# Patient Record
Sex: FEMALE | Race: ASIAN | NOT HISPANIC OR LATINO | Employment: UNEMPLOYED | URBAN - METROPOLITAN AREA
[De-identification: names, ages, dates, MRNs, and addresses within clinical notes are randomized per-mention and may not be internally consistent; named-entity substitution may affect disease eponyms.]

---

## 2017-10-23 ENCOUNTER — OFFICE VISIT (OUTPATIENT)
Dept: PEDIATRICS | Facility: CLINIC | Age: 57
End: 2017-10-23
Payer: COMMERCIAL

## 2017-10-23 VITALS
OXYGEN SATURATION: 96 % | SYSTOLIC BLOOD PRESSURE: 115 MMHG | HEIGHT: 63 IN | BODY MASS INDEX: 19.95 KG/M2 | WEIGHT: 112.6 LBS | HEART RATE: 65 BPM | DIASTOLIC BLOOD PRESSURE: 81 MMHG | TEMPERATURE: 97.4 F

## 2017-10-23 DIAGNOSIS — Z83.49 FAMILY HISTORY OF THYROID DISEASE: ICD-10-CM

## 2017-10-23 DIAGNOSIS — R94.6 ABNORMAL FINDING ON THYROID FUNCTION TEST: ICD-10-CM

## 2017-10-23 DIAGNOSIS — Z23 NEEDS FLU SHOT: ICD-10-CM

## 2017-10-23 DIAGNOSIS — M54.12 CERVICAL RADICULOPATHY: Primary | ICD-10-CM

## 2017-10-23 DIAGNOSIS — M62.838 NECK MUSCLE SPASM: ICD-10-CM

## 2017-10-23 DIAGNOSIS — M50.30 DDD (DEGENERATIVE DISC DISEASE), CERVICAL: ICD-10-CM

## 2017-10-23 DIAGNOSIS — M54.2 NECK PAIN ON LEFT SIDE: ICD-10-CM

## 2017-10-23 PROCEDURE — 90686 IIV4 VACC NO PRSV 0.5 ML IM: CPT | Performed by: FAMILY MEDICINE

## 2017-10-23 PROCEDURE — 90471 IMMUNIZATION ADMIN: CPT | Performed by: FAMILY MEDICINE

## 2017-10-23 PROCEDURE — 99203 OFFICE O/P NEW LOW 30 MIN: CPT | Mod: 25 | Performed by: FAMILY MEDICINE

## 2017-10-23 RX ORDER — TIZANIDINE 2 MG/1
2 TABLET ORAL
Qty: 30 TABLET | Refills: 0 | Status: SHIPPED | OUTPATIENT
Start: 2017-10-23 | End: 2018-05-22

## 2017-10-23 ASSESSMENT — ANXIETY QUESTIONNAIRES
1. FEELING NERVOUS, ANXIOUS, OR ON EDGE: MORE THAN HALF THE DAYS
7. FEELING AFRAID AS IF SOMETHING AWFUL MIGHT HAPPEN: SEVERAL DAYS
3. WORRYING TOO MUCH ABOUT DIFFERENT THINGS: MORE THAN HALF THE DAYS
5. BEING SO RESTLESS THAT IT IS HARD TO SIT STILL: SEVERAL DAYS
GAD7 TOTAL SCORE: 12
6. BECOMING EASILY ANNOYED OR IRRITABLE: MORE THAN HALF THE DAYS
2. NOT BEING ABLE TO STOP OR CONTROL WORRYING: MORE THAN HALF THE DAYS

## 2017-10-23 ASSESSMENT — PATIENT HEALTH QUESTIONNAIRE - PHQ9
SUM OF ALL RESPONSES TO PHQ QUESTIONS 1-9: 13
5. POOR APPETITE OR OVEREATING: MORE THAN HALF THE DAYS

## 2017-10-23 NOTE — PATIENT INSTRUCTIONS
[facility] [place]  Get the flu shot todfay  Schedule for fasting labs and physical in 4 weeks  Take TIZANIDINE at night for neck spasms  Recommended  local ice and heat, avoid triggering activities, tylenol or Ibuprofen AS needed for pain and FOLLOW UP FOR persistent or worsening concerns in 4weeks.    Understanding Cervical Radiculopathy    Cervical radiculopathy is irritation or inflammation of a nerve root in the neck. It causes neck pain and other symptoms that may spread into the chest or down the arm. To understand this condition, it helps to understand the parts of the spine:    Vertebrae. These are bones that stack to form the spine. The cervical spine contains the 7 vertebrae in the neck.    Disks. These are soft pads of tissue between the vertebrae. They act as shock absorbers for the spine.    The spinal canal. This is a tunnel formed within the stacked vertebrae. The spinal cord runs through this canal.    Nerves. These branch off the spinal cord. As they leave the spinal canal, nerves pass through openings between the vertebrae. The nerve root is the part of the nerve that is closest to the spinal cord.   With cervical radiculopathy, nerve roots in the neck become irritated. This leads to pain and symptoms that can travel to the nerves that go from the spinal cord down the arms and into the torso.  What causes cervical radiculopathy?  Aging, injury, poor posture, and other issues can lead to problems in the neck. These problems may then irritate nerve roots. These include:    Damage to a disk in the cervical spine. The damaged disk may then press on nearby nerve roots.    Degeneration from wear and tear, and aging. This can lead to narrowing (stenosis) of the openings between the vertebrae. The narrowed openings press on nerve roots as they leave the spinal canal.    An unstable spine. This is when a vertebra slips forward. It can then press on a nerve root.  There are other, less common  causes of pressure on nerves in the neck. These include infection, cysts, and tumors.  Symptoms of cervical radiculopathy  These include:    Neck pain    Pain, numbness, tingling, or weakness that travels down the arm    Loss of neck movement    Muscle spasms  Treatment for cervical radiculopathy  In most cases, your healthcare provider will first try treatments that help relieve symptoms. These may include:    Prescription or over-the-counter pain medicines. These help relieve pain and swelling.    Cold packs. These help reduce pain.    Resting. This involves avoiding positions and activities that increase pain.    Neck brace (cervical collar). This can help relieve inflammation and pain.    Physical therapy, including exercises and stretches. This can help decrease pain and increase movement and function.    Shots of medicinesaround the nerve roots. This is done to help relieve symptoms for a time.  In some cases, your healthcare provider may advise surgery to fix the underlying problem. This depends on the cause, the symptoms, and how long the pain has lasted.  Possible complications  Over time, an irritated and inflamed nerve may become damaged. This may lead to long-lasting (permanent) numbness or weakness. If symptoms change suddenly or get worse, be sure to let your healthcare provider know.     When to call your healthcare provider  Call your healthcare provider right away if you have any of these:    New pain or pain that gets worse    New or increasing weakness, numbness, or tingling in your arm or hand    Bowel or bladder changes   Date Last Reviewed: 3/10/2016    5171-3301 [employer]. [address] [place] [address]. All rights reserved. This information is not intended as a substitute for professional medical care. Always follow your healthcare professional's instructions.        Cervical Disk Problems  The spine has 3 natural curves. The cervical curve is located in the neck.  It forms the top part of the spine. For this reason, it is also called the cervical spine. This sheet tells you more about the parts of the cervical spine and damaged disks. This is a common problem that can affect the cervical spine, but most people don't need surgery for this.   A healthy cervical spine  The spine is made up of the following:      Vertebrae. These are bones stacked like building blocks that make up the spine. The neck contains the first 7 vertebrae of the spine.    Disks. These are small pads of tissue that lie between the vertebrae. They help cushion and protect the vertebrae when you move.    The spinal cord. This runs through a large central opening (spinal canal) formed by the vertebrae.    Nerves. These branch from the spinal cord and carry messages to the body.    Foramina. These are smaller openings in the vertebrae. Nerves travel to your arms and other parts of your body from the spinal cord through these openings.  Damaged disks in the cervical spine    One of the most common cervical spine problems is a damaged disk. A disk may be injured by a sudden movement, causing a disk to bulge or break open (herniate). Or a disk may wear out slowly over time (degenerate). A worn-out disk may become so flat that the vertebrae above and below it touch or slip back and forth. As disks wear out, abnormal bone growths (bone spurs) can form on the vertebrae. Bone spurs can also form in the foramina, causing them to narrow (stenosis). If your healthcare provider suspects that you have a damaged disk, tests may be done to confirm the problem, such as an MRI, CT, or EMG. Your healthcare provider will then work with you to plan treatment as needed.   Date Last Reviewed: 10/4/2015    [id number] [employer]. [address] [place] [address], [place] [address]. All rights reserved. This information is not intended as a substitute for professional medical care. Always follow your healthcare professional's  instructions.        Reach and Hold Exercise       Do this exercise on your hands and knees. Keep your knees under your hips and your hands under your shoulders. Keep your spine in a neutral position (not arched or sagging). Keep your ears in line with your shoulders. Hold for a few seconds before starting the exercise:  1. Tighten your abdominal muscles and raise one arm straight in front of you, palm down. Hold for 5 seconds, then lower. Repeat 5 times.  2. Do the exercise again, this time lifting your arm to the side. Repeat 5 times.  3. Do the exercise again, this time lifting your arm backward, palm up. Repeat 5 times.  Switch sides and do each exercise with the other arm.  Date Last Reviewed: 8/16/2015 2000-2017 [employer]. [address] [place] [address]. All rights reserved. This information is not intended as a substitute for professional medical care. Always follow your healthcare professional's instructions.        Shoulder Clock Exercise    To start, stand tall with your ears, shoulders, and hips in line. Your feet should be slightly apart, positioned just under your hips. Focus your eyes directly in front of you.  this position for a few seconds before starting your exercise. This helps increase your awareness of proper posture.    Imagine that your right shoulder is the center of a clock. With the outer point of your shoulder, roll it around to slowly trace the outer edge of the clock.    Move clockwise first, then counterclockwise.    Repeat 3 to 5 times. Switch shoulders.   Date Last Reviewed: 10/2/2015    [id number] [employer]. [address] [place] [address]. All rights reserved. This information is not intended as a substitute for professional medical care. Always follow your healthcare professional's instructions.        Shoulder and Upper Back Stretch  To start, stand tall with your ears, shoulders, and hips in line. Your feet should be  slightly apart, positioned just under your hips. Focus your eyes directly in front of you.  this position for a few seconds before starting your exercise. This helps increase your awareness of proper posture.          Reach overhead and slightly back with both arms. Keep your shoulders and neck aligned and your elbows behind your shoulders:    With your palms facing the ceiling, turn your fingers inward.    Take a deep breath. Breathe out, and lower your elbows toward your buttocks. Hold for 5 seconds, then return to starting position.    Repeat 3 times.  Date Last Reviewed: 8/16/2015 2000-2017 [employer]. [address] [place] [address]. All rights reserved. This information is not intended as a substitute for professional medical care. Always follow your healthcare professional's instructions.        Neck Exercises: Active Neck Rotation    To start, lie on your back, knees bent and feet flat on the floor. Keep your ears, shoulders, and hips aligned, but don t press your lower back to the floor. Rest your hands on your pelvis. Breathe deeply and relax.    Use your neck muscles to turn your head to one side until you feel a stretch in the muscles.    Hold for 5 seconds. Then turn to the other side.    Repeat 5 times on each side.  Note: Keep your shoulders on the floor. Don t lift or tuck your chin as you turn your head.  Date Last Reviewed: 8/16/2015 2000-2017 [employer]. [address] [place] [address]. All rights reserved. This information is not intended as a substitute for professional medical care. Always follow your healthcare professional's instructions.        Neck Exercises: Arm Lift            To start, lie on your back, knees bent and feet flat on the floor. Keep your ears, shoulders, and hips aligned, but don t press your lower back to the floor. Rest your hands on your pelvis. Breathe deeply and relax. Tighten the abdominal muscles to keep  the back from arching.    Raise one arm overhead, then lower it. As you lower that arm, raise the other arm.    Continue to move both arms in slow, smooth arcs. Keep your arms straight and your head and neck relaxed.    Repeat 10 times with each arm.  For your safety, check with your healthcare provider before starting an exercise program.   Date Last Reviewed: 8/16/2015 2000-2017 [employer] [address] [place] [address], [place] [address]. All rights reserved. This information is not intended as a substitute for professional medical care. Always follow your healthcare professional's instructions.

## 2017-10-23 NOTE — MR AVS SNAPSHOT
After Visit Summary   10/23/2017    Jaimie Melvin    MRN: [id number]           Patient Information     Date Of Birth          1960        Visit Information        Provider Department      10/23/2017 2:10 PM Toni Harkins MD [facility]        Today's Diagnoses     Abnormal finding on thyroid function test    -  1    Family history of thyroid disease        Neck pain on left side        Cervical radiculopathy        DDD (degenerative disc disease), cervical        Neck muscle spasm          Care Instructions    KADEEM [facility] [place] records form [place]  Get the flu shot todfay  Schedule for fasting labs and physical in 4 weeks  Take TIZANIDINE at night for neck spasms  Recommended  local ice and heat, avoid triggering activities, tylenol or Ibuprofen AS needed for pain and FOLLOW UP FOR persistent or worsening concerns in 4weeks.    Understanding Cervical Radiculopathy    Cervical radiculopathy is irritation or inflammation of a nerve root in the neck. It causes neck pain and other symptoms that may spread into the chest or down the arm. To understand this condition, it helps to understand the parts of the spine:    Vertebrae. These are bones that stack to form the spine. The cervical spine contains the 7 vertebrae in the neck.    Disks. These are soft pads of tissue between the vertebrae. They act as shock absorbers for the spine.    The spinal canal. This is a tunnel formed within the stacked vertebrae. The spinal cord runs through this canal.    Nerves. These branch off the spinal cord. As they leave the spinal canal, nerves pass through openings between the vertebrae. The nerve root is the part of the nerve that is closest to the spinal cord.   With cervical radiculopathy, nerve roots in the neck become irritated. This leads to pain and symptoms that can travel to the nerves that go from the spinal cord down the arms and into the torso.  What causes cervical radiculopathy?  Aging,  injury, poor posture, and other issues can lead to problems in the neck. These problems may then irritate nerve roots. These include:    Damage to a disk in the cervical spine. The damaged disk may then press on nearby nerve roots.    Degeneration from wear and tear, and aging. This can lead to narrowing (stenosis) of the openings between the vertebrae. The narrowed openings press on nerve roots as they leave the spinal canal.    An unstable spine. This is when a vertebra slips forward. It can then press on a nerve root.  There are other, less common causes of pressure on nerves in the neck. These include infection, cysts, and tumors.  Symptoms of cervical radiculopathy  These include:    Neck pain    Pain, numbness, tingling, or weakness that travels down the arm    Loss of neck movement    Muscle spasms  Treatment for cervical radiculopathy  In most cases, your healthcare provider will first try treatments that help relieve symptoms. These may include:    Prescription or over-the-counter pain medicines. These help relieve pain and swelling.    Cold packs. These help reduce pain.    Resting. This involves avoiding positions and activities that increase pain.    Neck brace (cervical collar). This can help relieve inflammation and pain.    Physical therapy, including exercises and stretches. This can help decrease pain and increase movement and function.    Shots of medicinesaround the nerve roots. This is done to help relieve symptoms for a time.  In some cases, your healthcare provider may advise surgery to fix the underlying problem. This depends on the cause, the symptoms, and how long the pain has lasted.  Possible complications  Over time, an irritated and inflamed nerve may become damaged. This may lead to long-lasting (permanent) numbness or weakness. If symptoms change suddenly or get worse, be sure to let your healthcare provider know.     When to call your healthcare provider  Call your healthcare provider  right away if you have any of these:    New pain or pain that gets worse    New or increasing weakness, numbness, or tingling in your arm or hand    Bowel or bladder changes   Date Last Reviewed: 3/10/2016    5807-2527 [employer]. [address] [place] [address]. All rights reserved. This information is not intended as a substitute for professional medical care. Always follow your healthcare professional's instructions.        Cervical Disk Problems  The spine has 3 natural curves. The cervical curve is located in the neck. It forms the top part of the spine. For this reason, it is also called the cervical spine. This sheet tells you more about the parts of the cervical spine and damaged disks. This is a common problem that can affect the cervical spine, but most people don't need surgery for this.   A healthy cervical spine  The spine is made up of the following:      Vertebrae. These are bones stacked like building blocks that make up the spine. The neck contains the first 7 vertebrae of the spine.    Disks. These are small pads of tissue that lie between the vertebrae. They help cushion and protect the vertebrae when you move.    The spinal cord. This runs through a large central opening (spinal canal) formed by the vertebrae.    Nerves. These branch from the spinal cord and carry messages to the body.    Foramina. These are smaller openings in the vertebrae. Nerves travel to your arms and other parts of your body from the spinal cord through these openings.  Damaged disks in the cervical spine    One of the most common cervical spine problems is a damaged disk. A disk may be injured by a sudden movement, causing a disk to bulge or break open (herniate). Or a disk may wear out slowly over time (degenerate). A worn-out disk may become so flat that the vertebrae above and below it touch or slip back and forth. As disks wear out, abnormal bone growths (bone spurs) can form on the vertebrae.  Bone spurs can also form in the foramina, causing them to narrow (stenosis). If your healthcare provider suspects that you have a damaged disk, tests may be done to confirm the problem, such as an MRI, CT, or EMG. Your healthcare provider will then work with you to plan treatment as needed.   Date Last Reviewed: 10/4/2015    3343-7216 [employer]. [address] [place] [address]. All rights reserved. This information is not intended as a substitute for professional medical care. Always follow your healthcare professional's instructions.        Reach and Hold Exercise       Do this exercise on your hands and knees. Keep your knees under your hips and your hands under your shoulders. Keep your spine in a neutral position (not arched or sagging). Keep your ears in line with your shoulders. Hold for a few seconds before starting the exercise:  1. Tighten your abdominal muscles and raise one arm straight in front of you, palm down. Hold for 5 seconds, then lower. Repeat 5 times.  2. Do the exercise again, this time lifting your arm to the side. Repeat 5 times.  3. Do the exercise again, this time lifting your arm backward, palm up. Repeat 5 times.  Switch sides and do each exercise with the other arm.  Date Last Reviewed: 8/16/2015 2000-2017 [employer]. [address] [place] [address]. All rights reserved. This information is not intended as a substitute for professional medical care. Always follow your healthcare professional's instructions.        Shoulder Clock Exercise    To start, stand tall with your ears, shoulders, and hips in line. Your feet should be slightly apart, positioned just under your hips. Focus your eyes directly in front of you.  this position for a few seconds before starting your exercise. This helps increase your awareness of proper posture.    Imagine that your right shoulder is the center of a clock. With the outer point of your  shoulder, roll it around to slowly trace the outer edge of the clock.    Move clockwise first, then counterclockwise.    Repeat 3 to 5 times. Switch shoulders.   Date Last Reviewed: 10/2/2015    6351-4227 [employer]. [address] [place] [address]. All rights reserved. This information is not intended as a substitute for professional medical care. Always follow your healthcare professional's instructions.        Shoulder and Upper Back Stretch  To start, stand tall with your ears, shoulders, and hips in line. Your feet should be slightly apart, positioned just under your hips. Focus your eyes directly in front of you.  this position for a few seconds before starting your exercise. This helps increase your awareness of proper posture.          Reach overhead and slightly back with both arms. Keep your shoulders and neck aligned and your elbows behind your shoulders:    With your palms facing the ceiling, turn your fingers inward.    Take a deep breath. Breathe out, and lower your elbows toward your buttocks. Hold for 5 seconds, then return to starting position.    Repeat 3 times.  Date Last Reviewed: 8/16/2015 2000-2017 [employer]. [address] [place] [address]. All rights reserved. This information is not intended as a substitute for professional medical care. Always follow your healthcare professional's instructions.        Neck Exercises: Active Neck Rotation    To start, lie on your back, knees bent and feet flat on the floor. Keep your ears, shoulders, and hips aligned, but don t press your lower back to the floor. Rest your hands on your pelvis. Breathe deeply and relax.    Use your neck muscles to turn your head to one side until you feel a stretch in the muscles.    Hold for 5 seconds. Then turn to the other side.    Repeat 5 times on each side.  Note: Keep your shoulders on the floor. Don t lift or tuck your chin as you turn your head.  Date  Last Reviewed: 8/16/2015 2000-2017 [employer]. [address] [place] [address]. All rights reserved. This information is not intended as a substitute for professional medical care. Always follow your healthcare professional's instructions.        Neck Exercises: Arm Lift            To start, lie on your back, knees bent and feet flat on the floor. Keep your ears, shoulders, and hips aligned, but don t press your lower back to the floor. Rest your hands on your pelvis. Breathe deeply and relax. Tighten the abdominal muscles to keep the back from arching.    Raise one arm overhead, then lower it. As you lower that arm, raise the other arm.    Continue to move both arms in slow, smooth arcs. Keep your arms straight and your head and neck relaxed.    Repeat 10 times with each arm.  For your safety, check with your healthcare provider before starting an exercise program.   Date Last Reviewed: 8/16/2015 2000-2017 [employer]. [address] [place] [address]. All rights reserved. This information is not intended as a substitute for professional medical care. Always follow your healthcare professional's instructions.                Follow-ups after your visit        Who to contact     If you have questions or need follow up information about today's clinic visit or your schedule please contact [facility] directly at [phone].  Normal or non-critical lab and imaging results will be communicated to you by MyChart, letter or phone within 4 business days after the clinic has received the results. If you do not hear from us within 7 days, please contact the clinic through MyChart or phone. If you have a critical or abnormal lab result, we will notify you by phone as soon as possible.  Submit refill requests through [employer] or call your pharmacy and they will forward the refill request to us. Please allow 3 business days for your refill to be completed.  "         Additional Information About Your Visit        [employer] Information     Knowable is an electronic gateway that provides easy, online access to your medical records. With Knowable, you can request a clinic appointment, read your test results, renew a prescription or communicate with your care team.     To sign up for Knowable visit the website at [email or web address]   You will be asked to enter the access code listed below, as well as some personal information. Please follow the directions to create your username and password.     Your access code is: [email or web address]  Expires: 2018  2:28 PM     Your access code will  in 90 days. If you need help or a new code, please contact your [facility] or call [phone] for assistance.        Care EveryWhere ID     This is your Care EveryWhere ID. This could be used by other organizations to access your [facility] medical records  [id number]        Your Vitals Were     Pulse Temperature Height Pulse Oximetry BMI (Body Mass Index)       65 97.4  F (36.3  C) (Temporal) 5' 2.5\" (1.588 m) 96% 20.27 kg/m2        Blood Pressure from Last 3 Encounters:   10/23/17 115/81    Weight from Last 3 Encounters:   10/23/17 112 lb 9.6 oz (51.1 kg)              Today, you had the following     No orders found for display         Today's Medication Changes          These changes are accurate as of: 10/23/17  2:28 PM.  If you have any questions, ask your nurse or doctor.               Start taking these medicines.        Dose/Directions    tiZANidine 2 MG tablet   Commonly known as:  ZANAFLEX   Used for:  Neck pain on left side, Cervical radiculopathy, DDD (degenerative disc disease), cervical, Neck muscle spasm   Started by:  Toni Harkins MD        Dose:  2 mg   Take 1 tablet (2 mg) by mouth nightly as needed for muscle spasms   Quantity:  30 tablet   Refills:  0            Where to get your medicines      These medications were sent to " [facility] - [place], [place] - [address], [address]  [address], [facility] [address]     Phone:  [phone]     tiZANidine 2 MG tablet                Primary Care Provider Office Phone # Fax #    [facility] [phone]       [address]  [facility] [address]        Equal Access to Services     CALLIE ALONZO : Hadii aad ku hadasho Soomaali, waaxda luqadaha, qaybta kaalmada adeegyada, waxay idiin hayaan adeeg kharash la'aan . [facility] [phone].    ATENCIÓN: Si habla español, tiene a ken disposición servicios gratuitos de asistencia lingüística. [facility] [phone].    We comply with applicable federal civil rights laws and [place] laws. We do not discriminate on the basis of race, color, national origin, age, disability, sex, sexual orientation, or gender identity.            Thank you!     Thank you for choosing [facility]  for your care. Our goal is always to provide you with excellent care. Hearing back from our patients is one way we can continue to improve our services. Please take a few minutes to complete the written survey that you may receive in the mail after your visit with us. Thank you!             Your Updated Medication List - Protect others around you: Learn how to safely use, store and throw away your medicines at www.disposemymeds.org.          This list is accurate as of: 10/23/17  2:28 PM.  Always use your most recent med list.                   Brand Name Dispense Instructions for use Diagnosis    tiZANidine 2 MG tablet    ZANAFLEX    30 tablet    Take 1 tablet (2 mg) by mouth nightly as needed for muscle spasms    Neck pain on left side, Cervical radiculopathy, DDD (degenerative disc disease), cervical, Neck muscle spasm

## 2017-10-23 NOTE — PROGRESS NOTES
SUBJECTIVE:   Jaimie Melvin is a 57 year old female who presents to clinic today for the following health issues:    This is a new patient to this provider, here today  To establish care  Patient recently moved from [place], [place], living with her sister in [place]  Past medical history is Significant for cervical degenerative disc disease, family history of thyroid disease. 4 sisters with hyperthyroidism,Personal history of having abnormal thyroid function test results  Denies cold or heat intolerance, weight loss or gain, change in bowel movements, hair loss or thinning, chest pain, palpitations, SOB, edema legs or eyes, dry skin, memory problems.    She is also here with concerns of having Pain on both sides of the neck muscles left worse than the right associated with some tingling and numbness in both the upper extremities and with no associated new onset of bladder or bowel incontinence.  Patient denies headaches, recent history of fall or trauma      Concern - Establish Care/Thyroid Concerns/Pain on the left/right side of her neck.  Patient states her 4 sisters have hyperthyroidism and she would like to be checked.  Onset: 2 days ago    Description:   Pain on the left side of her neck.      Intensity: 3-4/10    Progression of Symptoms:  same    Accompanying Signs & Symptoms:  Patient states she has bilateral shoulder pain    Previous history of similar problem:   Yes    Precipitating factors:   Worsened by: Lifting things.    Alleviating factors:  Improved by: Applying heat/tylenol    Therapies Tried and outcome: Seen by Neurologist in [place] diagnosed with arthritis.          Problem list and histories reviewed & adjusted, as indicated.  Additional history: as documented    Patient Active Problem List   Diagnosis     DDD (degenerative disc disease), cervical     Cervical radiculopathy     Neck pain on left side     History reviewed. No pertinent surgical history.    Social History   Substance Use Topics  "    Smoking status: Never Smoker     Smokeless tobacco: Never Used     Alcohol use No     Family History   Problem Relation Age of Onset     Colon Cancer Mother      Lung Cancer Father      Lung Cancer Brother      Thyroid Disease Sister          Current Outpatient Prescriptions   Medication Sig Dispense Refill     tiZANidine (ZANAFLEX) 2 MG tablet Take 1 tablet (2 mg) by mouth nightly as needed for muscle spasms 30 tablet 0     Not on File  No lab results found.   BP Readings from Last 3 Encounters:   10/23/17 115/81    Wt Readings from Last 3 Encounters:   10/23/17 112 lb 9.6 oz (51.1 kg)                  Labs reviewed in EPIC          Reviewed and updated as needed this visit by clinical staffTobacco  Allergies  Meds  Med Hx  Surg Hx  Fam Hx  Soc Hx      Reviewed and updated as needed this visit by Provider         ROS:  C: NEGATIVE for fever, chills, change in weight  INTEGUMENTARY/SKIN: NEGATIVE for worrisome rashes, moles or lesions  R: NEGATIVE for significant cough or SOB  CV: NEGATIVE for chest pain, palpitations or peripheral edema  GI: NEGATIVE for nausea, abdominal pain, heartburn, or change in bowel habits  MUSCULOSKELETAL: as above  NEURO: as above  ENDOCRINE: as above  HEME/ALLERGY/IMMUNE: NEGATIVE for bleeding problems  PSYCHIATRIC: NEGATIVE for changes in mood or affect    OBJECTIVE:     /81  Pulse 65  Temp 97.4  F (36.3  C) (Temporal)  Ht 5' 2.5\" (1.588 m)  Wt 112 lb 9.6 oz (51.1 kg)  SpO2 96%  BMI 20.27 kg/m2  Body mass index is 20.27 kg/(m^2).  GENERAL: healthy, alert and no distress  NECK: no adenopathy, no asymmetry, masses, or scars and thyroid normal to palpation  RESP: lungs clear to auscultation - no rales, rhonchi or wheezes  CV: regular rate and rhythm, normal S1 S2, no S3 or S4, no murmur, click or rub, no peripheral edema and peripheral pulses strong  MS: Minimal to moderate tenderness over the Trapezius muscles on both sides, no cervical spine tenderness, slightly " reduced range of motion during flexion and extension for muscle spasm  SKIN: no suspicious lesions or rashes  NEURO: Normal strength and tone, mentation intact and speech normal  BACK: no CVA tenderness, no paralumbar tenderness  Comprehensive back pain exam:  No tenderness, Range of motion not limited by pain, Lower extremity strength functional and equal on both sides, Lower extremity reflexes within normal limits bilaterally, Lower extremity sensation normal and equal on both sides and Straight leg raise negative bilaterally  PSYCH: mentation appears normal, affect normal/bright    Diagnostic Test Results:  none     ASSESSMENT/PLAN:             1. Cervical radiculopathy  Per patient's report, she had a cervical MRI done 2 months ago at the previous clinic  Will send KADEEM for old records and reports  Prescription given for tizanidine to use at night as needed for muscle spasm, will try over-the-counter Aleve or ibuprofen as needed for  Pain  Patient education handouts on neck rehabilitation exercises  Will recheck at the visit for physical in 4 weeks or sooner if needed  Consider spine versus sports consult based on the MRI results  Dosing and potential medication side effects discussed.  Patient verbalised understanding and is agreeable to the plan.    - tiZANidine (ZANAFLEX) 2 MG tablet; Take 1 tablet (2 mg) by mouth nightly as needed for muscle spasms  Dispense: 30 tablet; Refill: 0    2. Neck pain on left side  as above    - tiZANidine (ZANAFLEX) 2 MG tablet; Take 1 tablet (2 mg) by mouth nightly as needed for muscle spasms  Dispense: 30 tablet; Refill: 0    3. DDD (degenerative disc disease), cervical  as above    - tiZANidine (ZANAFLEX) 2 MG tablet; Take 1 tablet (2 mg) by mouth nightly as needed for muscle spasms  Dispense: 30 tablet; Refill: 0    4. Abnormal finding on thyroid function test   patient has been getting thyroid labs in 6 months for monitoring due to significant family history and personal  history of abnormal thyroid functions  Will have labs done at the time of physical in 4 weeks      5. Family history of thyroid disease  as above      6. Neck muscle spasm  as above    - tiZANidine (ZANAFLEX) 2 MG tablet; Take 1 tablet (2 mg) by mouth nightly as needed for muscle spasms  Dispense: 30 tablet; Refill: 0    Chart documentation done in part with Dragon Voice recognition Software. Although reviewed after completion, some word and grammatical error may remain.    per patient's report, she had a colonoscopy a few years ago but less than 10 years ago-normal  Had a mammogram 2 years ago-normal  Had a normal Pap last year-from [place]  We will wait for old records and reports for review    See Patient Instructions    Toni Harkins MD  [facility]

## 2017-10-23 NOTE — NURSING NOTE
"Chief Complaint   Patient presents with     Establish Care     Family history of thyroid issues       Initial /81  Pulse 65  Temp 97.4  F (36.3  C) (Temporal)  Ht 5' 2.5\" (1.588 m)  Wt 112 lb 9.6 oz (51.1 kg)  SpO2 96%  BMI 20.27 kg/m2 Estimated body mass index is 20.27 kg/(m^2) as calculated from the following:    Height as of this encounter: 5' 2.5\" (1.588 m).    Weight as of this encounter: 112 lb 9.6 oz (51.1 kg).  Medication Reconciliation: complete     Patricia Sam CMA  "

## 2017-10-23 NOTE — NURSING NOTE
Injectable Influenza Immunization Documentation    1.  Is the person to be vaccinated sick today?  No    2. Does the person to be vaccinated have an allergy to eggs or to a component of the vaccine?  No    3. Has the person to be vaccinated today ever had a serious reaction to influenza vaccine in the past?  No    4. Has the person to be vaccinated ever had Guillain-Cl[place]ond[place] syndrome?  No     Form completed by Salome DECKER

## 2017-10-24 ASSESSMENT — ANXIETY QUESTIONNAIRES: GAD7 TOTAL SCORE: 12

## 2017-11-04 DIAGNOSIS — Z13.6 CARDIOVASCULAR SCREENING; LDL GOAL LESS THAN 160: ICD-10-CM

## 2017-11-04 DIAGNOSIS — Z00.00 ROUTINE GENERAL MEDICAL EXAMINATION AT A HEALTH CARE FACILITY: Primary | ICD-10-CM

## 2017-11-04 DIAGNOSIS — Z11.59 NEED FOR HEPATITIS C SCREENING TEST: ICD-10-CM

## 2017-11-04 DIAGNOSIS — Z13.29 SCREENING FOR THYROID DISORDER: ICD-10-CM

## 2017-11-04 DIAGNOSIS — Z13.1 SCREENING FOR DIABETES MELLITUS (DM): ICD-10-CM

## 2017-11-04 DIAGNOSIS — Z13.0 SCREENING FOR DEFICIENCY ANEMIA: ICD-10-CM

## 2017-11-06 DIAGNOSIS — Z13.0 SCREENING FOR DEFICIENCY ANEMIA: ICD-10-CM

## 2017-11-06 DIAGNOSIS — Z00.00 ROUTINE GENERAL MEDICAL EXAMINATION AT A HEALTH CARE FACILITY: Primary | ICD-10-CM

## 2017-11-08 DIAGNOSIS — Z13.1 SCREENING FOR DIABETES MELLITUS (DM): ICD-10-CM

## 2017-11-08 DIAGNOSIS — Z13.6 CARDIOVASCULAR SCREENING; LDL GOAL LESS THAN 160: ICD-10-CM

## 2017-11-08 DIAGNOSIS — Z00.00 ROUTINE GENERAL MEDICAL EXAMINATION AT A HEALTH CARE FACILITY: ICD-10-CM

## 2017-11-08 DIAGNOSIS — Z13.29 SCREENING FOR THYROID DISORDER: ICD-10-CM

## 2017-11-08 DIAGNOSIS — Z11.59 NEED FOR HEPATITIS C SCREENING TEST: ICD-10-CM

## 2017-11-08 DIAGNOSIS — Z13.0 SCREENING FOR DEFICIENCY ANEMIA: ICD-10-CM

## 2017-11-08 LAB
ALBUMIN SERPL-MCNC: 3.8 G/DL (ref 3.4–5)
ALP SERPL-CCNC: 43 U/L (ref 40–150)
ALT SERPL W P-5'-P-CCNC: 14 U/L (ref 0–50)
ANION GAP SERPL CALCULATED.3IONS-SCNC: 5 MMOL/L (ref 3–14)
AST SERPL W P-5'-P-CCNC: 11 U/L (ref 0–45)
BASOPHILS # BLD AUTO: 0 10E9/L (ref 0–0.2)
BASOPHILS NFR BLD AUTO: 0.4 %
BILIRUB SERPL-MCNC: 0.5 MG/DL (ref 0.2–1.3)
BUN SERPL-MCNC: 10 MG/DL (ref 7–30)
CALCIUM SERPL-MCNC: 8.2 MG/DL (ref 8.5–10.1)
CHLORIDE SERPL-SCNC: 108 MMOL/L (ref 94–109)
CHOLEST SERPL-MCNC: 230 MG/DL
CO2 SERPL-SCNC: 29 MMOL/L (ref 20–32)
CREAT SERPL-MCNC: 0.63 MG/DL (ref 0.52–1.04)
DIFFERENTIAL METHOD BLD: NORMAL
EOSINOPHIL # BLD AUTO: 0.1 10E9/L (ref 0–0.7)
EOSINOPHIL NFR BLD AUTO: 1.5 %
ERYTHROCYTE [DISTWIDTH] IN BLOOD BY AUTOMATED COUNT: 12.4 % (ref 10–15)
GFR SERPL CREATININE-BSD FRML MDRD: >90 ML/MIN/1.7M2
GLUCOSE SERPL-MCNC: 84 MG/DL (ref 70–99)
HCT VFR BLD AUTO: 44.1 % (ref 35–47)
HCV AB SERPL QL IA: NONREACTIVE
HDLC SERPL-MCNC: 77 MG/DL
HGB BLD-MCNC: 15 G/DL (ref 11.7–15.7)
LDLC SERPL CALC-MCNC: 132 MG/DL
LYMPHOCYTES # BLD AUTO: 1.4 10E9/L (ref 0.8–5.3)
LYMPHOCYTES NFR BLD AUTO: 29.3 %
MCH RBC QN AUTO: 31.8 PG (ref 26.5–33)
MCHC RBC AUTO-ENTMCNC: 34 G/DL (ref 31.5–36.5)
MCV RBC AUTO: 93 FL (ref 78–100)
MONOCYTES # BLD AUTO: 0.3 10E9/L (ref 0–1.3)
MONOCYTES NFR BLD AUTO: 5.6 %
NEUTROPHILS # BLD AUTO: 2.9 10E9/L (ref 1.6–8.3)
NEUTROPHILS NFR BLD AUTO: 63.2 %
NONHDLC SERPL-MCNC: 153 MG/DL
PLATELET # BLD AUTO: 225 10E9/L (ref 150–450)
POTASSIUM SERPL-SCNC: 3.6 MMOL/L (ref 3.4–5.3)
PROT SERPL-MCNC: 7.4 G/DL (ref 6.8–8.8)
RBC # BLD AUTO: 4.72 10E12/L (ref 3.8–5.2)
SODIUM SERPL-SCNC: 142 MMOL/L (ref 133–144)
TRIGL SERPL-MCNC: 104 MG/DL
TSH SERPL DL<=0.005 MIU/L-ACNC: 0.68 MU/L (ref 0.4–4)
WBC # BLD AUTO: 4.6 10E9/L (ref 4–11)

## 2017-11-08 PROCEDURE — 36415 COLL VENOUS BLD VENIPUNCTURE: CPT | Performed by: FAMILY MEDICINE

## 2017-11-08 PROCEDURE — 80050 GENERAL HEALTH PANEL: CPT | Performed by: FAMILY MEDICINE

## 2017-11-08 PROCEDURE — 80061 LIPID PANEL: CPT | Performed by: FAMILY MEDICINE

## 2017-11-08 PROCEDURE — 86803 HEPATITIS C AB TEST: CPT | Performed by: FAMILY MEDICINE

## 2017-11-14 ENCOUNTER — OFFICE VISIT (OUTPATIENT)
Dept: PEDIATRICS | Facility: CLINIC | Age: 57
End: 2017-11-14
Payer: COMMERCIAL

## 2017-11-14 VITALS
HEIGHT: 63 IN | OXYGEN SATURATION: 96 % | WEIGHT: 114.6 LBS | BODY MASS INDEX: 20.3 KG/M2 | SYSTOLIC BLOOD PRESSURE: 114 MMHG | DIASTOLIC BLOOD PRESSURE: 78 MMHG | HEART RATE: 64 BPM | TEMPERATURE: 97.5 F

## 2017-11-14 DIAGNOSIS — Z12.11 SCREENING FOR COLON CANCER: ICD-10-CM

## 2017-11-14 DIAGNOSIS — F43.23 ADJUSTMENT DISORDER WITH MIXED ANXIETY AND DEPRESSED MOOD: ICD-10-CM

## 2017-11-14 DIAGNOSIS — Z00.01 ENCOUNTER FOR GENERAL ADULT MEDICAL EXAMINATION WITH ABNORMAL FINDINGS: Primary | ICD-10-CM

## 2017-11-14 DIAGNOSIS — Z23 NEED FOR TDAP VACCINATION: ICD-10-CM

## 2017-11-14 DIAGNOSIS — Z12.39 BREAST CANCER SCREENING: ICD-10-CM

## 2017-11-14 DIAGNOSIS — Z12.4 SCREENING FOR CERVICAL CANCER: ICD-10-CM

## 2017-11-14 DIAGNOSIS — M50.30 DDD (DEGENERATIVE DISC DISEASE), CERVICAL: ICD-10-CM

## 2017-11-14 DIAGNOSIS — M54.12 CERVICAL RADICULOPATHY: ICD-10-CM

## 2017-11-14 PROBLEM — Z83.49 FAMILY HISTORY OF THYROID DISEASE: Status: ACTIVE | Noted: 2017-11-14

## 2017-11-14 PROCEDURE — 90471 IMMUNIZATION ADMIN: CPT | Performed by: FAMILY MEDICINE

## 2017-11-14 PROCEDURE — 99396 PREV VISIT EST AGE 40-64: CPT | Mod: 25 | Performed by: FAMILY MEDICINE

## 2017-11-14 PROCEDURE — G0476 HPV COMBO ASSAY CA SCREEN: HCPCS | Performed by: FAMILY MEDICINE

## 2017-11-14 PROCEDURE — G0123 SCREEN CERV/VAG THIN LAYER: HCPCS | Performed by: FAMILY MEDICINE

## 2017-11-14 PROCEDURE — 90715 TDAP VACCINE 7 YRS/> IM: CPT | Performed by: FAMILY MEDICINE

## 2017-11-14 RX ORDER — MULTIVIT WITH MINERALS/LUTEIN
1 TABLET ORAL DAILY
COMMUNITY
Start: 2017-11-14 | End: 2023-01-01

## 2017-11-14 ASSESSMENT — PATIENT HEALTH QUESTIONNAIRE - PHQ9
5. POOR APPETITE OR OVEREATING: SEVERAL DAYS
SUM OF ALL RESPONSES TO PHQ QUESTIONS 1-9: 10

## 2017-11-14 ASSESSMENT — ANXIETY QUESTIONNAIRES
7. FEELING AFRAID AS IF SOMETHING AWFUL MIGHT HAPPEN: SEVERAL DAYS
GAD7 TOTAL SCORE: 7
5. BEING SO RESTLESS THAT IT IS HARD TO SIT STILL: SEVERAL DAYS
2. NOT BEING ABLE TO STOP OR CONTROL WORRYING: SEVERAL DAYS
1. FEELING NERVOUS, ANXIOUS, OR ON EDGE: SEVERAL DAYS
3. WORRYING TOO MUCH ABOUT DIFFERENT THINGS: SEVERAL DAYS
6. BECOMING EASILY ANNOYED OR IRRITABLE: SEVERAL DAYS

## 2017-11-14 ASSESSMENT — PAIN SCALES - GENERAL: PAINLEVEL: MODERATE PAIN (5)

## 2017-11-14 NOTE — PROGRESS NOTES
SUBJECTIVE:   CC: Jaimie Melvin is an 57 year old woman who presents for preventive health visit.     Healthy Habits:    Do you get at least three servings of calcium containing foods daily (dairy, green leafy vegetables, etc.)? yes    Amount of exercise or daily activities, outside of work: 3-4 day(s) per week    Problems taking medications regularly No    Medication side effects: No    Have you had an eye exam in the past two years? no    Do you see a dentist twice per year? yes    Do you have sleep apnea, excessive snoring or daytime drowsiness?no              Today's PHQ-2 Score:   PHQ-2 ( 1999 [employer]) 11/14/2017 10/23/2017   Q1: Little interest or pleasure in doing things 3 0   Q2: Feeling down, depressed or hopeless 3 3   PHQ-2 Score 6 3         Abuse: Current or Past(Physical, Sexual or Emotional)- No  Do you feel safe in your environment - Yes  Social History   Substance Use Topics     Smoking status: Never Smoker     Smokeless tobacco: Never Used     Alcohol use No     The patient does not drink >3 drinks per day nor >7 drinks per week.    Reviewed orders with patient.  Reviewed health maintenance and updated orders accordingly - Yes  Labs reviewed in EPIC  BP Readings from Last 3 Encounters:   11/14/17 114/78   10/23/17 115/81    Wt Readings from Last 3 Encounters:   11/14/17 114 lb 9.6 oz (52 kg)   10/23/17 112 lb 9.6 oz (51.1 kg)                  Patient Active Problem List   Diagnosis     DDD (degenerative disc disease), cervical     Cervical radiculopathy     Neck pain on left side     CARDIOVASCULAR SCREENING; LDL GOAL LESS THAN 160     Adjustment disorder with mixed anxiety and depressed mood     History reviewed. No pertinent surgical history.    Social History   Substance Use Topics     Smoking status: Never Smoker     Smokeless tobacco: Never Used     Alcohol use No     Family History   Problem Relation Age of Onset     Colon Cancer Mother      Lung Cancer Father      Lung Cancer Brother       Thyroid Disease Sister          Current Outpatient Prescriptions   Medication Sig Dispense Refill     BIOTIN PO Take 1 tablet by mouth daily       VITAMIN D, CHOLECALCIFEROL, PO Take 5,000 Units by mouth daily       Multiple Vitamins-Minerals (CENTRUM SILVER) per tablet Take 1 tablet by mouth daily       Misc Natural Products (OSTEO BI-FLEX JOINT SHIELD PO) Take 1 tablet by mouth daily       tiZANidine (ZANAFLEX) 2 MG tablet Take 1 tablet (2 mg) by mouth nightly as needed for muscle spasms 30 tablet 0     No Known Allergies  Recent Labs   Lab Test  11/08/17   0736   LDL  132*   HDL  77   TRIG  104   ALT  14   CR  0.63   GFRESTIMATED  >90   GFRESTBLACK  >90   POTASSIUM  3.6   TSH  0.68              Patient over age 50, mutual decision to screen reflected in health maintenance.      Pertinent mammograms are reviewed under the imaging tab.  History of abnormal Pap smear: NO - age 30- 65 PAP every 3 years recommended    Reviewed and updated as needed this visit by clinical staffTobacco  Allergies  Meds  Med Hx  Surg Hx  Fam Hx  Soc Hx        Reviewed and updated as needed this visit by Provider          Past Medical History:   Diagnosis Date     Arthritis      Depressive disorder       History reviewed. No pertinent surgical history.  Obstetric History     No data available            ROS:  C: NEGATIVE for fever, chills, change in weight  I: NEGATIVE for worrisome rashes, moles or lesions  E: NEGATIVE for vision changes or irritation  ENT: NEGATIVE for ear, mouth and throat problems  R: NEGATIVE for significant cough or SOB  B: NEGATIVE for masses, tenderness or discharge  CV: NEGATIVE for chest pain, palpitations or peripheral edema  GI: NEGATIVE for nausea, abdominal pain, heartburn, or change in bowel habits  : NEGATIVE for unusual urinary or vaginal symptoms. No vaginal bleeding.  MUSCULOSKELETAL:Slightly improved but persistent left-sided neck pain  With cervical radiculopathy, was seen a few weeks ago for  "the same, improved on muscle relaxer  N: NEGATIVE for weakness, dizziness or paresthesias  E: NEGATIVE for temperature intolerance, skin/hair changes  H: NEGATIVE for bleeding problems  PSYCHIATRIC: Ongoing concerns for anxiety and depression secondary to prolonged grief from loss of her  in January/2017, moved from [facility] to live close to her sister     OBJECTIVE:   /78 (BP Location: Right arm, Patient Position: Sitting, Cuff Size: Adult Regular)  Pulse 64  Temp 97.5  F (36.4  C) (Oral)  Ht 5' 2.5\" (1.588 m)  Wt 114 lb 9.6 oz (52 kg)  SpO2 96%  BMI 20.63 kg/m2  EXAM:  GENERAL APPEARANCE: healthy, alert and no distress  EYES: Eyes grossly normal to inspection, PERRL and conjunctivae and sclerae normal  HENT: ear canals and TM's normal, nose and mouth without ulcers or lesions, oropharynx clear and oral mucous membranes moist  NECK: no adenopathy, no asymmetry, masses, or scars and thyroid normal to palpation  RESP: lungs clear to auscultation - no rales, rhonchi or wheezes  BREAST: normal without masses, tenderness or nipple discharge and no palpable axillary masses or adenopathy  CV: regular rate and rhythm, normal S1 S2, no S3 or S4, no murmur, click or rub, no peripheral edema and peripheral pulses strong  ABDOMEN: soft, nontender, no hepatosplenomegaly, no masses and bowel sounds normal   (female): normal female external genitalia, normal urethral meatus, vaginal mucosal atrophy noted, normal cervix, adnexae, and uterus without masses or abnormal discharge  MS: no musculoskeletal defects are noted and gait is age appropriate without ataxia  SKIN: no suspicious lesions or rashes  NEURO: Normal strength and tone, sensory exam grossly normal, mentation intact and speech normal  PSYCH: mentation appears normal and tearful    ASSESSMENT/PLAN:   1. Encounter for general adult medical examination with abnormal findings  Discussed on regular exercises, daily calcium intake, healthy eating, " self breast exams monthly and routine dental checks  - MA Screening Digital Bilateral; Future  - Pap imaged thin layer screen with HPV - recommended age 30 - 65 years (select HPV order below)  - HPV High Risk Types DNA Cervical  - TDAP VACCINE (ADACEL)    2. Adjustment disorder with mixed anxiety and depressed mood  Patient was tearful today, and stress from recent and loss of  in January  She is trying to talk to her  at [employer], family and friends had good social support system  Offered to talk to [facility] today, patient is not interested at this time  Reviewed her PHQ-9, she did mention about not wanting to live but denies suicidal ideations or previous attempts  Patient is not interested in seeking any medical treatment or counseling at this time  She will monitor, follow-up p.r.n.  - DEPRESSION ACTION PLAN (DAP)    3. Cervical radiculopathy  Persistent pain, be started on physical therapy and consult sports medicine for further evaluation including possible cervical MRA  - SPORTS MEDICINE REFERRAL  - [facility]    4. DDD (degenerative disc disease), cervical  as above    - SPORTS MEDICINE REFERRAL  - [facility]    5. Breast cancer screening    - MA Screening Digital Bilateral; Future    6. Screening for cervical cancer    - Pap imaged thin layer screen with HPV - recommended age 30 - 65 years (select HPV order below)  - HPV High Risk Types DNA Cervical    7. Need for Tdap vaccination    - TDAP VACCINE (ADACEL)    8. Screening for colon cancer  The patient's report, she had a normal colonoscopy 2 years ago in [place], [place]      COUNSELING:   Reviewed preventive health counseling, as reflected in patient instructions  Special attention given to:        Regular exercise       Healthy diet/nutrition       Vision screening       Immunizations    Vaccinated for: TDAP           Osteoporosis Prevention/Bone Health       Colon cancer screening       Consider  "Hep C screening for patients born between 1945 and 1965       (Nelly)menopause management       The 10-year ASCVD risk score (Shanna GOODWIN Jr, et al., 2013) is: 1.9%    Values used to calculate the score:      Age: 57 years      Sex: Female      Is Non- : No      Diabetic: No      Tobacco smoker: No      Systolic Blood Pressure: 114 mmHg      Is BP treated: No      HDL Cholesterol: 77 mg/dL      Total Cholesterol: 230 mg/dL       Advance Care Planning           reports that she has never smoked. She has never used smokeless tobacco.    Estimated body mass index is 20.63 kg/(m^2) as calculated from the following:    Height as of this encounter: 5' 2.5\" (1.588 m).    Weight as of this encounter: 114 lb 9.6 oz (52 kg).         Counseling Resources:  ATP IV Guidelines  Pooled Cohorts Equation Calculator  Breast Cancer Risk Calculator  FRAX Risk Assessment  ICSI Preventive Guidelines  Dietary Guidelines for Americans, 2010  USDA's MyPlate  ASA Prophylaxis  Lung CA Screening    Toni Harkins MD  [facility]  Chart documentation done in part with Dragon Voice recognition Software. Although reviewed after completion, some word and grammatical error may remain.    "

## 2017-11-14 NOTE — LETTER
November 25, 2017    Jaimie Melvin  [address] [facility] [address]    Dear Jaimie,  We are happy to inform you that your PAP smear result from 11/14/17 is normal.  We are now able to do a follow up test on PAP smears. The DNA test is for HPV (Human Papilloma Virus). Cervical cancer is closely linked with certain types of HPV. Your result showed no evidence of high risk HPV.  Therefore we recommend you return in 3 years for your next pap smear.  You will still need to return to the clinic every year for an annual exam and other preventive tests.  Please contact the clinic at [phone] with any questions.  Sincerely,    Toni Harkins MD/arash

## 2017-11-14 NOTE — LETTER
My Depression Action Plan  Name: Jaimie Melvin   Date of Birth 1960  Date: 11/14/2017    My doctor: [facility]   My clinic: [address] [address] [phone]          GREEN    ZONE   Good Control    What it looks like:     Things are going generally well. You have normal up s and down s. You may even feel depressed from time to time, but bad moods usually last less than a day.   What you need to do:  1. Continue to care for yourself (see self care plan)  2. Check your depression survival kit and update it as needed  3. Follow your physician s recommendations including any medication.  4. Do not stop taking medication unless you consult with your physician first.           YELLOW         ZONE Getting Worse    What it looks like:     Depression is starting to interfere with your life.     It may be hard to get out of bed; you may be starting to isolate yourself from others.    Symptoms of depression are starting to last most all day and this has happened for several days.     You may have suicidal thoughts but they are not constant.   What you need to do:     1. Call your care team, your response to treatment will improve if you keep your care team informed of your progress. Yellow periods are signs an adjustment may need to be made.     2. Continue your self-care, even if you have to fake it!    3. Talk to someone in your support network    4. Open up your depression survival kit           RED    ZONE Medical Alert - Get Help    What it looks like:     Depression is seriously interfering with your life.     You may experience these or other symptoms: You can t get out of bed most days, can t work or engage in other necessary activities, you have trouble taking care of basic hygiene, or basic responsibilities, thoughts of suicide or death that will not go away, self-injurious behavior.     What you need to do:  1. Call your care  team and request a same-day appointment. If they are not available (weekends or after hours) call your local crisis line, emergency room or 911.      Electronically signed by: Toni Harkins, November 14, 2017    Depression Self Care Plan / Survival Kit    Self-Care for Depression  Here s the deal. Your body and mind are really not as separate as most people think.  What you do and think affects how you feel and how you feel influences what you do and think. This means if you do things that people who feel good do, it will help you feel better.  Sometimes this is all it takes.  There is also a place for medication and therapy depending on how severe your depression is, so be sure to consult with your medical provider and/ or Behavioral Health Consultant if your symptoms are worsening or not improving.     In order to better manage my stress, I will:    Exercise  Get some form of exercise, every day. This will help reduce pain and release endorphins, the  feel good  chemicals in your brain. This is almost as good as taking antidepressants!  This is not the same as joining a gym and then never going! (they count on that by the way ) It can be as simple as just going for a walk or doing some gardening, anything that will get you moving.      Hygiene   Maintain good hygiene (Get out of bed in the morning, Make your bed, Brush your teeth, Take a shower, and Get dressed like you were going to work, even if you are unemployed).  If your clothes don't fit try to get ones that do.    Diet  I will strive to eat foods that are good for me, drink plenty of water, and avoid excessive sugar, caffeine, alcohol, and other mood-altering substances.  Some foods that are helpful in depression are: complex carbohydrates, B vitamins, flaxseed, fish or fish oil, fresh fruits and vegetables.    Psychotherapy  I agree to participate in Individual Therapy (if recommended).    Medication  If prescribed medications, I agree to take them.   Missing doses can result in serious side effects.  I understand that drinking alcohol, or other illicit drug use, may cause potential side effects.  I will not stop my medication abruptly without first discussing it with my provider.    Staying Connected With Others  I will stay in touch with my friends, family members, and my primary care provider/team.    Use your imagination  Be creative.  We all have a creative side; it doesn t matter if it s oil painting, sand castles, or mud pies! This will also kick up the endorphins.    Witness Beauty  (AKA stop and smell the roses) Take a look outside, even in mid-winter. Notice colors, textures. Watch the squirrels and birds.     Service to others  Be of service to others.  There is always someone else in need.  By helping others we can  get out of ourselves  and remember the really important things.  This also provides opportunities for practicing all the other parts of the program.    Humor  Laugh and be silly!  Adjust your TV habits for less news and crime-drama and more comedy.    Control your stress  Try breathing deep, massage therapy, biofeedback, and meditation. Find time to relax each day.     My support system    Clinic Contact:  Phone number:    Contact 1:  Phone number:    Contact 2:  Phone number:    [employer]/:  Phone number:    Therapist:  Phone number:    Local crisis center:    Phone number:    Other community support:  Phone number:

## 2017-11-14 NOTE — MR AVS SNAPSHOT
After Visit Summary   11/14/2017    Jaimie Melvin    MRN: [id number]           Patient Information     Date Of Birth          1960        Visit Information        Provider Department      11/14/2017 1:30 PM Toni Harkins MD [facility]        Today's Diagnoses     Encounter for general adult medical examination with abnormal findings    -  1    Adjustment disorder with mixed anxiety and depressed mood        Cervical radiculopathy        DDD (degenerative disc disease), cervical        Breast cancer screening        Screening for cervical cancer        Need for Tdap vaccination        Screening for colon cancer          Care Instructions    Get the TDAP shot today  Schedule for mammogram  Schedule for sports consult  Start on physical therapy for the neck      Preventive Health Recommendations  Female Ages 50 - 64    Yearly exam: See your health care provider every year in order to  o Review health changes.   o Discuss preventive care.    o Review your medicines if your doctor has prescribed any.      Get a Pap test every three years (unless you have an abnormal result and your provider advises testing more often).    If you get Pap tests with HPV test, you only need to test every 5 years, unless you have an abnormal result.     You do not need a Pap test if your uterus was removed (hysterectomy) and you have not had cancer.    You should be tested each year for STDs (sexually transmitted diseases) if you're at risk.     Have a mammogram every 1 to 2 years.    Have a colonoscopy at age 50, or have a yearly FIT test (stool test). These exams screen for colon cancer.      Have a cholesterol test every 5 years, or more often if advised.    Have a diabetes test (fasting glucose) every three years. If you are at risk for diabetes, you should have this test more often.     If you are at risk for osteoporosis (brittle bone disease), think about having a bone density scan  (DEXA).    Shots: Get a flu shot each year. Get a tetanus shot every 10 years.    Nutrition:     Eat at least 5 servings of fruits and vegetables each day.    Eat whole-grain bread, whole-wheat pasta and brown rice instead of white grains and rice.    Talk to your provider about Calcium and Vitamin D.     Lifestyle    Exercise at least 150 minutes a week (30 minutes a day, 5 days a week). This will help you control your weight and prevent disease.    Limit alcohol to one drink per day.    No smoking.     Wear sunscreen to prevent skin cancer.     See your dentist every six months for an exam and cleaning.    See your eye doctor every 1 to 2 years.            Follow-ups after your visit        Additional Services     [facility] PT, HAND, AND CHIROPRACTIC REFERRAL       **This order will print in the [facility] Scheduling Office**    Physical Therapy, Hand Therapy and Chiropractic Care are available through:    *[facility]  *[facility]  *[facility]    Call one number to schedule at any of the above locations: [phone].    Your provider has referred you to: Physical Therapy at [facility] or [facility]    Indication/Reason for Referral: as below  Onset of Illness: months  Therapy Orders: Evaluate and Treat  Special Programs: None  Special Request: None    Rene López      Additional Comments for the Therapist or Chiropractor: none    Please be aware that coverage of these services is subject to the terms and limitations of your health insurance plan.  Call member services at your health plan with any benefit or coverage questions.      Please bring the following to your appointment:    *Your personal calendar for scheduling future appointments  *Comfortable clothing            SPORTS MEDICINE REFERRAL       Your provider has referred you to:  [facility]: [facility] [phone]   [email or web address]    Please be aware that  coverage of these services is subject to the terms and limitations of your health insurance plan.  Call member services at your health plan with any benefit or coverage questions.      Please bring the following to your appointment:    >>   Any x-rays, CTs or MRIs which have been performed.  Contact the facility where they were done to arrange for  prior to your scheduled appointment.    >>   List of current medications   >>   This referral request   >>   Any documents/labs given to you for this referral                  Future tests that were ordered for you today     Open Future Orders        Priority Expected Expires Ordered    MA Screening Digital Bilateral Routine  11/14/2018 11/14/2017            Who to contact     If you have questions or need follow up information about today's clinic visit or your schedule please contact [facility] directly at [phone].  Normal or non-critical lab and imaging results will be communicated to you by MyChart, letter or phone within 4 business days after the clinic has received the results. If you do not hear from us within 7 days, please contact the clinic through MyChart or phone. If you have a critical or abnormal lab result, we will notify you by phone as soon as possible.  Submit refill requests through [employer] or call your pharmacy and they will forward the refill request to us. Please allow 3 business days for your refill to be completed.          Additional Information About Your Visit        [employer]et Information     [employer]et is an electronic gateway that provides easy, online access to your medical records. With [employer]anet, you can request a clinic appointment, read your test results, renew a prescription or communicate with your care team.     To sign up for [employer] visit the website at [email or web address]   You will be asked to enter the access code listed below, as well as some personal information. Please follow the directions to  "create your username and password.     Your access code is: [email or web address]  Expires: 2018  1:28 PM     Your access code will  in 90 days. If you need help or a new code, please contact your [facility] or call [phone] for assistance.        Care EveryWhere ID     This is your Care EveryWhere ID. This could be used by other organizations to access your [place] medical records  [id number]        Your Vitals Were     Pulse Temperature Height Pulse Oximetry BMI (Body Mass Index)       64 97.5  F (36.4  C) (Oral) 5' 2.5\" (1.588 m) 96% 20.63 kg/m2        Blood Pressure from Last 3 Encounters:   17 114/78   10/23/17 115/81    Weight from Last 3 Encounters:   17 114 lb 9.6 oz (52 kg)   10/23/17 112 lb 9.6 oz (51.1 kg)              We Performed the Following     DEPRESSION ACTION PLAN (DAP)     HPV High Risk Types DNA Cervical     [facility] PT, HAND, AND CHIROPRACTIC REFERRAL     Pap imaged thin layer screen with HPV - recommended age 30 - 65 years (select HPV order below)     SPORTS MEDICINE REFERRAL     TDAP VACCINE (ADACEL)        Primary Care Provider Office Phone # Fax #    [facility] [phone] [address]  [facility] [address]        Equal Access to Services     CALLIE ALONZO : Hadii ginette ku hadasho Soomaali, waaxda luqadaha, qaybta kaalmada adekenyada, sulema colunga haypat sanches . So [facility] [phone].    ATENCIÓN: Si habla español, tiene a ken disposición servicios gratuitos de asistencia lingüística. Llame al [phone].    We comply with applicable federal civil rights laws and [place] laws. We do not discriminate on the basis of race, color, national origin, age, disability, sex, sexual orientation, or gender identity.            Thank you!     Thank you for choosing [facility]  for your care. Our goal is always to provide you with excellent care. Hearing back from our patients is " one way we can continue to improve our services. Please take a few minutes to complete the written survey that you may receive in the mail after your visit with us. Thank you!             Your Updated Medication List - Protect others around you: Learn how to safely use, store and throw away your medicines at www.disposemymeds.org.          This list is accurate as of: 11/14/17  2:06 PM.  Always use your most recent med list.                   Brand Name Dispense Instructions for use Diagnosis    BIOTIN PO      Take 1 tablet by mouth daily        CENTRUM SILVER per tablet      Take 1 tablet by mouth daily        OSTEO BI-FLEX JOINT SHIELD PO      Take 1 tablet by mouth daily        tiZANidine 2 MG tablet    ZANAFLEX    30 tablet    Take 1 tablet (2 mg) by mouth nightly as needed for muscle spasms    Neck pain on left side, Cervical radiculopathy, DDD (degenerative disc disease), cervical, Neck muscle spasm       VITAMIN D (CHOLECALCIFEROL) PO      Take 5,000 Units by mouth daily

## 2017-11-14 NOTE — NURSING NOTE
"Chief Complaint   Patient presents with     Physical       Initial /78 (BP Location: Right arm, Patient Position: Sitting, Cuff Size: Adult Regular)  Pulse 64  Temp 97.5  F (36.4  C) (Oral)  Ht 5' 2.5\" (1.588 m)  Wt 114 lb 9.6 oz (52 kg)  SpO2 96%  BMI 20.63 kg/m2 Estimated body mass index is 20.63 kg/(m^2) as calculated from the following:    Height as of this encounter: 5' 2.5\" (1.588 m).    Weight as of this encounter: 114 lb 9.6 oz (52 kg).  BP completed using cuff size: regular  Catia Tabor, MIREILLE    "

## 2017-11-14 NOTE — PATIENT INSTRUCTIONS
Get the TDAP shot today  Schedule for mammogram  Schedule for sports consult  Start on physical therapy for the neck      Preventive Health Recommendations  Female Ages 50 - 64    Yearly exam: See your health care provider every year in order to  o Review health changes.   o Discuss preventive care.    o Review your medicines if your doctor has prescribed any.      Get a Pap test every three years (unless you have an abnormal result and your provider advises testing more often).    If you get Pap tests with HPV test, you only need to test every 5 years, unless you have an abnormal result.     You do not need a Pap test if your uterus was removed (hysterectomy) and you have not had cancer.    You should be tested each year for STDs (sexually transmitted diseases) if you're at risk.     Have a mammogram every 1 to 2 years.    Have a colonoscopy at age 50, or have a yearly FIT test (stool test). These exams screen for colon cancer.      Have a cholesterol test every 5 years, or more often if advised.    Have a diabetes test (fasting glucose) every three years. If you are at risk for diabetes, you should have this test more often.     If you are at risk for osteoporosis (brittle bone disease), think about having a bone density scan (DEXA).    Shots: Get a flu shot each year. Get a tetanus shot every 10 years.    Nutrition:     Eat at least 5 servings of fruits and vegetables each day.    Eat whole-grain bread, whole-wheat pasta and brown rice instead of white grains and rice.    Talk to your provider about Calcium and Vitamin D.     Lifestyle    Exercise at least 150 minutes a week (30 minutes a day, 5 days a week). This will help you control your weight and prevent disease.    Limit alcohol to one drink per day.    No smoking.     Wear sunscreen to prevent skin cancer.     See your dentist every six months for an exam and cleaning.    See your eye doctor every 1 to 2 years.

## 2017-11-14 NOTE — Clinical Note
Please abstract the following data from this visit with this patient into the appropriate field in Epic:  Colonoscopy done on this date: 2015 (approximately), by this group: [facility], results were NORMAL.

## 2017-11-15 ASSESSMENT — ANXIETY QUESTIONNAIRES: GAD7 TOTAL SCORE: 7

## 2017-11-16 LAB
COPATH REPORT: NORMAL
PAP: NORMAL

## 2017-11-20 LAB
FINAL DIAGNOSIS: NORMAL
HPV HR 12 DNA CVX QL NAA+PROBE: NEGATIVE
HPV16 DNA SPEC QL NAA+PROBE: NEGATIVE
HPV18 DNA SPEC QL NAA+PROBE: NEGATIVE
SPECIMEN DESCRIPTION: NORMAL

## 2017-12-08 ENCOUNTER — THERAPY VISIT (OUTPATIENT)
Dept: PHYSICAL THERAPY | Facility: CLINIC | Age: 57
End: 2017-12-08
Payer: COMMERCIAL

## 2017-12-08 DIAGNOSIS — M54.12 CERVICAL RADICULOPATHY: Primary | ICD-10-CM

## 2017-12-08 PROCEDURE — 97110 THERAPEUTIC EXERCISES: CPT | Mod: GP | Performed by: PHYSICAL THERAPIST

## 2017-12-08 PROCEDURE — 97161 PT EVAL LOW COMPLEX 20 MIN: CPT | Mod: GP | Performed by: PHYSICAL THERAPIST

## 2017-12-08 PROCEDURE — 97530 THERAPEUTIC ACTIVITIES: CPT | Mod: GP | Performed by: PHYSICAL THERAPIST

## 2017-12-08 NOTE — PROGRESS NOTES
[facility] Initial Evaluation -- Cervical    Evaluation Date: December 8, 2017  Jaimie Melvin is a 57 year old female with a cervical condition.   Referral: Dr. Harkins   Employment status: not currently working  Leisure mechanical stresses:   Functional disability score (NDI):  30%  VAS score (0-10): 5/10  Patient goals/expectations:  Alleviate symptoms so that she can sleep all night.  Improve strength of the R UE so that she may able to lift and carry laundry without pain.     HISTORY:    Present symptoms:  Patient is experiencing R sided neck ache with symptoms into the R UT, proximal arm with tingling into index, middle and ring fingers of the R hand.  Intermittent symptoms on the L side in same location.  R UE weakness.  Pain quality (sharp/shooting/stabbing/aching/burning/cramping):   Aching, numbness/tingling.  Paresthesia (yes/no):  yes    Present since (onset date): June 2016 on the R side and symptoms began on the left side in about August 2017.  .     Symptoms (improving/unchanging/worsening):  Unchanging/worsening .    Symptoms commenced as a result of: unknown   Condition occurred in the following environment:  home    Symptoms at onset (neck/arm/forearm/headache): neck on the Rt with proximal arm/shoulder and tingling/numbness ring, middle and ring fingers.    Constant symptoms (neck/arm/forearm/headache):  neck on the Rt with proximal arm/shoulder and tingling/numbness ring, middle and ring fingers.      Intermittent symptoms (neck/arm/forearm/headache): L sided neck, proximal arm/shoulder and finger tips    Symptoms are made worse with the following: Always Sitting > 30', Always Turning and Sometimes On the move, lifting more than 5#, reading, standing > 1 hour.  Symptoms are made better with the following: heat and going to the gym 3 times a week.   Time of day does not impact symptoms - sometimes worse in am, sometimes as day progresses, sometimes in the evening.    Disturbed  sleep (yes/no): yes  Number of pillows: 1-2  Sleeping postures (prone/sup/side R/L): side or supine    Previous episodes (0/1-5/6-10/11+): none Year of first episode: 2016    Previous history: none  Previous treatments: PT - not helpful (Pt recently moved to [place] from [place].  Had PT when in [place]).    Specific Questions: (as reported by the patient)  Dizziness/Tinnitus/Nausea/Swallowing (pos/neg): none  Gait/Upper Limbs (normal/abnormal): normal  Medications (nil/NSAIDS/anlag/steroids/anticoag/other):  OTC analgesic and Muscle relaxants  Medical allergies:  none  General health (excellent/good/fair/poor):  good  Pertinent medical history:  Menopausal  Imaging (None/Xray/MRI/Other):  MRI - herniated disc in the neck.  (-) UE EMG  Recent or major surgery (yes/no): none  Night pain (yes/no): no  Accidents (yes/no): no  Unexplained weight loss (yes/no): no  Barriers at home: none  Other red flags: none    EXAMINATION    Posture:   Sitting (good/fair/poor): fair  Standing (good/fair/poor): fair     Protruded head (yes/no): no    Wry Neck (right/left/nil):  no  Relevant (yes/no):  NA     Correction of posture(better/worse/no effect): better  Other observations:  Decreased cervical lordosis    Neurological:    Motor Deficit:  R UE weakness - Triceps 3/5, Biceps 4-/5, ER 3+/5, wrist extension 4+/5  Reflexes:  Symmetrical UE reflexes  Sensory Deficit:  Symmetrical to sensation to light touch     Dural signs:  NT    Movement Loss:   Raghavendra Mod Min Nil Pain   Protrusion    x Nil loss with inc R sided neck sx   Flexion    x 70 deg with inc R sided neck sx   Retraction  x   Mod loss with inc R neck pain   Extension x    46 deg with inc PDM w dev to L   Lateral flexion R x    28 deg with inc pain R neck   Lateral flexion L x    10 deg with inc pain on the R   Rotation R  x   52 deg with inc pain on the R   Rotation L  x   63 deg with inc pain on the R     Test Movements:   During: produces, abolishes, increases, decreases, no effect,  centralizing, peripheralizing  After: better, worse, no better, no worse, no effect, centralized, peripheralized    Pretest symptoms sittin/10 R neck proximal arm tingling medial 3 fingers with weakness R UE   Symptoms During Symptoms After ROM increased ROM decreased No Effect   PRO          Rep PRO          RET Increases    No Worse      x   Rep RET Increases    Better    X  Increased strength R UE     RET EXT          Rep RET EXT          Pretest symptoms lying:  R cx proximal shoulder/R arm, tingling medial 3 fingers, UE weakness   Symptoms During Symptoms After ROM increased ROM decreased No Effect   RET Increases    No Worse      x   Rep RET Increases    Better    X  Increased strength R UE     RET EXT          Rep RET EXT              Static Tests:   Protrusion:  NT  Flexion:  NT  Retraction:  NT  Extension (sitting/prone/supine):  NT    Other Tests: none    Provisional Classification:  Derangement - Asymmetrical, unilateral, symptoms below elbow    Principle of Management:  Education:  Discussed centralization/peripheralization.  Discussed improved strength and ROM as a result of the exercise which allows to know she is moving in the correct direction.  Discussed proper posture and use of lumbar roll.  Avoid protrusion and prolonged flexion.  Can continue with exercise 3 times a week as without increased symptoms afterwards.      Equipment provided:  Lumbar roll  Mechanical therapy (Y/N):  yes   Extension principle:  Repeated neck retraction with self overpressure 10 reps per 2 hours.  If not improving with seated retraction can try exercises supine.     Other:  NA    ASSESSMENT/PLAN:    Patient is a 57 year old female with cervical complaints.    Patient has the following significant findings with corresponding treatment plan.                Diagnosis 1:  Cervical radiculopathy    Pain -  manual therapy, self management, education, directional preference exercise and home program  Decreased  ROM/flexibility - manual therapy, therapeutic exercise and home program  Decreased joint mobility - manual therapy, therapeutic exercise and home program  Decreased strength - therapeutic exercise, therapeutic activities and home program  Decreased function - therapeutic activities and home program  Impaired posture - neuro re-education, therapeutic activities and home program    Therapy Evaluation Codes:   1) History comprised of:   Personal factors that impact the plan of care:      None.    Comorbidity factors that impact the plan of care are:      None.     Medications impacting care: None.  2) Examination of Body Systems comprised of:   Body structures and functions that impact the plan of care:      Cervical spine.   Activity limitations that impact the plan of care are:      Driving, Lifting, Sleeping and carrying, sitting, standing, reading.  3) Clinical presentation characteristics are:   Stable/Uncomplicated.  4) Decision-Making    Low complexity using standardized patient assessment instrument and/or measureable assessment of functional outcome.  Cumulative Therapy Evaluation is: Low complexity.    Previous and current functional limitations:  (See Goal Flow Sheet for this information)    Short term and Long term goals: (See Goal Flow Sheet for this information)     Communication ability:  Patient appears to be able to clearly communicate and understand verbal and written communication and follow directions correctly.  Treatment Explanation - The following has been discussed with the patient:   RX ordered/plan of care  Anticipated outcomes  Possible risks and side effects  This patient would benefit from PT intervention to resume normal activities.   Rehab potential is good.    Frequency:  1 X week, once daily  Duration:  for 8 weeks  Discharge Plan:  Achieve all LTG.  Independent in home treatment program.  Reach maximal therapeutic benefit.    Please refer to the daily flowsheet for treatment today,  total treatment time and time spent performing 1:1 timed codes.

## 2017-12-15 ENCOUNTER — THERAPY VISIT (OUTPATIENT)
Dept: PHYSICAL THERAPY | Facility: CLINIC | Age: 57
End: 2017-12-15
Payer: COMMERCIAL

## 2017-12-15 DIAGNOSIS — M54.12 CERVICAL RADICULOPATHY: ICD-10-CM

## 2017-12-15 PROCEDURE — 97110 THERAPEUTIC EXERCISES: CPT | Mod: GP | Performed by: PHYSICAL THERAPIST

## 2017-12-29 ENCOUNTER — THERAPY VISIT (OUTPATIENT)
Dept: PHYSICAL THERAPY | Facility: CLINIC | Age: 57
End: 2017-12-29
Payer: COMMERCIAL

## 2017-12-29 DIAGNOSIS — M54.12 CERVICAL RADICULOPATHY: ICD-10-CM

## 2017-12-29 PROCEDURE — 97110 THERAPEUTIC EXERCISES: CPT | Mod: GP | Performed by: PHYSICAL THERAPIST

## 2017-12-29 PROCEDURE — 97140 MANUAL THERAPY 1/> REGIONS: CPT | Mod: GP | Performed by: PHYSICAL THERAPIST

## 2018-01-24 ENCOUNTER — THERAPY VISIT (OUTPATIENT)
Dept: PHYSICAL THERAPY | Facility: CLINIC | Age: 58
End: 2018-01-24
Payer: COMMERCIAL

## 2018-01-24 DIAGNOSIS — M54.12 CERVICAL RADICULOPATHY: ICD-10-CM

## 2018-01-24 PROCEDURE — 97110 THERAPEUTIC EXERCISES: CPT | Mod: GP | Performed by: PHYSICAL THERAPIST

## 2018-01-24 PROCEDURE — 97140 MANUAL THERAPY 1/> REGIONS: CPT | Mod: GP | Performed by: PHYSICAL THERAPIST

## 2018-02-09 ENCOUNTER — THERAPY VISIT (OUTPATIENT)
Dept: PHYSICAL THERAPY | Facility: CLINIC | Age: 58
End: 2018-02-09
Payer: COMMERCIAL

## 2018-02-09 DIAGNOSIS — M54.12 CERVICAL RADICULOPATHY: ICD-10-CM

## 2018-02-09 PROCEDURE — 97110 THERAPEUTIC EXERCISES: CPT | Mod: GP | Performed by: PHYSICAL THERAPIST

## 2018-02-09 PROCEDURE — 97140 MANUAL THERAPY 1/> REGIONS: CPT | Mod: GP | Performed by: PHYSICAL THERAPIST

## 2018-02-09 NOTE — PROGRESS NOTES
Subjective:  HPI                    Objective:  System    Physical Exam    General     ROS    Assessment/Plan:    PROGRESS  REPORT    Progress reporting period is from 12/8/2017 to 2/9/2018.       SUBJECTIVE  Patient relates that she is 70% improved with regards to her neck and B UE symptoms.  Sleeping better.  Notes that her arm/forearm and hand symptoms are now intermittent.  Symptoms remain constant for the neck and into B UT, shoulders, R > L.  Notes that with the weather changes her symptoms will worsen.  Has been doing exercises 3 to 4 times a day.  Has been working on her posture and does feel sitting posture has improved, but still needs to work on her posture.  When her symptoms are worse, she will feel that her R UE is weak.  Weakness seems to be present all the time, but weak with use when symptoms are worse.  Does feel a pulling down the R UE if tries to lift a gallon of milk.  Will have to assist lifting with use of the L hand.      Current Pain level: 5/10.     Initial Pain level: 9/10 (at worst).   Changes in function:  Yes (See Goal flowsheet attached for changes in current functional level)  Adverse reaction to treatment or activity: None    OBJECTIVE  Changes noted in objective findings:  Yes, improved posture, improved R UE strength, improved neck side bending L, improved sitting posture.   Objective:   CROM Rot R 36, Rot L 33, R SB 28, L SB 22 with inc pain on the R, Flexion 26 deg, Ext 40 deg.   At eval R Rot 52, Rot L 63 deg, R SB 28 deg, L SB 10 deg, extension 46 deg    Weakness R UE - Biceps 4+/5, Triceps 3/5,  wrist extension 3+/5 (full ER today with shoulder pain).  At eval - Biceps 4-/5, Triceps 3/5, ER 3+/5, wrist extension 4+/5    Increased rotation after cervical retraction ext and increased Bicep strength to 5/5, improved wrist ext, no change with Triceps.  Full strength for wrist extension and Triceps after extension mobilization and deep tissue work along right side of the spine.     Slight decline in NDI score (36% today while 30% at evaluation).      ASSESSMENT/PLAN  Updated problem list and treatment plan: Diagnosis 1:  Cervical radiculopathy    Pain -  hot/cold therapy, US, manual therapy, self management, education, directional preference exercise and home program  Decreased ROM/flexibility - manual therapy, therapeutic exercise and home program  Decreased joint mobility - manual therapy, therapeutic exercise and home program  Decreased strength - therapeutic exercise, therapeutic activities and home program  Impaired muscle performance - neuro re-education and home program  Decreased function - therapeutic activities and home program  Impaired posture - neuro re-education, therapeutic activities and home program  STG/LTGs have been met or progress has been made towards goals:  Yes (See Goal flow sheet completed today.)  Assessment of Progress: The patient's condition is improving.  The patient has met all of their long term goals.  Patient is meeting short term goals and is progressing towards long term goals.  Self Management Plans:  Patient has been instructed in a home treatment program.  Patient  has been instructed in self management of symptoms.  I have re-evaluated this patient and find that the nature, scope, duration and intensity of the therapy is appropriate for the medical condition of the patient.  Jaimie continues to require the following intervention to meet STG and LTG's:  PT    Recommendations:  This patient would benefit from continued therapy.     Frequency:  1 X week, once daily  Duration:  for 6 weeks  (approved for 3 additional sessions per insurance)  May need more PT due to weakness due to nerve root impingement.  Patient will be out of town beginning next week.  Will schedule future PT when returns.        Please refer to the daily flowsheet for treatment today, total treatment time and time spent performing 1:1 timed codes.

## 2018-03-08 ENCOUNTER — THERAPY VISIT (OUTPATIENT)
Dept: PHYSICAL THERAPY | Facility: CLINIC | Age: 58
End: 2018-03-08
Payer: COMMERCIAL

## 2018-03-08 DIAGNOSIS — M54.12 CERVICAL RADICULOPATHY: ICD-10-CM

## 2018-03-08 PROCEDURE — 97110 THERAPEUTIC EXERCISES: CPT | Mod: GP | Performed by: PHYSICAL THERAPIST

## 2018-03-08 PROCEDURE — 97140 MANUAL THERAPY 1/> REGIONS: CPT | Mod: GP | Performed by: PHYSICAL THERAPIST

## 2018-04-05 ENCOUNTER — THERAPY VISIT (OUTPATIENT)
Dept: PHYSICAL THERAPY | Facility: CLINIC | Age: 58
End: 2018-04-05
Payer: COMMERCIAL

## 2018-04-05 DIAGNOSIS — M54.12 CERVICAL RADICULOPATHY: ICD-10-CM

## 2018-04-05 PROCEDURE — 97110 THERAPEUTIC EXERCISES: CPT | Mod: GP | Performed by: PHYSICAL THERAPIST

## 2018-04-05 NOTE — PROGRESS NOTES
Subjective:  HPI                    Objective:  System    Physical Exam    General     ROS    Assessment/Plan:    PROGRESS  REPORT    Progress reporting period is from 2/9/18 to 4/5/18.       SUBJECTIVE  Subjective: Patient relates that her R UE symptoms are relatively the same/constant, the L UE symptoms are intermittent.  L UE symptoms tend to commence as the day progresses.  Sleep continues to be disturbed. She is waking every 2 hours due to her symptoms and it takes roughly 30 minutes to fall back asleep. Pt relates that she has not been consistent with her HEP. Has been on vacation, but would like to return to PT more consistently to see if we are able to make more progress. Pt stated that her mother had the same pattern of symptoms that she is having and is questioning if it will change in the future.   Current pain level is 4-5/10  .     Previous pain level was  9/10 Initial Pain level: 9/10 (at worst).   Changes in function:  None  Adverse reaction to treatment or activity: None    OBJECTIVE  Changes noted in objective findings:  Yes, improved posture (fair to good), decreased ROM,  Objective: R rot 39, L Rot 48, Ext 17    On 2/9/18 CROM Rot R 36, Rot L 33, R SB 28, L SB 22 with inc pain on the R, Flexion 26 deg, Ext 40 deg  MMT 5/5 biceps B, Triceps 4/5 B, wrist ex 5/5, Thumb ext 3+/5 on L, 5/5 R.  Strength improved following extension biasing exercises   On 2/9/18 Weakness R UE - Biceps 4+/5, Triceps 3/5,  wrist extension 3+/5 (full ER today with shoulder pain).  Sitting Posture: decreased cervical lordosis     ASSESSMENT/PLAN  Updated problem list and treatment plan: Diagnosis 1:  Cervical Radiculopathy    Pain -  manual therapy, self management, education, directional preference exercise and home program  Decreased ROM/flexibility - manual therapy, therapeutic exercise, therapeutic activity and home program  Decreased joint mobility - manual therapy, therapeutic exercise and home program  Decreased  strength - therapeutic exercise, therapeutic activities and home program  Decreased function - therapeutic activities and home program  Impaired posture - neuro re-education, therapeutic activities and home program  STG/LTGs have been met or progress has been made towards goals:  None  Assessment of Progress: The patient's condition has potential to improve.  Patient has not been with consistent with HEP and is questioning wether this is due to her slow progress over the past few months   Self Management Plans:  Patient has been instructed in a home treatment program.  I have re-evaluated this patient and find that the nature, scope, duration and intensity of the therapy is appropriate for the medical condition of the patient.  Jaimie continues to require the following intervention to meet STG and LTG's:  PT    Recommendations:  This patient would benefit from continued therapy.     Frequency:  1 X week, once daily  Duration:  for 6-8 weeks        Please refer to the daily flowsheet for treatment today, total treatment time and time spent performing 1:1 timed codes.

## 2018-04-11 ENCOUNTER — THERAPY VISIT (OUTPATIENT)
Dept: PHYSICAL THERAPY | Facility: CLINIC | Age: 58
End: 2018-04-11
Payer: COMMERCIAL

## 2018-04-11 DIAGNOSIS — M54.12 CERVICAL RADICULOPATHY: ICD-10-CM

## 2018-04-11 PROCEDURE — 97110 THERAPEUTIC EXERCISES: CPT | Mod: GP | Performed by: PHYSICAL THERAPIST

## 2018-04-25 ENCOUNTER — RADIANT APPOINTMENT (OUTPATIENT)
Dept: MAMMOGRAPHY | Facility: CLINIC | Age: 58
End: 2018-04-25
Attending: FAMILY MEDICINE
Payer: COMMERCIAL

## 2018-04-25 DIAGNOSIS — Z00.01 ENCOUNTER FOR GENERAL ADULT MEDICAL EXAMINATION WITH ABNORMAL FINDINGS: ICD-10-CM

## 2018-04-25 DIAGNOSIS — Z12.39 BREAST CANCER SCREENING: ICD-10-CM

## 2018-04-25 PROCEDURE — 77067 SCR MAMMO BI INCL CAD: CPT

## 2018-05-22 ENCOUNTER — OFFICE VISIT (OUTPATIENT)
Dept: PEDIATRICS | Facility: CLINIC | Age: 58
End: 2018-05-22
Payer: COMMERCIAL

## 2018-05-22 VITALS
HEART RATE: 63 BPM | HEIGHT: 63 IN | OXYGEN SATURATION: 97 % | WEIGHT: 109.4 LBS | SYSTOLIC BLOOD PRESSURE: 112 MMHG | BODY MASS INDEX: 19.38 KG/M2 | TEMPERATURE: 98.3 F | DIASTOLIC BLOOD PRESSURE: 78 MMHG

## 2018-05-22 DIAGNOSIS — L30.4 INTERTRIGO: Primary | ICD-10-CM

## 2018-05-22 DIAGNOSIS — M25.511 CHRONIC PAIN OF BOTH SHOULDERS: ICD-10-CM

## 2018-05-22 DIAGNOSIS — F43.23 ADJUSTMENT DISORDER WITH MIXED ANXIETY AND DEPRESSED MOOD: ICD-10-CM

## 2018-05-22 DIAGNOSIS — M54.2 NECK PAIN ON LEFT SIDE: ICD-10-CM

## 2018-05-22 DIAGNOSIS — G89.29 CHRONIC PAIN OF BOTH SHOULDERS: ICD-10-CM

## 2018-05-22 DIAGNOSIS — M54.12 CERVICAL RADICULOPATHY: ICD-10-CM

## 2018-05-22 DIAGNOSIS — M50.30 DDD (DEGENERATIVE DISC DISEASE), CERVICAL: ICD-10-CM

## 2018-05-22 DIAGNOSIS — M25.512 CHRONIC PAIN OF BOTH SHOULDERS: ICD-10-CM

## 2018-05-22 PROCEDURE — 99214 OFFICE O/P EST MOD 30 MIN: CPT | Performed by: FAMILY MEDICINE

## 2018-05-22 RX ORDER — CLOTRIMAZOLE AND BETAMETHASONE DIPROPIONATE 10; .64 MG/G; MG/G
CREAM TOPICAL DAILY PRN
Qty: 15 G | Refills: 1 | Status: SHIPPED | OUTPATIENT
Start: 2018-05-22 | End: 2018-10-26

## 2018-05-22 RX ORDER — CLOTRIMAZOLE AND BETAMETHASONE DIPROPIONATE 10; .64 MG/G; MG/G
CREAM TOPICAL DAILY PRN
COMMUNITY
End: 2018-05-22

## 2018-05-22 ASSESSMENT — PAIN SCALES - GENERAL: PAINLEVEL: MODERATE PAIN (5)

## 2018-05-22 NOTE — PATIENT INSTRUCTIONS
Recommended local ice and heat, avoid triggering activities for the shoulders  Start on PT for neck and shoulders  Schedule for phyiscal,  fasting labs in 11/2018

## 2018-05-22 NOTE — MR AVS SNAPSHOT
After Visit Summary   5/22/2018    Jaimie Melvin    MRN: [id number]           Patient Information     Date Of Birth          1960        Visit Information        Provider Department      5/22/2018 10:50 AM Toni Harkins MD [facility]        Today's Diagnoses     Intertrigo    -  1    Adjustment disorder with mixed anxiety and depressed mood        Cervical radiculopathy        DDD (degenerative disc disease), cervical        Neck pain on left side        Chronic pain of both shoulders          Care Instructions    Recommended local ice and heat, avoid triggering activities for the shoulders  Start on PT for neck and shoulders  Schedule for phyiscal,  fasting labs in 11/2018          Follow-ups after your visit        Additional Services     [facility] PT, HAND, AND CHIROPRACTIC REFERRAL       **This order will print in the [facility] Scheduling Office**    Physical Therapy, Hand Therapy and Chiropractic Care are available through:    *[facility]  *[facility]  *[facility]    Call one number to schedule at any of the above locations: [phone].    Your provider has referred you to: Physical Therapy at [facility] or [facility]    Indication/Reason for Referral: as below  Onset of Illness: months  Therapy Orders: Evaluate and Treat  Special Programs: None  Special Request: None    Rene López      Additional Comments for the Therapist or Chiropractor: none    Please be aware that coverage of these services is subject to the terms and limitations of your health insurance plan.  Call member services at your health plan with any benefit or coverage questions.      Please bring the following to your appointment:    *Your personal calendar for scheduling future appointments  *Comfortable clothing            [facility] PT, HAND, AND CHIROPRACTIC REFERRAL       **This order will print in the [facility] Scheduling Office**    Physical Therapy, Hand Therapy and  Chiropractic Care are available through:    *[facility]  *[facility]  *[facility]    Call one number to schedule at any of the above locations: [phone].    Your provider has referred you to: Physical Therapy at [facility] or [facility]    Indication/Reason for Referral: as below  Onset of Illness: months  Therapy Orders: Evaluate and Treat  Special Programs: None  Special Request: None    Rene López      Additional Comments for the Therapist or Chiropractor: none    Please be aware that coverage of these services is subject to the terms and limitations of your health insurance plan.  Call member services at your health plan with any benefit or coverage questions.      Please bring the following to your appointment:    *Your personal calendar for scheduling future appointments  *Comfortable clothing                  Follow-up notes from your care team     Return in about 6 months (around 11/14/2018) for Fasting lab work, Physical.      Who to contact     If you have questions or need follow up information about today's clinic visit or your schedule please contact [facility] directly at [phone].  Normal or non-critical lab and imaging results will be communicated to you by MyChart, letter or phone within 4 business days after the clinic has received the results. If you do not hear from us within 7 days, please contact the clinic through [employer] or phone. If you have a critical or abnormal lab result, we will notify you by phone as soon as possible.  Submit refill requests through [employer] or call your pharmacy and they will forward the refill request to us. Please allow 3 business days for your refill to be completed.          Additional Information About Your Visit        [employer] Information     [employer] is an electronic gateway that provides easy, online access to your medical records. With [employer], you can request a clinic appointment, read  "your test results, renew a prescription or communicate with your care team.     To sign up for [employer] visit the website at [email or web address]   You will be asked to enter the access code listed below, as well as some personal information. Please follow the directions to create your username and password.     Your access code is: [email or web address]  Expires: 2018 10:50 AM     Your access code will  in 90 days. If you need help or a new code, please contact your [facility] or call [phone] for assistance.        Care EveryWhere ID     This is your Care EveryWhere ID. This could be used by other organizations to access your [facility] medical records  [id number]        Your Vitals Were     Pulse Temperature Height Pulse Oximetry BMI (Body Mass Index)       63 98.3  F (36.8  C) (Oral) 5' 2.5\" (1.588 m) 97% 19.69 kg/m2        Blood Pressure from Last 3 Encounters:   18 112/78   17 114/78   10/23/17 115/81    Weight from Last 3 Encounters:   18 109 lb 6.4 oz (49.6 kg)   17 114 lb 9.6 oz (52 kg)   10/23/17 112 lb 9.6 oz (51.1 kg)              We Performed the Following     [facility] PT[facility] HAND, AND CHIR[facility]RACTIC REFERRAL     [facility] PT[facility] HAND, AND CHIROPRACTIC REFERRAL          Where to get your medicines      These medications were sent to [facility] - [place], [place] - [address], [address]  [address], [address], [facility] [address]     Phone:  [phone]     clotrimazole-betamethasone cream          Primary Care Provider Office Phone # Fax #    Toni Harkins [phone]       [address]  [facility] [address]        Equal Access to Services     CALLIE ALONZO AH: Anamaria Hawthorne, tona cevallos, qasulema cartagenaarash la'pat ah. So [facility] [phone].    ATENCIÓN: Si habla español, tiene a ken disposición servicios gratuitos de asistencia lingüística. " Miah melgar [phone].    We comply with applicable federal civil rights laws and [place] laws. We do not discriminate on the basis of race, color, national origin, age, disability, sex, sexual orientation, or gender identity.            Thank you!     Thank you for choosing [facility]  for your care. Our goal is always to provide you with excellent care. Hearing back from our patients is one way we can continue to improve our services. Please take a few minutes to complete the written survey that you may receive in the mail after your visit with us. Thank you!             Your Updated Medication List - Protect others around you: Learn how to safely use, store and throw away your medicines at www.disposemymeds.org.          This list is accurate as of 5/22/18 10:50 AM.  Always use your most recent med list.                   Brand Name Dispense Instructions for use Diagnosis    CENTRUM SILVER per tablet      Take 1 tablet by mouth daily        clotrimazole-betamethasone cream    LOTRISONE    15 g    Apply topically daily as needed    Intertrigo       OSTEO BI-FLEX JOINT SHIELD PO      Take 1 tablet by mouth daily        VITAMIN D (CHOLECALCIFEROL) PO      Take 5,000 Units by mouth daily

## 2018-05-22 NOTE — PROGRESS NOTES
SUBJECTIVE:   Jaimie Melvin is a 57 year old female who presents to clinic today for the following health issues:    INTERTRIGO-patient complaining of intermittent rash in both armpits for many years now, for which she was using topical creams prescribed by previous provider. patient is requesting refills on this medication today though she does not have any current concerning symptoms.    MOOD DISORDER-has ongoing concerns with anxiety and depression which patient is trying to work through herself and exercise and talking to friends and families she denies concerns for suicidal ideations, panic attacks.  Patient thinks she is feeling much better than when it all started.  NECK PAIN/RADICULOPATHY-patient was started on physical therapy at her last visit for cervical radiculopathy and neck pain she feels improved symptoms.  Her insurance is not covering more than 8 visits until we referred her back. Denies new or worsening symptoms at this time.  Patient is also concerned with right worse than the left pain in both shoulders,          Problem list and histories reviewed & adjusted, as indicated.  Additional history: as documented    Patient Active Problem List   Diagnosis     DDD (degenerative disc disease), cervical     Cervical radiculopathy     Neck pain on left side     CARDIOVASCULAR SCREENING; LDL GOAL LESS THAN 160     Adjustment disorder with mixed anxiety and depressed mood     Family history of thyroid disease     Intertrigo     No past surgical history on file.    Social History   Substance Use Topics     Smoking status: Never Smoker     Smokeless tobacco: Never Used     Alcohol use No     Family History   Problem Relation Age of Onset     Colon Cancer Mother      Lung Cancer Father      Lung Cancer Brother      Thyroid Disease Sister          Current Outpatient Prescriptions   Medication Sig Dispense Refill     clotrimazole-betamethasone (LOTRISONE) cream Apply topically daily as needed 15 g 1     Misc  "Natural Products (OSTEO BI-FLEX JOINT SHIELD PO) Take 1 tablet by mouth daily       Multiple Vitamins-Minerals (CENTRUM SILVER) per tablet Take 1 tablet by mouth daily       VITAMIN D, CHOLECALCIFEROL, PO Take 5,000 Units by mouth daily       No Known Allergies  Recent Labs   Lab Test  11/08/17   0736   LDL  132*   HDL  77   TRIG  104   ALT  14   CR  0.63   GFRESTIMATED  >90   GFRESTBLACK  >90   POTASSIUM  3.6   TSH  0.68      BP Readings from Last 3 Encounters:   05/22/18 112/78   11/14/17 114/78   10/23/17 115/81    Wt Readings from Last 3 Encounters:   05/22/18 109 lb 6.4 oz (49.6 kg)   11/14/17 114 lb 9.6 oz (52 kg)   10/23/17 112 lb 9.6 oz (51.1 kg)                  Labs reviewed in EPIC    Reviewed and updated as needed this visit by clinical staff       Reviewed and updated as needed this visit by Provider         ROS:  CONSTITUTIONAL: NEGATIVE for fever, chills, change in weight  INTEGUMENTARY/SKIN: as above  RESP: NEGATIVE for significant cough or SOB  CV: NEGATIVE for chest pain, palpitations or peripheral edema  MUSCULOSKELETAL: as above  NEURO: NEGATIVE for weakness, dizziness or paresthesias  PSYCHIATRIC: as above    OBJECTIVE:     /78 (BP Location: Right arm, Patient Position: Sitting, Cuff Size: Adult Regular)  Pulse 63  Temp 98.3  F (36.8  C) (Oral)  Ht 5' 2.5\" (1.588 m)  Wt 109 lb 6.4 oz (49.6 kg)  SpO2 97%  BMI 19.69 kg/m2  Body mass index is 19.69 kg/(m^2).  GENERAL: healthy, alert and no distress  RESP: lungs clear to auscultation - no rales, rhonchi or wheezes  CV: regular rate and rhythm, normal S1 S2, no S3 or S4, no murmur, click or rub, no peripheral edema and peripheral pulses strong  MS: no gross musculoskeletal defects noted, no edema  MS: No cervical spine tenderness, normal range of motion of the neck.  Bilateral shoulders-minimal tenderness over the biceps on both sides, slightly respiratory range of motion during extension and abduction.  Pain during  abduction, abduction " internal and external rotation.,  Negative impingement test  SKIN: no suspicious lesions or rashes  PSYCH: mentation appears normal, affect normal/bright    Diagnostic Test Results:  none     ASSESSMENT/PLAN:             1. Intertrigo  Prescription given for Lotrisone cream  Reviewed skin hygiene  Dosing and potential medication side effects discussed.  rtc  for persistent or worsening concerns    - clotrimazole-betamethasone (LOTRISONE) cream; Apply topically daily as needed  Dispense: 15 g; Refill: 1    2. Adjustment disorder with mixed anxiety and depressed mood  Continue to monitor, continue relaxation techniques  Consider starting on counseling versus medications for worsening concerns    3. Cervical radiculopathy  Feeling improved on physical therapy, will continue PT  - [facility], [facility] CHIR[facility]RACTIC REFERRAL    4. DDD (degenerative disc disease), cervical    - [facility] PT[facility] HAND, AND CHIROPRACTIC REFERRAL    5. Neck pain on left side    - [facility] PT[facility] HAND, AND CHIROPRACTIC REFERRAL    6. Chronic pain of both shoulders  ddx-rotator cuff tendinitis versus strain  Start on physical therapy,.Recommended  local ice and heat, avoid triggering activities, tylenol or Ibuprofen prn for pain and rtc ofr persistent or worsening concerns in 4weeks.    - [facility] PT, HAND, AND CHIROPRACTIC REFERRAL    Chart documentation done in part with Dragon Voice recognition Software. Although reviewed after completion, some word and grammatical error may remain.    See Patient Instructions    Toni Harkins MD  [facility]

## 2018-08-14 ENCOUNTER — OFFICE VISIT (OUTPATIENT)
Dept: PEDIATRICS | Facility: CLINIC | Age: 58
End: 2018-08-14
Payer: COMMERCIAL

## 2018-08-14 VITALS
DIASTOLIC BLOOD PRESSURE: 82 MMHG | HEART RATE: 78 BPM | TEMPERATURE: 98 F | BODY MASS INDEX: 19.24 KG/M2 | HEIGHT: 63 IN | SYSTOLIC BLOOD PRESSURE: 126 MMHG | WEIGHT: 108.6 LBS | OXYGEN SATURATION: 95 %

## 2018-08-14 DIAGNOSIS — M25.511 CHRONIC RIGHT SHOULDER PAIN: Primary | ICD-10-CM

## 2018-08-14 DIAGNOSIS — G89.29 CHRONIC RIGHT SHOULDER PAIN: Primary | ICD-10-CM

## 2018-08-14 DIAGNOSIS — M67.911 TENDINOPATHY OF ROTATOR CUFF, RIGHT: ICD-10-CM

## 2018-08-14 PROCEDURE — 99214 OFFICE O/P EST MOD 30 MIN: CPT | Performed by: FAMILY MEDICINE

## 2018-08-14 RX ORDER — NAPROXEN 500 MG/1
500 TABLET ORAL 2 TIMES DAILY WITH MEALS
Qty: 60 TABLET | Refills: 0 | Status: SHIPPED | OUTPATIENT
Start: 2018-08-14 | End: 2019-01-16

## 2018-08-14 NOTE — MR AVS SNAPSHOT
After Visit Summary   8/14/2018    Jaimie Melvin    MRN: [id number]           Patient Information     Date Of Birth          1960        Visit Information        Provider Department      8/14/2018 1:50 PM Toni Harkins MD [facility]        Today's Diagnoses     Chronic right shoulder pain    -  1    Tendinopathy of rotator cuff, right          Care Instructions    Recommended  local ice and heat, avoid triggering activities,   take medications as prescribed  Get the xrays today  Start on PT for right shoulder   Understanding Rotator Cuff Tendonitis    Tendons are tough tissues that connect muscles to bone. A group of 4 muscles and their tendons form a  cuff  around the head of the upper arm bone. This is called the rotator cuff. It connects the upper arm to the shoulder blade. It gives the shoulder joint stability and strength.  If tendons are injured or strained, they may become irritated and swollen (inflamed). This is called tendonitis. Rotator cuff tendonitis may cause shoulder pain and loss of function.  What causes rotator cuff tendonitis?  Tendonitis results when the rotator cuff tendons are injured or overworked. The most common cause of injury is repetitive overhead activities. These can be work-related activities such as reaching, pushing, or lifting. Or they can be sports-related activities such as throwing, swimming, or lifting weights.  Symptoms of rotator cuff tendonitis  Pain on the side of the upper arm is the most common symptom. Pain may get worse with overhead movements or when you raise the arm above shoulder level. It may also hurt to lie on the shoulder at night.  Treatment for rotator cuff tendonitis  Treatment may include the following:    Active rest. This lets the rotator cuff heal. Active rest means using your arm and shoulder, but avoiding activities that cause pain, such as reaching overhead or sleeping on the shoulder.    Cold packs. Putting  ice packs on the shoulder helps reduce swelling and relieve pain.    Pain medicines. Prescription or over-the-counter pain medicines can help relieve pain and swelling.    Arm and shoulder exercises. These help keep the shoulder joint mobile as it heals. They also help improve the strength of muscles around the joint.  Possible complications  It might be tempting to stop using your shoulder completely to avoid pain. But doing so may lead to a condition called  frozen shoulder.  To help prevent this, following instructions you are given for active rest and for doing exercises to help your shoulder heal.  When to call your healthcare provider  Call your healthcare provider right away if you have any of these:    Fever of 100.4 F (38 C) or higher, or as directed    Symptoms that don t get better, or get worse    New symptoms   Date Last Reviewed: 3/10/2016    9653-6068 [employer]. [address] [place], [place] [address]. All rights reserved. This information is not intended as a substitute for professional medical care. Always follow your healthcare professional's instructions.        Understanding a Rotator Cuff Tendon Tear    The rotator cuff is a group of 4 muscles and their tendons in the shoulder. The muscles are located in the front, back, and top of the shoulder joint. They each have a strong band of tissue (tendon) that attaches to the top of the upper arm bone. This helps keep the arm bone firmly in place in the socket of the shoulder joint. The muscles and tendons of the rotator cuff also help the shoulder joint with certain movements. These include reaching the arm over the head and rotating the arm.  Any one of the rotator cuff tendons can fray or tear from causes such as injury and overuse. A tear may be partial, with some of the tendon still intact. Or it may be a complete tear, with the tendon fully torn. Both types can cause pain and weakness, and limit arm and shoulder movement. A  rotator cuff tear often needs treatment to heal properly.  Causes of a rotator cuff tendon tear  Causes can include:    Wear and tear of the tendons from aging or normal use over time    Overuse of the tendons from sports or work activities, especially those that involve repeated overhead movements    Injury to the tendons from a fall or other accident  Symptoms of a rotator cuff tendon tear  Some people with a rotator cuff tendon tear have few or no symptoms. Others may have symptoms that range from mild to severe. Possible symptoms include:    Pain in your shoulder, which may be worse with overhead movements or at night from lying on the affected side    Weakness in your arm and shoulder    Trouble lifting your arm up or rotating your arm    Clicking or crackling sounds when moving or using your arm and shoulder  Treating a rotator cuff tendon tear  Treatment for a rotator cuff tendon tear depends on several factors. These include the severity of the tear and your symptoms. Options may include:    Resting your arm and shoulder. This involves limiting certain movements, such as reaching above your head or lifting your arm up. These can slow healing and worsen symptoms. You may also need to avoid certain sports and types of work for a time.    Cold packs or heat packs. These help reduce pain and swelling.    Prescription or over-the-counter pain medicines. These help reduce pain and swelling.    Injections of medicine into your shoulder. These help relieve pain and swelling for a time.    Physical therapy and exercises.  These help improve strength, flexibility, and range of motion in your arm and shoulder.     Surgery. You may need surgery if your tendon is completely torn or if other treatments don t relieve your symptoms. Different options are available. In many cases, the damaged tendon is repaired. It is then reattached to your arm bone.  Possible complications    If a partial tear isn t given time to heal, it  may get larger or tear completely. You may then need more treatment.    Even with treatment, a partial or complete tear may sometimes have trouble healing. The problem may become long-term (chronic). This can cause ongoing pain, weakness, and limited movement of your arm and shoulder.     When to call your healthcare provider  Call your healthcare provider right away if you have any of these:    Fever of 100.4 F (38 C) or higher, or as directed    Symptoms that don t get better with treatment, or get worse    After surgery, symptoms at the incision sites such as redness, warmth, swelling, bleeding, or drainage    New symptoms   Date Last Reviewed: 3/10/2016    9543-6457 [employer]. [address] [place] [address]. All rights reserved. This information is not intended as a substitute for professional medical care. Always follow your healthcare professional's instructions.                Follow-ups after your visit        Additional Services     [facility] PT, HAND, AND CHIROPRACTIC REFERRAL       **This order will print in the [facility] Scheduling Office**    Physical Therapy, Hand Therapy and Chiropractic Care are available through:    *[facility]  *[facility]  *[facility]    Call one number to schedule at any of the above locations: [phone].    Your provider has referred you to: Physical Therapy at [facility] or [facility] [place]    Indication/Reason for Referral: as below  Onset of Illness: 1 year  Therapy Orders: Evaluate and Treat  Special Programs: None  Special Request: None    Rene López      Additional Comments for the Therapist or Chiropractor: none    Please be aware that coverage of these services is subject to the terms and limitations of your health insurance plan.  Call member services at your health plan with any benefit or coverage questions.      Please bring the following to your appointment:    *Your personal calendar for scheduling future  appointments  *Comfortable clothing                  Future tests that were ordered for you today     Open Future Orders        Priority Expected Expires Ordered    XR Shoulder Right G/E 3 Views Routine 8/14/2018 8/14/2019 8/14/2018            Who to contact     If you have questions or need follow up information about today's clinic visit or your schedule please contact [facility] directly at [phone].  Normal or non-critical lab and imaging results will be communicated to you by [employer], letter or phone within 4 business days after the clinic has received the results. If you do not hear from us within 7 days, please contact the clinic through [employer]ines[employer] or phone. If you have a critical or abnormal lab result, we will notify you by phone as soon as possible.  Submit refill requests through [employer] or call your pharmacy and they will forward the refill request to us. Please allow 3 business days for your refill to be completed.          Additional Information About Your Visit        Caninesharpinion-pins Information     [employer] gives you secure access to your electronic health record. If you see a primary care provider, you can also send messages to your care team and make appointments. If you have questions, please call your primary care clinic.  If you do not have a primary care provider, please call [phone] and they will assist you.      [employer] is an electronic gateway that provides easy, online access to your medical records. With [employer], you can request a clinic appointment, read your test results, renew a prescription or communicate with your care team.     To access your existing account, please contact your [facility] or call [phone] for assistance.        Care EveryWhere ID     This is your Care EveryWhere ID. This could be used by other organizations to access your [place] medical records  [id number]        Your Vitals Were     Pulse Temperature Height  "Pulse Oximetry BMI (Body Mass Index)       78 98  F (36.7  C) (Temporal) 5' 2.5\" (1.588 m) 95% 19.55 kg/m2        Blood Pressure from Last 3 Encounters:   08/14/18 126/82   05/22/18 112/78   11/14/17 114/78    Weight from Last 3 Encounters:   08/14/18 108 lb 9.6 oz (49.3 kg)   05/22/18 109 lb 6.4 oz (49.6 kg)   11/14/17 114 lb 9.6 oz (52 kg)              We Performed the Following     [facility] PT, HAND, AND CHIROPRACTIC REFERRAL          Today's Medication Changes          These changes are accurate as of 8/14/18  2:12 PM.  If you have any questions, ask your nurse or doctor.               Start taking these medicines.        Dose/Directions    diclofenac 1 % Gel topical gel   Commonly known as:  VOLTAREN   Used for:  Chronic right shoulder pain, Tendinopathy of rotator cuff, right   Started by:  Toni Harkins MD        Apply  2 grams to right shoulder four times daily as needed using enclosed dosing card.   Quantity:  100 g   Refills:  1       naproxen 500 MG tablet   Commonly known as:  NAPROSYN   Used for:  Chronic right shoulder pain, Tendinopathy of rotator cuff, right   Started by:  Toni Harkins MD        Dose:  500 mg   Take 1 tablet (500 mg) by mouth 2 times daily (with meals)   Quantity:  60 tablet   Refills:  0            Where to get your medicines      These medications were sent to [facility] - [place], [place] - [address], [address]  [address], [address], [facility] [address]     Phone:  [phone]     diclofenac 1 % Gel topical gel    naproxen 500 MG tablet                Primary Care Provider Office Phone # Fax #    Toni Harkins [phone]       [address]  [facility] [address]        Equal Access to Services     [facility] CHERI AH: Anamaria Hawthorne, wanel luqadaha, qaybdon tourealnina piña, sulema alexandra. [facility] [phone].    ATENCIÓN: Si habla suzanna, tiene a ken disposición " servicios gratuitos de asistencia lingüística. Miah melgar [phone].    We comply with applicable federal civil rights laws and [place] laws. We do not discriminate on the basis of race, color, national origin, age, disability, sex, sexual orientation, or gender identity.            Thank you!     Thank you for choosing [facility]  for your care. Our goal is always to provide you with excellent care. Hearing back from our patients is one way we can continue to improve our services. Please take a few minutes to complete the written survey that you may receive in the mail after your visit with us. Thank you!             Your Updated Medication List - Protect others around you: Learn how to safely use, store and throw away your medicines at www.disposemymeds.org.          This list is accurate as of 8/14/18  2:12 PM.  Always use your most recent med list.                   Brand Name Dispense Instructions for use Diagnosis    CENTRUM SILVER per tablet      Take 1 tablet by mouth daily        clotrimazole-betamethasone cream    LOTRISONE    15 g    Apply topically daily as needed    Intertrigo       diclofenac 1 % Gel topical gel    VOLTAREN    100 g    Apply  2 grams to right shoulder four times daily as needed using enclosed dosing card.    Chronic right shoulder pain, Tendinopathy of rotator cuff, right       naproxen 500 MG tablet    NAPROSYN    60 tablet    Take 1 tablet (500 mg) by mouth 2 times daily (with meals)    Chronic right shoulder pain, Tendinopathy of rotator cuff, right       OSTEO BI-FLEX JOINT SHIELD PO      Take 1 tablet by mouth daily        VITAMIN D (CHOLECALCIFEROL) PO      Take 5,000 Units by mouth daily

## 2018-08-14 NOTE — PROGRESS NOTES
SUBJECTIVE:   Jaimie Melvin is a 57 year old female who presents to clinic today for the following health issues:      Shoulder Pain  Patient with PMH significant for cervical radiculopathy is here with concerns of having progressively worsening pain in the right shoulder for the past 1 year with no associated concerns for recent history of trauma or fall.  Patient tried physical therapy for her neck pain and is feeling better.    Denies current concerns for neck pain, tingling, numbness or weakness of the upper extremities.  Patient is right-handed.  Denies left-sided symptoms.      Onset: about a year, getting worse    Description:   Location: left shoulder and right shoulder  Character: Sharp    Intensity: mild    Progression of Symptoms: worse    Accompanying Signs & Symptoms:  Other symptoms: radiation of pain to neck and tingling only on right shoulder    History:   Previous similar pain: no       Precipitating factors:   Trauma or overuse: no     Alleviating factors:  Improved by: rest/inactivity and heat    Therapies Tried and outcome: heat             Problem list and histories reviewed & adjusted, as indicated.  Additional history: as documented    Patient Active Problem List   Diagnosis     DDD (degenerative disc disease), cervical     Cervical radiculopathy     Neck pain on left side     CARDIOVASCULAR SCREENING; LDL GOAL LESS THAN 160     Adjustment disorder with mixed anxiety and depressed mood     Family history of thyroid disease     Intertrigo     Tendinopathy of rotator cuff, right     No past surgical history on file.    Social History   Substance Use Topics     Smoking status: Never Smoker     Smokeless tobacco: Never Used     Alcohol use No     Family History   Problem Relation Age of Onset     Colon Cancer Mother      Lung Cancer Father      Lung Cancer Brother      Thyroid Disease Sister          Current Outpatient Prescriptions   Medication Sig Dispense Refill      "clotrimazole-betamethasone (LOTRISONE) cream Apply topically daily as needed 15 g 1     diclofenac (VOLTAREN) 1 % GEL topical gel Apply  2 grams to right shoulder four times daily as needed using enclosed dosing card. 100 g 1     Misc Natural Products (OSTEO BI-FLEX JOINT SHIELD PO) Take 1 tablet by mouth daily       Multiple Vitamins-Minerals (CENTRUM SILVER) per tablet Take 1 tablet by mouth daily       naproxen (NAPROSYN) 500 MG tablet Take 1 tablet (500 mg) by mouth 2 times daily (with meals) 60 tablet 0     VITAMIN D, CHOLECALCIFEROL, PO Take 5,000 Units by mouth daily       No Known Allergies  Recent Labs   Lab Test  11/08/17   0736   LDL  132*   HDL  77   TRIG  104   ALT  14   CR  0.63   GFRESTIMATED  >90   GFRESTBLACK  >90   POTASSIUM  3.6   TSH  0.68      BP Readings from Last 3 Encounters:   08/14/18 126/82   05/22/18 112/78   11/14/17 114/78    Wt Readings from Last 3 Encounters:   08/14/18 49.3 kg (108 lb 9.6 oz)   05/22/18 49.6 kg (109 lb 6.4 oz)   11/14/17 52 kg (114 lb 9.6 oz)                  Labs reviewed in EPIC    Reviewed and updated as needed this visit by clinical staff  Tobacco  Allergies  Meds       Reviewed and updated as needed this visit by Provider         ROS:  CONSTITUTIONAL: NEGATIVE for fever, chills, change in weight  INTEGUMENTARY/SKIN: NEGATIVE for worrisome rashes, moles or lesions  MUSCULOSKELETAL: as above  NEURO: NEGATIVE for weakness, dizziness or paresthesias    OBJECTIVE:     /82 (BP Location: Right arm, Patient Position: Chair, Cuff Size: Adult Small)  Pulse 78  Temp 98  F (36.7  C) (Temporal)  Ht 1.588 m (5' 2.5\")  Wt 49.3 kg (108 lb 9.6 oz)  SpO2 95%  BMI 19.55 kg/m2  Body mass index is 19.55 kg/(m^2).  GENERAL: healthy, alert and no distress  MS: Right shoulder-normal on inspection, moderate tenderness over the right acromioclavicular joint and right biceps, painful and reduced range of motion during abduction, internal and external rotation, negative " impingement test.  SKIN: no suspicious lesions or rashes  NEURO: Normal strength and tone, mentation intact and speech normal  PSYCH: mentation appears normal, affect normal/bright    Diagnostic Test Results:  none     ASSESSMENT/PLAN:             1. Chronic right shoulder pain  ddx-right shoulder osteoarthritis versus rotator cuff tendinitis/tear  Recommended right shoulder x-rays due to chronicity-  Recommended local ice and heat, avoid triggering activities,   Prescription given for diclofenac gel to use topically as needed for pain, take naproxen twice daily as needed for moderate to severe pain  Recommended to start on physical therapy  If no better in 4-6 weeks of PT, consider sports medicine consult for further evaluation including possible cortisone injection  Dosing and potential medication side effects discussed.  Patient verbalised understanding and is agreeable to the plan.  Will f/u on results and call with recommendations.      - XR Shoulder Right G/E 3 Views; Future  - [facility] PT, HAND, AND CHIROPRACTIC REFERRAL  - diclofenac (VOLTAREN) 1 % GEL topical gel; Apply  2 grams to right shoulder four times daily as needed using enclosed dosing card.  Dispense: 100 g; Refill: 1  - naproxen (NAPROSYN) 500 MG tablet; Take 1 tablet (500 mg) by mouth 2 times daily (with meals)  Dispense: 60 tablet; Refill: 0    2. Tendinopathy of rotator cuff, right  as above    - XR Shoulder Right G/E 3 Views; Future  - [facility] PT, HAND, AND CHIROPRACTIC REFERRAL  - diclofenac (VOLTAREN) 1 % GEL topical gel; Apply  2 grams to right shoulder four times daily as needed using enclosed dosing card.  Dispense: 100 g; Refill: 1  - naproxen (NAPROSYN) 500 MG tablet; Take 1 tablet (500 mg) by mouth 2 times daily (with meals)  Dispense: 60 tablet; Refill: 0    Chart documentation done in part with Dragon Voice recognition Software. Although reviewed after completion, some word and grammatical error may remain.    See Patient  Instructions    Toni Harkins MD  [facility]

## 2018-08-14 NOTE — PATIENT INSTRUCTIONS
Recommended  local ice and heat, avoid triggering activities,   take medications as prescribed  Get the xrays today  Start on PT for right shoulder   Understanding Rotator Cuff Tendonitis    Tendons are tough tissues that connect muscles to bone. A group of 4 muscles and their tendons form a  cuff  around the head of the upper arm bone. This is called the rotator cuff. It connects the upper arm to the shoulder blade. It gives the shoulder joint stability and strength.  If tendons are injured or strained, they may become irritated and swollen (inflamed). This is called tendonitis. Rotator cuff tendonitis may cause shoulder pain and loss of function.  What causes rotator cuff tendonitis?  Tendonitis results when the rotator cuff tendons are injured or overworked. The most common cause of injury is repetitive overhead activities. These can be work-related activities such as reaching, pushing, or lifting. Or they can be sports-related activities such as throwing, swimming, or lifting weights.  Symptoms of rotator cuff tendonitis  Pain on the side of the upper arm is the most common symptom. Pain may get worse with overhead movements or when you raise the arm above shoulder level. It may also hurt to lie on the shoulder at night.  Treatment for rotator cuff tendonitis  Treatment may include the following:    Active rest. This lets the rotator cuff heal. Active rest means using your arm and shoulder, but avoiding activities that cause pain, such as reaching overhead or sleeping on the shoulder.    Cold packs. Putting ice packs on the shoulder helps reduce swelling and relieve pain.    Pain medicines. Prescription or over-the-counter pain medicines can help relieve pain and swelling.    Arm and shoulder exercises. These help keep the shoulder joint mobile as it heals. They also help improve the strength of muscles around the joint.  Possible complications  It might be tempting to stop using your shoulder completely to  avoid pain. But doing so may lead to a condition called  frozen shoulder.  To help prevent this, following instructions you are given for active rest and for doing exercises to help your shoulder heal.  When to call your healthcare provider  Call your healthcare provider right away if you have any of these:    Fever of 100.4 F (38 C) or higher, or as directed    Symptoms that don t get better, or get worse    New symptoms   Date Last Reviewed: 3/10/2016    0067-2611 [employer]. [address] [place] [id number]. All rights reserved. This information is not intended as a substitute for professional medical care. Always follow your healthcare professional's instructions.        Understanding a Rotator Cuff Tendon Tear    The rotator cuff is a group of 4 muscles and their tendons in the shoulder. The muscles are located in the front, back, and top of the shoulder joint. They each have a strong band of tissue (tendon) that attaches to the top of the upper arm bone. This helps keep the arm bone firmly in place in the socket of the shoulder joint. The muscles and tendons of the rotator cuff also help the shoulder joint with certain movements. These include reaching the arm over the head and rotating the arm.  Any one of the rotator cuff tendons can fray or tear from causes such as injury and overuse. A tear may be partial, with some of the tendon still intact. Or it may be a complete tear, with the tendon fully torn. Both types can cause pain and weakness, and limit arm and shoulder movement. A rotator cuff tear often needs treatment to heal properly.  Causes of a rotator cuff tendon tear  Causes can include:    Wear and tear of the tendons from aging or normal use over time    Overuse of the tendons from sports or work activities, especially those that involve repeated overhead movements    Injury to the tendons from a fall or other accident  Symptoms of a rotator cuff tendon tear  Some people with  a rotator cuff tendon tear have few or no symptoms. Others may have symptoms that range from mild to severe. Possible symptoms include:    Pain in your shoulder, which may be worse with overhead movements or at night from lying on the affected side    Weakness in your arm and shoulder    Trouble lifting your arm up or rotating your arm    Clicking or crackling sounds when moving or using your arm and shoulder  Treating a rotator cuff tendon tear  Treatment for a rotator cuff tendon tear depends on several factors. These include the severity of the tear and your symptoms. Options may include:    Resting your arm and shoulder. This involves limiting certain movements, such as reaching above your head or lifting your arm up. These can slow healing and worsen symptoms. You may also need to avoid certain sports and types of work for a time.    Cold packs or heat packs. These help reduce pain and swelling.    Prescription or over-the-counter pain medicines. These help reduce pain and swelling.    Injections of medicine into your shoulder. These help relieve pain and swelling for a time.    Physical therapy and exercises.  These help improve strength, flexibility, and range of motion in your arm and shoulder.     Surgery. You may need surgery if your tendon is completely torn or if other treatments don t relieve your symptoms. Different options are available. In many cases, the damaged tendon is repaired. It is then reattached to your arm bone.  Possible complications    If a partial tear isn t given time to heal, it may get larger or tear completely. You may then need more treatment.    Even with treatment, a partial or complete tear may sometimes have trouble healing. The problem may become long-term (chronic). This can cause ongoing pain, weakness, and limited movement of your arm and shoulder.     When to call your healthcare provider  Call your healthcare provider right away if you have any of these:    Fever of  100.4 F (38 C) or higher, or as directed    Symptoms that don t get better with treatment, or get worse    After surgery, symptoms at the incision sites such as redness, warmth, swelling, bleeding, or drainage    New symptoms   Date Last Reviewed: 3/10/2016    8732-8766 [employer]. [address] [place] [address]. All rights reserved. This information is not intended as a substitute for professional medical care. Always follow your healthcare professional's instructions.

## 2018-08-15 ENCOUNTER — RADIANT APPOINTMENT (OUTPATIENT)
Dept: GENERAL RADIOLOGY | Facility: CLINIC | Age: 58
End: 2018-08-15
Attending: FAMILY MEDICINE
Payer: COMMERCIAL

## 2018-08-15 DIAGNOSIS — M67.911 TENDINOPATHY OF ROTATOR CUFF, RIGHT: ICD-10-CM

## 2018-08-15 DIAGNOSIS — M25.511 CHRONIC RIGHT SHOULDER PAIN: ICD-10-CM

## 2018-08-15 DIAGNOSIS — G89.29 CHRONIC RIGHT SHOULDER PAIN: ICD-10-CM

## 2018-08-15 PROCEDURE — 73030 X-RAY EXAM OF SHOULDER: CPT | Mod: RT | Performed by: RADIOLOGY

## 2018-08-15 NOTE — PROGRESS NOTES
Dear Jaimie,  Your right shoulder x-ray showed no concern for arthritis.  As the reviewed, it did show concerns for inflammation and tendinitis of the rotator cuff.  Please follow-up with the plan that we reviewed yesterday during the visit including physical therapy and medications.   Let me know if you have any questions. Take care.  'Toni Harkins MD

## 2018-09-24 ENCOUNTER — TELEPHONE (OUTPATIENT)
Dept: PEDIATRICS | Facility: CLINIC | Age: 58
End: 2018-09-24

## 2018-09-24 NOTE — TELEPHONE ENCOUNTER
2nd attempt    Left message for patient to return clinic call regarding scheduling. Patient needs a Physical  appointment for annual wellness with Dr Harkins on or after 11/14/18 and  Fasting labs. Number to clinic and Mychart option given, please assist in scheduling once patient returns clinic call from the recall/wait list tab.    Call Center OKAY TO SCHEDULE.    Recall letter sent 8/23/18    Thanks,   Tere Kaminski  Primary Care   [facility] [place]

## 2018-10-23 ENCOUNTER — THERAPY VISIT (OUTPATIENT)
Dept: PHYSICAL THERAPY | Facility: CLINIC | Age: 58
End: 2018-10-23
Payer: COMMERCIAL

## 2018-10-23 DIAGNOSIS — M25.511 CHRONIC PAIN OF BOTH SHOULDERS: Primary | ICD-10-CM

## 2018-10-23 DIAGNOSIS — M25.512 CHRONIC PAIN OF BOTH SHOULDERS: Primary | ICD-10-CM

## 2018-10-23 DIAGNOSIS — G89.29 CHRONIC PAIN OF BOTH SHOULDERS: Primary | ICD-10-CM

## 2018-10-23 PROCEDURE — G8984 CARRY CURRENT STATUS: HCPCS | Mod: GP | Performed by: PHYSICAL THERAPIST

## 2018-10-23 PROCEDURE — 97161 PT EVAL LOW COMPLEX 20 MIN: CPT | Mod: GP | Performed by: PHYSICAL THERAPIST

## 2018-10-23 PROCEDURE — G8985 CARRY GOAL STATUS: HCPCS | Mod: GP | Performed by: PHYSICAL THERAPIST

## 2018-10-23 PROCEDURE — 97110 THERAPEUTIC EXERCISES: CPT | Mod: GP | Performed by: PHYSICAL THERAPIST

## 2018-10-23 NOTE — PROGRESS NOTES
[facility] Initial Evaluation  Subjective:  Patient is a 58 year old female presenting with rehab right shoulder hpi.   Karina Melvin is a 58 year old female with a bilateral shoulders condition.  Condition occurred with:  Unknown cause.  Condition occurred: for unknown reasons.  This is a chronic condition  Been having pain for over 2 years now. 8/14/18 date of MD order.    Patient reports pain:  Upper arm and upper trap.  Radiates to:  Cervical and shoulder (sometimes will have a sensation of numbness in her fingers).  Pain is described as sharp and is intermittent and reported as 7/10.  Associated symptoms:  Loss of strength, loss of motion/stiffness and tingling. Pain is worse in the P.M..  Exacerbated by: lying on her side, raising arm up over shoulder level, lifting heavy objects.  Relieved by: hot shower, and light massage.  Since onset symptoms are gradually worsening.  Special tests:  X-ray (no signficant osteoarthriis, slight calcification of supraspinats tendon).  Previous treatment includes physical therapy.  There was mild improvement following previous treatment.  General health as reported by patient is good.  Pertinent medical history includes:  None.  Medical allergies: no.  Other surgeries include:  None reported.  Medication history: tyelnol.  Current occupation is Not currently working.    Employment tasks: staaying with sister and does house chores for her sister, Shopping, grocery shopping.     Barriers: lives with sister.    Red flags:  None as reported by the patient.                        Objective:  System                   Shoulder Evaluation:  ROM:  AROM:    Flexion:  Left:  142 ERP    Right:  140 ERP  Extension: Left: 48Right: 35  Abduction:  Left: 112   Right:  95 ERP      External Rotation:  Left:  54    Right:  55            Extension/Internal Rotation:  Left:  T6    Right:  T9    PROM:    Flexion:  Right: 155    Extension:  Right:  40  Abduction:  Right:   154                          Strength:    Flexion: Left:5/5 Strong/painful    Pain:    Right: 4/5  Weak/painful     Pain:   Extension:  Left: 5/5  Strong/pain free    Pain:    Right: 5/5    Strong/pain free  Pain:  Abduction:  Left: 4/5  Weak/painful  Pain:    Right: 4/5   Weak/painful    Pain:  Adduction:  Left: 5/5   Strong/pain free   Pain:    Right: 5/5   Strong/pain free    Pain:  Internal Rotation:  Left:5/5   Strong/pain free    Pain:    Right: 4/5   Weak/painful    Pain:  External Rotation:   Left:5/5     Pain:   Right:/5  Weak/pain free    Pain:    Horizontal Abduction:  Left:4/5     Pain:            Stability Testing:  normal                                             Eulogio Cervical Evaluation    Posture:  Sitting: good  Standing: good    Wry Neck: no  Correction of Posture: better    Movement Loss:    Flexion (Flex): nil  Retraction (RET): nil  Extension (EXT): mod and pain  Lateral Flexion Right (LF R): nil  Lateral Flexion Left (LF L): nil  Rotation Right (ROT R): nil  Rotation Left (ROT L): nil                                                 ROS    Assessment/Plan:    Patient is a 58 year old female with both sides shoulder complaints.    Patient has the following significant findings with corresponding treatment plan.                Diagnosis 1:  Chronic bilateral shoulder pain  Pain -  hot/cold therapy, US, manual therapy, self management, education and home program  Decreased ROM/flexibility - manual therapy, therapeutic exercise, therapeutic activity and home program  Decreased joint mobility - manual therapy, therapeutic exercise, therapeutic activity and home program  Decreased strength - therapeutic exercise, therapeutic activities and home program  Decreased function - therapeutic activities and home program    Therapy Evaluation Codes:   1) History comprised of:   Personal factors that impact the plan of care:      Time since onset of symptoms.    Comorbidity factors that impact the plan  of care are:      None.     Medications impacting care: None.  2) Examination of Body Systems comprised of:   Body structures and functions that impact the plan of care:      Cervical spine and Shoulder.   Activity limitations that impact the plan of care are:      Cooking, Dressing and Lifting.  3) Clinical presentation characteristics are:   Stable/Uncomplicated.  4) Decision-Making    Low complexity using standardized patient assessment instrument and/or measureable assessment of functional outcome.  Cumulative Therapy Evaluation is: Low complexity.    Previous and current functional limitations:  (See Goal Flow Sheet for this information)    Short term and Long term goals: (See Goal Flow Sheet for this information)     Communication ability:  Patient appears to be able to clearly communicate and understand verbal and written communication and follow directions correctly.  Treatment Explanation - The following has been discussed with the patient:   RX ordered/plan of care  Anticipated outcomes  Possible risks and side effects  This patient would benefit from PT intervention to resume normal activities.   Rehab potential is good.    Frequency:  1 X week, once daily  Duration:  for 8 weeks  Discharge Plan:  Achieve all LTG.  Independent in home treatment program.  Reach maximal therapeutic benefit.    Please refer to the daily flowsheet for treatment today, total treatment time and time spent performing 1:1 timed codes.

## 2018-10-29 ENCOUNTER — THERAPY VISIT (OUTPATIENT)
Dept: PHYSICAL THERAPY | Facility: CLINIC | Age: 58
End: 2018-10-29
Payer: COMMERCIAL

## 2018-10-29 DIAGNOSIS — M25.512 CHRONIC PAIN OF BOTH SHOULDERS: ICD-10-CM

## 2018-10-29 DIAGNOSIS — G89.29 CHRONIC PAIN OF BOTH SHOULDERS: ICD-10-CM

## 2018-10-29 DIAGNOSIS — M25.511 CHRONIC PAIN OF BOTH SHOULDERS: ICD-10-CM

## 2018-10-29 PROCEDURE — 97110 THERAPEUTIC EXERCISES: CPT | Mod: GP | Performed by: PHYSICAL THERAPIST

## 2018-10-29 PROCEDURE — 97140 MANUAL THERAPY 1/> REGIONS: CPT | Mod: GP | Performed by: PHYSICAL THERAPIST

## 2018-11-06 ENCOUNTER — THERAPY VISIT (OUTPATIENT)
Dept: PHYSICAL THERAPY | Facility: CLINIC | Age: 58
End: 2018-11-06
Payer: COMMERCIAL

## 2018-11-06 DIAGNOSIS — M25.512 CHRONIC PAIN OF BOTH SHOULDERS: ICD-10-CM

## 2018-11-06 DIAGNOSIS — G89.29 CHRONIC PAIN OF BOTH SHOULDERS: ICD-10-CM

## 2018-11-06 DIAGNOSIS — M25.511 CHRONIC PAIN OF BOTH SHOULDERS: ICD-10-CM

## 2018-11-06 PROCEDURE — 97110 THERAPEUTIC EXERCISES: CPT | Mod: GP | Performed by: PHYSICAL THERAPIST

## 2018-11-08 ENCOUNTER — THERAPY VISIT (OUTPATIENT)
Dept: PHYSICAL THERAPY | Facility: CLINIC | Age: 58
End: 2018-11-08
Payer: COMMERCIAL

## 2018-11-08 DIAGNOSIS — M25.512 CHRONIC PAIN OF BOTH SHOULDERS: ICD-10-CM

## 2018-11-08 DIAGNOSIS — G89.29 CHRONIC PAIN OF BOTH SHOULDERS: ICD-10-CM

## 2018-11-08 DIAGNOSIS — M25.511 CHRONIC PAIN OF BOTH SHOULDERS: ICD-10-CM

## 2018-11-08 PROCEDURE — 97140 MANUAL THERAPY 1/> REGIONS: CPT | Mod: GP | Performed by: PHYSICAL THERAPIST

## 2018-11-08 PROCEDURE — 97110 THERAPEUTIC EXERCISES: CPT | Mod: GP | Performed by: PHYSICAL THERAPIST

## 2018-11-15 ENCOUNTER — THERAPY VISIT (OUTPATIENT)
Dept: PHYSICAL THERAPY | Facility: CLINIC | Age: 58
End: 2018-11-15
Payer: COMMERCIAL

## 2018-11-15 DIAGNOSIS — M25.512 CHRONIC PAIN OF BOTH SHOULDERS: ICD-10-CM

## 2018-11-15 DIAGNOSIS — G89.29 CHRONIC PAIN OF BOTH SHOULDERS: ICD-10-CM

## 2018-11-15 DIAGNOSIS — M25.511 CHRONIC PAIN OF BOTH SHOULDERS: ICD-10-CM

## 2018-11-15 PROCEDURE — 97110 THERAPEUTIC EXERCISES: CPT | Mod: GP | Performed by: PHYSICAL THERAPIST

## 2019-01-16 ENCOUNTER — OFFICE VISIT (OUTPATIENT)
Dept: PEDIATRICS | Facility: CLINIC | Age: 59
End: 2019-01-16
Payer: COMMERCIAL

## 2019-01-16 VITALS
WEIGHT: 110.3 LBS | HEIGHT: 63 IN | DIASTOLIC BLOOD PRESSURE: 77 MMHG | SYSTOLIC BLOOD PRESSURE: 116 MMHG | BODY MASS INDEX: 19.54 KG/M2 | OXYGEN SATURATION: 97 % | HEART RATE: 68 BPM | TEMPERATURE: 97.6 F

## 2019-01-16 DIAGNOSIS — G89.29 CHRONIC RIGHT SHOULDER PAIN: ICD-10-CM

## 2019-01-16 DIAGNOSIS — M25.511 CHRONIC RIGHT SHOULDER PAIN: ICD-10-CM

## 2019-01-16 DIAGNOSIS — L30.4 INTERTRIGO: ICD-10-CM

## 2019-01-16 DIAGNOSIS — K64.8 INTERNAL HEMORRHOID, BLEEDING: Primary | ICD-10-CM

## 2019-01-16 DIAGNOSIS — Z12.39 BREAST CANCER SCREENING: ICD-10-CM

## 2019-01-16 DIAGNOSIS — M67.911 TENDINOPATHY OF ROTATOR CUFF, RIGHT: ICD-10-CM

## 2019-01-16 PROCEDURE — 99214 OFFICE O/P EST MOD 30 MIN: CPT | Performed by: FAMILY MEDICINE

## 2019-01-16 RX ORDER — HYDROCORTISONE ACETATE 25 MG/1
25 SUPPOSITORY RECTAL 2 TIMES DAILY
Qty: 20 SUPPOSITORY | Refills: 0 | Status: SHIPPED | OUTPATIENT
Start: 2019-01-16 | End: 2019-02-21

## 2019-01-16 RX ORDER — CLOTRIMAZOLE AND BETAMETHASONE DIPROPIONATE 10; .64 MG/G; MG/G
CREAM TOPICAL
Qty: 15 G | Refills: 0 | Status: SHIPPED | OUTPATIENT
Start: 2019-01-16 | End: 2019-06-17

## 2019-01-16 ASSESSMENT — PATIENT HEALTH QUESTIONNAIRE - PHQ9
SUM OF ALL RESPONSES TO PHQ QUESTIONS 1-9: 10
5. POOR APPETITE OR OVEREATING: SEVERAL DAYS

## 2019-01-16 ASSESSMENT — MIFFLIN-ST. JEOR: SCORE: 1036.51

## 2019-01-16 ASSESSMENT — ANXIETY QUESTIONNAIRES
7. FEELING AFRAID AS IF SOMETHING AWFUL MIGHT HAPPEN: SEVERAL DAYS
5. BEING SO RESTLESS THAT IT IS HARD TO SIT STILL: SEVERAL DAYS
2. NOT BEING ABLE TO STOP OR CONTROL WORRYING: SEVERAL DAYS
6. BECOMING EASILY ANNOYED OR IRRITABLE: SEVERAL DAYS
1. FEELING NERVOUS, ANXIOUS, OR ON EDGE: SEVERAL DAYS
3. WORRYING TOO MUCH ABOUT DIFFERENT THINGS: SEVERAL DAYS
GAD7 TOTAL SCORE: 7

## 2019-01-16 ASSESSMENT — PAIN SCALES - GENERAL: PAINLEVEL: NO PAIN (0)

## 2019-01-16 NOTE — PATIENT INSTRUCTIONS
Schedule for mammogram, physical, fasting labs in 5-6 months  Schedule for sports consult    Start using ANUSOL for up to 7-10 days

## 2019-01-16 NOTE — PROGRESS NOTES
SUBJECTIVE:   Karina Melvin is a 58 year old female who presents to clinic today for the following health issues:    Hemorrhoids  Patient with past medical history significant for family history of colon cancer in mother is here with concerns of having mild pain and rectal bleeding after having a bowel movement intermittently for the past few months noticed mostly happening after she eats very spicy ethnic foods.  Patient had a colonoscopy in [place] 3 years ago which was reportedly normal  He denies concerns for swelling around the anus, diarrhea, constipation, abdominal pain or bloating, nausea, vomiting, decrease or lack of appetite, weight loss  Onset: within last year, intermittent    Description:   Pain: YES- after BM and pressure  Itching: no     Accompanying Signs & Symptoms:  Blood streaked toilet paper: YES  Blood in stool: no   Changes in stool pattern: no     History:   Any previous GI studies done:Colonoscopy  Family History of colon cancer: YES- mother    Precipitating factors:   None    Alleviating factors:  None    Therapies Tried and outcome: Tylenol    Chronic right shoulder pain-patient was seen for this last year, was recommended to consult sports medicine for possible cortisone injection which she declined.  Is currently using topical diclofenac gel and on physical therapy with only minimal relief of symptoms  Patient continues to have significant restriction in range of motion and feels severe pain when she tries to lay on her right side  Denies recent history of fall or trauma  INTERTRIGO-patient has history of chronic intertrigo from Candida for many years in the bilateral axillary area, has been using antifungal/steroid cream sparingly as needed, is requesting refills today        Problem list and histories reviewed & adjusted, as indicated.  Additional history: as documented    Patient Active Problem List   Diagnosis     DDD (degenerative disc disease), cervical     Cervical  radiculopathy     Neck pain on left side     CARDIOVASCULAR SCREENING; LDL GOAL LESS THAN 160     Adjustment disorder with mixed anxiety and depressed mood     Family history of thyroid disease     Intertrigo     Tendinopathy of rotator cuff, right     Chronic pain of both shoulders     Internal hemorrhoid, bleeding     Chronic right shoulder pain     History reviewed. No pertinent surgical history.    Social History     Tobacco Use     Smoking status: Never Smoker     Smokeless tobacco: Never Used   Substance Use Topics     Alcohol use: No     Family History   Problem Relation Age of Onset     Colon Cancer Mother      Lung Cancer Father      Lung Cancer Brother      Thyroid Disease Sister          Current Outpatient Medications   Medication Sig Dispense Refill     clotrimazole-betamethasone (LOTRISONE) 1-0.05 % external cream APPLY TO AFFECTED AREA(S) TOPICALLY DAILY AS NEEDED 15 g 0     diclofenac (VOLTAREN) 1 % topical gel Apply  2 grams to right shoulder four times daily as needed using enclosed dosing card. 100 g 1     hydrocortisone (ANUSOL-HC) 25 MG suppository Place 1 suppository (25 mg) rectally 2 times daily for 10 days 20 suppository 0     Misc Natural Products (OSTEO BI-FLEX JOINT SHIELD PO) Take 1 tablet by mouth daily       Multiple Vitamins-Minerals (CENTRUM SILVER) per tablet Take 1 tablet by mouth daily       VITAMIN D, CHOLECALCIFEROL, PO Take 5,000 Units by mouth daily       No Known Allergies  Recent Labs   Lab Test 11/08/17  0736   *   HDL 77   TRIG 104   ALT 14   CR 0.63   GFRESTIMATED >90   GFRESTBLACK >90   POTASSIUM 3.6   TSH 0.68      BP Readings from Last 3 Encounters:   01/16/19 116/77   08/14/18 126/82   05/22/18 112/78    Wt Readings from Last 3 Encounters:   01/16/19 50 kg (110 lb 4.8 oz)   08/14/18 49.3 kg (108 lb 9.6 oz)   05/22/18 49.6 kg (109 lb 6.4 oz)                  Labs reviewed in EPIC    Reviewed and updated as needed this visit by clinical staff  Meds       Reviewed  "and updated as needed this visit by Provider         ROS:  CONSTITUTIONAL: NEGATIVE for fever, chills, change in weight  INTEGUMENTARY/SKIN: as above  RESP: NEGATIVE for significant cough or SOB  CV: NEGATIVE for chest pain, palpitations or peripheral edema  GI: as above  MUSCULOSKELETAL: as above  NEURO: NEGATIVE for weakness, dizziness or paresthesias  Heme-no concerns for anemia  PSYCHIATRIC: NEGATIVE for changes in mood or affect    OBJECTIVE:     /77 (BP Location: Right arm, Patient Position: Sitting, Cuff Size: Adult Regular)   Pulse 68   Temp 97.6  F (36.4  C) (Oral)   Ht 1.588 m (5' 2.5\")   Wt 50 kg (110 lb 4.8 oz)   SpO2 97%   BMI 19.85 kg/m    Body mass index is 19.85 kg/m .  GENERAL: healthy, alert and no distress  RESP: lungs clear to auscultation - no rales, rhonchi or wheezes  CV: regular rate and rhythm, normal S1 S2, no S3 or S4, no murmur, click or rub, no peripheral edema and peripheral pulses strong  ABDOMEN: soft, nontender, no hepatosplenomegaly, no masses and bowel sounds normal  RECTAL (female): Small posterior anal fissure with few internal nonbleeding hemorrhoids noted on rectal exam  No external hemorrhoid or other sentinel tag seen  MS: Right shoulder-moderate tenderness over the right acromioclavicular joint, significantly restricted in range of motion during abduction, adduction, external and internal rotation, negative impingement test  SKIN: no suspicious lesions or rashes  PSYCH: mentation appears normal, affect normal/bright    Diagnostic Test Results:  none     ASSESSMENT/PLAN:             1. Internal hemorrhoid, bleeding  Recommended to use anusol locally, avoid constipation and straining at stool,start on otc stool softeners, push fluids and fiber rich diet,start on miralax, sitz baths 1-2 times daily and RTC in 2 weeks if no better by then or sooner prn.  We will consider colonoscopy for further evaluation given her family history of colon cancer  Recommended to avoid " food triggers  Patient verbalised understanding and is agreeable to the plan    - hydrocortisone (ANUSOL-HC) 25 MG suppository; Place 1 suppository (25 mg) rectally 2 times daily for 10 days  Dispense: 20 suppository; Refill: 0    2. Chronic right shoulder pain  With previous x-ray showing supraspinatus tendinopathy  Recommended patient to consult sports medicine for cortisone injection given her poor response to physical therapy and topical medications  Patient verbalised understanding and is agreeable to the plan.    - diclofenac (VOLTAREN) 1 % topical gel; Apply  2 grams to right shoulder four times daily as needed using enclosed dosing card.  Dispense: 100 g; Refill: 1  - SPORTS MEDICINE REFERRAL    3. Tendinopathy of rotator cuff, right  as above    - diclofenac (VOLTAREN) 1 % topical gel; Apply  2 grams to right shoulder four times daily as needed using enclosed dosing card.  Dispense: 100 g; Refill: 1  - SPORTS MEDICINE REFERRAL    4. Breast cancer screening    - [place] Screening Digital Bilateral; Future    5. Intertrigo  Recommend patient to use cream only sparingly and if she does not feel improvement and using cream for 1 week, patient needs to be seen  - clotrimazole-betamethasone (LOTRISONE) 1-0.05 % external cream; APPLY TO AFFECTED AREA(S) TOPICALLY DAILY AS NEEDED  Dispense: 15 g; Refill: 0    Chart documentation done in part with Dragon Voice recognition Software. Although reviewed after completion, some word and grammatical error may remain.    See Patient Instructions    Toni Harkins MD  [facility]

## 2019-01-17 ASSESSMENT — ANXIETY QUESTIONNAIRES: GAD7 TOTAL SCORE: 7

## 2019-01-23 ENCOUNTER — OFFICE VISIT (OUTPATIENT)
Dept: PEDIATRICS | Facility: CLINIC | Age: 59
End: 2019-01-23
Payer: COMMERCIAL

## 2019-01-23 VITALS
WEIGHT: 109.1 LBS | HEART RATE: 61 BPM | DIASTOLIC BLOOD PRESSURE: 77 MMHG | TEMPERATURE: 97.9 F | OXYGEN SATURATION: 97 % | SYSTOLIC BLOOD PRESSURE: 122 MMHG | BODY MASS INDEX: 19.64 KG/M2

## 2019-01-23 DIAGNOSIS — R05.9 COUGH: ICD-10-CM

## 2019-01-23 DIAGNOSIS — J02.9 SORE THROAT: Primary | ICD-10-CM

## 2019-01-23 LAB
DEPRECATED S PYO AG THROAT QL EIA: NORMAL
SPECIMEN SOURCE: NORMAL

## 2019-01-23 PROCEDURE — 99213 OFFICE O/P EST LOW 20 MIN: CPT | Performed by: FAMILY MEDICINE

## 2019-01-23 PROCEDURE — 87081 CULTURE SCREEN ONLY: CPT | Performed by: FAMILY MEDICINE

## 2019-01-23 PROCEDURE — 87880 STREP A ASSAY W/OPTIC: CPT | Performed by: FAMILY MEDICINE

## 2019-01-23 RX ORDER — BENZONATATE 100 MG/1
100 CAPSULE ORAL 3 TIMES DAILY PRN
Qty: 21 CAPSULE | Refills: 0 | Status: SHIPPED | OUTPATIENT
Start: 2019-01-23 | End: 2019-02-21

## 2019-01-23 NOTE — PROGRESS NOTES
SUBJECTIVE:   Karina Melvin is a 58 year old female who presents to clinic today for the following health issues:      Patient is here with concerns of having acute onset of sore throat and severe dry cough for the past 2 days associated with clear runny nose, chest congestion has slightly decreased appetite and  with no associated concerns for fever, chills, abnormal skin rashes, vomiting, diarrhea, abdominal pain.  Does not smoke. Has no h/o asthnma or allergies.    Acute Illness   Acute illness concerns: Cough and Sore Throat  Onset: 2 days ago    Fever: no    Chills/Sweats: YES    Headache (location?): YES    Sinus Pressure:YES    Conjunctivitis:  no    Ear Pain: no    Rhinorrhea: YES    Congestion: YES    Sore Throat: YES     Cough: YES-non-productive    Wheeze: no    Decreased Appetite: YES    Nausea: no    Vomiting: no    Diarrhea:  no    Dysuria/Freq.: no    Fatigue/Achiness: YES    Sick/Strep Exposure: no     Therapies Tried and outcome: increase fluid intake, cough drops and tylenol          Problem list and histories reviewed & adjusted, as indicated.  Additional history: as documented    Patient Active Problem List   Diagnosis     DDD (degenerative disc disease), cervical     Cervical radiculopathy     Neck pain on left side     CARDIOVASCULAR SCREENING; LDL GOAL LESS THAN 160     Adjustment disorder with mixed anxiety and depressed mood     Family history of thyroid disease     Intertrigo     Tendinopathy of rotator cuff, right     Chronic pain of both shoulders     Internal hemorrhoid, bleeding     Chronic right shoulder pain     No past surgical history on file.    Social History     Tobacco Use     Smoking status: Never Smoker     Smokeless tobacco: Never Used   Substance Use Topics     Alcohol use: No     Family History   Problem Relation Age of Onset     Colon Cancer Mother      Lung Cancer Father      Lung Cancer Brother      Thyroid Disease Sister          Current Outpatient Medications    Medication Sig Dispense Refill     benzonatate (TESSALON) 100 MG capsule Take 1 capsule (100 mg) by mouth 3 times daily as needed for cough 21 capsule 0     clotrimazole-betamethasone (LOTRISONE) 1-0.05 % external cream APPLY TO AFFECTED AREA(S) TOPICALLY DAILY AS NEEDED 15 g 0     diclofenac (VOLTAREN) 1 % topical gel Apply  2 grams to right shoulder four times daily as needed using enclosed dosing card. 100 g 1     hydrocortisone (ANUSOL-HC) 25 MG suppository Place 1 suppository (25 mg) rectally 2 times daily for 10 days 20 suppository 0     Misc Natural Products (OSTEO BI-FLEX JOINT SHIELD PO) Take 1 tablet by mouth daily       Multiple Vitamins-Minerals (CENTRUM SILVER) per tablet Take 1 tablet by mouth daily       VITAMIN D, CHOLECALCIFEROL, PO Take 5,000 Units by mouth daily       No Known Allergies  Recent Labs   Lab Test 11/08/17  0736   *   HDL 77   TRIG 104   ALT 14   CR 0.63   GFRESTIMATED >90   GFRESTBLACK >90   POTASSIUM 3.6   TSH 0.68      BP Readings from Last 3 Encounters:   01/23/19 122/77   01/16/19 116/77   08/14/18 126/82    Wt Readings from Last 3 Encounters:   01/23/19 49.5 kg (109 lb 1.6 oz)   01/16/19 50 kg (110 lb 4.8 oz)   08/14/18 49.3 kg (108 lb 9.6 oz)                  Labs reviewed in EPIC    Reviewed and updated as needed this visit by clinical staff       Reviewed and updated as needed this visit by Provider         ROS:  CONSTITUTIONAL: NEGATIVE for fever, chills, change in weight  INTEGUMENTARY/SKIN: NEGATIVE for worrisome rashes, moles or lesions  EYES: NEGATIVE for vision changes or irritation  ENT/MOUTH: as above  RESP:as above  CV: NEGATIVE for chest pain, palpitations or peripheral edema  GI: NEGATIVE for nausea, abdominal pain, heartburn, or change in bowel habits  PSYCHIATRIC: NEGATIVE for changes in mood or affect    OBJECTIVE:     /77 (BP Location: Right arm, Patient Position: Sitting, Cuff Size: Adult Regular)   Pulse 61   Temp 97.9  F (36.6  C) (Oral)    Wt 49.5 kg (109 lb 1.6 oz)   SpO2 97%   BMI 19.64 kg/m    Body mass index is 19.64 kg/m .  GENERAL: healthy, alert and no distress  EYES: Eyes grossly normal to inspection  HENT: ear canals and TM's normal, nose and mouth without ulcers or lesions  NECK: no adenopathy, no asymmetry, masses, or scars and thyroid normal to palpation  RESP: lungs clear to auscultation - no rales, rhonchi or wheezes  CV: regular rate and rhythm, normal S1 S2, no S3 or S4, no murmur, click or rub, no peripheral edema and peripheral pulses strong  MS: no gross musculoskeletal defects noted, no edema  SKIN: no suspicious lesions or rashes  PSYCH: mentation appears normal, affect normal/bright    Diagnostic Test Results:  Results for orders placed or performed in visit on 01/23/19 (from the past 24 hour(s))   Strep, Rapid Screen   Result Value Ref Range    Specimen Description Throat     Rapid Strep A Screen       NEGATIVE: No Group A streptococcal antigen detected by immunoassay, await culture report.       ASSESSMENT/PLAN:             1. Sore throat  Results for orders placed or performed in visit on 01/23/19   Strep, Rapid Screen   Result Value Ref Range    Specimen Description Throat     Rapid Strep A Screen       NEGATIVE: No Group A streptococcal antigen detected by immunoassay, await culture report.         Negative test results reviewed with the patient and reassured.  Recommended warm saline gargles, tylenol or motrin prn for pain, throat lozenges, fluids and rtc if no better by 1week or sooner prn.      - Strep, Rapid Screen  - Beta strep group A culture    2. Cough  Likely viral URI, per patient's request, prescription given for Tessalon capsules to use as needed for cough  Follow-up if no better in 1 week or sooner if needed  Dosing and potential medication side effects discussed.  Patient verbalised understanding and is agreeable to the plan.    - benzonatate (TESSALON) 100 MG capsule; Take 1 capsule (100 mg) by mouth 3 times  daily as needed for cough  Dispense: 21 capsule; Refill: 0    Chart documentation done in part with Dragon Voice recognition Software. Although reviewed after completion, some word and grammatical error may remain.    See Patient Instructions    Toni Harkins MD  [facility]

## 2019-01-24 LAB
BACTERIA SPEC CULT: NORMAL
SPECIMEN SOURCE: NORMAL

## 2019-02-21 ENCOUNTER — OFFICE VISIT (OUTPATIENT)
Dept: ORTHOPEDICS | Facility: CLINIC | Age: 59
End: 2019-02-21
Attending: FAMILY MEDICINE
Payer: COMMERCIAL

## 2019-02-21 VITALS
HEART RATE: 73 BPM | WEIGHT: 109 LBS | DIASTOLIC BLOOD PRESSURE: 80 MMHG | HEIGHT: 63 IN | BODY MASS INDEX: 19.31 KG/M2 | SYSTOLIC BLOOD PRESSURE: 112 MMHG

## 2019-02-21 DIAGNOSIS — M75.31 CALCIFIC TENDINITIS OF RIGHT SHOULDER: Primary | ICD-10-CM

## 2019-02-21 PROCEDURE — 99214 OFFICE O/P EST MOD 30 MIN: CPT | Performed by: FAMILY MEDICINE

## 2019-02-21 ASSESSMENT — MIFFLIN-ST. JEOR: SCORE: 1030.61

## 2019-02-21 ASSESSMENT — PAIN SCALES - GENERAL: PAINLEVEL: SEVERE PAIN (7)

## 2019-02-21 NOTE — LETTER
2/21/2019         RE: Karina Melvin  [address] [facility] [address]        Dear Colleague,    Thank you for referring your patient, Karina Melvin, to the [facility]. Please see a copy of my visit note below.    CHIEF COMPLAINT:  Consult (Chronic right shoulder pain )       HISTORY OF PRESENT ILLNESS  Ms. Melvin is a pleasant 58 year old year old female who presents to clinic today with right shoulder pain.  Karina is seen at the request of Dr. Harkins.    Estrella has had right shoulder pain for about 2 years.  No acute event that she can recall that incited her pain.  She points to the lateral aspect of her right shoulder.  Pain is worse with overhead motion, worse the more she uses her shoulder.  She denies any numbness or tingling, no weakness.      Notably, she does have a history of a C3 disc herniation that was diagnosed while living in [place], [place] years ago.      Additional history: as documented    MEDICAL HISTORY  Patient Active Problem List   Diagnosis     DDD (degenerative disc disease), cervical     Cervical radiculopathy     Neck pain on left side     CARDIOVASCULAR SCREENING; LDL GOAL LESS THAN 160     Adjustment disorder with mixed anxiety and depressed mood     Family history of thyroid disease     Intertrigo     Tendinopathy of rotator cuff, right     Chronic pain of both shoulders     Internal hemorrhoid, bleeding     Chronic right shoulder pain       Current Outpatient Medications   Medication Sig Dispense Refill     clotrimazole-betamethasone (LOTRISONE) 1-0.05 % external cream APPLY TO AFFECTED AREA(S) TOPICALLY DAILY AS NEEDED 15 g 0     diclofenac (VOLTAREN) 1 % topical gel Apply  2 grams to right shoulder four times daily as needed using enclosed dosing card. 100 g 1     Misc Natural Products (OSTEO BI-FLEX JOINT SHIELD PO) Take 1 tablet by mouth daily       Multiple Vitamins-Minerals (CENTRUM SILVER) per tablet Take 1 tablet by mouth daily        "VITAMIN D, CHOLECALCIFEROL, PO Take 5,000 Units by mouth daily         No Known Allergies    Family History   Problem Relation Age of Onset     Colon Cancer Mother      Lung Cancer Father      Lung Cancer Brother      Thyroid Disease Sister        Additional medical/Social/Surgical histories reviewed in [facility] and updated as appropriate.     REVIEW OF SYSTEMS (2/21/2019)  CONSTITUTIONAL: Denies fever and weight loss  EYES: Denies acute vision changes  ENT: Denies hearing changes or difficulty swallowing  CARDIAC: Denies chest pain or edema  RESPIRATORY: Denies dyspnea, cough or wheeze  GASTROINTESTINAL: Denies abdominal pain, nausea, vomiting  MUSCULOSKELETAL: See HPI  SKIN: Denies any recent rash or lesion  NEUROLOGICAL: Denies numbness or focal weakness  PSYCHIATRIC: No history of psychiatric symptoms or problems  ENDOCRINE: Denies current diagnosis of diabetes  HEMATOLOGY: Denies episodes of easy bleeding      PHYSICAL EXAM  Vitals:    02/21/19 1042   BP: 112/80   BP Location: Left arm   Patient Position: Sitting   Pulse: 73   Weight: 49.4 kg (109 lb)   Height: 1.588 m (5' 2.5\")     General  - normal appearance, in no obvious distress  CV  - normal radial pulse  Pulm  - normal respiratory pattern, non-labored  Musculoskeletal - right shoulder  - inspection: normal bone and joint alignment  - palpation: mildly tender RC insertion, normal clavicle, non-tender AC  - ROM: painful and limited flexion and ER at end range, limited IR  - strength: 5/5  strength, 5/5 in all shoulder planes  - special tests:  (-) Speed's  (+) Neer  (+) Hawkin's  (+) Ana's  Neuro  - no sensory or motor deficit, grossly normal coordination, normal muscle tone  Skin  - no ecchymosis, erythema, warmth, or induration, no obvious rash  Psych  - interactive, appropriate, normal mood and affect           ASSESSMENT & PLAN  Ms. Melvin is a 58 year old year old female who presents to clinic today with right shoulder pain.    I reviewed her " "shoulder xray in the room with her:  IMPRESSION:  1. No evidence of fracture or dislocation involving the right  shoulder.  2. Focal area of calcification in the region of the supraspinatus  tendon, correlate clinically for calcific tendinitis.    Estrella's pain is likely secondary to calcific tendinitis of the rotator cuff.  We discussed this for some time.  Given her now chronic pain and lack of function I am ordering an MRI of her shoulder.    We should follow-up after her MRI, at that visit we may attempt a barbotage procedure or subacromial bursa injection, if amenable.    Thank you for allowing me to participate in Estrella's care.    Jose Miguel De Leon DO, CAQSM  Primary Care Sports Medicine                 [facility]  2/21/2019    Karina KATHY Melvin's chief complaint for this visit includes:  Chief Complaint   Patient presents with     Consult     Chronic right shoulder pain, no known injury, pain for years. Has done PT     PCP: Toni Harkins    Referring Provider:  Toni Harkins MD  [address] [place] [address]    Ht 1.588 m (5' 2.5\")   Wt 49.4 kg (109 lb)   BMI 19.62 kg/m     Severe Pain (7)       Reason for visit:     What part of your body is injured / painful?  right shoulder    What caused the injury /pain? Unsure    How long ago did your injury occur or pain begin? several years ago    What are your most bothersome symptoms? Pain    How would you characterize your symptom?  aching    What makes your symptoms better? Rest    What makes your symptoms worse? Movement    Have you been previously seen for this problem? Yes, has tried PT    Medical History:    Any recent changes to your medical history? No    Any new medication prescribed since last visit? No    Have you had surgery on this body part before? No        Review of Systems:    Do you have fever, chills, weight loss? No    Do you have any vision problems? No    Do you have any chest pain or edema? No    Do you have any " shortness of breath or wheezing?  No    Do you have stomach problems? No    Do you have any numbness or focal weakness? No    Do you have diabetes? No    Do you have problems with bleeding or clotting? No    Do you have an rashes or other skin lesions? No         Again, thank you for allowing me to participate in the care of your patient.        Sincerely,        Jose Miguel De Leon, DO

## 2019-02-21 NOTE — PROGRESS NOTES
CHIEF COMPLAINT:  Consult (Chronic right shoulder pain )       HISTORY OF PRESENT ILLNESS  Ms. Melvin is a pleasant 58 year old year old female who presents to clinic today with right shoulder pain.  Karina is seen at the request of Dr. Harkins.    Estrella has had right shoulder pain for about 2 years.  No acute event that she can recall that incited her pain.  She points to the lateral aspect of her right shoulder.  Pain is worse with overhead motion, worse the more she uses her shoulder.  She denies any numbness or tingling, no weakness.      Notably, she does have a history of a C3 disc herniation that was diagnosed while living in [place], [place] years ago.      Additional history: as documented    MEDICAL HISTORY  Patient Active Problem List   Diagnosis     DDD (degenerative disc disease), cervical     Cervical radiculopathy     Neck pain on left side     CARDIOVASCULAR SCREENING; LDL GOAL LESS THAN 160     Adjustment disorder with mixed anxiety and depressed mood     Family history of thyroid disease     Intertrigo     Tendinopathy of rotator cuff, right     Chronic pain of both shoulders     Internal hemorrhoid, bleeding     Chronic right shoulder pain       Current Outpatient Medications   Medication Sig Dispense Refill     clotrimazole-betamethasone (LOTRISONE) 1-0.05 % external cream APPLY TO AFFECTED AREA(S) TOPICALLY DAILY AS NEEDED 15 g 0     diclofenac (VOLTAREN) 1 % topical gel Apply  2 grams to right shoulder four times daily as needed using enclosed dosing card. 100 g 1     Misc Natural Products (OSTEO BI-FLEX JOINT SHIELD PO) Take 1 tablet by mouth daily       Multiple Vitamins-Minerals (CENTRUM SILVER) per tablet Take 1 tablet by mouth daily       VITAMIN D, CHOLECALCIFEROL, PO Take 5,000 Units by mouth daily         No Known Allergies    Family History   Problem Relation Age of Onset     Colon Cancer Mother      Lung Cancer Father      Lung Cancer Brother      Thyroid Disease Sister        Additional  "medical/Social/Surgical histories reviewed in [facility] and updated as appropriate.     REVIEW OF SYSTEMS (2/21/2019)  CONSTITUTIONAL: Denies fever and weight loss  EYES: Denies acute vision changes  ENT: Denies hearing changes or difficulty swallowing  CARDIAC: Denies chest pain or edema  RESPIRATORY: Denies dyspnea, cough or wheeze  GASTROINTESTINAL: Denies abdominal pain, nausea, vomiting  MUSCULOSKELETAL: See HPI  SKIN: Denies any recent rash or lesion  NEUROLOGICAL: Denies numbness or focal weakness  PSYCHIATRIC: No history of psychiatric symptoms or problems  ENDOCRINE: Denies current diagnosis of diabetes  HEMATOLOGY: Denies episodes of easy bleeding      PHYSICAL EXAM  Vitals:    02/21/19 1042   BP: 112/80   BP Location: Left arm   Patient Position: Sitting   Pulse: 73   Weight: 49.4 kg (109 lb)   Height: 1.588 m (5' 2.5\")     General  - normal appearance, in no obvious distress  CV  - normal radial pulse  Pulm  - normal respiratory pattern, non-labored  Musculoskeletal - right shoulder  - inspection: normal bone and joint alignment  - palpation: mildly tender RC insertion, normal clavicle, non-tender AC  - ROM: painful and limited flexion and ER at end range, limited IR  - strength: 5/5  strength, 5/5 in all shoulder planes  - special tests:  (-) Speed's  (+) Neer  (+) Hawkin's  (+) Ana's  Neuro  - no sensory or motor deficit, grossly normal coordination, normal muscle tone  Skin  - no ecchymosis, erythema, warmth, or induration, no obvious rash  Psych  - interactive, appropriate, normal mood and affect           ASSESSMENT & PLAN  Ms. Melvin is a 58 year old year old female who presents to clinic today with right shoulder pain.    I reviewed her shoulder xray in the room with her:  IMPRESSION:  1. No evidence of fracture or dislocation involving the right  shoulder.  2. Focal area of calcification in the region of the supraspinatus  tendon, correlate clinically for calcific tendinitis.    Estrella's pain is " likely secondary to calcific tendinitis of the rotator cuff.  We discussed this for some time.  Given her now chronic pain and lack of function I am ordering an MRI of her shoulder.    We should follow-up after her MRI, at that visit we may attempt a barbotage procedure or subacromial bursa injection, if amenable.    Thank you for allowing me to participate in Estrella's care.    Jose Miguel De Leon DO, CAM  Primary Care Sports Medicine

## 2019-02-21 NOTE — PROGRESS NOTES
"      [facility]  2/21/2019    Karinathang Melvin's chief complaint for this visit includes:  Chief Complaint   Patient presents with     Consult     Chronic right shoulder pain, no known injury, pain for years. Has done PT     PCP: Toni Harkins    Referring Provider:  Toni Harkins MD  [address] [place] [address]    Ht 1.588 m (5' 2.5\")   Wt 49.4 kg (109 lb)   BMI 19.62 kg/m    Severe Pain (7)       Reason for visit:     What part of your body is injured / painful?  right shoulder    What caused the injury /pain? Unsure    How long ago did your injury occur or pain begin? several years ago    What are your most bothersome symptoms? Pain    How would you characterize your symptom?  aching    What makes your symptoms better? Rest    What makes your symptoms worse? Movement    Have you been previously seen for this problem? Yes, has tried PT    Medical History:    Any recent changes to your medical history? No    Any new medication prescribed since last visit? No    Have you had surgery on this body part before? No        Review of Systems:    Do you have fever, chills, weight loss? No    Do you have any vision problems? No    Do you have any chest pain or edema? No    Do you have any shortness of breath or wheezing?  No    Do you have stomach problems? No    Do you have any numbness or focal weakness? No    Do you have diabetes? No    Do you have problems with bleeding or clotting? No    Do you have an rashes or other skin lesions? No         "

## 2019-02-28 ENCOUNTER — ANCILLARY PROCEDURE (OUTPATIENT)
Dept: MRI IMAGING | Facility: CLINIC | Age: 59
End: 2019-02-28
Attending: FAMILY MEDICINE
Payer: COMMERCIAL

## 2019-02-28 DIAGNOSIS — M75.31 CALCIFIC TENDINITIS OF RIGHT SHOULDER: ICD-10-CM

## 2019-02-28 PROCEDURE — 73221 MRI JOINT UPR EXTREM W/O DYE: CPT | Mod: RT | Performed by: RADIOLOGY

## 2019-03-07 ENCOUNTER — OFFICE VISIT (OUTPATIENT)
Dept: ORTHOPEDICS | Facility: CLINIC | Age: 59
End: 2019-03-07
Payer: COMMERCIAL

## 2019-03-07 VITALS
DIASTOLIC BLOOD PRESSURE: 72 MMHG | SYSTOLIC BLOOD PRESSURE: 103 MMHG | HEIGHT: 63 IN | BODY MASS INDEX: 19.31 KG/M2 | WEIGHT: 109 LBS | HEART RATE: 67 BPM

## 2019-03-07 DIAGNOSIS — M75.31 CALCIFIC TENDINITIS OF RIGHT SHOULDER: Primary | ICD-10-CM

## 2019-03-07 PROCEDURE — 99213 OFFICE O/P EST LOW 20 MIN: CPT | Performed by: FAMILY MEDICINE

## 2019-03-07 ASSESSMENT — PAIN SCALES - GENERAL: PAINLEVEL: EXTREME PAIN (8)

## 2019-03-07 ASSESSMENT — MIFFLIN-ST. JEOR: SCORE: 1030.61

## 2019-03-07 NOTE — PROGRESS NOTES
"HISTORY OF PRESENT ILLNESS  Ms. Melvin is a pleasant 58 year old year old female following up with right shoulder pain.  Estrella is here to review her MRI.  Additional history: as documented      REVIEW OF SYSTEMS (3/7/2019)  10 point ROS of systems including Constitutional, Eyes, Respiratory, Cardiovascular, Gastroenterology, Genitourinary, Integumentary, Musculoskeletal, Psychiatric were all negative except for pertinent positives noted in my HPI.     PHYSICAL EXAM  Vitals:    03/07/19 1031   BP: 103/72   BP Location: Left arm   Patient Position: Sitting   Pulse: 67   Weight: 49.4 kg (109 lb)   Height: 1.588 m (5' 2.5\")         ASSESSMENT & PLAN  Ms. Melvin is a 58 year old year old female following up with right shoulder pain.    I reviewed her MR in the room with her:  Impression:   1. Calcific tendinitis with active inflammation of the  supraspinatus/infraspinatus junction fibers. Underlying partial  thickness tear cannot be entirely excluded in a background of active  inflammation.  2. Subscapularis tendinosis.    Estrella and I discussed the pathophysiology and treatment strategies for calcific tendinitis.  One possible treatment strategy is to perform injection based therapy either in the form of a barbotage or percutaneous tenotomy.    Estrella is going to return to my clinic next week for a barbotage procedure.    It was a pleasure seeing Estrella.    15 minutes was spent in the room, more than half of which was spent on counseling and coordination of care.        Jose Miguel De Leon, DO, CAQSM          "

## 2019-03-07 NOTE — LETTER
"    3/7/2019         RE: Karina Melvin  [address] [facility] [address]        Dear Colleague,    Thank you for referring your patient, Karina Melvin, to the [facility]. Please see a copy of my visit note below.    HISTORY OF PRESENT ILLNESS  Ms. Melvin is a pleasant 58 year old year old female following up with right shoulder pain.  Estrella is here to review her MRI.  Additional history: as documented      REVIEW OF SYSTEMS (3/7/2019)  10 point ROS of systems including Constitutional, Eyes, Respiratory, Cardiovascular, Gastroenterology, Genitourinary, Integumentary, Musculoskeletal, Psychiatric were all negative except for pertinent positives noted in my HPI.     PHYSICAL EXAM  Vitals:    03/07/19 1031   BP: 103/72   BP Location: Left arm   Patient Position: Sitting   Pulse: 67   Weight: 49.4 kg (109 lb)   Height: 1.588 m (5' 2.5\")         ASSESSMENT & PLAN  Ms. Melvin is a 58 year old year old female following up with right shoulder pain.    I reviewed her MR in the room with her:  Impression:   1. Calcific tendinitis with active inflammation of the  supraspinatus/infraspinatus junction fibers. Underlying partial  thickness tear cannot be entirely excluded in a background of active  inflammation.  2. Subscapularis tendinosis.    Estrella and I discussed the pathophysiology and treatment strategies for calcific tendinitis.  One possible treatment strategy is to perform injection based therapy either in the form of a barbotage or percutaneous tenotomy.    Estrella is going to return to my clinic next week for a barbotage procedure.    It was a pleasure seeing Estrella.    15 minutes was spent in the room, more than half of which was spent on counseling and coordination of care.        Jose Miguel De Leon DO, CAQSM                  [facility] FOLLOW-UP VISIT 3/7/2019    Karina Melvin's chief complaint for this visit includes:    PCP: Toni Harkins    Referring Provider:  No " "referring provider defined for this encounter.    Ht 1.588 m (5' 2.5\")   Wt 49.4 kg (109 lb)   BMI 19.62 kg/m     Extreme Pain (8)       Interval History:     Follow up reason: follow up shoulder  MRI    Medical History:    Any recent changes to your medical history? No    Any new medication prescribed since last visit? No    Review of Systems:    Do you have fever, chills, weight loss? No    Do you have any vision problems? No    Do you have any chest pain or edema? No    Do you have any shortness of breath or wheezing?  No    Do you have stomach problems? No    Do you have any numbness or focal weakness? No    Do you have diabetes? No    Do you have problems with bleeding or clotting? No    Do you have an rashes or other skin lesions? No      Again, thank you for allowing me to participate in the care of your patient.        Sincerely,        Jose Miguel De Leon, DO    "

## 2019-03-07 NOTE — PATIENT INSTRUCTIONS
Thanks for coming today.  Ortho/Sports Medicine Clinic  [address] [place] [address]    To schedule future appointments in Ortho Clinic, you may call [phone].    To schedule ordered imaging by your Provider: Call [facility] at [phone]    REEL Qualified available online at:   [email or web address]    Please call if any further questions or concerns [phone] and ask for the Orthopedic Department. Clinic hours 8 am to 5 pm.    Return to clinic if symptoms worsen.

## 2019-03-07 NOTE — PROGRESS NOTES
"      [facility] FOLLOW-UP VISIT 3/7/2019    Karina Melvin's chief complaint for this visit includes:    PCP: Toni Harkins    Referring Provider:  No referring provider defined for this encounter.    Ht 1.588 m (5' 2.5\")   Wt 49.4 kg (109 lb)   BMI 19.62 kg/m    Extreme Pain (8)       Interval History:     Follow up reason: follow up shoulder  MRI    Medical History:    Any recent changes to your medical history? No    Any new medication prescribed since last visit? No    Review of Systems:    Do you have fever, chills, weight loss? No    Do you have any vision problems? No    Do you have any chest pain or edema? No    Do you have any shortness of breath or wheezing?  No    Do you have stomach problems? No    Do you have any numbness or focal weakness? No    Do you have diabetes? No    Do you have problems with bleeding or clotting? No    Do you have an rashes or other skin lesions? No    "

## 2019-03-12 ENCOUNTER — OFFICE VISIT (OUTPATIENT)
Dept: ORTHOPEDICS | Facility: CLINIC | Age: 59
End: 2019-03-12
Payer: COMMERCIAL

## 2019-03-12 ENCOUNTER — TELEPHONE (OUTPATIENT)
Dept: ORTHOPEDICS | Facility: CLINIC | Age: 59
End: 2019-03-12

## 2019-03-12 VITALS
BODY MASS INDEX: 19.31 KG/M2 | SYSTOLIC BLOOD PRESSURE: 103 MMHG | HEART RATE: 70 BPM | HEIGHT: 63 IN | DIASTOLIC BLOOD PRESSURE: 80 MMHG | WEIGHT: 109 LBS

## 2019-03-12 DIAGNOSIS — M75.31 CALCIFIC TENDINITIS OF RIGHT SHOULDER: Primary | ICD-10-CM

## 2019-03-12 PROCEDURE — 76942 ECHO GUIDE FOR BIOPSY: CPT | Performed by: FAMILY MEDICINE

## 2019-03-12 PROCEDURE — 99207 ZZC DROP WITH A PROCEDURE: CPT | Performed by: FAMILY MEDICINE

## 2019-03-12 PROCEDURE — 20550 NJX 1 TENDON SHEATH/LIGAMENT: CPT | Mod: RT | Performed by: FAMILY MEDICINE

## 2019-03-12 RX ORDER — TRIAMCINOLONE ACETONIDE 40 MG/ML
40 INJECTION, SUSPENSION INTRA-ARTICULAR; INTRAMUSCULAR ONCE
Status: DISCONTINUED | OUTPATIENT
Start: 2019-03-12 | End: 2020-08-10

## 2019-03-12 ASSESSMENT — MIFFLIN-ST. JEOR: SCORE: 1030.61

## 2019-03-12 ASSESSMENT — PAIN SCALES - GENERAL: PAINLEVEL: EXTREME PAIN (8)

## 2019-03-12 NOTE — LETTER
Return to Work  2019     Seen today: no    Patient:  Karina Melvin  :   1960  MRN:     [id number]  Physician: JOSE MIGUEL DE LEON    Karina Melvni may not return to work until after 2019.      Please contact our office with any questions or concerns.     Electronically signed by Jose Miguel De Leon DO

## 2019-03-12 NOTE — TELEPHONE ENCOUNTER
Health Call Center    Phone Message  Patient needs a note for work for her appointment today - She would like to come and pick it up.   May a detailed message be left on voicemail: yes    Reason for Call: Other: Patient needs a note for work for her appointment today - She would like to come and pick it up.      Action Taken: Message routed to:  Adult Clinics: Orthopedics p [phone]

## 2019-03-12 NOTE — PROGRESS NOTES
"Estrella has calcific tendinitis of the right supraspinatus.  She is here for a barbotage.    Vitals:    03/12/19 1009   BP: 103/80   BP Location: Left arm   Patient Position: Sitting   Pulse: 70   Weight: 49.4 kg (109 lb)   Height: 1.588 m (5' 2.5\")     Barbotage, supraspinatus tendon - Ultrasound Guided  The patient was informed of the risks and the benefits of the procedure and a written consent was signed.  The patient s right shoulder was prepped with chlorhexidine in sterile fashion.   40 mg of triamcinolone suspension was drawn up into a 5 mL syringe with 4 mL of 1% lidocaine.  In addition a second syringe with 10 cc of 1% lidocaine without epinephrine was drawn up.  Procedure was performed using sterile technique.  Under ultrasound guidance a 1.5-inch 22-gauge needle was used to enter the distal supraspinatus tendon at the area of calcification.  Anterolateral approach was used with arm held in Crass position.  Needle placement was visualized and documented with ultrasound.  Ultrasound visualization was necessary to ensure placement in to the calcified area and not the rotator cuff tendon, which could potentially cause further tendon damage.  Injection performed long axis to the probe.  Multiple passes were made through calcification at distal supraspinatus, the 10 cc of lidocaine was injected throughout.  The syringe was then swapped for the syringe containing the triamcinolone.  Injection was performed at the site of the calcification.  There were no complications. The patient tolerated the procedure well. There was negligible bleeding.   The patient was instructed to ice the shoulder upon leaving clinic and refrain from overuse over the next 3 days.   The patient was instructed to call or go to the emergency room with any unusual pain, swelling, redness, or if otherwise concerned.  Kenalog: NDC [id number]      Art DO Moises CAQSM      "

## 2019-03-12 NOTE — PATIENT INSTRUCTIONS
Thanks for coming today.  [facility] Clinic  [address] [place] [address]    To schedule future appointments in Ortho Clinic, you may call [phone].    To schedule ordered imaging by your Provider: Call [facility] at [phone]    [employer] available online at:   [email or web address]    Please call if any further questions or concerns [phone] and ask for the Orthopedic Department. Clinic hours 8 am to 5 pm.    Return to clinic if symptoms worsen.

## 2019-03-12 NOTE — TELEPHONE ENCOUNTER
The patient was contacted today to discuss her work letter. She would like a few weeks off to recover. Dr. De Leon will be consulted. The patient can  the completed letter tomorrow 3/13/19. Th patient is agreeable.

## 2019-03-12 NOTE — LETTER
"    3/12/2019         RE: Karina Melvin  [address] [facility] [address]        Dear Colleague,    Thank you for referring your patient, Karina Melvin, to the [facility]. Please see a copy of my visit note below.    Estrella has calcific tendinitis of the right supraspinatus.  She is here for a barbotage.    Vitals:    03/12/19 1009   BP: 103/80   BP Location: Left arm   Patient Position: Sitting   Pulse: 70   Weight: 49.4 kg (109 lb)   Height: 1.588 m (5' 2.5\")     Barbotage, supraspinatus tendon - Ultrasound Guided  The patient was informed of the risks and the benefits of the procedure and a written consent was signed.  The patient s right shoulder was prepped with chlorhexidine in sterile fashion.   40 mg of triamcinolone suspension was drawn up into a 5 mL syringe with 4 mL of 1% lidocaine.  In addition a second syringe with 10 cc of 1% lidocaine without epinephrine was drawn up.  Procedure was performed using sterile technique.  Under ultrasound guidance a 1.5-inch 22-gauge needle was used to enter the distal supraspinatus tendon at the area of calcification.  Anterolateral approach was used with arm held in Crass position.  Needle placement was visualized and documented with ultrasound.  Ultrasound visualization was necessary to ensure placement in to the calcified area and not the rotator cuff tendon, which could potentially cause further tendon damage.  Injection performed long axis to the probe.  Multiple passes were made through calcification at distal supraspinatus, the 10 cc of lidocaine was injected throughout.  The syringe was then swapped for the syringe containing the triamcinolone.  Injection was performed at the site of the calcification.  There were no complications. The patient tolerated the procedure well. There was negligible bleeding.   The patient was instructed to ice the shoulder upon leaving clinic and refrain from overuse over the next 3 days.   The " patient was instructed to call or go to the emergency room with any unusual pain, swelling, redness, or if otherwise concerned.  Kenalog: NDC [id number]      Art De Leon DO [facility]        Again, thank you for allowing me to participate in the care of your patient.        Sincerely,        Jose Miguel De Leon DO

## 2019-03-12 NOTE — LETTER
Return to Work  2019     Seen today: yes    Patient:  Karina Melvin  :   1960  MRN:     [id number]  Physician: JOSE MIGUEL DE LEON    Karina Melvin may not return to work until after 2019.    Condition: MRI and injection    Please contact our office with any questions or concerns.     Electronically signed by Jose Miguel De Leon DO

## 2019-03-12 NOTE — NURSING NOTE
"Karina Melvin's chief complaint for this visit includes:  Chief Complaint   Patient presents with     RECHECK     right shoulder procedure      PCP: Toni Harkins    Referring Provider:  No referring provider defined for this encounter.    /80 (BP Location: Left arm, Patient Position: Sitting)   Pulse 70   Ht 1.588 m (5' 2.5\")   Wt 49.4 kg (109 lb)   BMI 19.62 kg/m    Extreme Pain (8)     Do you need any medication refills at today's visit? No     "

## 2019-03-12 NOTE — LETTER
2019     Seen today: yes    Patient:  Karina Melvin  :   1960  MRN:     [id number]  Physician: JOSE MIGUEL DE LEON    Karina Melvin may not return to work until after 2019.    Condition: MRI and injection (barbotage procedure)    Kiya contact our office with any questions or concerns.     Electronically signed by Jose Miguel De Leon DO

## 2019-03-13 NOTE — TELEPHONE ENCOUNTER
Please see message below, patient would like a call when the work letter is placed at the , possibly this morning for her work.     [phone] -

## 2019-03-13 NOTE — TELEPHONE ENCOUNTER
Dr. De Leon is agreeable for the patient to be off work until April 1st, a work letter will be written today. The patient is agreeable and will pick the letter up from the .

## 2019-03-22 ENCOUNTER — THERAPY VISIT (OUTPATIENT)
Dept: PHYSICAL THERAPY | Facility: CLINIC | Age: 59
End: 2019-03-22
Payer: COMMERCIAL

## 2019-03-22 DIAGNOSIS — M25.511 CHRONIC PAIN OF BOTH SHOULDERS: ICD-10-CM

## 2019-03-22 DIAGNOSIS — M75.31 CALCIFIC TENDINITIS OF RIGHT SHOULDER: ICD-10-CM

## 2019-03-22 DIAGNOSIS — G89.29 CHRONIC PAIN OF BOTH SHOULDERS: ICD-10-CM

## 2019-03-22 DIAGNOSIS — M25.512 CHRONIC PAIN OF BOTH SHOULDERS: ICD-10-CM

## 2019-03-22 PROCEDURE — 97110 THERAPEUTIC EXERCISES: CPT | Mod: GP | Performed by: PHYSICAL THERAPIST

## 2019-03-22 PROCEDURE — 97164 PT RE-EVAL EST PLAN CARE: CPT | Mod: GP | Performed by: PHYSICAL THERAPIST

## 2019-03-22 PROCEDURE — 97035 APP MDLTY 1+ULTRASOUND EA 15: CPT | Mod: GP | Performed by: PHYSICAL THERAPIST

## 2019-03-23 NOTE — PROGRESS NOTES
Subjective:  HPI                    Objective:  System    Physical Exam    General     ROS    Assessment/Plan:    PROGRESS  REPORT    Progress reporting period is from 11/15/2018 to 3/22/2019.       SUBJECTIVE  Subjective changes noted by patient: R shoulder feels a little better since the injection into the tendon on March 12, 2019.    Patient notes that she was not in PT after November as she was out of town though the holidays.  She had not made progress with regards to her shoulders, R remaining worse than L.  Thus, sought further medical intervention and had an injection.  She does feel that her ROM has improved since the injection and has been able to lay on the right shoulder with a little less pain.  Not really feeling the symptoms down the R UE any longer.    Pain for the shoulders will increase with reaching, lifting, carrying, etc.  Returns to PT to improved pain, increase her ROM and strength so that she can better use her UE.   Notes that the L shoulder does continue to bother her, still not as bad as her R shoulder.     Current pain level is 7/10  .     Previous pain level was  5/10   Initial Pain level: 7/10.   Changes in function:  None  Adverse reaction to treatment or activity: None    OBJECTIVE  Changes noted in objective findings:  No significant change in AROM for the R shoulder since injection on 3/12/2019.  No significant change in SPADI score.   Objective:  AROM R shoulder Flex 117 deg, Ext 33, Abd 99, ER 55, IR L1.  All AROM limited by pain   AROM R shoulder at evaluation on 10/23/2018 - Flexion 140 deg, Ext 35 deg, abd 95 deg, ER 55 deg, IR T 9  PROM R shoulder 140 empty endfeel, ER 75 empty endfeel   10/23/2019 PROM flexion 155, abd 154  SPADI 70 deg, on 10/23/2018 77.69 (the lower the score the better function).    ASSESSMENT/PLAN  Updated problem list and treatment plan: Diagnosis 1:  R shoulder calcific tendinitis (and L shoulder pain)    Pain -  US, manual therapy, self management,  education and home program  Decreased ROM/flexibility - manual therapy, therapeutic exercise and home program  Decreased strength - therapeutic exercise, therapeutic activities and home program  Impaired muscle performance - neuro re-education and home program  Decreased function - therapeutic activities and home program  STG/LTGs have been met or progress has been made towards goals:  None  Assessment of Progress: The patient's condition is unchanged.  Self Management Plans:  Patient has been instructed in a home treatment program.  Patient  has been instructed in self management of symptoms.  I have re-evaluated this patient and find that the nature, scope, duration and intensity of the therapy is appropriate for the medical condition of the patient.  Karina continues to require the following intervention to meet STG and LTG's:  PT    Recommendations:  This patient would benefit from continued therapy.     Frequency:  1 X week, once daily  Duration:  for 12 weeks        Please refer to the daily flowsheet for treatment today, total treatment time and time spent performing 1:1 timed codes.

## 2019-03-28 ENCOUNTER — THERAPY VISIT (OUTPATIENT)
Dept: PHYSICAL THERAPY | Facility: CLINIC | Age: 59
End: 2019-03-28
Payer: COMMERCIAL

## 2019-03-28 DIAGNOSIS — G89.29 CHRONIC PAIN OF BOTH SHOULDERS: ICD-10-CM

## 2019-03-28 DIAGNOSIS — M25.511 CHRONIC PAIN OF BOTH SHOULDERS: ICD-10-CM

## 2019-03-28 DIAGNOSIS — M25.512 CHRONIC PAIN OF BOTH SHOULDERS: ICD-10-CM

## 2019-03-28 DIAGNOSIS — M75.31 CALCIFIC TENDINITIS OF RIGHT SHOULDER: ICD-10-CM

## 2019-03-28 PROCEDURE — 97035 APP MDLTY 1+ULTRASOUND EA 15: CPT | Mod: GP | Performed by: PHYSICAL THERAPIST

## 2019-03-28 PROCEDURE — 97110 THERAPEUTIC EXERCISES: CPT | Mod: GP | Performed by: PHYSICAL THERAPIST

## 2019-04-10 ENCOUNTER — THERAPY VISIT (OUTPATIENT)
Dept: PHYSICAL THERAPY | Facility: CLINIC | Age: 59
End: 2019-04-10
Payer: COMMERCIAL

## 2019-04-10 DIAGNOSIS — G89.29 CHRONIC PAIN OF BOTH SHOULDERS: ICD-10-CM

## 2019-04-10 DIAGNOSIS — M25.512 CHRONIC PAIN OF BOTH SHOULDERS: ICD-10-CM

## 2019-04-10 DIAGNOSIS — M75.31 CALCIFIC TENDINITIS OF RIGHT SHOULDER: ICD-10-CM

## 2019-04-10 DIAGNOSIS — M25.511 CHRONIC PAIN OF BOTH SHOULDERS: ICD-10-CM

## 2019-04-10 PROCEDURE — 97035 APP MDLTY 1+ULTRASOUND EA 15: CPT | Mod: GP | Performed by: PHYSICAL THERAPIST

## 2019-04-10 PROCEDURE — 97110 THERAPEUTIC EXERCISES: CPT | Mod: GP | Performed by: PHYSICAL THERAPIST

## 2019-04-16 ENCOUNTER — THERAPY VISIT (OUTPATIENT)
Dept: PHYSICAL THERAPY | Facility: CLINIC | Age: 59
End: 2019-04-16
Payer: COMMERCIAL

## 2019-04-16 DIAGNOSIS — G89.29 CHRONIC PAIN OF BOTH SHOULDERS: ICD-10-CM

## 2019-04-16 DIAGNOSIS — M75.31 CALCIFIC TENDINITIS OF RIGHT SHOULDER: ICD-10-CM

## 2019-04-16 DIAGNOSIS — M25.512 CHRONIC PAIN OF BOTH SHOULDERS: ICD-10-CM

## 2019-04-16 DIAGNOSIS — M25.511 CHRONIC PAIN OF BOTH SHOULDERS: ICD-10-CM

## 2019-04-16 PROCEDURE — 97110 THERAPEUTIC EXERCISES: CPT | Mod: GP | Performed by: PHYSICAL THERAPIST

## 2019-04-16 PROCEDURE — 97035 APP MDLTY 1+ULTRASOUND EA 15: CPT | Mod: GP | Performed by: PHYSICAL THERAPIST

## 2019-04-25 ENCOUNTER — THERAPY VISIT (OUTPATIENT)
Dept: PHYSICAL THERAPY | Facility: CLINIC | Age: 59
End: 2019-04-25
Payer: COMMERCIAL

## 2019-04-25 DIAGNOSIS — M75.31 CALCIFIC TENDINITIS OF RIGHT SHOULDER: ICD-10-CM

## 2019-04-25 DIAGNOSIS — M25.511 CHRONIC PAIN OF BOTH SHOULDERS: ICD-10-CM

## 2019-04-25 DIAGNOSIS — M25.512 CHRONIC PAIN OF BOTH SHOULDERS: ICD-10-CM

## 2019-04-25 DIAGNOSIS — G89.29 CHRONIC PAIN OF BOTH SHOULDERS: ICD-10-CM

## 2019-04-25 PROCEDURE — 97110 THERAPEUTIC EXERCISES: CPT | Mod: GP | Performed by: PHYSICAL THERAPIST

## 2019-04-25 PROCEDURE — 97035 APP MDLTY 1+ULTRASOUND EA 15: CPT | Mod: GP | Performed by: PHYSICAL THERAPIST

## 2019-05-01 ENCOUNTER — THERAPY VISIT (OUTPATIENT)
Dept: PHYSICAL THERAPY | Facility: CLINIC | Age: 59
End: 2019-05-01
Payer: COMMERCIAL

## 2019-05-01 DIAGNOSIS — M75.31 CALCIFIC TENDINITIS OF RIGHT SHOULDER: ICD-10-CM

## 2019-05-01 DIAGNOSIS — M25.511 CHRONIC PAIN OF BOTH SHOULDERS: ICD-10-CM

## 2019-05-01 DIAGNOSIS — M25.512 CHRONIC PAIN OF BOTH SHOULDERS: ICD-10-CM

## 2019-05-01 DIAGNOSIS — G89.29 CHRONIC PAIN OF BOTH SHOULDERS: ICD-10-CM

## 2019-05-01 PROCEDURE — 97035 APP MDLTY 1+ULTRASOUND EA 15: CPT | Mod: GP | Performed by: PHYSICAL THERAPIST

## 2019-05-01 PROCEDURE — 97110 THERAPEUTIC EXERCISES: CPT | Mod: GP | Performed by: PHYSICAL THERAPIST

## 2019-06-08 DIAGNOSIS — Z00.00 ROUTINE GENERAL MEDICAL EXAMINATION AT A HEALTH CARE FACILITY: Primary | ICD-10-CM

## 2019-06-08 DIAGNOSIS — Z13.6 CARDIOVASCULAR SCREENING; LDL GOAL LESS THAN 160: ICD-10-CM

## 2019-06-08 DIAGNOSIS — Z13.1 SCREENING FOR DIABETES MELLITUS (DM): ICD-10-CM

## 2019-06-08 DIAGNOSIS — Z13.0 SCREENING FOR DEFICIENCY ANEMIA: ICD-10-CM

## 2019-06-17 ENCOUNTER — OFFICE VISIT (OUTPATIENT)
Dept: PEDIATRICS | Facility: CLINIC | Age: 59
End: 2019-06-17
Payer: COMMERCIAL

## 2019-06-17 VITALS
BODY MASS INDEX: 19.92 KG/M2 | SYSTOLIC BLOOD PRESSURE: 108 MMHG | DIASTOLIC BLOOD PRESSURE: 75 MMHG | TEMPERATURE: 97.4 F | WEIGHT: 110.7 LBS | HEART RATE: 63 BPM | OXYGEN SATURATION: 98 %

## 2019-06-17 DIAGNOSIS — Z12.11 COLON CANCER SCREENING: ICD-10-CM

## 2019-06-17 DIAGNOSIS — Z12.39 BREAST CANCER SCREENING: ICD-10-CM

## 2019-06-17 DIAGNOSIS — G89.29 CHRONIC RIGHT SHOULDER PAIN: ICD-10-CM

## 2019-06-17 DIAGNOSIS — Z00.00 ROUTINE GENERAL MEDICAL EXAMINATION AT A HEALTH CARE FACILITY: Primary | ICD-10-CM

## 2019-06-17 DIAGNOSIS — Z13.6 CARDIOVASCULAR SCREENING; LDL GOAL LESS THAN 160: ICD-10-CM

## 2019-06-17 DIAGNOSIS — Z13.1 SCREENING FOR DIABETES MELLITUS (DM): ICD-10-CM

## 2019-06-17 DIAGNOSIS — Z11.4 SCREENING FOR HUMAN IMMUNODEFICIENCY VIRUS: ICD-10-CM

## 2019-06-17 DIAGNOSIS — G47.9 SLEEPING DIFFICULTY: ICD-10-CM

## 2019-06-17 DIAGNOSIS — Z13.0 SCREENING FOR DEFICIENCY ANEMIA: ICD-10-CM

## 2019-06-17 DIAGNOSIS — L30.4 INTERTRIGO: ICD-10-CM

## 2019-06-17 DIAGNOSIS — Z12.4 CERVICAL CANCER SCREENING: ICD-10-CM

## 2019-06-17 DIAGNOSIS — M67.911 TENDINOPATHY OF ROTATOR CUFF, RIGHT: ICD-10-CM

## 2019-06-17 DIAGNOSIS — M25.511 CHRONIC RIGHT SHOULDER PAIN: ICD-10-CM

## 2019-06-17 DIAGNOSIS — Z00.00 ROUTINE GENERAL MEDICAL EXAMINATION AT A HEALTH CARE FACILITY: ICD-10-CM

## 2019-06-17 LAB
ALBUMIN SERPL-MCNC: 3.9 G/DL (ref 3.4–5)
ALP SERPL-CCNC: 40 U/L (ref 40–150)
ALT SERPL W P-5'-P-CCNC: 14 U/L (ref 0–50)
ANION GAP SERPL CALCULATED.3IONS-SCNC: 4 MMOL/L (ref 3–14)
AST SERPL W P-5'-P-CCNC: 13 U/L (ref 0–45)
BASOPHILS # BLD AUTO: 0 10E9/L (ref 0–0.2)
BASOPHILS NFR BLD AUTO: 0.6 %
BILIRUB SERPL-MCNC: 0.5 MG/DL (ref 0.2–1.3)
BUN SERPL-MCNC: 12 MG/DL (ref 7–30)
CALCIUM SERPL-MCNC: 9.1 MG/DL (ref 8.5–10.1)
CHLORIDE SERPL-SCNC: 107 MMOL/L (ref 94–109)
CHOLEST SERPL-MCNC: 225 MG/DL
CO2 SERPL-SCNC: 32 MMOL/L (ref 20–32)
CREAT SERPL-MCNC: 0.59 MG/DL (ref 0.52–1.04)
DIFFERENTIAL METHOD BLD: NORMAL
EOSINOPHIL # BLD AUTO: 0.1 10E9/L (ref 0–0.7)
EOSINOPHIL NFR BLD AUTO: 2.3 %
ERYTHROCYTE [DISTWIDTH] IN BLOOD BY AUTOMATED COUNT: 11.4 % (ref 10–15)
GFR SERPL CREATININE-BSD FRML MDRD: >90 ML/MIN/{1.73_M2}
GLUCOSE SERPL-MCNC: 86 MG/DL (ref 70–99)
HCT VFR BLD AUTO: 45.3 % (ref 35–47)
HDLC SERPL-MCNC: 89 MG/DL
HGB BLD-MCNC: 14.9 G/DL (ref 11.7–15.7)
HIV 1+2 AB+HIV1 P24 AG SERPL QL IA: NONREACTIVE
IMM GRANULOCYTES # BLD: 0 10E9/L (ref 0–0.4)
IMM GRANULOCYTES NFR BLD: 0.2 %
LDLC SERPL CALC-MCNC: 115 MG/DL
LYMPHOCYTES # BLD AUTO: 1.6 10E9/L (ref 0.8–5.3)
LYMPHOCYTES NFR BLD AUTO: 32.4 %
MCH RBC QN AUTO: 31.4 PG (ref 26.5–33)
MCHC RBC AUTO-ENTMCNC: 32.9 G/DL (ref 31.5–36.5)
MCV RBC AUTO: 96 FL (ref 78–100)
MONOCYTES # BLD AUTO: 0.4 10E9/L (ref 0–1.3)
MONOCYTES NFR BLD AUTO: 7.7 %
NEUTROPHILS # BLD AUTO: 2.7 10E9/L (ref 1.6–8.3)
NEUTROPHILS NFR BLD AUTO: 56.8 %
NONHDLC SERPL-MCNC: 136 MG/DL
PLATELET # BLD AUTO: 227 10E9/L (ref 150–450)
POTASSIUM SERPL-SCNC: 4.3 MMOL/L (ref 3.4–5.3)
PROT SERPL-MCNC: 7.8 G/DL (ref 6.8–8.8)
RBC # BLD AUTO: 4.74 10E12/L (ref 3.8–5.2)
SODIUM SERPL-SCNC: 143 MMOL/L (ref 133–144)
TRIGL SERPL-MCNC: 104 MG/DL
WBC # BLD AUTO: 4.8 10E9/L (ref 4–11)

## 2019-06-17 PROCEDURE — 85025 COMPLETE CBC W/AUTO DIFF WBC: CPT | Performed by: FAMILY MEDICINE

## 2019-06-17 PROCEDURE — 80053 COMPREHEN METABOLIC PANEL: CPT | Performed by: FAMILY MEDICINE

## 2019-06-17 PROCEDURE — 80061 LIPID PANEL: CPT | Performed by: FAMILY MEDICINE

## 2019-06-17 PROCEDURE — 87389 HIV-1 AG W/HIV-1&-2 AB AG IA: CPT | Performed by: FAMILY MEDICINE

## 2019-06-17 PROCEDURE — 36415 COLL VENOUS BLD VENIPUNCTURE: CPT | Performed by: FAMILY MEDICINE

## 2019-06-17 PROCEDURE — 99396 PREV VISIT EST AGE 40-64: CPT | Performed by: FAMILY MEDICINE

## 2019-06-17 RX ORDER — CLOTRIMAZOLE AND BETAMETHASONE DIPROPIONATE 10; .64 MG/G; MG/G
CREAM TOPICAL
Qty: 15 G | Refills: 0 | Status: SHIPPED | OUTPATIENT
Start: 2019-06-17 | End: 2020-04-21

## 2019-06-17 ASSESSMENT — ANXIETY QUESTIONNAIRES
1. FEELING NERVOUS, ANXIOUS, OR ON EDGE: SEVERAL DAYS
5. BEING SO RESTLESS THAT IT IS HARD TO SIT STILL: SEVERAL DAYS
3. WORRYING TOO MUCH ABOUT DIFFERENT THINGS: SEVERAL DAYS
7. FEELING AFRAID AS IF SOMETHING AWFUL MIGHT HAPPEN: SEVERAL DAYS
GAD7 TOTAL SCORE: 7
6. BECOMING EASILY ANNOYED OR IRRITABLE: SEVERAL DAYS
2. NOT BEING ABLE TO STOP OR CONTROL WORRYING: SEVERAL DAYS

## 2019-06-17 ASSESSMENT — PATIENT HEALTH QUESTIONNAIRE - PHQ9
5. POOR APPETITE OR OVEREATING: SEVERAL DAYS
SUM OF ALL RESPONSES TO PHQ QUESTIONS 1-9: 10

## 2019-06-17 ASSESSMENT — PAIN SCALES - GENERAL: PAINLEVEL: NO PAIN (0)

## 2019-06-17 NOTE — PROGRESS NOTES
SUBJECTIVE:   CC: Karina Melvin is an 58 year old woman who presents for preventive health visit.     Healthy Habits:    Do you get at least three servings of calcium containing foods daily (dairy, green leafy vegetables, etc.)? yes    Amount of exercise or daily activities, outside of work: 0 day(s) per week    Problems taking medications regularly No    Medication side effects: No    Have you had an eye exam in the past two years? no    Do you see a dentist twice per year? yes    Do you have sleep apnea, excessive snoring or daytime drowsiness?no      Sleep Concerns - Patient reports waking about 2-3 times per night. She has been using tylenol for it.  Denies concerns for depression, anxiety, previous history of insomnia, urinary urgency or frequency        Today's PHQ-2 Score:   PHQ-2 ( 1999 [employer]) 6/17/2019 1/16/2019   Q1: Little interest or pleasure in doing things 1 1   Q2: Feeling down, depressed or hopeless 1 1   PHQ-2 Score 2 2     Abuse: Current or Past(Physical, Sexual or Emotional)- No  Do you feel safe in your environment? Yes    Social History     Tobacco Use     Smoking status: Never Smoker     Smokeless tobacco: Never Used   Substance Use Topics     Alcohol use: No     If you drink alcohol do you typically have >3 drinks per day or >7 drinks per week? No                     Reviewed orders with patient.  Reviewed health maintenance and updated orders accordingly - Yes  Lab work is in process  Labs reviewed in EPIC  BP Readings from Last 3 Encounters:   06/17/19 108/75   03/12/19 103/80   03/07/19 103/72    Wt Readings from Last 3 Encounters:   06/17/19 50.2 kg (110 lb 11.2 oz)   03/12/19 49.4 kg (109 lb)   03/07/19 49.4 kg (109 lb)                  Patient Active Problem List   Diagnosis     DDD (degenerative disc disease), cervical     Cervical radiculopathy     Neck pain on left side     CARDIOVASCULAR SCREENING; LDL GOAL LESS THAN 160     Adjustment disorder with mixed anxiety and depressed  mood     Family history of thyroid disease     Intertrigo     Tendinopathy of rotator cuff, right     Chronic pain of both shoulders     Internal hemorrhoid, bleeding     Chronic right shoulder pain     Calcific tendinitis of right shoulder     Sleeping difficulty     No past surgical history on file.    Social History     Tobacco Use     Smoking status: Never Smoker     Smokeless tobacco: Never Used   Substance Use Topics     Alcohol use: No     Family History   Problem Relation Age of Onset     Colon Cancer Mother      Lung Cancer Father      Lung Cancer Brother      Thyroid Disease Sister          Current Outpatient Medications   Medication Sig Dispense Refill     clotrimazole-betamethasone (LOTRISONE) 1-0.05 % external cream APPLY TO AFFECTED AREA(S) TOPICALLY DAILY AS NEEDED 15 g 0     diclofenac (VOLTAREN) 1 % topical gel Apply  2 grams to right shoulder four times daily as needed using enclosed dosing card. 100 g 1     Misc Natural Products (OSTEO BI-FLEX JOINT SHIELD PO) Take 1 tablet by mouth daily       Multiple Vitamins-Minerals (CENTRUM SILVER) per tablet Take 1 tablet by mouth daily       VITAMIN D, CHOLECALCIFEROL, PO Take 5,000 Units by mouth daily       No Known Allergies  Recent Labs   Lab Test 06/17/19  0814 11/08/17  0736   * 132*   HDL 89 77   TRIG 104 104   ALT 14 14   CR 0.59 0.63   GFRESTIMATED >90 >90   GFRESTBLACK >90 >90   POTASSIUM 4.3 3.6   TSH  --  0.68        Mammogram Screening: Patient over age 50, mutual decision to screen reflected in health maintenance.    Pertinent mammograms are reviewed under the imaging tab.  History of abnormal Pap smear: NO - age 30- 65 PAP every 3 years recommended  PAP / HPV Latest Ref Rng & Units 11/14/2017   PAP - NIL   HPV 16 DNA NEG:Negative Negative   HPV 18 DNA NEG:Negative Negative   OTHER HR HPV NEG:Negative Negative     Reviewed and updated as needed this visit by clinical staff  Tobacco  Allergies         Reviewed and updated as needed  this visit by Provider          Past Medical History:   Diagnosis Date     Arthritis      Depressive disorder       No past surgical history on file.  OB History   No data available       ROS:  CONSTITUTIONAL: NEGATIVE for fever, chills, change in weight  INTEGUMENTARY/SKIN: NEGATIVE for worrisome rashes, moles or lesions  EYES: NEGATIVE for vision changes or irritation  ENT: NEGATIVE for ear, mouth and throat problems  RESP: NEGATIVE for significant cough or SOB  BREAST: NEGATIVE for masses, tenderness or discharge  CV: NEGATIVE for chest pain, palpitations or peripheral edema  GI: NEGATIVE for nausea, abdominal pain, heartburn, or change in bowel habits  : NEGATIVE for unusual urinary or vaginal symptoms. No vaginal bleeding.  MUSCULOSKELETAL: NEGATIVE for significant arthralgias or myalgia  MUSCULOSKELETAL: Ongoing chronic right shoulder pain  NEURO: NEGATIVE for weakness, dizziness or paresthesias  ENDOCRINE: NEGATIVE for temperature intolerance, skin/hair changes  HEME/ALLERGY/IMMUNE: NEGATIVE for bleeding problems  PSYCHIATRIC: NEGATIVE for changes in mood or affect     OBJECTIVE:   /75 (BP Location: Right arm, Patient Position: Sitting, Cuff Size: Adult Regular)   Pulse 63   Temp 97.4  F (36.3  C) (Oral)   Wt 50.2 kg (110 lb 11.2 oz)   SpO2 98%   BMI 19.92 kg/m    EXAM:  GENERAL APPEARANCE: healthy, alert and no distress  EYES: Eyes grossly normal to inspection, PERRL and conjunctivae and sclerae normal  HENT: ear canals and TM's normal, nose and mouth without ulcers or lesions, oropharynx clear and oral mucous membranes moist  NECK: no adenopathy, no asymmetry, masses, or scars and thyroid normal to palpation  RESP: lungs clear to auscultation - no rales, rhonchi or wheezes  BREAST: normal without masses, tenderness or nipple discharge and no palpable axillary masses or adenopathy  CV: regular rate and rhythm, normal S1 S2, no S3 or S4, no murmur, click or rub, no peripheral edema and peripheral  pulses strong  ABDOMEN: soft, nontender, no hepatosplenomegaly, no masses and bowel sounds normal   (female): normal female external genitalia, normal urethral meatus, vaginal mucosal atrophy noted and normal cervix, adnexae, and uterus without masses.  MS: no musculoskeletal defects are noted and gait is age appropriate without ataxia  SKIN: no suspicious lesions or rashes  NEURO: Normal strength and tone, sensory exam grossly normal, mentation intact and speech normal  PSYCH: mentation appears normal and affect normal/bright    Diagnostic Test Results:  Labs reviewed in Epic  Results for orders placed or performed in visit on 06/17/19 (from the past 24 hour(s))   Lipid panel reflex to direct LDL Fasting   Result Value Ref Range    Cholesterol 225 (H) <200 mg/dL    Triglycerides 104 <150 mg/dL    HDL Cholesterol 89 >49 mg/dL    LDL Cholesterol Calculated 115 (H) <100 mg/dL    Non HDL Cholesterol 136 (H) <130 mg/dL   **Comprehensive metabolic panel FUTURE anytime   Result Value Ref Range    Sodium 143 133 - 144 mmol/L    Potassium 4.3 3.4 - 5.3 mmol/L    Chloride 107 94 - 109 mmol/L    Carbon Dioxide 32 20 - 32 mmol/L    Anion Gap 4 3 - 14 mmol/L    Glucose 86 70 - 99 mg/dL    Urea Nitrogen 12 7 - 30 mg/dL    Creatinine 0.59 0.52 - 1.04 mg/dL    GFR Estimate >90 >60 mL/min/[1.73_m2]    GFR Estimate If Black >90 >60 mL/min/[1.73_m2]    Calcium 9.1 8.5 - 10.1 mg/dL    Bilirubin Total 0.5 0.2 - 1.3 mg/dL    Albumin 3.9 3.4 - 5.0 g/dL    Protein Total 7.8 6.8 - 8.8 g/dL    Alkaline Phosphatase 40 40 - 150 U/L    ALT 14 0 - 50 U/L    AST 13 0 - 45 U/L   CBC with platelets differential   Result Value Ref Range    WBC 4.8 4.0 - 11.0 10e9/L    RBC Count 4.74 3.8 - 5.2 10e12/L    Hemoglobin 14.9 11.7 - 15.7 g/dL    Hematocrit 45.3 35.0 - 47.0 %    MCV 96 78 - 100 fl    MCH 31.4 26.5 - 33.0 pg    MCHC 32.9 31.5 - 36.5 g/dL    RDW 11.4 10.0 - 15.0 %    Platelet Count 227 150 - 450 10e9/L    Diff Method Automated Method     %  Neutrophils 56.8 %    % Lymphocytes 32.4 %    % Monocytes 7.7 %    % Eosinophils 2.3 %    % Basophils 0.6 %    % Immature Granulocytes 0.2 %    Absolute Neutrophil 2.7 1.6 - 8.3 10e9/L    Absolute Lymphocytes 1.6 0.8 - 5.3 10e9/L    Absolute Monocytes 0.4 0.0 - 1.3 10e9/L    Absolute Eosinophils 0.1 0.0 - 0.7 10e9/L    Absolute Basophils 0.0 0.0 - 0.2 10e9/L    Abs Immature Granulocytes 0.0 0 - 0.4 10e9/L       ASSESSMENT/PLAN:   1. Routine general medical examination at a health care facility  Discussed on regular exercises, daily calcium intake, healthy eating, self breast exams monthly and routine dental checks    - HIV Antigen Antibody Combo  - GASTROENTEROLOGY ADULT REF PROCEDURE ONLY [facility] [phone]; No Provider Preference    2. Intertrigo    - clotrimazole-betamethasone (LOTRISONE) 1-0.05 % external cream; APPLY TO AFFECTED AREA(S) TOPICALLY DAILY AS NEEDED  Dispense: 15 g; Refill: 0    3. Chronic right shoulder pain  Recommended patient to consult Dr. De Leon in sports medicine for follow-up  She had one injection with significant relief of pain for 3 to 4 months  - diclofenac (VOLTAREN) 1 % topical gel; Apply  2 grams to right shoulder four times daily as needed using enclosed dosing card.  Dispense: 100 g; Refill: 1    4. Tendinopathy of rotator cuff, right  as above    - diclofenac (VOLTAREN) 1 % topical gel; Apply  2 grams to right shoulder four times daily as needed using enclosed dosing card.  Dispense: 100 g; Refill: 1    5. Breast cancer screening  Patient wants to have the screening done every 2 years we will repeat next year    6. CARDIOVASCULAR SCREENING; LDL GOAL LESS THAN 160  LDL Cholesterol Calculated   Date Value Ref Range Status   06/17/2019 115 (H) <100 mg/dL Final     Comment:     Above desirable:  100-129 mg/dl  Borderline High:  130-159 mg/dL  High:             160-189 mg/dL  Very high:       >189 mg/dl           7. Cervical cancer screening  Reviewed normal Pap from 2017,  "will recheck next year    8. Colon cancer screening    - GASTROENTEROLOGY ADULT REF PROCEDURE ONLY [facility] [phone]; No Provider Preference    9. Screening for human immunodeficiency virus    - HIV Antigen Antibody Combo    10. Sleeping difficulty  Reviewed sleep hygiene  Will try over-the-counter melatonin 3 to 5 mg as needed for sleep  F/U  for persistent or worsening concerns  Patient verbalised understanding and is agreeable to the plan.        COUNSELING:   Reviewed preventive health counseling, as reflected in patient instructions  Special attention given to:        Regular exercise       Healthy diet/nutrition       Vision screening       HIV screeninx in teen years, 1x in adult years, and at intervals if high risk       (Nelly)menopause management       The 10-year ASCVD risk score (Shanna GOODWIN Jr., et al., 2013) is: 1.7%    Values used to calculate the score:      Age: 58 years      Sex: Female      Is Non- : No      Diabetic: No      Tobacco smoker: No      Systolic Blood Pressure: 108 mmHg      Is BP treated: No      HDL Cholesterol: 89 mg/dL      Total Cholesterol: 225 mg/dL       Advance Care Planning    Estimated body mass index is 19.92 kg/m  as calculated from the following:    Height as of 3/12/19: 1.588 m (5' 2.5\").    Weight as of this encounter: 50.2 kg (110 lb 11.2 oz).         reports that she has never smoked. She has never used smokeless tobacco.      Counseling Resources:  ATP IV Guidelines  Pooled Cohorts Equation Calculator  Breast Cancer Risk Calculator  FRAX Risk Assessment  ICSI Preventive Guidelines  Dietary Guidelines for Americans, 2010  USDA's MyPlate  ASA Prophylaxis  Lung CA Screening    Toni Harkins MD  [facility]  Chart documentation done in part with Dragon Voice recognition Software. Although reviewed after completion, some word and grammatical error may remain.    "

## 2019-06-17 NOTE — PATIENT INSTRUCTIONS
Schedule for colonoscopy  Make sure to check with the insurance  Try MELATONIN 3mg as needed at night for sleep    Return in 1 year or sooner if needed    Preventive Health Recommendations  Female Ages 50 - 64    Yearly exam: See your health care provider every year in order to  o Review health changes.   o Discuss preventive care.    o Review your medicines if your doctor has prescribed any.      Get a Pap test every three years (unless you have an abnormal result and your provider advises testing more often).    If you get Pap tests with HPV test, you only need to test every 5 years, unless you have an abnormal result.     You do not need a Pap test if your uterus was removed (hysterectomy) and you have not had cancer.    You should be tested each year for STDs (sexually transmitted diseases) if you're at risk.     Have a mammogram every 1 to 2 years.    Have a colonoscopy at age 50, or have a yearly FIT test (stool test). These exams screen for colon cancer.      Have a cholesterol test every 5 years, or more often if advised.    Have a diabetes test (fasting glucose) every three years. If you are at risk for diabetes, you should have this test more often.     If you are at risk for osteoporosis (brittle bone disease), think about having a bone density scan (DEXA).    Shots: Get a flu shot each year. Get a tetanus shot every 10 years.    Nutrition:     Eat at least 5 servings of fruits and vegetables each day.    Eat whole-grain bread, whole-wheat pasta and brown rice instead of white grains and rice.    Get adequate Calcium and Vitamin D.     Lifestyle    Exercise at least 150 minutes a week (30 minutes a day, 5 days a week). This will help you control your weight and prevent disease.    Limit alcohol to one drink per day.    No smoking.     Wear sunscreen to prevent skin cancer.     See your dentist every six months for an exam and cleaning.    See your eye doctor every 1 to 2 years.

## 2019-06-18 ASSESSMENT — ANXIETY QUESTIONNAIRES: GAD7 TOTAL SCORE: 7

## 2019-06-18 NOTE — RESULT ENCOUNTER NOTE
Dear Estrella,  Your test results for HIV screening is negative, this is good and reassuring.   Let me know if you have any questions. Take care.  Toni Harkins MD

## 2019-08-15 ENCOUNTER — TELEPHONE (OUTPATIENT)
Dept: PEDIATRICS | Facility: CLINIC | Age: 59
End: 2019-08-15

## 2019-08-15 NOTE — TELEPHONE ENCOUNTER
**Please Do Not Close This Encounter**               ** Until This Has Been Addressed**          PRIOR AUTHORIZATION REQUIED PER INSURANCE       PATIENT:Karina Melvin YOB: 1960          MEDICATION:Diclofenac  Sod 1%             SIG:Apply 2 grams to right shoulder four times daily as needed using enclosed dosing card       NDC:[address]-58      INSURANCE:[facility]       INSURANCE PHONE #:[phone]      ID #:[id number]      INSURANCE REJECT:Not on Formulary      PHARMACY:[facility]      PHARMACY NPI:[id number]      PHARMACY PHONE #:[phone]                                                          Please let us know when Prior Auth approved/denied, prescriber refusal to complete prior auth or if you would like to change medication/discontinue                                                            Thank you

## 2019-08-15 NOTE — TELEPHONE ENCOUNTER
Central Prior Authorization Team   Phone: [phone]      PA Initiation via fax    Medication: Diclofenac Sod 1%-  Insurance Company: [employer] - Phone [phone] Fax [phone]  Pharmacy Filling the Rx: [facility] - [place], [place] - [address] [id number]  Filling Pharmacy Phone: [phone]  Filling Pharmacy Fax:    Start Date: 8/15/2019

## 2019-08-21 ENCOUNTER — HOSPITAL ENCOUNTER (OUTPATIENT)
Facility: AMBULATORY SURGERY CENTER | Age: 59
Discharge: HOME | End: 2019-08-21
Attending: INTERNAL MEDICINE | Admitting: INTERNAL MEDICINE
Payer: COMMERCIAL

## 2019-08-21 VITALS
OXYGEN SATURATION: 95 % | SYSTOLIC BLOOD PRESSURE: 105 MMHG | HEART RATE: 66 BPM | TEMPERATURE: 97 F | RESPIRATION RATE: 16 BRPM | DIASTOLIC BLOOD PRESSURE: 72 MMHG

## 2019-08-21 LAB — COLONOSCOPY: NORMAL

## 2019-08-21 PROCEDURE — 88305 TISSUE EXAM BY PATHOLOGIST: CPT | Performed by: INTERNAL MEDICINE

## 2019-08-21 PROCEDURE — 45380 COLONOSCOPY AND BIOPSY: CPT | Mod: XS

## 2019-08-21 PROCEDURE — 45385 COLONOSCOPY W/LESION REMOVAL: CPT

## 2019-08-21 PROCEDURE — G8907 PT DOC NO EVENTS ON DISCHARG: HCPCS

## 2019-08-21 PROCEDURE — G8918 PT W/O PREOP ORDER IV AB PRO: HCPCS

## 2019-08-21 RX ORDER — FENTANYL CITRATE 50 UG/ML
INJECTION, SOLUTION INTRAMUSCULAR; INTRAVENOUS PRN
Status: DISCONTINUED | OUTPATIENT
Start: 2019-08-21 | End: 2019-08-21 | Stop reason: HOSPADM

## 2019-08-21 RX ORDER — ONDANSETRON 2 MG/ML
4 INJECTION INTRAMUSCULAR; INTRAVENOUS
Status: DISCONTINUED | OUTPATIENT
Start: 2019-08-21 | End: 2019-08-22 | Stop reason: HOSPADM

## 2019-08-21 RX ORDER — LIDOCAINE 40 MG/G
CREAM TOPICAL
Status: DISCONTINUED | OUTPATIENT
Start: 2019-08-21 | End: 2019-08-22 | Stop reason: HOSPADM

## 2019-08-21 NOTE — TELEPHONE ENCOUNTER
Called patient on home number to inform. Patient states understanding, but doesn't wish to try oral form of medication at this time. She will update clinic if she chooses otherwise.  Routing to Dr. Harkins as FYI when she returns to clinic on 8/26/19.  Catia WILSON CMA

## 2019-08-21 NOTE — TELEPHONE ENCOUNTER
Called [employer] to check status. PA was manually faxed along with documentation on 8/15/19 however insurance stated they have not received it because the fax number is incorrect. Re-faxed to number given [phone]. Turnaround time will be 24 hours for urgent requests.

## 2019-08-21 NOTE — TELEPHONE ENCOUNTER
Checking on status of PA.      Thank You,  Katie Flores  Pharmacy Technician  [facility]  [phone]

## 2019-08-21 NOTE — TELEPHONE ENCOUNTER
PRIOR AUTHORIZATION DENIED    Medication: Diclofenac Sod 1%-DENIED    Denial Date: 8/21/2019    Denial Rational:         Appeal Information:

## 2019-08-21 NOTE — TELEPHONE ENCOUNTER
Inform patient that her insurance company will not cover the diclofenac gel but will cover the pill form. Would she want to try the pill form?

## 2019-08-25 LAB — COPATH REPORT: NORMAL

## 2019-09-25 ENCOUNTER — OFFICE VISIT (OUTPATIENT)
Dept: PEDIATRICS | Facility: CLINIC | Age: 59
End: 2019-09-25
Payer: COMMERCIAL

## 2019-09-25 VITALS
SYSTOLIC BLOOD PRESSURE: 113 MMHG | HEART RATE: 62 BPM | TEMPERATURE: 97.6 F | DIASTOLIC BLOOD PRESSURE: 76 MMHG | BODY MASS INDEX: 20.97 KG/M2 | OXYGEN SATURATION: 95 % | WEIGHT: 116.5 LBS

## 2019-09-25 DIAGNOSIS — M25.511 CHRONIC RIGHT SHOULDER PAIN: Primary | ICD-10-CM

## 2019-09-25 DIAGNOSIS — G89.29 CHRONIC RIGHT SHOULDER PAIN: Primary | ICD-10-CM

## 2019-09-25 DIAGNOSIS — Z23 NEED FOR PROPHYLACTIC VACCINATION AND INOCULATION AGAINST INFLUENZA: ICD-10-CM

## 2019-09-25 DIAGNOSIS — F43.23 ADJUSTMENT DISORDER WITH MIXED ANXIETY AND DEPRESSED MOOD: ICD-10-CM

## 2019-09-25 DIAGNOSIS — M75.81 TENDINITIS OF RIGHT ROTATOR CUFF: ICD-10-CM

## 2019-09-25 DIAGNOSIS — Z12.39 BREAST CANCER SCREENING: ICD-10-CM

## 2019-09-25 PROCEDURE — 90682 RIV4 VACC RECOMBINANT DNA IM: CPT | Performed by: FAMILY MEDICINE

## 2019-09-25 PROCEDURE — 99214 OFFICE O/P EST MOD 30 MIN: CPT | Mod: 25 | Performed by: FAMILY MEDICINE

## 2019-09-25 PROCEDURE — 90471 IMMUNIZATION ADMIN: CPT | Performed by: FAMILY MEDICINE

## 2019-09-25 RX ORDER — TRAMADOL HYDROCHLORIDE 50 MG/1
25-50 TABLET ORAL
Qty: 30 TABLET | Refills: 0 | Status: SHIPPED | OUTPATIENT
Start: 2019-09-25 | End: 2020-11-16

## 2019-09-25 RX ORDER — FLUOXETINE 10 MG/1
10 CAPSULE ORAL DAILY
Qty: 30 CAPSULE | Refills: 1 | Status: SHIPPED | OUTPATIENT
Start: 2019-09-25 | End: 2020-08-10

## 2019-09-25 ASSESSMENT — PATIENT HEALTH QUESTIONNAIRE - PHQ9
5. POOR APPETITE OR OVEREATING: SEVERAL DAYS
SUM OF ALL RESPONSES TO PHQ QUESTIONS 1-9: 15

## 2019-09-25 ASSESSMENT — ANXIETY QUESTIONNAIRES
5. BEING SO RESTLESS THAT IT IS HARD TO SIT STILL: MORE THAN HALF THE DAYS
1. FEELING NERVOUS, ANXIOUS, OR ON EDGE: SEVERAL DAYS
3. WORRYING TOO MUCH ABOUT DIFFERENT THINGS: SEVERAL DAYS
2. NOT BEING ABLE TO STOP OR CONTROL WORRYING: MORE THAN HALF THE DAYS
6. BECOMING EASILY ANNOYED OR IRRITABLE: MORE THAN HALF THE DAYS
7. FEELING AFRAID AS IF SOMETHING AWFUL MIGHT HAPPEN: SEVERAL DAYS
GAD7 TOTAL SCORE: 10

## 2019-09-25 ASSESSMENT — PAIN SCALES - GENERAL: PAINLEVEL: SEVERE PAIN (7)

## 2019-09-25 NOTE — PATIENT INSTRUCTIONS
Get the flu shot today    Schedule for recheck in 5-6 weeks  Schedule for to see Edyta Frost  Schedule for f/u with Dr. De Leon  Schedule for mammogram    Start on PROZAC 10 mg daily   Take TRAMADOL as needed at night for shoulder pain

## 2019-09-25 NOTE — PROGRESS NOTES
Subjective     Karina Melvin is a 58 year old female who presents to clinic today for the following health issues:    HPI     Results - review colonoscopy results 8/21    Flu Vaccine - offered, patient will do today.    Shoulder Pain - patient reports pain in right shoulder again x 2 months. Patient had injection done with Dr. De Leon in March.  Patient has concerns with ongoing pain in the right shoulder for the past 1-1.5Years, she had a  MRI 7 months ago showing calcific tendinitis of the right right rotator cuff.  Patient received cortisone injection from Dr. De Leon in sports medicine along with physical therapy that helped near resolution of symptoms at that time along with topical diclofenac gel but patient's insurance did not approve further refills on the gel and patient is here to discuss about her current concerns with 8-9 out of 10 pain    Depression and Anxiety Follow-Up  Patient has been dealing with symptoms of depression and anxiety from mild to moderate severity for the past 2 years since she lost her , she is currently living with her sister  Denies use of alcohol, recreational drugs, tobacco, sleep problems, concerns for panic attacks  She does feel very hopeless and does not feel, she would be happy to continue to live though she denies she will attempt to hurt herself  Patient denies previous history of depression, anxiety, previous treatment    How are you doing with your depression since your last visit? No change    How are you doing with your anxiety since your last visit?  No change    Are you having other symptoms that might be associated with depression or anxiety? No    Have you had a significant life event? No     Do you have any concerns with your use of alcohol or other drugs? No    Social History     Tobacco Use     Smoking status: Never Smoker     Smokeless tobacco: Never Used   Substance Use Topics     Alcohol use: No     Drug use: No     PHQ 11/14/2017 1/16/2019 6/17/2019    PHQ-9 Total Score 10 10 10   Q9: Thoughts of better off dead/self-harm past 2 weeks Several days Several days Not at all     NAEEM-7 SCORE 11/14/2017 1/16/2019 6/17/2019   Total Score 7 7 7     No flowsheet data found.  No flowsheet data found.  In the past two weeks have you had thoughts of suicide or self-harm?  No.    Do you have concerns about your personal safety or the safety of others?   No    Suicide Assessment Five-step Evaluation and Treatment (SAFE-T)      How many servings of fruits and vegetables do you eat daily?  0-1    On average, how many sweetened beverages do you drink each day (soda, juice, sweet tea, etc)?   1    How many days per week do you miss taking your medication? 0            Patient Active Problem List   Diagnosis     DDD (degenerative disc disease), cervical     Cervical radiculopathy     Neck pain on left side     CARDIOVASCULAR SCREENING; LDL GOAL LESS THAN 160     Adjustment disorder with mixed anxiety and depressed mood     Family history of thyroid disease     Intertrigo     Tendinopathy of rotator cuff, right     Chronic pain of both shoulders     Internal hemorrhoid, bleeding     Chronic right shoulder pain     Calcific tendinitis of right shoulder     Sleeping difficulty     Past Surgical History:   Procedure Laterality Date     COLONOSCOPY N/A 8/21/2019    Procedure: Colonoscopy, With Polypectomy And Biopsy;  Surgeon: Duane, William Charles, MD;  Location: [facility] OR     COLONOSCOPY WITH CO2 INSUFFLATION N/A 8/21/2019    Procedure: COLONOSCOPY, WITH CO2 INSUFFLATION;  Surgeon: Duane, William Charles, MD;  Location: [facility] OR       Social History     Tobacco Use     Smoking status: Never Smoker     Smokeless tobacco: Never Used   Substance Use Topics     Alcohol use: No     Family History   Problem Relation Age of Onset     Colon Cancer Mother      Lung Cancer Father      Lung Cancer Brother      Thyroid Disease Sister          Current Outpatient Medications   Medication Sig Dispense  Refill     clotrimazole-betamethasone (LOTRISONE) 1-0.05 % external cream APPLY TO AFFECTED AREA(S) TOPICALLY DAILY AS NEEDED 15 g 0     FLUoxetine (PROZAC) 10 MG capsule Take 1 capsule (10 mg) by mouth daily 30 capsule 1     Misc Natural Products (OSTEO BI-FLEX JOINT SHIELD PO) Take 1 tablet by mouth daily       Multiple Vitamins-Minerals (CENTRUM SILVER) per tablet Take 1 tablet by mouth daily       traMADol (ULTRAM) 50 MG tablet Take 0.5-1 tablets (25-50 mg) by mouth nightly as needed for moderate to severe pain 30 tablet 0     VITAMIN D, CHOLECALCIFEROL, PO Take 5,000 Units by mouth daily       No Known Allergies  Recent Labs   Lab Test 06/17/19  0814 11/08/17  0736   * 132*   HDL 89 77   TRIG 104 104   ALT 14 14   CR 0.59 0.63   GFRESTIMATED >90 >90   GFRESTBLACK >90 >90   POTASSIUM 4.3 3.6   TSH  --  0.68      BP Readings from Last 3 Encounters:   09/25/19 113/76   08/21/19 105/72   06/17/19 108/75    Wt Readings from Last 3 Encounters:   09/25/19 52.8 kg (116 lb 8 oz)   06/17/19 50.2 kg (110 lb 11.2 oz)   03/12/19 49.4 kg (109 lb)                      Reviewed and updated as needed this visit by Provider         Review of Systems   ROS COMP: CONSTITUTIONAL: NEGATIVE for fever, chills, change in weight  RESP: NEGATIVE for significant cough or SOB  CV: NEGATIVE for chest pain, palpitations or peripheral edema  GI: NEGATIVE for nausea, abdominal pain, heartburn, or change in bowel habits  MUSCULOSKELETAL: as above  NEURO: NEGATIVE for weakness, dizziness or paresthesias  ENDOCRINE: NEGATIVE for temperature intolerance, skin/hair changes  HEME/ALLERGY/IMMUNE: NEGATIVE for bleeding problems  PSYCHIATRIC: as above      Objective    /76 (BP Location: Right arm, Patient Position: Sitting, Cuff Size: Adult Regular)   Pulse 62   Temp 97.6  F (36.4  C) (Oral)   Wt 52.8 kg (116 lb 8 oz)   SpO2 95%   BMI 20.97 kg/m    Body mass index is 20.97 kg/m .  Physical Exam   GENERAL: healthy, alert and no  distress  RESP: lungs clear to auscultation - no rales, rhonchi or wheezes  CV: regular rate and rhythm, normal S1 S2, no S3 or S4, no murmur, click or rub, no peripheral edema and peripheral pulses strong  MS: Moderate tenderness over the right biceps, right anterior shoulder, full range of motion but with significant amount of pain during internal rotation and abduction, negative impingement test  SKIN: no suspicious lesions or rashes  PSYCH: mentation appears normal, tearful and anxious    Diagnostic Test Results:  Labs reviewed in Epic        Assessment & Plan     1. Chronic right shoulder pain  From calcific tendinitis  With the shoulder MRI from 6 months ago  Recommended patient to follow-up with Dr. De Leon in sports medicine for recheck and consideration for repeat cortisone injection if appropriate  Diclofenac gel topical was helpful but insurance does not cover it anymore  Continue with over-the-counter Tylenol PRN for mild pain, use tramadol as needed for moderate to severe pain  Dosing and potential medication side effects discussed.  Patient did not tolerate over-the-counter and prescription NSAIDs in the past, had upper GI side effects including nausea, vomiting and burning pain  - traMADol (ULTRAM) 50 MG tablet; Take 0.5-1 tablets (25-50 mg) by mouth nightly as needed for moderate to severe pain  Dispense: 30 tablet; Refill: 0        3. Tendinitis of right rotator cuff  as above  - traMADol (ULTRAM) 50 MG tablet; Take 0.5-1 tablets (25-50 mg) by mouth nightly as needed for moderate to severe pain  Dispense: 30 tablet; Refill: 0    4. Breast cancer screening    - MA Screening Digital Bilateral; Future    5. Adjustment disorder with mixed anxiety and depressed mood  Empathized with patient on her current concerns  Recommended to start on counseling  Reviewed relaxation techniques  Patient continues to see her Judaism , and has excellent family and friends support as she reported  Reviewed her PHQ 9  and expressed my concerns over her symptoms  Patient does not express suicidal ideations, she feels very hopeless  Recommended to start on Prozac 10 mg daily, follow for recheck in 5 to 6 weeks or sooner if needed  Dosing and potential medication side effects discussed.  Patient verbalised understanding and is agreeable to the plan.    - FLUoxetine (PROZAC) 10 MG capsule; Take 1 capsule (10 mg) by mouth daily  Dispense: 30 capsule; Refill: 1    6. Need for prophylactic vaccination and inoculation against influenza    - INFLUENZA QUAD, RECOMBINANT, P-FREE (RIV4) (FLUBLOCK) [41786]  - Vaccine Administration, Initial [94746]       Chart documentation done in part with Dragon Voice recognition Software. Although reviewed after completion, some word and grammatical error may remain.    See Patient Instructions    Return in about 5 weeks (around 10/30/2019) for Medication Recheck.    Toni Harkins MD  [facility]

## 2019-09-26 ASSESSMENT — ANXIETY QUESTIONNAIRES: GAD7 TOTAL SCORE: 10

## 2019-10-07 ENCOUNTER — ANCILLARY PROCEDURE (OUTPATIENT)
Dept: MAMMOGRAPHY | Facility: CLINIC | Age: 59
End: 2019-10-07
Attending: FAMILY MEDICINE
Payer: COMMERCIAL

## 2019-10-07 ENCOUNTER — OFFICE VISIT (OUTPATIENT)
Dept: ORTHOPEDICS | Facility: CLINIC | Age: 59
End: 2019-10-07
Payer: COMMERCIAL

## 2019-10-07 VITALS
SYSTOLIC BLOOD PRESSURE: 110 MMHG | DIASTOLIC BLOOD PRESSURE: 76 MMHG | HEART RATE: 66 BPM | BODY MASS INDEX: 20.88 KG/M2 | WEIGHT: 116 LBS

## 2019-10-07 DIAGNOSIS — Z12.39 BREAST CANCER SCREENING: ICD-10-CM

## 2019-10-07 DIAGNOSIS — M75.31 CALCIFIC TENDINITIS OF RIGHT SHOULDER: Primary | ICD-10-CM

## 2019-10-07 PROCEDURE — 77067 SCR MAMMO BI INCL CAD: CPT

## 2019-10-07 PROCEDURE — 99213 OFFICE O/P EST LOW 20 MIN: CPT | Performed by: FAMILY MEDICINE

## 2019-10-07 ASSESSMENT — PAIN SCALES - GENERAL: PAINLEVEL: SEVERE PAIN (7)

## 2019-10-07 NOTE — LETTER
10/7/2019         RE: Karina Melvin  [address] [facility] [address]        Dear Colleague,    Thank you for referring your patient, Karina Melvin, to the [facility]. Please see a copy of my visit note below.    HISTORY OF PRESENT ILLNESS  Ms. Melvin is a pleasant 59 year old year old female following up with calcific tendinitis of her right shoulder.  Estrella last saw me in March, at that visit I performed a ultrasound-guided barbotage of her supraspinatus tendon.  She felt completely pain-free for about 4 months.  Unfortunately over the past couple of months her shoulder started to bother her again.  She points to the lateral aspect of her shoulder, pain is worse with extension and abduction.  She denies numbness or tingling.     PHYSICAL EXAM  Vitals:    10/07/19 0939   BP: 110/76   Pulse: 66   Weight: 52.6 kg (116 lb)     General  - normal appearance, in no obvious distress  CV  - normal radial pulse  Pulm  - normal respiratory pattern, non-labored  Musculoskeletal - right shoulder  - inspection: normal bone and joint alignment, no obvious deformity, no scapular winging, no AC step-off  - palpation: mildly tender RC insertion, normal clavicle, non-tender AC  - ROM: painful and limited flexion and ER at end range, limited IR and abduction  - strength: 5/5  strength, 5/5 in all shoulder planes  - special tests:  (+) Neer  (+) Hawkin's  (+) Ana's  Neuro  - no sensory or motor deficit, grossly normal coordination, normal muscle tone  Skin  - no ecchymosis, erythema, warmth, or induration, no obvious rash  Psych  - interactive, appropriate, normal mood and affect          ASSESSMENT & PLAN  Ms. Melvin is a 59 year old year old female following up with calcific tendinitis of the right shoulder.    I again reviewed her prior x-ray and MR in the room with her which does reveal calcific tendinitis of the supraspinatus tendon with a possible partial-thickness tear.    We reviewed  treatment options moving forward including repeating the procedure, repeating her imaging, or referring her to our surgical colleagues.  She would like to repeat her barbotage procedure, she get this done at her earliest convenience.    She is going to follow-up this coming Friday.    It was a pleasure seeing Estrella.        Jose Miguel De Leon DO, CAQSM      This note was constructed using Dragon dictation software, please excuse any minor errors in spelling, grammar, or syntax.            [facility] FOLLOW-UP VISIT 10/7/2019    Karina Melvin's chief complaint for this visit includes:  Chief Complaint   Patient presents with     RECHECK     f/u right shoulder, did well have the right shoulder procedure, pain starting to return.      PCP: Toni Harkins    Referring Provider:  No referring provider defined for this encounter.    /76   Pulse 66   Wt 52.6 kg (116 lb)   BMI 20.88 kg/m     Severe Pain (7)       Interval History:     Follow up reason: right shoulder       Medical History:    Any recent changes to your medical history? No    Any new medication prescribed since last visit? No    Review of Systems:    Do you have fever, chills, weight loss? No    Do you have any vision problems? No    Do you have any chest pain or edema? No    Do you have any shortness of breath or wheezing?  No    Do you have stomach problems? No    Do you have any numbness or focal weakness? No    Do you have diabetes? No    Do you have problems with bleeding or clotting? No    Do you have an rashes or other skin lesions? No      Again, thank you for allowing me to participate in the care of your patient.        Sincerely,        Jose Miguel De Leon DO

## 2019-10-07 NOTE — PROGRESS NOTES
[facility] FOLLOW-UP VISIT 10/7/2019    Karina Melvin's chief complaint for this visit includes:  Chief Complaint   Patient presents with     RECHECK     f/u right shoulder, did well have the right shoulder procedure, pain starting to return.      PCP: Toni Harkins    Referring Provider:  No referring provider defined for this encounter.    /76   Pulse 66   Wt 52.6 kg (116 lb)   BMI 20.88 kg/m    Severe Pain (7)       Interval History:     Follow up reason: right shoulder       Medical History:    Any recent changes to your medical history? No    Any new medication prescribed since last visit? No    Review of Systems:    Do you have fever, chills, weight loss? No    Do you have any vision problems? No    Do you have any chest pain or edema? No    Do you have any shortness of breath or wheezing?  No    Do you have stomach problems? No    Do you have any numbness or focal weakness? No    Do you have diabetes? No    Do you have problems with bleeding or clotting? No    Do you have an rashes or other skin lesions? No

## 2019-10-07 NOTE — PROGRESS NOTES
HISTORY OF PRESENT ILLNESS  Ms. Melvin is a pleasant 59 year old year old female following up with calcific tendinitis of her right shoulder.  Estrella last saw me in March, at that visit I performed a ultrasound-guided barbotage of her supraspinatus tendon.  She felt completely pain-free for about 4 months.  Unfortunately over the past couple of months her shoulder started to bother her again.  She points to the lateral aspect of her shoulder, pain is worse with extension and abduction.  She denies numbness or tingling.     PHYSICAL EXAM  Vitals:    10/07/19 0939   BP: 110/76   Pulse: 66   Weight: 52.6 kg (116 lb)     General  - normal appearance, in no obvious distress  CV  - normal radial pulse  Pulm  - normal respiratory pattern, non-labored  Musculoskeletal - right shoulder  - inspection: normal bone and joint alignment, no obvious deformity, no scapular winging, no AC step-off  - palpation: mildly tender RC insertion, normal clavicle, non-tender AC  - ROM: painful and limited flexion and ER at end range, limited IR and abduction  - strength: 5/5  strength, 5/5 in all shoulder planes  - special tests:  (+) Neer  (+) Hawkin's  (+) Ana's  Neuro  - no sensory or motor deficit, grossly normal coordination, normal muscle tone  Skin  - no ecchymosis, erythema, warmth, or induration, no obvious rash  Psych  - interactive, appropriate, normal mood and affect          ASSESSMENT & PLAN  Ms. Melvin is a 59 year old year old female following up with calcific tendinitis of the right shoulder.    I again reviewed her prior x-ray and MR in the room with her which does reveal calcific tendinitis of the supraspinatus tendon with a possible partial-thickness tear.    We reviewed treatment options moving forward including repeating the procedure, repeating her imaging, or referring her to our surgical colleagues.  She would like to repeat her barbotage procedure, she get this done at her earliest convenience.    She is going to  follow-up this coming Friday.    It was a pleasure seeing Estrella.        Jose Miguel De Leon DO, LEELA      This note was constructed using Dragon dictation software, please excuse any minor errors in spelling, grammar, or syntax.

## 2019-10-08 NOTE — RESULT ENCOUNTER NOTE
Dear Estrella,  Your mammogram is negative and reassuring.   Let me know if you have any questions. Take care.  Toni Harkins MD

## 2019-10-11 ENCOUNTER — OFFICE VISIT (OUTPATIENT)
Dept: ORTHOPEDICS | Facility: CLINIC | Age: 59
End: 2019-10-11
Payer: COMMERCIAL

## 2019-10-11 VITALS — DIASTOLIC BLOOD PRESSURE: 75 MMHG | BODY MASS INDEX: 20.88 KG/M2 | SYSTOLIC BLOOD PRESSURE: 111 MMHG | WEIGHT: 116 LBS

## 2019-10-11 DIAGNOSIS — M75.31 CALCIFIC TENDINITIS OF RIGHT SHOULDER: Primary | ICD-10-CM

## 2019-10-11 PROCEDURE — 20550 NJX 1 TENDON SHEATH/LIGAMENT: CPT | Mod: RT | Performed by: FAMILY MEDICINE

## 2019-10-11 PROCEDURE — 76942 ECHO GUIDE FOR BIOPSY: CPT | Performed by: FAMILY MEDICINE

## 2019-10-11 PROCEDURE — 99207 ZZC DROP WITH A PROCEDURE: CPT | Performed by: FAMILY MEDICINE

## 2019-10-11 RX ADMIN — TRIAMCINOLONE ACETONIDE 40 MG: 40 INJECTION, SUSPENSION INTRA-ARTICULAR; INTRAMUSCULAR at 09:19

## 2019-10-11 NOTE — LETTER
10/11/2019         RE: Karina Melvin  [address] [facility] [address]        Dear Colleague,    Thank you for referring your patient, Karina Melvin, to the [facility]. Please see a copy of my visit note below.      Ms. Melvin is a pleasant 59 year old year old female following up with calcific tendinitis of her right shoulder.  Estrella is here for a barbotage procedure.  Vitals:    10/11/19 0917   BP: 111/75   Weight: 52.6 kg (116 lb)     Barbotage, supraspinatus tendon - Ultrasound Guided    The patient was informed of the risks and the benefits of the procedure and a written consent was signed.    The patient s right shoulder was prepped with chlorhexidine in sterile fashion.     40 mg of triamcinolone suspension was drawn up into a 5 mL syringe with 4 mL of 1% lidocaine.  In addition a second syringe with 10 cc of 1% lidocaine without epinephrine was drawn up.    Procedure was performed using sterile technique.  Under ultrasound guidance a 1.5-inch 18-gauge needle was used to enter the distal supraspinatus tendon at the area of calcification.  Anterolateral approach was used with patient supine, right shoulder internally rotated.  Needle placement was visualized and documented with ultrasound.  Ultrasound visualization was necessary to ensure placement in to the calcified area and not the rotator cuff tendon, which could potentially cause further tendon damage.   Procedure performed with multiple axes of visualization.  Multiple passes were made through calcification at distal supraspinatus, the 10 cc of lidocaine was injected throughout.  The syringe was then swapped for the syringe containing the triamcinolone.  Injection was performed at the site of the calcification.    There were no complications. The patient tolerated the procedure well. There was negligible bleeding.     The patient was instructed to ice the shoulder upon leaving clinic and refrain from overuse over the  next 3 days.     The patient was instructed to call or go to the emergency room with any unusual pain, swelling, redness, or if otherwise concerned.    Kenalog: NDC [id number]        DO KAITLYNN PadillaM        Jose Miguel De Leon DO, CAQSM      This note was constructed using Dragon dictation software, please excuse any minor errors in spelling, grammar, or syntax.        [facility] FOLLOW-UP VISIT 10/11/2019    Karina Melvin's chief complaint for this visit includes:  Chief Complaint   Patient presents with     Procedure     right shoulder barbotage procedure      PCP: Toni Harkins    Referring Provider:  No referring provider defined for this encounter.    Wt 52.6 kg (116 lb)   BMI 20.88 kg/m     Data Unavailable       Interval History:     Follow up reason: right shoulder barbotage procedure     Medical History:    Any recent changes to your medical history? No    Any new medication prescribed since last visit? No    Review of Systems:    Do you have fever, chills, weight loss? No    Do you have any vision problems? No    Do you have any chest pain or edema? No    Do you have any shortness of breath or wheezing?  No    Do you have stomach problems? No    Do you have any numbness or focal weakness? No    Do you have diabetes? No    Do you have problems with bleeding or clotting? No    Do you have an rashes or other skin lesions? No  Large Joint Injection/Arthocentesis  Date/Time: 10/11/2019 9:19 AM  Performed by: Jose Miguel De Leon DO  Authorized by: Jose Miguel De Leon DO     Indications:  Pain  Needle Size:  25 G  Guidance: ultrasound    Location:  Shoulder   Location comment:  Right shoulder barbotage procedure       Medications:  40 mg triamcinolone 40 MG/ML  Outcome:  Tolerated well, no immediate complications  Procedure discussed: discussed risks, benefits, and alternatives    Consent Given by:  Patient  Timeout: timeout called immediately prior to procedure    Prep: patient was prepped  and draped in usual sterile fashion            Again, thank you for allowing me to participate in the care of your patient.        Sincerely,        Jose Miguel De Leon, DO

## 2019-10-11 NOTE — PROGRESS NOTES
Ms. Melvin is a pleasant 59 year old year old female following up with calcific tendinitis of her right shoulder.  Estrella is here for a barbotage procedure.  Vitals:    10/11/19 0917   BP: 111/75   Weight: 52.6 kg (116 lb)     Barbotage, supraspinatus tendon - Ultrasound Guided    The patient was informed of the risks and the benefits of the procedure and a written consent was signed.    The patient s right shoulder was prepped with chlorhexidine in sterile fashion.     40 mg of triamcinolone suspension was drawn up into a 5 mL syringe with 4 mL of 1% lidocaine.  In addition a second syringe with 10 cc of 1% lidocaine without epinephrine was drawn up.    Procedure was performed using sterile technique.  Under ultrasound guidance a 1.5-inch 18-gauge needle was used to enter the distal supraspinatus tendon at the area of calcification.  Anterolateral approach was used with patient supine, right shoulder internally rotated.  Needle placement was visualized and documented with ultrasound.  Ultrasound visualization was necessary to ensure placement in to the calcified area and not the rotator cuff tendon, which could potentially cause further tendon damage.  Procedure performed with multiple axes of visualization.  Multiple passes were made through calcification at distal supraspinatus, the 10 cc of lidocaine was injected throughout.  The syringe was then swapped for the syringe containing the triamcinolone.  Injection was performed at the site of the calcification.    There were no complications. The patient tolerated the procedure well. There was negligible bleeding.     The patient was instructed to ice the shoulder upon leaving clinic and refrain from overuse over the next 3 days.     The patient was instructed to call or go to the emergency room with any unusual pain, swelling, redness, or if otherwise concerned.    Kenalog: NDC 3088-4055-86        Art De Leon DO CAQSM        Jose Miguel De Leon DO, CAQSM      This note was  constructed using Dragon dictation software, please excuse any minor errors in spelling, grammar, or syntax.        [facility] FOLLOW-UP VISIT 10/11/2019    Karina Melvin's chief complaint for this visit includes:  Chief Complaint   Patient presents with     Procedure     right shoulder barbotage procedure      PCP: Toni Harkins    Referring Provider:  No referring provider defined for this encounter.    Wt 52.6 kg (116 lb)   BMI 20.88 kg/m    Data Unavailable       Interval History:     Follow up reason: right shoulder barbotage procedure     Medical History:    Any recent changes to your medical history? No    Any new medication prescribed since last visit? No    Review of Systems:    Do you have fever, chills, weight loss? No    Do you have any vision problems? No    Do you have any chest pain or edema? No    Do you have any shortness of breath or wheezing?  No    Do you have stomach problems? No    Do you have any numbness or focal weakness? No    Do you have diabetes? No    Do you have problems with bleeding or clotting? No    Do you have an rashes or other skin lesions? No  Large Joint Injection/Arthocentesis  Date/Time: 10/11/2019 9:19 AM  Performed by: Jose Miguel De Leon DO  Authorized by: Jose Miguel De Leon DO     Indications:  Pain  Needle Size:  25 G  Guidance: ultrasound    Location:  Shoulder   Location comment:  Right shoulder barbotage procedure       Medications:  40 mg triamcinolone 40 MG/ML  Outcome:  Tolerated well, no immediate complications  Procedure discussed: discussed risks, benefits, and alternatives    Consent Given by:  Patient  Timeout: timeout called immediately prior to procedure    Prep: patient was prepped and draped in usual sterile fashion

## 2019-10-13 RX ORDER — TRIAMCINOLONE ACETONIDE 40 MG/ML
40 INJECTION, SUSPENSION INTRA-ARTICULAR; INTRAMUSCULAR
Status: DISCONTINUED | OUTPATIENT
Start: 2019-10-11 | End: 2020-08-10

## 2019-10-14 DIAGNOSIS — M75.31 CALCIFIC TENDINITIS OF RIGHT SHOULDER: Primary | ICD-10-CM

## 2019-10-14 DIAGNOSIS — M25.511 ACUTE PAIN OF RIGHT SHOULDER: ICD-10-CM

## 2019-10-14 RX ORDER — HYDROCODONE BITARTRATE AND ACETAMINOPHEN 5; 325 MG/1; MG/1
1 TABLET ORAL EVERY 6 HOURS PRN
Qty: 20 TABLET | Refills: 0 | Status: SHIPPED | OUTPATIENT
Start: 2019-10-14 | End: 2019-10-21

## 2019-10-14 RX ORDER — HYDROCODONE BITARTRATE AND ACETAMINOPHEN 5; 325 MG/1; MG/1
TABLET ORAL
Qty: 30 TABLET | Refills: 0 | Status: SHIPPED | OUTPATIENT
Start: 2019-10-14 | End: 2019-10-14

## 2019-10-14 NOTE — TELEPHONE ENCOUNTER
The patient called today to discuss her increased pain after her right shoulder procedure last Friday.  The patient states she is using ice and tylenol with out relief.  She is unable to take Tramadol or ibuprofen. Dr. De Leon is agreeable to ordering Narco. This will be ordered to the [facility].

## 2019-11-27 PROBLEM — M54.12 CERVICAL RADICULOPATHY: Status: RESOLVED | Noted: 2017-10-23 | Resolved: 2019-11-27

## 2019-11-27 PROBLEM — M75.31 CALCIFIC TENDINITIS OF RIGHT SHOULDER: Status: RESOLVED | Noted: 2019-03-22 | Resolved: 2019-11-27

## 2019-11-27 NOTE — PROGRESS NOTES
Discharge Note    Progress reporting period is from initial evaluation date (please see noted date below) to May 1, 2019.  Linked Episodes   Type: Episode: Status: Noted: Resolved: Last update: Updated by:   PHYSICAL THERAPY bilateral shoulder pain 10/23/18 Active 10/23/2018  5/1/2019 11:39 AM Ladi Fowler, PT      Comments:       Karina failed to follow up and current status is unknown.  Please see information below for last relevant information on current status.  Patient seen for 6 visits.    SUBJECTIVE  Subjective changes noted by patient:  Pt relates she is feels better this week than last and feels that the exercises are helping. relates that IR wand stretch can cause some soreness at times. States reaching is still somewhat painful  .  Current pain level is 2/10(4/10 with reaching).     Previous pain level was  7/10.   Changes in function:  Yes (See Goal flowsheet attached for changes in current functional level)  Adverse reaction to treatment or activity: None    OBJECTIVE  Changes noted in objective findings:   AROM R shoulder flexion 155, Abd  150, ext 50, ER 80, IR: T11     ASSESSMENT/PLAN  Diagnosis: bilateral shoulder pain   Updated problem list and treatment plan:   Pain - HEP  Decreased ROM/flexibility - HEP  Decreased function - HEP  Decreased strength - HEP  STG/LTGs have been met or progress has been made towards goals:  Yes, please see goal flowsheet for most current information  Assessment of Progress: current status is unknown.    Last current status: Pt is progressing as expected   Self Management Plans:  HEP  I have re-evaluated this patient and find that the nature, scope, duration and intensity of the therapy is appropriate for the medical condition of the patient.  Karina continues to require the following intervention to meet STG and LTG's:  HEP.    Recommendations:  Discharge with current home program.  Patient to follow up with MD as needed.    Please refer to the daily  flowsheet for treatment today, total treatment time and time spent performing 1:1 timed codes.

## 2019-11-27 NOTE — PROGRESS NOTES
Discharge Note    Progress reporting period is from initial evaluation date (please see noted date below) to Apr 11, 2018.  Linked Episodes   Type: Episode: Status: Noted: Resolved: Last update: Updated by:   PHYSICAL THERAPY neck and B shoulders/UE 12/8/2017 Active 12/8/2017 4/11/2018  8:20 AM Ladi Fowler, PT      Comments:       Karina failed to follow up and current status is unknown.  Please see information below for last relevant information on current status.  Patient seen for 8 visits.    SUBJECTIVE  Subjective changes noted by patient:  Patient relates that she is better.  Less pain and improving ROM for neck extension.  Not feeling the symptoms down the R UE.  Middle finger is much less  numb/tingly   Symptoms down the arm is intermittent.  She will feel the symptoms about every   .  Current pain level is 4/10.     Previous pain level was  9/10(at worst).   Changes in function:  Yes (See Goal flowsheet attached for changes in current functional level)  Adverse reaction to treatment or activity: None    OBJECTIVE  Changes noted in objective findings:   CROM Rot R 44, L 42, ext 27.    MMT symmetrical MMT for thumb ext 4+/5 B.     ASSESSMENT/PLAN  Diagnosis: cervical radiculopathy   Updated problem list and treatment plan:   Pain - HEP  Decreased ROM/flexibility - HEP  Decreased function - HEP  Decreased strength - HEP  STG/LTGs have been met or progress has been made towards goals:  Yes, please see goal flowsheet for most current information  Assessment of Progress: current status is unknown.    Last current status: Pt is progressing slower than anticipated   Self Management Plans:  HEP  I have re-evaluated this patient and find that the nature, scope, duration and intensity of the therapy is appropriate for the medical condition of the patient.  Karina continues to require the following intervention to meet STG and LTG's:  HEP.    Recommendations:  Discharge with current home program.  Patient  to follow up with MD as needed.    Please refer to the daily flowsheet for treatment today, total treatment time and time spent performing 1:1 timed codes.

## 2020-04-21 DIAGNOSIS — L30.4 INTERTRIGO: ICD-10-CM

## 2020-04-21 RX ORDER — CLOTRIMAZOLE AND BETAMETHASONE DIPROPIONATE 10; .64 MG/G; MG/G
CREAM TOPICAL
Qty: 15 G | Refills: 0 | Status: SHIPPED | OUTPATIENT
Start: 2020-04-21 | End: 2020-08-10

## 2020-08-10 ENCOUNTER — VIRTUAL VISIT (OUTPATIENT)
Dept: PEDIATRICS | Facility: CLINIC | Age: 60
End: 2020-08-10
Payer: COMMERCIAL

## 2020-08-10 DIAGNOSIS — R09.81 NASAL CONGESTION: ICD-10-CM

## 2020-08-10 DIAGNOSIS — M75.81 TENDINITIS OF RIGHT ROTATOR CUFF: ICD-10-CM

## 2020-08-10 DIAGNOSIS — R21 RASH AND NONSPECIFIC SKIN ERUPTION: ICD-10-CM

## 2020-08-10 DIAGNOSIS — G89.29 CHRONIC RIGHT SHOULDER PAIN: Primary | ICD-10-CM

## 2020-08-10 DIAGNOSIS — M25.511 CHRONIC RIGHT SHOULDER PAIN: Primary | ICD-10-CM

## 2020-08-10 DIAGNOSIS — J30.2 SEASONAL ALLERGIC RHINITIS, UNSPECIFIED TRIGGER: ICD-10-CM

## 2020-08-10 PROCEDURE — 99214 OFFICE O/P EST MOD 30 MIN: CPT | Mod: 95 | Performed by: FAMILY MEDICINE

## 2020-08-10 RX ORDER — TRAMADOL HYDROCHLORIDE 50 MG/1
25-50 TABLET ORAL
Qty: 30 TABLET | Refills: 0 | Status: CANCELLED | OUTPATIENT
Start: 2020-08-10

## 2020-08-10 RX ORDER — FLUTICASONE PROPIONATE 50 MCG
1 SPRAY, SUSPENSION (ML) NASAL DAILY
Qty: 16 G | Refills: 1 | Status: SHIPPED | OUTPATIENT
Start: 2020-08-10 | End: 2020-08-10

## 2020-08-10 RX ORDER — FLUTICASONE PROPIONATE 50 MCG
2 SPRAY, SUSPENSION (ML) NASAL DAILY
Qty: 16 G | Refills: 1 | Status: SHIPPED | OUTPATIENT
Start: 2020-08-10 | End: 2020-10-12

## 2020-08-10 RX ORDER — ACETAMINOPHEN 325 MG/1
325-650 TABLET ORAL EVERY 6 HOURS PRN
COMMUNITY
End: 2022-04-11

## 2020-08-10 NOTE — PATIENT INSTRUCTIONS
Start using Flonase nasal sprays-2 sprays in each nostril once daily  Start on physical therapy for the right shoulder pain  Use over-the-counter Claritin 10 mg daily in the morning  Use over-the-counter hydrocortisone cream twice daily as needed for the rash around the nose

## 2020-08-10 NOTE — PROGRESS NOTES
"Karina Melvin is a 59 year old female who is being evaluated via a billable telephone visit.      The patient has been notified of following:     \"This telephone visit will be conducted via a call between you and your physician/provider. We have found that certain health care needs can be provided without the need for a physical exam.  This service lets us provide the care you need with a short phone conversation.  If a prescription is necessary we can send it directly to your pharmacy.  If lab work is needed we can place an order for that and you can then stop by our lab to have the test done at a later time.    Telephone visits are billed at different rates depending on your insurance coverage. During this emergency period, for some insurers they may be billed the same as an in-person visit.  Please reach out to your insurance provider with any questions.    If during the course of the call the physician/provider feels a telephone visit is not appropriate, you will not be charged for this service.\"    Patient has given verbal consent for Telephone visit?  Yes    What phone number would you like to be contacted at? [phone]    How would you like to obtain your AVS? Bubbahart    Subjective     Karina Melvin is a 59 year old female who presents via phone visit today for the following health issues:    HPI  Patient is here for a telephone visit instead of in person visit due to the current COVID-19 pandemic.    Medication Followup for chronic shoulder pain    Taking Medication as prescribed: yes    Side Effects:  None    Medication Helping Symptoms:  yes   . Patient is here for telephone visit with concerns of having ongoing intermittent pain of right shoulder.  Patient was seen by Dr. De Leon in sports medicine last September, had MRI showing calcific tendinitis of the right shoulder.  She continues to have pain and stiffness with limited improvement but felt when she use the diclofenac topical gel she had a " great pain relief and is requesting refills on those today      ALLERGIES    Complaining of having increasing nasal stuffiness, nasal congestion, sneezing, itching around the nose and with no associated concerns for scratchy throat, wheezing, cough  Patient does not smoke, denies history of asthma  Noted as a small round erythematous rash on either side of the nose from rubbing her nostrils       Onset: 2 months    Description:   Nasal congestion: no   Sneezing: YES  Red, itchy eyes: no    Progression of Symptoms:  same    Accompanying Signs & Symptoms:  Cough: no   Wheezing: no   Rash: YES- under her nose from running nose  Sinus/facial pain: no    History:   Is it seasonal: in the summer   History of Asthma: no   Has allergy testing been done: no     Precipitating factors:   Worse in the morning    Alleviating factors:  None       Therapies Tried and outcome: Claritin, Tylenol, Dayquil.           Patient Active Problem List   Diagnosis     DDD (degenerative disc disease), cervical     Neck pain on left side     CARDIOVASCULAR SCREENING; LDL GOAL LESS THAN 160     Adjustment disorder with mixed anxiety and depressed mood     Family history of thyroid disease     Intertrigo     Tendinopathy of rotator cuff, right     Chronic pain of both shoulders     Internal hemorrhoid, bleeding     Chronic right shoulder pain     Sleeping difficulty     Past Surgical History:   Procedure Laterality Date     COLONOSCOPY N/A 8/21/2019    Procedure: Colonoscopy, With Polypectomy And Biopsy;  Surgeon: Duane, William Charles, MD;  Location: [facility] OR     COLONOSCOPY WITH CO2 INSUFFLATION N/A 8/21/2019    Procedure: COLONOSCOPY, WITH CO2 INSUFFLATION;  Surgeon: Duane, William Charles, MD;  Location: [facility] OR       Social History     Tobacco Use     Smoking status: Never Smoker     Smokeless tobacco: Never Used   Substance Use Topics     Alcohol use: No     Family History   Problem Relation Age of Onset     Colon Cancer Mother      Lung Cancer  Father      Lung Cancer Brother      Thyroid Disease Sister          Current Outpatient Medications   Medication Sig Dispense Refill     acetaminophen (TYLENOL) 325 MG tablet Take 325-650 mg by mouth every 6 hours as needed for mild pain       diclofenac (VOLTAREN) 1 % topical gel Apply 2 g topically 4 times daily 100 g 0     fluticasone (FLONASE) 50 MCG/ACT nasal spray Spray 1 spray into both nostrils daily 16 g 1     Misc Natural Products (OSTEO BI-FLEX JOINT SHIELD PO) Take 1 tablet by mouth daily       Multiple Vitamins-Minerals (CENTRUM SILVER) per tablet Take 1 tablet by mouth daily       traMADol (ULTRAM) 50 MG tablet Take 0.5-1 tablets (25-50 mg) by mouth nightly as needed for moderate to severe pain 30 tablet 0     VITAMIN D, CHOLECALCIFEROL, PO Take 5,000 Units by mouth daily       clotrimazole-betamethasone (LOTRISONE) 1-0.05 % external cream APPLY TO AFFECTED AREA(S) TOPICALLY DAILY AS NEEDED (Patient not taking: Reported on 8/10/2020) 15 g 0     FLUoxetine (PROZAC) 10 MG capsule Take 1 capsule (10 mg) by mouth daily (Patient not taking: Reported on 8/10/2020) 30 capsule 1     No Known Allergies  Recent Labs   Lab Test 06/17/19  0814 11/08/17  0736   * 132*   HDL 89 77   TRIG 104 104   ALT 14 14   CR 0.59 0.63   GFRESTIMATED >90 >90   GFRESTBLACK >90 >90   POTASSIUM 4.3 3.6   TSH  --  0.68      BP Readings from Last 3 Encounters:   10/11/19 111/75   10/07/19 110/76   09/25/19 113/76    Wt Readings from Last 3 Encounters:   10/11/19 52.6 kg (116 lb)   10/07/19 52.6 kg (116 lb)   09/25/19 52.8 kg (116 lb 8 oz)                    Reviewed and updated as needed this visit by Provider         Review of Systems   CONSTITUTIONAL: NEGATIVE for fever, chills, change in weight  INTEGUMENTARY/SKIN: as above  EYES: NEGATIVE for vision changes or irritation  ENT/MOUTH: as above  RESP: NEGATIVE for significant cough or SOB  CV: NEGATIVE for chest pain, palpitations or peripheral edema  MUSCULOSKELETAL: as  above  PSYCHIATRIC: NEGATIVE for changes in mood or affect       Objective   Reported vitals:  There were no vitals taken for this visit.   healthy, alert and no distress  PSYCH: Alert and oriented times 3; coherent speech, normal   rate and volume, able to articulate logical thoughts, able   to abstract reason, no tangential thoughts, no hallucinations   or delusions  Her affect is normal  RESP: No cough, no audible wheezing, able to talk in full sentences  Remainder of exam unable to be completed due to telephone visits    Diagnostic Test Results:  Labs reviewed in Epic        Assessment/Plan:    1. Chronic right shoulder pain  Recommended to start back on physical therapy  Patient has upcoming appointment with provider next month  We will reevaluate at that time  - [facility]; Future    2. Tendinitis of right rotator cuff  Prescription refill given for diclofenac topical gel, start on PT as mentioned above, weightbearing activities, apply local ice and heat as needed  - diclofenac (VOLTAREN) 1 % topical gel; Apply 2 g topically 4 times daily  Dispense: 100 g; Refill: 0  - [facility]; Future    3. Seasonal allergic rhinitis, unspecified trigger  Recommended to start taking over-the-counter Claritin 10 mg once daily in the morning  Use Flonase nasal sprays daily PRN  Reviewed saline nasal rinses  Follow-up if no better in 2 to 3 weeks or sooner if needed  - fluticasone (FLONASE) 50 MCG/ACT nasal spray; Spray 2 spray into both nostrils daily  Dispense: 16 g; Refill: 1    4. Nasal congestion  as above    - fluticasone (FLONASE) 50 MCG/ACT nasal spray; Spray 2 spray into both nostrils daily  Dispense: 16 g; Refill: 1    5. Rash and nonspecific skin eruption  Try over-the-counter hydrocortisone cream twice a day for up to 2 weeks, follow-up if no better by then or sooner if needed      No follow-ups on file.      Phone call duration:  6:10 minutes    Toni  EVARISTO Harkins MD

## 2020-08-21 ENCOUNTER — OFFICE VISIT (OUTPATIENT)
Dept: PEDIATRICS | Facility: CLINIC | Age: 60
End: 2020-08-21
Payer: COMMERCIAL

## 2020-08-21 VITALS
OXYGEN SATURATION: 97 % | TEMPERATURE: 97.8 F | BODY MASS INDEX: 1.8 KG/M2 | DIASTOLIC BLOOD PRESSURE: 80 MMHG | HEART RATE: 77 BPM | WEIGHT: 10 LBS | SYSTOLIC BLOOD PRESSURE: 136 MMHG

## 2020-08-21 DIAGNOSIS — M77.50 TENDONITIS OF ANKLE OR FOOT: ICD-10-CM

## 2020-08-21 DIAGNOSIS — M75.81 TENDINITIS OF RIGHT ROTATOR CUFF: ICD-10-CM

## 2020-08-21 DIAGNOSIS — R21 RASH AND NONSPECIFIC SKIN ERUPTION: ICD-10-CM

## 2020-08-21 DIAGNOSIS — M79.672 LEFT FOOT PAIN: Primary | ICD-10-CM

## 2020-08-21 PROCEDURE — 99213 OFFICE O/P EST LOW 20 MIN: CPT | Performed by: FAMILY MEDICINE

## 2020-08-21 NOTE — PROGRESS NOTES
Subjective     Karina Melvin is a 59 year old female who presents to clinic today for the following health issues:    HPI     Patient is here with concerns of having sharp pain over the medial aspect of left ankle radiating intermittently to the left leg below the knee associated with occasional soft tissue swelling over the concerned area of the left foot and ankle for the past 2 months.  Patient denies history of fall or trauma  Denies previous similar symptoms, right-sided symptoms  Denies low back pain, tingling, numbness or weakness of the extremities  She is also here with concerns of having intermittent itchy rash on the back of the neck and right upper arm for the past 5 to 6 weeks  Denies history of shingles in the past, current concerns for burning sensation, pain, fever, chills  Musculoskeletal problem/pain  Onset/Duration: 2 months  Description  Location: foot - left  Joint Swelling: YES  Redness: no  Pain: YES  Warmth: YES  Intensity:  moderate  Progression of Symptoms:  intermittent  Accompanying signs and symptoms:   Fevers: no  Numbness/tingling/weakness: no  History  Trauma to the area: no  Recent illness:  no  Previous similar problem: no  Previous evaluation:  no  Precipitating or alleviating [id number]:  Aggravating factors include: standing, walking and climbing stairs  Therapies tried and outcome: rest/inactivity and elevation  Rash  Onset/Duration: 1month  Description  Location: Back of neck  Character: red  Itching: severe  Intensity:  moderate  Progression of Symptoms:  constant  Accompanying signs and symptoms:   Fever: no  Body aches or joint pain: no  Sore throat symptoms: no  Recent cold symptoms: no  History:           Previous episodes of similar rash: None  New exposures:  None  Recent travel: no  Exposure to similar rash: no  Precipitating or alleviating factors: none  Therapies tried and outcome: none        Review of Systems   CONSTITUTIONAL: NEGATIVE for fever, chills, change in  weight  INTEGUMENTARY/SKIN: as above  RESP: NEGATIVE for significant cough or SOB  CV: NEGATIVE for chest pain, palpitations or peripheral edema  MUSCULOSKELETAL: as above  NEURO: NEGATIVE for weakness, dizziness or paresthesias  PSYCHIATRIC: HX anxiety and HX depression      Objective    /80   Pulse 77   Temp 97.8  F (36.6  C) (Temporal)   Wt (!) 4.536 kg (10 lb)   SpO2 97%   BMI 1.80 kg/m    Body mass index is 1.8 kg/m .  Physical Exam   GENERAL: healthy, alert and no distress  MS: Normal gait  Minimal tenderness over the soft tissues on the medial aspect of the left ankle, full range of motion  No warmth, redness noted  SKIN: Faint erythematous papular eruption on the posterior neck and some on the right elbow  NEURO: Normal strength and tone, mentation intact and speech normal  BACK: no CVA tenderness, no paralumbar tenderness  Comprehensive back pain exam:  No tenderness, Range of motion not limited by pain, Lower extremity strength functional and equal on both sides, Lower extremity reflexes within normal limits bilaterally, Lower extremity sensation normal and equal on both sides and Straight leg raise negative bilaterally  PSYCH: mentation appears normal, affect normal/bright    No results found for this or any previous visit (from the past 24 hour(s)).        Assessment & Plan     Left foot pain  Likely foot tendinitis causing her pain  Recommended to try over-the-counter foot orthotics, apply local ice and heat PRN, use over-the-counter diclofenac topical gel PRN, start on physical therapy, gave education handouts on the same.  Patient will call if she does not feel any better in 5 to 6 weeks of PT or sooner if needed  Consider imaging/podiatry consult  for persistent or worsening concerns  Patient verbalised understanding and is agreeable to the plan.    - [facility]; Future    Tendonitis of ankle or foot  as above    - [facility];  Future    Rash and nonspecific skin eruption  ddx-herpes zoster versus heat rash  Given the duration of symptoms, antiviral treatment may not be helpful  Recommended patient to try over-the-counter hydrocortisone cream topically twice daily as needed for up to 2 weeks  Avoid sun exposure  If rash is not any better in 2 weeks, patient understands to follow-up for further recommendations         Chart documentation done in part with Dragon Voice recognition Software. Although reviewed after completion, some word and grammatical error may remain.    See Patient Instructions    No follow-ups on file.    Toni Harkins MD  [facility]

## 2020-08-21 NOTE — PATIENT INSTRUCTIONS
Start on physical therapy  Use over the counter DICLOFENAC GEL topically over the affected areas as needed for pain

## 2020-08-24 ASSESSMENT — PATIENT HEALTH QUESTIONNAIRE - PHQ9: SUM OF ALL RESPONSES TO PHQ QUESTIONS 1-9: 11

## 2020-10-12 ENCOUNTER — MYC REFILL (OUTPATIENT)
Dept: PEDIATRICS | Facility: CLINIC | Age: 60
End: 2020-10-12

## 2020-10-12 DIAGNOSIS — R09.81 NASAL CONGESTION: ICD-10-CM

## 2020-10-12 DIAGNOSIS — J30.2 SEASONAL ALLERGIC RHINITIS, UNSPECIFIED TRIGGER: ICD-10-CM

## 2020-10-21 RX ORDER — FLUTICASONE PROPIONATE 50 MCG
2 SPRAY, SUSPENSION (ML) NASAL DAILY
Qty: 16 G | Refills: 5 | Status: SHIPPED | OUTPATIENT
Start: 2020-10-21 | End: 2021-10-26

## 2020-10-21 NOTE — TELEPHONE ENCOUNTER
Fluticasone nasal spray refilled per RN protocol.  Patient informed over Mychart.    Last Written Prescription Date:  8/10/2020  Last Fill Quantity: 16 g,  # refills: 1   Last office visit: 8/21/2020 with prescribing provider:  Dr. Harkins   Future Office Visit:      Deanna Ferreira RN, [facility]

## 2020-11-16 ENCOUNTER — OFFICE VISIT (OUTPATIENT)
Dept: PEDIATRICS | Facility: CLINIC | Age: 60
End: 2020-11-16
Payer: COMMERCIAL

## 2020-11-16 VITALS
HEART RATE: 69 BPM | SYSTOLIC BLOOD PRESSURE: 122 MMHG | TEMPERATURE: 98 F | HEIGHT: 61 IN | WEIGHT: 121.2 LBS | BODY MASS INDEX: 22.88 KG/M2 | DIASTOLIC BLOOD PRESSURE: 73 MMHG | OXYGEN SATURATION: 98 %

## 2020-11-16 DIAGNOSIS — S16.1XXA STRAIN OF STERNOCLEIDOMASTOID MUSCLE, INITIAL ENCOUNTER: Primary | ICD-10-CM

## 2020-11-16 PROCEDURE — 99213 OFFICE O/P EST LOW 20 MIN: CPT | Performed by: INTERNAL MEDICINE

## 2020-11-16 RX ORDER — CELECOXIB 200 MG/1
200 CAPSULE ORAL DAILY
Qty: 30 CAPSULE | Refills: 0 | Status: SHIPPED | OUTPATIENT
Start: 2020-11-16 | End: 2021-01-06

## 2020-11-16 ASSESSMENT — MIFFLIN-ST. JEOR: SCORE: 1052.14

## 2020-11-16 NOTE — PROGRESS NOTES
Subjective     Karina Melvin is a 60 year old female who presents to clinic today for the following health issues:    HPI       NECK AND JAW PAIN  For the last 2 weeks patient has had pain on the left side/front of neck, feels swollen along with pain on the left side of her jaw, strong family history of thyroid problems.    Patient states that she has had no injury.  All of a sudden 1 day she felt pain on the left side of her neck and it is on the upper and close to the jaw.  She is worried about thyroid problem because he has a family history of sisters with thyroid disease.  She is wondering if she should have an ultrasound scan of some sort.  Denies voice change.  Pain with swallowing.  No sore throat.    Review of Systems   Constitutional, HEENT, cardiovascular, pulmonary, GI, , musculoskeletal, neuro, skin, endocrine and psych systems are negative, except as otherwise noted.      Objective    There were no vitals taken for this visit.  There is no height or weight on file to calculate BMI.  Physical Exam   GENERAL: healthy, alert and no distress  EYES: Eyes grossly normal to inspection, PERRL and conjunctivae and sclerae normal  HENT: ear canals and TM's normal, nose and mouth without ulcers or lesions  NECK: no adenopathy, no asymmetry, masses, or scars and thyroid normal to palpation  MS: no gross musculoskeletal defects noted, no edema.  Examination of the neck reveals no cervical spine tenderness.  There is tenderness of the sternomastoid muscle on the left side.  The muscle is not swollen.  There is no palpable lump.  No evidence of lymphadenopathy but a muscle is activated with range of motion of the neck she can feel the discomfort.            Assessment & Plan     1.  Strain of the sternocleidomastoid muscle initial encounter.  Inform patient the exam is negative except for tenderness of the muscle which in reality is not swollen.  Advised to use anti-inflammatory medication.  She cannot tolerate  Advil or Aleve so I prescribed Celebrex 20 mg daily.  If she remains symptomatic in few weeks then she could return.      Tip Linda MD  [facility]

## 2020-11-24 ENCOUNTER — MYC REFILL (OUTPATIENT)
Dept: PEDIATRICS | Facility: CLINIC | Age: 60
End: 2020-11-24

## 2020-11-24 DIAGNOSIS — R09.81 NASAL CONGESTION: ICD-10-CM

## 2020-11-24 DIAGNOSIS — J30.2 SEASONAL ALLERGIC RHINITIS, UNSPECIFIED TRIGGER: ICD-10-CM

## 2020-11-24 RX ORDER — FLUTICASONE PROPIONATE 50 MCG
2 SPRAY, SUSPENSION (ML) NASAL DAILY
Qty: 16 G | Refills: 5 | OUTPATIENT
Start: 2020-11-24

## 2020-11-24 NOTE — TELEPHONE ENCOUNTER
fluticasone (FLONASE) 50 MCG/ACT nasal spray 16 g 5 10/21/2020  No   Sig - Route: Spray 2 sprays in nostril daily Corrected script - Nasal   Sent to pharmacy as: Fluticasone Propionate 50 MCG/ACT Nasal Suspension (FLONASE)   Class: E-Prescribe       Too soon to refill    Ashley Bryant RN  [facility]

## 2020-12-09 ENCOUNTER — TELEPHONE (OUTPATIENT)
Dept: PEDIATRICS | Facility: CLINIC | Age: 60
End: 2020-12-09

## 2020-12-09 NOTE — TELEPHONE ENCOUNTER
[facility] Call Center    Phone Message    May a detailed message be left on voicemail: yes     Reason for Call: Other: pt is calling regarding COVID like symptoms and was tested in nov, pt would like directive on what to do. please advise     Action Taken: Message routed to:  Primary Care p [phone]                                                                     Complex Repair And Double M Plasty Text: The defect edges were debeveled with a #15 scalpel blade.  The primary defect was closed partially with a complex linear closure.  Given the location of the remaining defect, shape of the defect and the proximity to free margins a double M plasty was deemed most appropriate for complete closure of the defect.  Using a sterile surgical marker, an appropriate advancement flap was drawn incorporating the defect and placing the expected incisions within the relaxed skin tension lines where possible.    The area thus outlined was incised deep to adipose tissue with a #15 scalpel blade.  The skin margins were undermined to an appropriate distance in all directions utilizing iris scissors.

## 2020-12-09 NOTE — TELEPHONE ENCOUNTER
Contacted patient, she was diagnosed with COVID-19 on 11/30/2020.  This past Saturday, 12/5/2020, she presented to the ER due to increased shortness of breath (see ED visit note from [facility]).  She was diagnosed with pneumonia.  Patient is requesting an antibiotic.    Patient reports that her shortness of breath has improved, but the cough is terrible.  Her cough is nonproductive, very dry and persistent.  She is taking tylenol for chills/sweats, but has not been checking her temperature.  She has been drinking warm tea and plenty of water.  She was discharged from the ED with a pulse oximeter, states her oxygenation has been adequate.    Patient states she feels that an antibiotic would be helpful.  She is not using a cough syrup.    Forwarding to provider for review/recommendation.    Arlet Hoyt RN

## 2020-12-09 NOTE — TELEPHONE ENCOUNTER
Patient contacted, Dr. Harkins had a cancellation this afternoon.  Video visit scheduled with PCP.      Arlet Hoyt RN

## 2020-12-27 ENCOUNTER — HEALTH MAINTENANCE LETTER (OUTPATIENT)
Age: 60
End: 2020-12-27

## 2021-01-06 ENCOUNTER — VIRTUAL VISIT (OUTPATIENT)
Dept: PEDIATRICS | Facility: CLINIC | Age: 61
End: 2021-01-06
Payer: COMMERCIAL

## 2021-01-06 DIAGNOSIS — J18.9 PNEUMONIA OF RIGHT MIDDLE LOBE DUE TO INFECTIOUS ORGANISM: ICD-10-CM

## 2021-01-06 DIAGNOSIS — R05.9 COUGH: ICD-10-CM

## 2021-01-06 DIAGNOSIS — U07.1 INFECTION DUE TO 2019 NOVEL CORONAVIRUS: ICD-10-CM

## 2021-01-06 PROCEDURE — 99214 OFFICE O/P EST MOD 30 MIN: CPT | Mod: 95 | Performed by: INTERNAL MEDICINE

## 2021-01-06 RX ORDER — BENZONATATE 200 MG/1
200 CAPSULE ORAL 3 TIMES DAILY PRN
Qty: 30 CAPSULE | Refills: 1 | Status: SHIPPED | OUTPATIENT
Start: 2021-01-06 | End: 2021-01-16

## 2021-01-06 RX ORDER — CODEINE PHOSPHATE AND GUAIFENESIN 10; 100 MG/5ML; MG/5ML
1 SOLUTION ORAL EVERY 4 HOURS PRN
Qty: 120 ML | Refills: 0 | Status: CANCELLED | OUTPATIENT
Start: 2021-01-06

## 2021-01-06 RX ORDER — CODEINE PHOSPHATE AND GUAIFENESIN 10; 100 MG/5ML; MG/5ML
1 SOLUTION ORAL
Qty: 120 ML | Refills: 0 | Status: SHIPPED | OUTPATIENT
Start: 2021-01-06 | End: 2022-04-11

## 2021-01-06 RX ORDER — FLUTICASONE PROPIONATE AND SALMETEROL XINAFOATE 230; 21 UG/1; UG/1
2 AEROSOL, METERED RESPIRATORY (INHALATION) 2 TIMES DAILY
Qty: 12 G | Refills: 0 | Status: SHIPPED | OUTPATIENT
Start: 2021-01-06 | End: 2022-04-11

## 2021-01-06 NOTE — PROGRESS NOTES
Karina Melvin is a 60 year old female who is being evaluated via a billable telephone visit.      What phone number would you like to be contacted at? [phone]  How would you like to obtain your AVS? MyChart  Assessment & Plan     Karina was seen today for covid concern.    Diagnoses and all orders for this visit:    Pneumonia of right middle lobe due to infectious organism  -     guaiFENesin-codeine (ROBITUSSIN AC) 100-10 MG/5ML solution; Take 5 mLs by mouth nightly as needed for cough    Cough  -     benzonatate (TESSALON) 200 MG capsule; Take 1 capsule (200 mg) by mouth 3 times daily as needed for cough  -     guaiFENesin-codeine (ROBITUSSIN AC) 100-10 MG/5ML solution; Take 5 mLs by mouth nightly as needed for cough  -     fluticasone-salmeterol (ADVAIR-HFA) 230-21 MCG/ACT inhaler; Inhale 2 puffs into the lungs 2 times daily    Infection due to 2019 novel coronavirus  -     guaiFENesin-codeine (ROBITUSSIN AC) 100-10 MG/5ML solution; Take 5 mLs by mouth nightly as needed for cough       Patient has had COVID-19 pneumonia a little over a month ago.  She has persistent symptoms of low energy, persistent cough fatigue, poor eating.  May be the beginning of reactive airway disease triggered by COVID-19 pneumonia.  Would like her to try Advair inhaler as a controller.  She can use Tessalon Perles during the day for cough suppressant, she can use Robitussin-AC at bedtime.  We will give her 2 weeks for the medications to take effect.  Tentatively have her return to work on January 18, 2021, working part-time for 2 weeks.  If she is able to return to work sooner, or doing full-time sooner than the work restriction, she will contact us. If she is not improving as expected, follow up in clinic.     Zee Hannon MD PhD  [facility]     Karina Melvin is a 60 year old who presents to clinic today for the following health issues.    HPI       Recently had Covid Nov 30th, still  having lingering cough and Sore throat and headache and needs to go back to work. Needs note for work.     Has been off work since 11/30/2020. She works as a . She is expected to return to work this weekend, works 8-10 hour days.  On weekends she is required to work before shift.  On weekdays she can do part-time shifts.    She has no energy. No strength.  Well.  Poor sleep.  Tired during the day.  Not eating well.  She tries to push herself to eat.          Objective           Vitals:  No vitals were obtained today due to virtual visit.    Physical Exam   healthy, alert and mild distress  PSYCH: Alert and oriented times 3; coherent speech, normal   rate and volume, able to articulate logical thoughts, able   to abstract reason, no tangential thoughts, no hallucinations   or delusions  Her affect is normal  RESP: Persistent cough, unable to speak in full sentences.  Took frequent stop due to cough  Remainder of exam unable to be completed due to telephone visits          Phone call duration: 22 minutes

## 2021-01-06 NOTE — LETTER
January 6, 2021      Karina Melvin  [address] [address]              To Whom It May Concern,  Karina Melvin had COVID-19 infection diagnosed on 11/30/2021. She continues to have a persistent cough, headache and low energy as of 1/6/2021. She has been put on medications to help her with the cough. She is not ready to return to work at this time. Tentatively she will return to work in on 1/18/2021 with the following restrictions:    Working 4 hours a day, 5 days a week for 2 weeks.      Should you have any questions, please feel free to contact me at the address above.        Sincerely,        Zee Hannon MD PhD

## 2021-04-20 ENCOUNTER — IMMUNIZATION (OUTPATIENT)
Dept: PEDIATRICS | Facility: CLINIC | Age: 61
End: 2021-04-20
Payer: COMMERCIAL

## 2021-04-20 PROCEDURE — 91300 PR COVID VAC PFIZER DIL RECON 30 MCG/0.3 ML IM: CPT

## 2021-04-20 PROCEDURE — 0001A PR COVID VAC PFIZER DIL RECON 30 MCG/0.3 ML IM: CPT

## 2021-05-11 ENCOUNTER — IMMUNIZATION (OUTPATIENT)
Dept: PEDIATRICS | Facility: CLINIC | Age: 61
End: 2021-05-11
Attending: INTERNAL MEDICINE
Payer: COMMERCIAL

## 2021-05-11 PROCEDURE — 91300 PR COVID VAC PFIZER DIL RECON 30 MCG/0.3 ML IM: CPT

## 2021-05-11 PROCEDURE — 0002A PR COVID VAC PFIZER DIL RECON 30 MCG/0.3 ML IM: CPT

## 2021-10-09 ENCOUNTER — HEALTH MAINTENANCE LETTER (OUTPATIENT)
Age: 61
End: 2021-10-09

## 2021-10-25 ENCOUNTER — TELEPHONE (OUTPATIENT)
Dept: PEDIATRICS | Facility: CLINIC | Age: 61
End: 2021-10-25

## 2021-10-25 DIAGNOSIS — J30.2 SEASONAL ALLERGIC RHINITIS, UNSPECIFIED TRIGGER: ICD-10-CM

## 2021-10-25 DIAGNOSIS — R09.81 NASAL CONGESTION: ICD-10-CM

## 2021-10-26 RX ORDER — FLUTICASONE PROPIONATE 50 MCG
SPRAY, SUSPENSION (ML) NASAL
Qty: 16 G | Refills: 0 | Status: SHIPPED | OUTPATIENT
Start: 2021-10-26 | End: 2022-03-01

## 2021-10-26 NOTE — TELEPHONE ENCOUNTER
Prescription approved per [facility] Refill Protocol.  APPT NEEDED FOR FURTHER REFILLS  Constance refill given per RN protocol.   Due for office visit  Pharmacy note to inform pt of office visit needed for continued medication use  Anabell Verdugo RN

## 2021-11-11 ENCOUNTER — IMMUNIZATION (OUTPATIENT)
Dept: FAMILY MEDICINE | Facility: CLINIC | Age: 61
End: 2021-11-11
Payer: COMMERCIAL

## 2021-11-11 DIAGNOSIS — Z23 NEED FOR PROPHYLACTIC VACCINATION AND INOCULATION AGAINST INFLUENZA: Primary | ICD-10-CM

## 2021-11-11 PROCEDURE — 90471 IMMUNIZATION ADMIN: CPT

## 2021-11-11 PROCEDURE — 99207 PR NO CHARGE NURSE ONLY: CPT

## 2021-11-11 PROCEDURE — 90682 RIV4 VACC RECOMBINANT DNA IM: CPT

## 2021-12-22 ENCOUNTER — IMMUNIZATION (OUTPATIENT)
Dept: NURSING | Facility: CLINIC | Age: 61
End: 2021-12-22
Payer: COMMERCIAL

## 2021-12-22 PROCEDURE — 0004A PR COVID VAC PFIZER DIL RECON 30 MCG/0.3 ML IM: CPT

## 2021-12-22 PROCEDURE — 91300 PR COVID VAC PFIZER DIL RECON 30 MCG/0.3 ML IM: CPT

## 2022-01-01 ENCOUNTER — LAB (OUTPATIENT)
Dept: LAB | Facility: CLINIC | Age: 62
End: 2022-01-01
Payer: COMMERCIAL

## 2022-01-01 ENCOUNTER — IMMUNIZATION (OUTPATIENT)
Dept: NURSING | Facility: CLINIC | Age: 62
End: 2022-01-01
Payer: COMMERCIAL

## 2022-01-01 ENCOUNTER — VIRTUAL VISIT (OUTPATIENT)
Dept: FAMILY MEDICINE | Facility: CLINIC | Age: 62
End: 2022-01-01
Payer: COMMERCIAL

## 2022-01-01 DIAGNOSIS — L65.9 HAIR LOSS: ICD-10-CM

## 2022-01-01 DIAGNOSIS — Z13.29 SCREENING FOR THYROID DISORDER: ICD-10-CM

## 2022-01-01 DIAGNOSIS — G47.00 INSOMNIA, UNSPECIFIED TYPE: ICD-10-CM

## 2022-01-01 DIAGNOSIS — R51.9 NEW ONSET OF HEADACHES AFTER AGE 50: Primary | ICD-10-CM

## 2022-01-01 DIAGNOSIS — K21.9 GASTROESOPHAGEAL REFLUX DISEASE WITHOUT ESOPHAGITIS: Primary | ICD-10-CM

## 2022-01-01 LAB
T4 FREE SERPL-MCNC: 1.01 NG/DL (ref 0.76–1.46)
THYROPEROXIDASE AB SERPL-ACNC: <10 IU/ML
TSH SERPL DL<=0.005 MIU/L-ACNC: 0.58 MU/L (ref 0.4–4)
TSI SER-ACNC: <1 TSI INDEX
ZINC SERPL-MCNC: 63.6 UG/DL

## 2022-01-01 PROCEDURE — 86376 MICROSOMAL ANTIBODY EACH: CPT

## 2022-01-01 PROCEDURE — 84630 ASSAY OF ZINC: CPT | Mod: 90

## 2022-01-01 PROCEDURE — 0124A COVID-19,PF,PFIZER BOOSTER BIVALENT: CPT

## 2022-01-01 PROCEDURE — 84445 ASSAY OF TSI GLOBULIN: CPT | Mod: 90

## 2022-01-01 PROCEDURE — 90471 IMMUNIZATION ADMIN: CPT

## 2022-01-01 PROCEDURE — 90682 RIV4 VACC RECOMBINANT DNA IM: CPT

## 2022-01-01 PROCEDURE — 99214 OFFICE O/P EST MOD 30 MIN: CPT | Mod: 95 | Performed by: NURSE PRACTITIONER

## 2022-01-01 PROCEDURE — 36415 COLL VENOUS BLD VENIPUNCTURE: CPT

## 2022-01-01 PROCEDURE — 84443 ASSAY THYROID STIM HORMONE: CPT

## 2022-01-01 PROCEDURE — 91312 COVID-19,PF,PFIZER BOOSTER BIVALENT: CPT

## 2022-01-01 PROCEDURE — 99213 OFFICE O/P EST LOW 20 MIN: CPT | Mod: 95 | Performed by: NURSE PRACTITIONER

## 2022-01-01 PROCEDURE — 84439 ASSAY OF FREE THYROXINE: CPT

## 2022-01-01 PROCEDURE — 99000 SPECIMEN HANDLING OFFICE-LAB: CPT

## 2022-01-01 RX ORDER — OMEPRAZOLE 20 MG/1
20 TABLET, DELAYED RELEASE ORAL DAILY
Qty: 30 TABLET | Refills: 1 | Status: SHIPPED | OUTPATIENT
Start: 2022-01-01 | End: 2023-01-01

## 2022-01-01 RX ORDER — TRAZODONE HYDROCHLORIDE 50 MG/1
50 TABLET, FILM COATED ORAL AT BEDTIME
Qty: 30 TABLET | Refills: 1 | Status: SHIPPED | OUTPATIENT
Start: 2022-01-01 | End: 2023-01-01 | Stop reason: SINTOL

## 2022-01-29 ENCOUNTER — HEALTH MAINTENANCE LETTER (OUTPATIENT)
Age: 62
End: 2022-01-29

## 2022-02-28 ENCOUNTER — LAB (OUTPATIENT)
Dept: URGENT CARE | Facility: URGENT CARE | Age: 62
End: 2022-02-28
Attending: FAMILY MEDICINE
Payer: COMMERCIAL

## 2022-02-28 DIAGNOSIS — R09.81 NASAL CONGESTION: ICD-10-CM

## 2022-02-28 DIAGNOSIS — J30.2 SEASONAL ALLERGIC RHINITIS, UNSPECIFIED TRIGGER: ICD-10-CM

## 2022-02-28 DIAGNOSIS — Z20.822 SUSPECTED 2019 NOVEL CORONAVIRUS INFECTION: ICD-10-CM

## 2022-02-28 LAB — SARS-COV-2 RNA RESP QL NAA+PROBE: NEGATIVE

## 2022-02-28 PROCEDURE — U0003 INFECTIOUS AGENT DETECTION BY NUCLEIC ACID (DNA OR RNA); SEVERE ACUTE RESPIRATORY SYNDROME CORONAVIRUS 2 (SARS-COV-2) (CORONAVIRUS DISEASE [COVID-19]), AMPLIFIED PROBE TECHNIQUE, MAKING USE OF HIGH THROUGHPUT TECHNOLOGIES AS DESCRIBED BY CMS-2020-01-R: HCPCS

## 2022-02-28 PROCEDURE — U0005 INFEC AGEN DETEC AMPLI PROBE: HCPCS

## 2022-03-01 RX ORDER — FLUTICASONE PROPIONATE 50 MCG
SPRAY, SUSPENSION (ML) NASAL
Qty: 16 G | Refills: 0 | Status: SHIPPED | OUTPATIENT
Start: 2022-03-01 | End: 2022-06-13

## 2022-03-01 NOTE — TELEPHONE ENCOUNTER
"Routing refill request to provider for review/approval because:  Constance given x1 and patient did not follow up, please advise.    Requested Prescriptions   Pending Prescriptions Disp Refills    fluticasone (FLONASE) 50 MCG/ACT nasal spray [Pharmacy Med Name: FLUTICASONE PROPIONATE 50 SUSP]  0     Sig: USE TWO SPRAYS IN EACH NOSTRIL ONCE DAILY **APPOINTMENT NEEDED  FOR FURTHER REFILLS**        Nasal Allergy Protocol Passed - 2/28/2022  9:13 AM        Passed - Patient is age 12 or older        Passed - Recent (12 mo) or future (30 days) visit within the authorizing provider's specialty     Patient has had an office visit with the authorizing provider or a provider within the authorizing providers department within the previous 12 mos or has a future within next 30 days. See \"Patient Info\" tab in inbasket, or \"Choose Columns\" in Meds & Orders section of the refill encounter.              Passed - Medication is active on med list            Jimena Choi RN, BSN  [facility]        "

## 2022-03-02 NOTE — TELEPHONE ENCOUNTER
Called patient to schedule physical and med check. Patient did not have the time to schedule over the phone today. Said she will schedule through [employer] at a later day.

## 2022-04-01 ENCOUNTER — TELEPHONE (OUTPATIENT)
Dept: FAMILY MEDICINE | Facility: CLINIC | Age: 62
End: 2022-04-01
Payer: COMMERCIAL

## 2022-04-01 DIAGNOSIS — Z11.1 SCREENING EXAMINATION FOR PULMONARY TUBERCULOSIS: Primary | ICD-10-CM

## 2022-04-01 NOTE — TELEPHONE ENCOUNTER
I will order a TB Gold plus blood test  If you test positive, patient needs to follow-up with a virtual visit

## 2022-04-01 NOTE — TELEPHONE ENCOUNTER
Patient called stating that she is needing a TB test prior to starting work at the [employer]. She states that she always tests positive with the Mantoux skin test and is asking for just a chest xray? Wonder if the quantiferon blood test would be more appropriate? Please advise and place the appropriate orders for patient.     Bisi Snyder RN [facility]

## 2022-04-05 ENCOUNTER — LAB (OUTPATIENT)
Dept: LAB | Facility: CLINIC | Age: 62
End: 2022-04-05
Payer: COMMERCIAL

## 2022-04-05 DIAGNOSIS — Z11.1 SCREENING EXAMINATION FOR PULMONARY TUBERCULOSIS: ICD-10-CM

## 2022-04-05 PROCEDURE — 36415 COLL VENOUS BLD VENIPUNCTURE: CPT

## 2022-04-05 PROCEDURE — 86481 TB AG RESPONSE T-CELL SUSP: CPT

## 2022-04-06 ENCOUNTER — TELEPHONE (OUTPATIENT)
Dept: FAMILY MEDICINE | Facility: CLINIC | Age: 62
End: 2022-04-06
Payer: COMMERCIAL

## 2022-04-06 LAB
GAMMA INTERFERON BACKGROUND BLD IA-ACNC: 0.49 IU/ML
M TB IFN-G BLD-IMP: POSITIVE
M TB IFN-G CD4+ BCKGRND COR BLD-ACNC: 9.51 IU/ML
MITOGEN IGNF BCKGRD COR BLD-ACNC: 0.61 IU/ML
MITOGEN IGNF BCKGRD COR BLD-ACNC: 0.69 IU/ML
QUANTIFERON MITOGEN: 10 IU/ML
QUANTIFERON NIL TUBE: 0.49 IU/ML
QUANTIFERON TB1 TUBE: 1.18 IU/ML
QUANTIFERON TB2 TUBE: 1.1

## 2022-04-06 NOTE — TELEPHONE ENCOUNTER
Patient calling regarding results. Requesting to know the status.     Advised results are still processing and she will be notified when they are finalized.     Pt verbalized understanding.     Jimena Choi RN, BSN  [facility]

## 2022-04-07 NOTE — TELEPHONE ENCOUNTER
Called patient and gave message per Dr. Harkins.      Patient states she verbalized understanding and is in agreement to plan.    Scheduled Video Visit tomorrow with Dr. Linda     4/8/22 at 1:00 pm    Deanna Ferreira RN, [facility]

## 2022-04-07 NOTE — TELEPHONE ENCOUNTER
Please inform patient of positive TB screening test results  Please help her schedule with Dr. Matta or Dr. Linda for follow up

## 2022-04-11 ENCOUNTER — VIRTUAL VISIT (OUTPATIENT)
Dept: FAMILY MEDICINE | Facility: CLINIC | Age: 62
End: 2022-04-11
Payer: COMMERCIAL

## 2022-04-11 ENCOUNTER — TELEPHONE (OUTPATIENT)
Dept: FAMILY MEDICINE | Facility: CLINIC | Age: 62
End: 2022-04-11

## 2022-04-11 DIAGNOSIS — R76.12 POSITIVE QUANTIFERON-TB GOLD TEST: Primary | ICD-10-CM

## 2022-04-11 DIAGNOSIS — Z53.9 ERRONEOUS ENCOUNTER--DISREGARD: Primary | ICD-10-CM

## 2022-04-11 PROCEDURE — 99213 OFFICE O/P EST LOW 20 MIN: CPT | Mod: 95 | Performed by: NURSE PRACTITIONER

## 2022-04-11 NOTE — PATIENT INSTRUCTIONS
PLAN:   1.   Symptomatic therapy suggested: Continue current medications as prescribed.     2.    Orders Placed This Encounter   Procedures    XR Chest 2 Views       3. Patient needs to follow up in if no improvement,or sooner if worsening of symptoms or other symptoms develop.  FURTHER TESTING:       - chest xray   Will follow up and/or notify patient of  results via My Chart to determine further need for followup

## 2022-04-11 NOTE — PROGRESS NOTES
Estrella is a 61 year old who is being evaluated via a billable video visit.      How would you like to obtain your AVS? Mail a copy with chest xray  If the video visit is dropped, the invitation should be resent by: Text to cell phone: [phone]  Will anyone else be joining your video visit? No      Video Start Time: 3:05 PM        Harman De La Rosa is a 61 year old who presents for the following health issues     HPI     TB  Patient reports that she will always have a positive test.  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  Patient is needing an updated chest xray.  She needs a copy of the result of the chest xray for her employer.  Had a TB test done to screening for work   Was in [place] and at that time was told positive for TB test about 6 years ago and was told to do only a chest xray   Never been on medication has always been positive   Came from [facility] to [facility] for 5 years and then here   Was noted positive in [facility] when she was working for Home [employer]     Would also like to discuss the 2nd booster dose for Covid.    Lab work is in process  Labs reviewed in EPIC  BP Readings from Last 3 Encounters:   11/16/20 122/73   08/21/20 136/80   10/11/19 111/75    Wt Readings from Last 3 Encounters:   11/16/20 55 kg (121 lb 3.2 oz)   08/21/20 (!) 4.536 kg (10 lb)   10/11/19 52.6 kg (116 lb)                  Patient Active Problem List   Diagnosis     DDD (degenerative disc disease), cervical     Neck pain on left side     CARDIOVASCULAR SCREENING; LDL GOAL LESS THAN 160     Adjustment disorder with mixed anxiety and depressed mood     Family history of thyroid disease     Intertrigo     Tendinopathy of rotator cuff, right     Chronic pain of both shoulders     Internal hemorrhoid, bleeding     Chronic right shoulder pain     Sleeping difficulty     Tendonitis of ankle or foot     Left foot pain     Positive TB test     Past Surgical History:   Procedure Laterality Date     COLONOSCOPY N/A 8/21/2019    Procedure:  Colonoscopy, With Polypectomy And Biopsy;  Surgeon: Duane, William Charles, MD;  Location: [facility] OR     COLONOSCOPY WITH CO2 INSUFFLATION N/A 8/21/2019    Procedure: COLONOSCOPY, WITH CO2 INSUFFLATION;  Surgeon: Duane, William Charles, MD;  Location: [facility] OR       Social History     Tobacco Use     Smoking status: Never Smoker     Smokeless tobacco: Never Used   Substance Use Topics     Alcohol use: No     Family History   Problem Relation Age of Onset     Colon Cancer Mother      Lung Cancer Father      Lung Cancer Brother      Thyroid Disease Sister          Current Outpatient Medications   Medication Sig Dispense Refill     clotrimazole-betamethasone (LOTRISONE) 1-0.05 % external cream APPLY TO AFFECTED AREA(S) TOPICALLY DAILY AS NEEDED 15 g 0     diclofenac (VOLTAREN) 1 % topical gel Apply 2 g topically 4 times daily 100 g 3     fluticasone (FLONASE) 50 MCG/ACT nasal spray USE TWO SPRAYS IN EACH NOSTRIL ONCE DAILY **APPOINTMENT NEEDED  FOR FURTHER REFILLS** 16 g 0     Misc Natural Products (OSTEO BI-FLEX JOINT SHIELD PO) Take 1 tablet by mouth daily       Multiple Vitamins-Minerals (CENTRUM SILVER) per tablet Take 1 tablet by mouth daily       VITAMIN D, CHOLECALCIFEROL, PO Take 5,000 Units by mouth daily       No Known Allergies          Review of Systems   Constitutional, HEENT, cardiovascular, pulmonary, gi and gu systems are negative, except as otherwise noted.      Objective    Vitals - Patient Reported  Weight (Patient Reported): 54 kg (119 lb)  Height (Patient Reported): 152.4 cm (5')  BMI (Based on Pt Reported Ht/Wt): 23.24  Pain Score: No Pain (0)        Physical Exam   GENERAL: Healthy, alert and no distress  EYES: Eyes grossly normal to inspection.  No discharge or erythema, or obvious scleral/conjunctival abnormalities.  HENT: normal cephalic/atraumatic and oral mucous membranes moist  RESP: No audible wheeze, cough, or visible cyanosis.  No visible retractions or increased work of breathing.    MS:  extremities normal- no gross deformities noted  SKIN: Visible skin clear. No significant rash, abnormal pigmentation or lesions.  NEURO: mentation intact  PSYCH: Mentation appears normal, affect normal/bright, judgement and insight intact, normal speech and appearance well-groomed.  Diagnostic Test Results:   Diagnostic Test Results:  Labs reviewed in Epic  Results for orders placed or performed in visit on 04/11/22   XR Chest 2 Views     Status: None    Narrative    EXAM: XR CHEST 2 VW  4/11/2022 3:58 PM     HISTORY:  Positive QuantiFERON-TB Gold test       COMPARISON:  None    TECHNIQUE: Upright PA and lateral chest    FINDINGS:   Devices, lines, tubes: None    The trachea is midline. The cardiomediastinal silhouette is within  normal limits. The pulmonary vasculature is distinct.  No appreciable  pneumothorax, pleural effusion, or focal consolidative opacity. No  acute osseous abnormality.        Impression    IMPRESSION:   No evidence of active or latent tuberculosis.    I have personally reviewed the examination and initial interpretation  and I agree with the findings.    MAYRA العراقي MD         SYSTEM ID:  [id number]     Assessment & Plan     Positive QuantiFERON-TB Gold test  Will follow up and/or notify patient of  results via My Chart to determine further need for followup  - XR Chest 2 Views  History of positive TB in her past for several years      Review of prior external note(s) from - CareEverywhere information from [facility] reviewed  Ordering of each unique test   Time spent doing chart review, history and exam, documentation and further activities per the note       See Patient Instructions  Patient Instructions     PLAN:   1.   Symptomatic therapy suggested: Continue current medications as prescribed.     2.    Orders Placed This Encounter   Procedures     XR Chest 2 Views       3. Patient needs to follow up in if no improvement,or sooner if worsening of symptoms or other symptoms  develop.  FURTHER TESTING:       - chest xray   Will follow up and/or notify patient of  results via My Chart to determine further need for followup        Return in about 1 week (around 4/18/2022), or if symptoms worsen or fail to improve.    VINCENZO Whipple CNP  [facility]          Video-Visit Details    Type of service:  Video Visit    Video End Time:3:16 PM    Originating Location (pt. Location): Home    Distant Location (provider location):  [facility]     Platform used for Video Visit: [employer]

## 2022-04-11 NOTE — TELEPHONE ENCOUNTER
Message left for patient to return call to check in for appointment today with Gela LOYA CNP.    Please transfer call to MEENA Hurd at [phone].

## 2022-04-11 NOTE — PROGRESS NOTES
Estrella is a 61 year old who is being evaluated via a billable video visit.      How would you like to obtain your AVS? MyChart  If the video visit is dropped, the invitation should be resent by: Send to e-mail at: [email or web address]  Will anyone else be joining your video visit? No      I did start the initial video visit with her thinking that we were just going to be talking about follow-up for a positive TB test.  Apparently she has had it positive for a long time had a chest x-ray somewhere out in [place] but she has not been in to see her physician in person for quite a while.  She also wanted to talk about a few other things but as she is more than 40 miles away from here and not in this clinic dyad I recommended especially since none of this was emergent that she call and get an in person visit so she can get the chest x-ray done that she needs.  I am will no charge this visit because it is unfortunate should not that she was scheduled with a random person instead of with her own home clinic.  Subjective   Estrella is a 61 year old who presents for the following health issues     History of Present Illness       Reason for visit:  Discuss pfo and migraines  Symptom onset:  More than a month  Symptom intensity:  Mild  Symptom progression:  Staying the same  Had these symptoms before:  Yes  Has tried/received treatment for these symptoms:  Yes  Previous treatment was successful:  Yes  Prior treatment description:  Sumatriptan    She eats 0-1 servings of fruits and vegetables daily.She consumes 1 sweetened beverage(s) daily.She exercises with enough effort to increase her heart rate 60 or more minutes per day.  She exercises with enough effort to increase her heart rate 4 days per week.   She is taking medications regularly.     Chief Complaint   Patient presents with     Video Visit     Results     Discuss positive TB results- born with this.      She has always had the positive tb she had       Review of Systems          Objective           Vitals:  No vitals were obtained today due to virtual visit.    Physical Exam                   Video-Visit Details    Type of service:  Video Visit    Video End Time:    Originating Location (pt. Location):     Distant Location (provider location):  [facility]     Platform used for Video Visit:       Cancelled inappropriate virtual visit . Needs exam   Geneva Cavazos M.D.

## 2022-04-17 PROBLEM — R76.11 POSITIVE TB TEST: Status: ACTIVE | Noted: 2022-04-17

## 2022-04-26 ENCOUNTER — IMMUNIZATION (OUTPATIENT)
Dept: NURSING | Facility: CLINIC | Age: 62
End: 2022-04-26
Payer: COMMERCIAL

## 2022-04-26 PROCEDURE — 91305 COVID-19,PF,PFIZER (12+ YRS): CPT

## 2022-04-26 PROCEDURE — 0054A COVID-19,PF,PFIZER (12+ YRS): CPT

## 2022-05-16 ENCOUNTER — OFFICE VISIT (OUTPATIENT)
Dept: FAMILY MEDICINE | Facility: CLINIC | Age: 62
End: 2022-05-16
Payer: COMMERCIAL

## 2022-05-16 VITALS
SYSTOLIC BLOOD PRESSURE: 125 MMHG | HEIGHT: 63 IN | BODY MASS INDEX: 20.82 KG/M2 | TEMPERATURE: 98 F | DIASTOLIC BLOOD PRESSURE: 81 MMHG | RESPIRATION RATE: 18 BRPM | HEART RATE: 71 BPM | OXYGEN SATURATION: 98 % | WEIGHT: 117.5 LBS

## 2022-05-16 DIAGNOSIS — Z12.31 ENCOUNTER FOR SCREENING MAMMOGRAM FOR BREAST CANCER: ICD-10-CM

## 2022-05-16 DIAGNOSIS — R79.89 DECREASED THYROID STIMULATING HORMONE (TSH) LEVEL: ICD-10-CM

## 2022-05-16 DIAGNOSIS — Z13.1 SCREENING FOR DIABETES MELLITUS (DM): ICD-10-CM

## 2022-05-16 DIAGNOSIS — Z13.0 SCREENING FOR DISORDER OF BLOOD AND BLOOD-FORMING ORGANS: ICD-10-CM

## 2022-05-16 DIAGNOSIS — E28.39 MENOPAUSE OVARIAN FAILURE: ICD-10-CM

## 2022-05-16 DIAGNOSIS — Z12.4 SCREENING FOR MALIGNANT NEOPLASM OF CERVIX: ICD-10-CM

## 2022-05-16 DIAGNOSIS — Z12.11 SCREEN FOR COLON CANCER: ICD-10-CM

## 2022-05-16 DIAGNOSIS — Z00.00 ROUTINE GENERAL MEDICAL EXAMINATION AT A HEALTH CARE FACILITY: Primary | ICD-10-CM

## 2022-05-16 DIAGNOSIS — Z13.29 SCREENING FOR THYROID DISORDER: ICD-10-CM

## 2022-05-16 DIAGNOSIS — Z13.6 CARDIOVASCULAR SCREENING; LDL GOAL LESS THAN 160: ICD-10-CM

## 2022-05-16 DIAGNOSIS — L98.8 WRINKLES: ICD-10-CM

## 2022-05-16 PROCEDURE — G0123 SCREEN CERV/VAG THIN LAYER: HCPCS | Performed by: NURSE PRACTITIONER

## 2022-05-16 PROCEDURE — 87624 HPV HI-RISK TYP POOLED RSLT: CPT | Performed by: NURSE PRACTITIONER

## 2022-05-16 PROCEDURE — 99396 PREV VISIT EST AGE 40-64: CPT | Performed by: NURSE PRACTITIONER

## 2022-05-16 ASSESSMENT — ENCOUNTER SYMPTOMS
CHILLS: 0
HEMATOCHEZIA: 0
ABDOMINAL PAIN: 0
HEMATURIA: 0

## 2022-05-16 ASSESSMENT — PAIN SCALES - GENERAL: PAINLEVEL: NO PAIN (0)

## 2022-05-16 NOTE — PATIENT INSTRUCTIONS
PLAN:   1.   Symptomatic therapy suggested: Increase calcium to 1000mg and 1000iu Vit D   2.    Orders Placed This Encounter   Procedures    REVIEW OF HEALTH MAINTENANCE PROTOCOL ORDERS    MA SCREENING DIGITAL BILAT - Future  (s+30)    DX Hip/Pelvis/Spine    Lipid panel reflex to direct LDL Fasting    CBC with platelets    Comprehensive metabolic panel    TSH with free T4 reflex    Adult Dermatology Referral       3. Patient needs to follow up in if no improvement,or sooner if worsening of symptoms or other symptoms develop.  FURTHER TESTING:       - DXA (bone density) scan       - mammogram  FUTURE LABS:       - Schedule a fasting blood draw   Will follow up and/or notify patient of  results via My Chart to determine further need for followup  Follow up office visit in one year for annual health maintenance exam, sooner PRN.

## 2022-05-16 NOTE — PROGRESS NOTES
SUBJECTIVE:   CC: Karina Melvin is an 61 year old woman who presents for preventive health visit.     Healthy Habits:     Getting at least 3 servings of Calcium per day:  Yes    Bi-annual eye exam:  NO    Dental care twice a year:  Yes    Sleep apnea or symptoms of sleep apnea:  None    Diet:  Regular (no restrictions)    Frequency of exercise:  2-3 days/week    Duration of exercise:  30-45 minutes    Taking medications regularly:  Yes    Medication side effects:  None    PHQ-2 Total Score: 2    Additional concerns today:  No      Today's PHQ-2 Score:   PHQ-2 ( 1999 Pfizer) 5/16/2022   Q1: Little interest or pleasure in doing things 1   Q2: Feeling down, depressed or hopeless 1   PHQ-2 Score 2   PHQ-2 Total Score (12-17 Years)- Positive if 3 or more points; Administer PHQ-A if positive -   Q1: Little interest or pleasure in doing things Several days   Q2: Feeling down, depressed or hopeless Several days   PHQ-2 Score 2       Abuse: Current or Past (Physical, Sexual or Emotional) - No  Do you feel safe in your environment? Yes    Have you ever done Advance Care Planning? (For example, a Health Directive, POLST, or a discussion with a medical provider or your loved ones about your wishes): No, advance care planning information given to patient to review.  Patient declined advance care planning discussion at this time.    Social History     Tobacco Use     Smoking status: Never Smoker     Smokeless tobacco: Never Used   Substance Use Topics     Alcohol use: No     If you drink alcohol do you typically have >3 drinks per day or >7 drinks per week? Not applicable    Alcohol Use 5/16/2022   Prescreen: >3 drinks/day or >7 drinks/week? No   Prescreen: >3 drinks/day or >7 drinks/week? -       Reviewed orders with patient.  Reviewed health maintenance and updated orders accordingly - Yes  Lab work is in process  Labs reviewed in EPIC  BP Readings from Last 3 Encounters:   05/16/22 125/81   11/16/20 122/73   08/21/20  136/80    Wt Readings from Last 3 Encounters:   05/16/22 53.3 kg (117 lb 8 oz)   11/16/20 55 kg (121 lb 3.2 oz)   08/21/20 (!) 4.536 kg (10 lb)               Patient Active Problem List   Diagnosis     DDD (degenerative disc disease), cervical     Neck pain on left side     CARDIOVASCULAR SCREENING; LDL GOAL LESS THAN 160     Adjustment disorder with mixed anxiety and depressed mood     Family history of thyroid disease     Intertrigo     Tendinopathy of rotator cuff, right     Chronic pain of both shoulders     Internal hemorrhoid, bleeding     Chronic right shoulder pain     Sleeping difficulty     Tendonitis of ankle or foot     Left foot pain     Positive TB test     Past Surgical History:   Procedure Laterality Date     COLONOSCOPY N/A 8/21/2019    Procedure: Colonoscopy, With Polypectomy And Biopsy;  Surgeon: Duane, William Charles, MD;  Location: [facility] OR     COLONOSCOPY WITH CO2 INSUFFLATION N/A 8/21/2019    Procedure: COLONOSCOPY, WITH CO2 INSUFFLATION;  Surgeon: Duane, William Charles, MD;  Location: [facility] OR       Social History     Tobacco Use     Smoking status: Never Smoker     Smokeless tobacco: Never Used   Substance Use Topics     Alcohol use: No     Family History   Problem Relation Age of Onset     Colon Cancer Mother      Lung Cancer Father      Lung Cancer Brother      Thyroid Disease Sister          Current Outpatient Medications   Medication Sig Dispense Refill     clotrimazole-betamethasone (LOTRISONE) 1-0.05 % external cream APPLY TO AFFECTED AREA(S) TOPICALLY DAILY AS NEEDED 15 g 0     diclofenac (VOLTAREN) 1 % topical gel Apply 2 g topically 4 times daily 100 g 3     fluticasone (FLONASE) 50 MCG/ACT nasal spray USE TWO SPRAYS IN EACH NOSTRIL ONCE DAILY **APPOINTMENT NEEDED  FOR FURTHER REFILLS** 16 g 0     Misc Natural Products (OSTEO BI-FLEX JOINT SHIELD PO) Take 1 tablet by mouth daily       Multiple Vitamins-Minerals (CENTRUM SILVER) per tablet Take 1 tablet by mouth daily       VITAMIN D,  CHOLECALCIFEROL, PO Take 5,000 Units by mouth daily       No Known Allergies    Breast Cancer Screening:  Any new diagnosis of family breast, ovarian, or bowel cancer? No    FHS-7:   Breast CA Risk Assessment (FHS-7) 5/16/2022   Did any of your first-degree relatives have breast or ovarian cancer? No   Did any of your relatives have bilateral breast cancer? No       Mammogram Screening: Recommended mammography every 1-2 years with patient discussion and risk factor consideration  Pertinent mammograms are reviewed under the imaging tab.    History of abnormal Pap smear: NO - age 30-65 PAP every 5 years with negative HPV co-testing recommended  PAP / HPV Latest Ref Rng & Units 5/16/2022 11/14/2017   PAP   Negative for Intraepithelial Lesion or Malignancy (NILM) -   PAP (Historical) - - NIL   HPV16 NEG:Negative - Negative   HPV18 NEG:Negative - Negative   HRHPV NEG:Negative - Negative     Reviewed and updated as needed this visit by clinical staff                    Reviewed and updated as needed this visit by Provider                   Past Medical History:   Diagnosis Date     Arthritis      Depressive disorder      Positive TB test       Past Surgical History:   Procedure Laterality Date     COLONOSCOPY N/A 8/21/2019    Procedure: Colonoscopy, With Polypectomy And Biopsy;  Surgeon: Duane, William Charles, MD;  Location: [facility]     COLONOSCOPY WITH CO2 INSUFFLATION N/A 8/21/2019    Procedure: COLONOSCOPY, WITH CO2 INSUFFLATION;  Surgeon: Duane, William Charles, MD;  Location: [facility] OR       Review of Systems   Constitutional: Negative for chills.   HENT: Negative for congestion.    Cardiovascular: Negative for chest pain.   Gastrointestinal: Negative for abdominal pain and hematochezia.   Genitourinary: Negative for hematuria.     CONSTITUTIONAL:NEGATIVE for fever, chills, change in weight  INTEGUMENTARY/SKIN: NEGATIVE for changing lesion  and non-healing lesion  and POSITIVE for suddenly more wrinkles   EYES: NEGATIVE  "for vision changes or irritation  ENT: NEGATIVE for ear, mouth and throat problems  RESP:NEGATIVE for significant cough or SOB  BREAST: NEGATIVE for masses, tenderness or discharge  CV: NEGATIVE for chest pain, palpitations or peripheral edema  GI: NEGATIVE for nausea, abdominal pain, heartburn, or change in bowel habits   menopausal female: amenorrhea, no unusual urinary symptoms and no unusual vaginal symptoms  MUSCULOSKELETAL:NEGATIVE for significant arthralgias or myalgia  NEURO: NEGATIVE for weakness, dizziness or paresthesias  ENDOCRINE: NEGATIVE for temperature intolerance, skin/hair changes  HEME/ALLERGY/IMMUNE: NEGATIVE for bleeding problems  PSYCHIATRIC: NEGATIVE for changes in mood or affect and POSITIVE for insomnia for the last a couple         OBJECTIVE:   /81 (BP Location: Right arm, Patient Position: Sitting, Cuff Size: Adult Regular)   Pulse 71   Temp 98  F (36.7  C) (Oral)   Resp 18   Ht 1.59 m (5' 2.6\")   Wt 53.3 kg (117 lb 8 oz)   SpO2 98%   BMI 21.08 kg/m    Physical Exam  GENERAL APPEARANCE: healthy, alert and no distress  EYES: Eyes grossly normal to inspection and conjunctivae and sclerae normal  HENT: ear canals and TM's normal, nose and mouth without ulcers or lesions, oropharynx clear and oral mucous membranes moist  NECK: no adenopathy, no asymmetry, masses, or scars and thyroid normal to palpation  RESP: lungs clear to auscultation - no rales, rhonchi or wheezes  BREAST: normal without masses, tenderness or nipple discharge and no palpable axillary masses or adenopathy  CV: regular rates and rhythm, no murmur, click or rub, no peripheral edema and peripheral pulses strong  ABDOMEN: soft, nontender, no hepatosplenomegaly, no masses and bowel sounds normal   (female): normal female external genitalia, normal urethral meatus, vaginal mucosal atrophy noted, normal cervix, adnexae, and uterus without masses or abnormal discharge  MS: no musculoskeletal defects are noted and " gait is age appropriate without ataxia  SKIN: no suspicious lesions or rashes  NEURO: Normal strength and tone, sensory exam grossly normal, mentation intact and speech normal  PSYCH: mentation appears normal and affect normal/bright    Diagnostic Test Results:  Labs reviewed in Epic  Results for orders placed or performed in visit on 05/16/22   Pap Screen with HPV - recommended age 30 - 65 years     Status: None   Result Value Ref Range    Interpretation        Negative for Intraepithelial Lesion or Malignancy (NILM)    Comment         Papanicolaou Test Limitations:  Cervical cytology is a screening test with limited sensitivity, and regular screening is critical for cancer prevention.  Pap tests are primarily effective for the diagnosis/prevention of squamous cell carcinoma, not adenocarcinoma or other cancers.        Specimen Adequacy       Satisfactory for evaluation, endocervical/transformation zone component present    Clinical Information       post-menopausal      Reflex Testing Yes regardless of result     Previous Abnormal?       No      Performing Labs       The technical component of this testing was completed at [facility]         ASSESSMENT/PLAN:   Karina was seen today for physical.    Diagnoses and all orders for this visit:    Routine general medical examination at a health care facility  -     REVIEW OF HEALTH MAINTENANCE PROTOCOL ORDERS    CARDIOVASCULAR SCREENING; LDL GOAL LESS THAN 160  -     Lipid panel reflex to direct LDL Fasting; Future    Screen for colon cancer  Up to date    Screening for diabetes mellitus (DM)  -     Comprehensive metabolic panel; Future    Encounter for screening mammogram for breast cancer  -     MA SCREENING DIGITAL BILAT - Future  (s+30); Future    Screening for disorder of blood and blood-forming organs  -     CBC with platelets; Future    Screening for malignant neoplasm of cervix  -     Pap Screen with  "HPV - recommended age 30 - 65 years  -     HPV Hold (Lab Only)  -     HPV High Risk Types DNA Cervical    Screening for thyroid disorder  -     TSH with free T4 reflex; Future    Menopause ovarian failure  -     DX Hip/Pelvis/Spine; Future    Wrinkles  -     Adult Dermatology Referral; Future    Decreased thyroid stimulating hormone (TSH) level  -     TSH; Future  -     T4 free; Future  -     Thyroid stimulating immunoglobulin; Future      PLAN:   Patient needs to follow up in if no improvement,or sooner if worsening of symptoms or other symptoms develop.  FURTHER TESTING:       - DXA (bone density) scan       - mammogram  FUTURE LABS:       - Schedule a fasting blood draw   Will follow up and/or notify patient of  results via My Chart to determine further need for followup  Follow up office visit in one year for annual health maintenance exam, sooner PRN.  Patient has been advised of split billing requirements and indicates understanding: Yes    COUNSELING:  Reviewed preventive health counseling, as reflected in patient instructions  Special attention given to:        Regular exercise       Healthy diet/nutrition       Vision screening       Osteoporosis prevention/bone health       Colorectal Cancer Screening       Consider Hep C screening for all patients one time for ages 18-79 years       The 10-year ASCVD risk score (Shannagudelia GOODWIN Jr., et al., 2013) is: 2.8%    Values used to calculate the score:      Age: 61 years      Sex: Female      Is Non- : No      Diabetic: No      Tobacco smoker: No      Systolic Blood Pressure: 125 mmHg      Is BP treated: No      HDL Cholesterol: 97 mg/dL      Total Cholesterol: 200 mg/dL       Advance Care Planning    Estimated body mass index is 21.08 kg/m  as calculated from the following:    Height as of this encounter: 1.59 m (5' 2.6\").    Weight as of this encounter: 53.3 kg (117 lb 8 oz).        She reports that she has never smoked. She has never used " smokeless tobacco.      Counseling Resources:  ATP IV Guidelines  Pooled Cohorts Equation Calculator  Breast Cancer Risk Calculator  BRCA-Related Cancer Risk Assessment: FHS-7 Tool  FRAX Risk Assessment  ICSI Preventive Guidelines  Dietary Guidelines for Americans, 2010  USDA's MyPlate  ASA Prophylaxis  Lung CA Screening    VINCENZO Whipple CNP  [facility]

## 2022-05-19 ENCOUNTER — ANCILLARY PROCEDURE (OUTPATIENT)
Dept: MAMMOGRAPHY | Facility: CLINIC | Age: 62
End: 2022-05-19
Attending: NURSE PRACTITIONER
Payer: COMMERCIAL

## 2022-05-19 ENCOUNTER — ANCILLARY PROCEDURE (OUTPATIENT)
Dept: BONE DENSITY | Facility: CLINIC | Age: 62
End: 2022-05-19
Attending: NURSE PRACTITIONER
Payer: COMMERCIAL

## 2022-05-19 ENCOUNTER — LAB (OUTPATIENT)
Dept: LAB | Facility: CLINIC | Age: 62
End: 2022-05-19
Payer: COMMERCIAL

## 2022-05-19 DIAGNOSIS — Z13.1 SCREENING FOR DIABETES MELLITUS (DM): ICD-10-CM

## 2022-05-19 DIAGNOSIS — Z12.31 ENCOUNTER FOR SCREENING MAMMOGRAM FOR BREAST CANCER: ICD-10-CM

## 2022-05-19 DIAGNOSIS — E28.39 MENOPAUSE OVARIAN FAILURE: ICD-10-CM

## 2022-05-19 DIAGNOSIS — Z13.29 SCREENING FOR THYROID DISORDER: ICD-10-CM

## 2022-05-19 DIAGNOSIS — Z13.0 SCREENING FOR DISORDER OF BLOOD AND BLOOD-FORMING ORGANS: ICD-10-CM

## 2022-05-19 DIAGNOSIS — Z13.6 CARDIOVASCULAR SCREENING; LDL GOAL LESS THAN 160: ICD-10-CM

## 2022-05-19 LAB
ALBUMIN SERPL-MCNC: 3.6 G/DL (ref 3.4–5)
ALP SERPL-CCNC: 40 U/L (ref 40–150)
ALT SERPL W P-5'-P-CCNC: 20 U/L (ref 0–50)
ANION GAP SERPL CALCULATED.3IONS-SCNC: 4 MMOL/L (ref 3–14)
AST SERPL W P-5'-P-CCNC: 16 U/L (ref 0–45)
BILIRUB SERPL-MCNC: 0.6 MG/DL (ref 0.2–1.3)
BKR LAB AP GYN ADEQUACY: NORMAL
BKR LAB AP GYN INTERPRETATION: NORMAL
BKR LAB AP HPV REFLEX: NORMAL
BKR LAB AP PREVIOUS ABNORMAL: NORMAL
BUN SERPL-MCNC: 13 MG/DL (ref 7–30)
CALCIUM SERPL-MCNC: 8.9 MG/DL (ref 8.5–10.1)
CHLORIDE BLD-SCNC: 109 MMOL/L (ref 94–109)
CHOLEST SERPL-MCNC: 200 MG/DL
CO2 SERPL-SCNC: 27 MMOL/L (ref 20–32)
CREAT SERPL-MCNC: 0.59 MG/DL (ref 0.52–1.04)
ERYTHROCYTE [DISTWIDTH] IN BLOOD BY AUTOMATED COUNT: 12 % (ref 10–15)
FASTING STATUS PATIENT QL REPORTED: YES
GFR SERPL CREATININE-BSD FRML MDRD: >90 ML/MIN/1.73M2
GLUCOSE BLD-MCNC: 103 MG/DL (ref 70–99)
HCT VFR BLD AUTO: 44 % (ref 35–47)
HDLC SERPL-MCNC: 97 MG/DL
HGB BLD-MCNC: 15 G/DL (ref 11.7–15.7)
LDLC SERPL CALC-MCNC: 88 MG/DL
MCH RBC QN AUTO: 31.5 PG (ref 26.5–33)
MCHC RBC AUTO-ENTMCNC: 34.1 G/DL (ref 31.5–36.5)
MCV RBC AUTO: 92 FL (ref 78–100)
NONHDLC SERPL-MCNC: 103 MG/DL
PATH REPORT.COMMENTS IMP SPEC: NORMAL
PATH REPORT.COMMENTS IMP SPEC: NORMAL
PATH REPORT.RELEVANT HX SPEC: NORMAL
PLATELET # BLD AUTO: 260 10E3/UL (ref 150–450)
POTASSIUM BLD-SCNC: 3.9 MMOL/L (ref 3.4–5.3)
PROT SERPL-MCNC: 7.4 G/DL (ref 6.8–8.8)
RBC # BLD AUTO: 4.76 10E6/UL (ref 3.8–5.2)
SODIUM SERPL-SCNC: 140 MMOL/L (ref 133–144)
T4 FREE SERPL-MCNC: 1.15 NG/DL (ref 0.76–1.46)
TRIGL SERPL-MCNC: 73 MG/DL
TSH SERPL DL<=0.005 MIU/L-ACNC: 0.37 MU/L (ref 0.4–4)
WBC # BLD AUTO: 4.9 10E3/UL (ref 4–11)

## 2022-05-19 PROCEDURE — 80053 COMPREHEN METABOLIC PANEL: CPT

## 2022-05-19 PROCEDURE — 84439 ASSAY OF FREE THYROXINE: CPT

## 2022-05-19 PROCEDURE — 80061 LIPID PANEL: CPT

## 2022-05-19 PROCEDURE — 77080 DXA BONE DENSITY AXIAL: CPT | Performed by: RADIOLOGY

## 2022-05-19 PROCEDURE — 77067 SCR MAMMO BI INCL CAD: CPT | Mod: GC | Performed by: STUDENT IN AN ORGANIZED HEALTH CARE EDUCATION/TRAINING PROGRAM

## 2022-05-19 PROCEDURE — 36415 COLL VENOUS BLD VENIPUNCTURE: CPT

## 2022-05-19 PROCEDURE — 84443 ASSAY THYROID STIM HORMONE: CPT

## 2022-05-19 PROCEDURE — 85027 COMPLETE CBC AUTOMATED: CPT

## 2022-05-19 NOTE — RESULT ENCOUNTER NOTE
Kasia Francisey,    Attached are your test results.  -Normal red blood cell (hgb) levels, normal white blood cell count and normal platelet levels.  -Cholesterol levels (LDL,HDL, Triglycerides) are normal.  ADVISE: rechecking in 1 year.   -Liver and gallbladder tests (ALT,AST, Alk phos,bilirubin) are normal.  -Kidney function (GFR) is normal.  -Sodium is normal.  -Potassium is normal.  -Calcium is normal.  -Glucose is slight elevated and may be a sign of early diabetes (prediabetes). ADVISE:: eating a low carbohydrate diet, exercising, trying to lose weight (if necessary) and rechecking your glucose level in 12 months.  Thyroid test is abnormal would recommend recheck in 1 month lab only appointment    Please contact us if you have any questions.    Ernestina De Leon, CNP

## 2022-05-19 NOTE — RESULT ENCOUNTER NOTE
Kasia Melvin,    Attached are your test results.  The bone density test shows osteopenia. This is an intermediate category that is in between normal and osteoporosis.  People with osteopenia should work on taking in 1629-1524 mg of calcium with vitamin D daily. They should also be getting daily weight bearing exercise (walking works)     Please contact us if you have any questions.    Ernestina De Leon, CNP

## 2022-05-23 LAB
HUMAN PAPILLOMA VIRUS 16 DNA: NEGATIVE
HUMAN PAPILLOMA VIRUS 18 DNA: NEGATIVE
HUMAN PAPILLOMA VIRUS FINAL DIAGNOSIS: NORMAL
HUMAN PAPILLOMA VIRUS OTHER HR: NEGATIVE

## 2022-06-08 ENCOUNTER — TELEPHONE (OUTPATIENT)
Dept: FAMILY MEDICINE | Facility: CLINIC | Age: 62
End: 2022-06-08
Payer: COMMERCIAL

## 2022-06-08 DIAGNOSIS — J30.2 SEASONAL ALLERGIC RHINITIS, UNSPECIFIED TRIGGER: Primary | ICD-10-CM

## 2022-06-08 RX ORDER — AZELASTINE 1 MG/ML
1 SPRAY, METERED NASAL 2 TIMES DAILY
Qty: 30 ML | Refills: 3 | Status: SHIPPED | OUTPATIENT
Start: 2022-06-08 | End: 2023-01-01

## 2022-06-08 NOTE — TELEPHONE ENCOUNTER
Provider:  Are you willing to send a new nasal spray for her allergies or would you like her to do some sort of visit?  Thank you. Niki Christy R.N.    Patient reports that she has bad allergie this time a year, every year.  She is looking for nasal spray for her symptoms.  The Flonase works for only a short period of time. She forgot to address this at her recent visit on 5/16/2022. Her current symptoms are runny nose, sneezing, sinus headache. Last tested for COVID 2 weeks ago - it was negative.     Ok to leave a message with the provider's response.

## 2022-06-12 ASSESSMENT — PATIENT HEALTH QUESTIONNAIRE - PHQ9
10. IF YOU CHECKED OFF ANY PROBLEMS, HOW DIFFICULT HAVE THESE PROBLEMS MADE IT FOR YOU TO DO YOUR WORK, TAKE CARE OF THINGS AT HOME, OR GET ALONG WITH OTHER PEOPLE: SOMEWHAT DIFFICULT
SUM OF ALL RESPONSES TO PHQ QUESTIONS 1-9: 9
SUM OF ALL RESPONSES TO PHQ QUESTIONS 1-9: 9

## 2022-06-13 ENCOUNTER — TELEPHONE (OUTPATIENT)
Dept: FAMILY MEDICINE | Facility: CLINIC | Age: 62
End: 2022-06-13

## 2022-06-13 ENCOUNTER — VIRTUAL VISIT (OUTPATIENT)
Dept: FAMILY MEDICINE | Facility: CLINIC | Age: 62
End: 2022-06-13
Payer: COMMERCIAL

## 2022-06-13 DIAGNOSIS — M79.675 PAIN OF TOE OF LEFT FOOT: Primary | ICD-10-CM

## 2022-06-13 PROCEDURE — 99213 OFFICE O/P EST LOW 20 MIN: CPT | Mod: 95 | Performed by: NURSE PRACTITIONER

## 2022-06-13 ASSESSMENT — PATIENT HEALTH QUESTIONNAIRE - PHQ9
10. IF YOU CHECKED OFF ANY PROBLEMS, HOW DIFFICULT HAVE THESE PROBLEMS MADE IT FOR YOU TO DO YOUR WORK, TAKE CARE OF THINGS AT HOME, OR GET ALONG WITH OTHER PEOPLE: SOMEWHAT DIFFICULT
SUM OF ALL RESPONSES TO PHQ QUESTIONS 1-9: 9

## 2022-06-13 NOTE — PATIENT INSTRUCTIONS
PLAN:   1.   Symptomatic therapy suggested: Continue current medications as prescribed.     2.  Orders Placed This Encounter   Procedures    Orthopedic  Referral     3. Patient needs to follow up in if no improvement,or sooner if worsening of symptoms or other symptoms develop.  CONSULTATION/REFERRAL to podiatry   Will follow up and/or notify patient of  results via My Chart to determine further need for followup

## 2022-06-13 NOTE — PROGRESS NOTES
Estrella is a 61 year old who is being evaluated via a billable telephone visit.      What phone number would you like to be contacted at? [phone]   How would you like to obtain your AVS? Gisela De La Rosa is a 61 year old who presents for the following health issues     History of Present Illness       Symptom onset:  More than a month  Symptoms include:  Finky  toes  pain  Symptom intensity:  Severe  Symptom progression:  Staying the same  Had these symptoms before:  No  What makes it worse:  Wear shoes  What makes it better:  Ice pack  massage    She eats 2-3 servings of fruits and vegetables daily.She consumes 2 sweetened beverage(s) daily.She exercises with enough effort to increase her heart rate 30 to 60 minutes per day.  She exercises with enough effort to increase her heart rate 4 days per week.   She is taking medications regularly.    Today's PHQ-9         PHQ-9 Total Score: 9    PHQ-9 Q9 Thoughts of better off dead/self-harm past 2 weeks :   Not at all    How difficult have these problems made it for you to do your work, take care of things at home, or get along with other people: Somewhat difficult      pain in pinky toe on left foot   Comes and goes and constantly and has something on it     Labs reviewed in EPIC  BP Readings from Last 3 Encounters:   05/16/22 125/81   11/16/20 122/73   08/21/20 136/80    Wt Readings from Last 3 Encounters:   05/16/22 53.3 kg (117 lb 8 oz)   11/16/20 55 kg (121 lb 3.2 oz)   08/21/20 (!) 4.536 kg (10 lb)                  Patient Active Problem List   Diagnosis     DDD (degenerative disc disease), cervical     Neck pain on left side     CARDIOVASCULAR SCREENING; LDL GOAL LESS THAN 160     Adjustment disorder with mixed anxiety and depressed mood     Family history of thyroid disease     Intertrigo     Tendinopathy of rotator cuff, right     Chronic pain of both shoulders     Internal hemorrhoid, bleeding     Chronic right shoulder pain     Sleeping difficulty      Tendonitis of ankle or foot     Left foot pain     Positive TB test     Past Surgical History:   Procedure Laterality Date     COLONOSCOPY N/A 8/21/2019    Procedure: Colonoscopy, With Polypectomy And Biopsy;  Surgeon: Duane, William Charles, MD;  Location: [facility] OR     COLONOSCOPY WITH CO2 INSUFFLATION N/A 8/21/2019    Procedure: COLONOSCOPY, WITH CO2 INSUFFLATION;  Surgeon: Duane, William Charles, MD;  Location: [facility] OR       Social History     Tobacco Use     Smoking status: Never Smoker     Smokeless tobacco: Never Used   Substance Use Topics     Alcohol use: No     Family History   Problem Relation Age of Onset     Colon Cancer Mother      Lung Cancer Father      Lung Cancer Brother      Thyroid Disease Sister          Current Outpatient Medications   Medication Sig Dispense Refill     azelastine (ASTELIN) 0.1 % nasal spray Spray 1 spray into both nostrils 2 times daily 30 mL 3     clotrimazole-betamethasone (LOTRISONE) 1-0.05 % external cream APPLY TO AFFECTED AREA(S) TOPICALLY DAILY AS NEEDED 15 g 0     diclofenac (VOLTAREN) 1 % topical gel Apply 2 g topically 4 times daily 100 g 3     Misc Natural Products (OSTEO BI-FLEX JOINT SHIELD PO) Take 1 tablet by mouth daily       Multiple Vitamins-Minerals (CENTRUM SILVER) per tablet Take 1 tablet by mouth daily       VITAMIN D, CHOLECALCIFEROL, PO Take 5,000 Units by mouth daily       No Known Allergies      Review of Systems         Objective    Vitals - Patient Reported  Pain Score: Severe Pain (6)  Pain Loc: Foot      Vitals:  No vitals were obtained today due to virtual visit.    Physical Exam   alert, active and no distress  PSYCH: Alert and oriented times 3; coherent speech, normal   rate and volume, able to articulate logical thoughts, able   to abstract reason, no tangential thoughts, no hallucinations   or delusions  Her affect is normal and pleasant  RESP: No cough, no audible wheezing, able to talk in full sentences  Remainder of exam unable to be  completed due to telephone visits    Diagnostic Test Results:   Diagnostic Test Results:  Labs reviewed in Epic  none       Assessment & Plan     Pain of toe of left foot  Referral placed   - Orthopedic  Referral     Time spent doing chart review, history and exam, documentation and further activities per the note       See Patient Instructions  Patient Instructions     PLAN:   1.   Symptomatic therapy suggested: Continue current medications as prescribed.     2.  Orders Placed This Encounter   Procedures     Orthopedic  Referral     3. Patient needs to follow up in if no improvement,or sooner if worsening of symptoms or other symptoms develop.  CONSULTATION/REFERRAL to podiatry   Will follow up and/or notify patient of  results via My Chart to determine further need for followup      Return in about 1 month (around 7/13/2022), or if symptoms worsen or fail to improve, for Referall.    VINCENZO Whipple [facility]          Phone call duration: 15 minutes

## 2022-06-14 ENCOUNTER — APPOINTMENT (OUTPATIENT)
Dept: URGENT CARE | Facility: CLINIC | Age: 62
End: 2022-06-14
Payer: COMMERCIAL

## 2022-06-15 ENCOUNTER — LAB (OUTPATIENT)
Dept: LAB | Facility: CLINIC | Age: 62
End: 2022-06-15
Payer: COMMERCIAL

## 2022-06-15 DIAGNOSIS — R79.89 DECREASED THYROID STIMULATING HORMONE (TSH) LEVEL: ICD-10-CM

## 2022-06-15 LAB
T4 FREE SERPL-MCNC: 1.1 NG/DL (ref 0.76–1.46)
TSH SERPL DL<=0.005 MIU/L-ACNC: 0.44 MU/L (ref 0.4–4)

## 2022-06-15 PROCEDURE — 84439 ASSAY OF FREE THYROXINE: CPT

## 2022-06-15 PROCEDURE — 99000 SPECIMEN HANDLING OFFICE-LAB: CPT

## 2022-06-15 PROCEDURE — 84443 ASSAY THYROID STIM HORMONE: CPT

## 2022-06-15 PROCEDURE — 36415 COLL VENOUS BLD VENIPUNCTURE: CPT

## 2022-06-15 PROCEDURE — 84445 ASSAY OF TSI GLOBULIN: CPT | Mod: 90

## 2022-06-16 NOTE — RESULT ENCOUNTER NOTE
Kasia Melvin,    Attached are your test results.  -TSH (thyroid stimulating hormone) level is normal which indicates normal thyroid function.   Please contact us if you have any questions.    Ernestina De Leon, CNP

## 2022-06-17 LAB — TSI SER-ACNC: <1 TSI INDEX

## 2022-06-20 NOTE — RESULT ENCOUNTER NOTE
Kasia Melvin,    Attached are your test results.  Thyroid antibody is normal    Please contact us if you have any questions.    Ernestina De Leon, CNP

## 2022-06-24 ENCOUNTER — VIRTUAL VISIT (OUTPATIENT)
Dept: FAMILY MEDICINE | Facility: CLINIC | Age: 62
End: 2022-06-24
Payer: COMMERCIAL

## 2022-06-24 DIAGNOSIS — Z20.822 SUSPECTED 2019 NOVEL CORONAVIRUS INFECTION: ICD-10-CM

## 2022-06-24 DIAGNOSIS — J02.9 SORE THROAT: Primary | ICD-10-CM

## 2022-06-24 PROCEDURE — 99213 OFFICE O/P EST LOW 20 MIN: CPT | Mod: CS | Performed by: NURSE PRACTITIONER

## 2022-06-24 NOTE — PATIENT INSTRUCTIONS
PLAN:   1.   Symptomatic therapy suggested: OTC Chloraseptic spray, Cepacol lozenges and call prn if symptoms persist or worsen.  2.  Orders Placed This Encounter   Procedures    Symptomatic; Unknown COVID-19 Virus (Coronavirus) by PCR Nose     3. Patient needs to follow up in if no improvement,or sooner if worsening of symptoms or other symptoms develop.  Call [facility] COVID-19 scheduling team at [phone] for appointment.

## 2022-06-24 NOTE — PROGRESS NOTES
Estrella is a 61 year old who is being evaluated via a billable telephone visit.      What phone number would you like to be contacted at? [phone]    How would you like to obtain your AVS? Gisela De La Rosa is a 61 year old, presenting for the following health issues:  Called Karina JIANG     Acute Illness  Acute illness concerns: Tired and sore throat since   Onset/Duration: 3 days ago   Symptoms:  Fever: no  Chills/Sweats: no  Headache (location?): no  Sinus Pressure: YES- always have pressure   Conjunctivitis:  no  Ear Pain: no  Rhinorrhea: no  Congestion: no  Sore Throat: YES  Cough: YES  Wheeze: no  Decreased Appetite: no  Nausea: no  Vomiting: no  Diarrhea: no  Dysuria/Freq.: no  Dysuria or Hematuria: no  Fatigue/Achiness: YES  Sick/Strep Exposure: no  Therapies tried and outcome: None    Labs reviewed in EPIC  BP Readings from Last 3 Encounters:   05/16/22 125/81   11/16/20 122/73   08/21/20 136/80    Wt Readings from Last 3 Encounters:   05/16/22 53.3 kg (117 lb 8 oz)   11/16/20 55 kg (121 lb 3.2 oz)   08/21/20 (!) 4.536 kg (10 lb)                  Patient Active Problem List   Diagnosis     DDD (degenerative disc disease), cervical     Neck pain on left side     CARDIOVASCULAR SCREENING; LDL GOAL LESS THAN 160     Adjustment disorder with mixed anxiety and depressed mood     Family history of thyroid disease     Intertrigo     Tendinopathy of rotator cuff, right     Chronic pain of both shoulders     Internal hemorrhoid, bleeding     Chronic right shoulder pain     Sleeping difficulty     Tendonitis of ankle or foot     Left foot pain     Positive TB test     Past Surgical History:   Procedure Laterality Date     COLONOSCOPY N/A 8/21/2019    Procedure: Colonoscopy, With Polypectomy And Biopsy;  Surgeon: Duane, William Charles, MD;  Location: [facility] OR     COLONOSCOPY WITH CO2 INSUFFLATION N/A 8/21/2019    Procedure: COLONOSCOPY, WITH CO2 INSUFFLATION;  Surgeon: Duane, William Charles,  MD;  Location:  OR       Social History     Tobacco Use     Smoking status: Never Smoker     Smokeless tobacco: Never Used   Substance Use Topics     Alcohol use: No     Family History   Problem Relation Age of Onset     Colon Cancer Mother      Lung Cancer Father      Lung Cancer Brother      Thyroid Disease Sister          Current Outpatient Medications   Medication Sig Dispense Refill     azelastine (ASTELIN) 0.1 % nasal spray Spray 1 spray into both nostrils 2 times daily 30 mL 3     clotrimazole-betamethasone (LOTRISONE) 1-0.05 % external cream APPLY TO AFFECTED AREA(S) TOPICALLY DAILY AS NEEDED 15 g 0     diclofenac (VOLTAREN) 1 % topical gel Apply 2 g topically 4 times daily 100 g 3     Misc Natural Products (OSTEO BI-FLEX JOINT SHIELD PO) Take 1 tablet by mouth daily       Multiple Vitamins-Minerals (CENTRUM SILVER) per tablet Take 1 tablet by mouth daily       VITAMIN D, CHOLECALCIFEROL, PO Take 5,000 Units by mouth daily       No Known Allergies      Review of Systems   Constitutional, HEENT, cardiovascular, pulmonary, gi and gu systems are negative, except as otherwise noted.      Objective           Vitals:  No vitals were obtained today due to virtual visit.    Physical Exam   alert, active and no distress  PSYCH: Alert and oriented times 3; coherent speech, normal   rate and volume, able to articulate logical thoughts, able   to abstract reason, no tangential thoughts, no hallucinations   or delusions  Her affect is normal and pleasant  RESP: No cough, no audible wheezing, able to talk in full sentences  Remainder of exam unable to be completed due to telephone visits    Recent Results (from the past 168 hour(s))   Streptococcus A Rapid Scr w Reflx to PCR - Lab Collect    Collection Time: 06/25/22  9:14 AM    Specimen: Throat; Swab   Result Value Ref Range    Group A Strep antigen Negative Negative   Symptomatic; Unknown COVID-19 Virus (Coronavirus) by PCR Nose    Collection Time: 06/25/22  9:14 AM     Specimen: Nose; Swab   Result Value Ref Range    SARS CoV2 PCR Negative Negative   Group A Streptococcus PCR Throat Swab    Collection Time: 06/25/22  9:14 AM    Specimen: Throat; Swab   Result Value Ref Range    Group A strep by PCR Not Detected Not Detected         Assessment & Plan     Sore throat  I think this is primarily related to a viral illness given the various associated symptoms.  Went over the treatment of viral illnesses, which is primarily supportive.    Recheck if not improving as expected    - Streptococcus A Rapid Scr w Reflx to PCR - Lab Collect  - Symptomatic; Unknown COVID-19 Virus (Coronavirus) by PCR Nose  Will follow up and/or notify patient of  results via My Chart to determine further need for followup      Suspected 2019 novel coronavirus infection  Will follow up and/or notify patient of  results via My Chart to determine further need for followup  - Symptomatic; Unknown COVID-19 Virus (Coronavirus) by PCR Nose      Ordering of each unique test  Prescription drug management   Time spent doing chart review, history and exam, documentation and further activities per the note       See Patient Instructions  Patient Instructions     PLAN:   1.   Symptomatic therapy suggested: OTC Chloraseptic spray, Cepacol lozenges and call prn if symptoms persist or worsen.  2.  Orders Placed This Encounter   Procedures     Symptomatic; Unknown COVID-19 Virus (Coronavirus) by PCR Nose     3. Patient needs to follow up in if no improvement,or sooner if worsening of symptoms or other symptoms develop.  Call [facility] COVID-19 scheduling team at [phone] for appointment.        Return in about 1 week (around 7/1/2022), or if symptoms worsen or fail to improve.    VINCENZO Whipple CNP  [facility]          Phone call duration: 10 minutes    .  ..

## 2022-06-25 ENCOUNTER — LAB (OUTPATIENT)
Dept: URGENT CARE | Facility: URGENT CARE | Age: 62
End: 2022-06-25
Attending: NURSE PRACTITIONER
Payer: COMMERCIAL

## 2022-06-25 DIAGNOSIS — Z20.822 SUSPECTED 2019 NOVEL CORONAVIRUS INFECTION: ICD-10-CM

## 2022-06-25 DIAGNOSIS — J02.9 SORE THROAT: ICD-10-CM

## 2022-06-25 LAB
DEPRECATED S PYO AG THROAT QL EIA: NEGATIVE
GROUP A STREP BY PCR: NOT DETECTED
SARS-COV-2 RNA RESP QL NAA+PROBE: NEGATIVE

## 2022-06-25 PROCEDURE — 87651 STREP A DNA AMP PROBE: CPT

## 2022-06-25 PROCEDURE — U0005 INFEC AGEN DETEC AMPLI PROBE: HCPCS

## 2022-06-25 PROCEDURE — U0003 INFECTIOUS AGENT DETECTION BY NUCLEIC ACID (DNA OR RNA); SEVERE ACUTE RESPIRATORY SYNDROME CORONAVIRUS 2 (SARS-COV-2) (CORONAVIRUS DISEASE [COVID-19]), AMPLIFIED PROBE TECHNIQUE, MAKING USE OF HIGH THROUGHPUT TECHNOLOGIES AS DESCRIBED BY CMS-2020-01-R: HCPCS

## 2022-06-26 NOTE — RESULT ENCOUNTER NOTE
Kasia Melvin,    Attached are your test results.  Covid swab is negative   Strep test is negative    Please contact us if you have any questions.    Ernestina De Leon, CNP

## 2022-07-05 NOTE — TELEPHONE ENCOUNTER
DIAGNOSIS: pain of toe of L foot   APPOINTMENT DATE: 07/13/2022   NOTES STATUS DETAILS   OFFICE NOTE from referring provider Internal 06/13/2022 Ernestina De Leon [facility] virtual visit   OFFICE NOTE from other specialist N/A    DISCHARGE SUMMARY from hospital N/A    DISCHARGE REPORT from the ER N/A    OPERATIVE REPORT N/A    EMG report N/A    MEDICATION LIST N/A    MRI N/A    DEXA (osteoporosis/bone health) N/A    CT SCAN N/A    XRAYS (IMAGES & REPORTS) N/A

## 2022-07-08 ENCOUNTER — VIRTUAL VISIT (OUTPATIENT)
Dept: FAMILY MEDICINE | Facility: CLINIC | Age: 62
End: 2022-07-08
Payer: COMMERCIAL

## 2022-07-08 DIAGNOSIS — L65.9 HAIR LOSS: ICD-10-CM

## 2022-07-08 DIAGNOSIS — J20.9 ACUTE BRONCHITIS WITH SYMPTOMS > 10 DAYS: Primary | ICD-10-CM

## 2022-07-08 PROCEDURE — 99213 OFFICE O/P EST LOW 20 MIN: CPT | Mod: 95 | Performed by: NURSE PRACTITIONER

## 2022-07-08 RX ORDER — CODEINE PHOSPHATE AND GUAIFENESIN 10; 100 MG/5ML; MG/5ML
1-2 SOLUTION ORAL EVERY 6 HOURS PRN
Qty: 120 ML | Refills: 0 | Status: SHIPPED | OUTPATIENT
Start: 2022-07-08 | End: 2023-01-01

## 2022-07-08 RX ORDER — DOXYCYCLINE HYCLATE 100 MG
100 TABLET ORAL 2 TIMES DAILY
Qty: 20 TABLET | Refills: 0 | Status: SHIPPED | OUTPATIENT
Start: 2022-07-08 | End: 2022-07-18

## 2022-07-08 NOTE — PROGRESS NOTES
Estrella is a 61 year old who is being evaluated via a billable telephone visit.      What phone number would you like to be contacted at? [phone]  How would you like to obtain your AVS? Gisela      Harman De La Rosa is a 61 year old, presenting for the following health issues:  Cough      History of Present Illness       Reason for visit:  Cough  Symptom onset:  1-2 weeks ago  Symptoms include:  Cough, dry  Symptom intensity:  Severe  Symptom progression:  Staying the same  Had these symptoms before:  Yes  Has tried/received treatment for these symptoms:  Yes  Previous treatment was successful:  Yes  Prior treatment description:  Cough syrup  What makes it worse:  Feeling cold  What makes it better:  Warm drinks    She eats 2-3 servings of fruits and vegetables daily.She consumes 2 sweetened beverage(s) daily.She exercises with enough effort to increase her heart rate 30 to 60 minutes per day.  She exercises with enough effort to increase her heart rate 4 days per week.   She is taking medications regularly.     Cough Adult  Onset of symptoms was 2 week(s) ago.  Course of illness is worsening.    Severity moderate  Current and Associated symptoms: cough - productive and shortness of breath  Treatment measures tried include Tylenol/Ibuprofen.  Predisposing factors include recent illness viral illness .    Labs reviewed in EPIC  BP Readings from Last 3 Encounters:   05/16/22 125/81   11/16/20 122/73   08/21/20 136/80    Wt Readings from Last 3 Encounters:   05/16/22 53.3 kg (117 lb 8 oz)   11/16/20 55 kg (121 lb 3.2 oz)   08/21/20 (!) 4.536 kg (10 lb)                  Patient Active Problem List   Diagnosis     DDD (degenerative disc disease), cervical     Neck pain on left side     CARDIOVASCULAR SCREENING; LDL GOAL LESS THAN 160     Adjustment disorder with mixed anxiety and depressed mood     Family history of thyroid disease     Intertrigo     Tendinopathy of rotator cuff, right     Chronic pain of both shoulders      Internal hemorrhoid, bleeding     Chronic right shoulder pain     Sleeping difficulty     Tendonitis of ankle or foot     Left foot pain     Positive TB test     Past Surgical History:   Procedure Laterality Date     COLONOSCOPY N/A 8/21/2019    Procedure: Colonoscopy, With Polypectomy And Biopsy;  Surgeon: Duane, William Charles, MD;  Location: [facility] OR     COLONOSCOPY WITH CO2 INSUFFLATION N/A 8/21/2019    Procedure: COLONOSCOPY, WITH CO2 INSUFFLATION;  Surgeon: Duane, William Charles, MD;  Location: [facility] OR       Social History     Tobacco Use     Smoking status: Never Smoker     Smokeless tobacco: Never Used   Substance Use Topics     Alcohol use: No     Family History   Problem Relation Age of Onset     Colon Cancer Mother      Lung Cancer Father      Lung Cancer Brother      Thyroid Disease Sister          Current Outpatient Medications   Medication Sig Dispense Refill     azelastine (ASTELIN) 0.1 % nasal spray Spray 1 spray into both nostrils 2 times daily 30 mL 3     clotrimazole-betamethasone (LOTRISONE) 1-0.05 % external cream APPLY TO AFFECTED AREA(S) TOPICALLY DAILY AS NEEDED 15 g 0     diclofenac (VOLTAREN) 1 % topical gel Apply 2 g topically 4 times daily 100 g 3     Misc Natural Products (OSTEO BI-FLEX JOINT SHIELD PO) Take 1 tablet by mouth daily       Multiple Vitamins-Minerals (CENTRUM SILVER) per tablet Take 1 tablet by mouth daily       VITAMIN D, CHOLECALCIFEROL, PO Take 5,000 Units by mouth daily       No Known Allergies    Review of Systems   Constitutional, HEENT, cardiovascular, pulmonary, GI, , musculoskeletal, neuro, skin, endocrine and psych systems are negative, except as otherwise noted.      Objective           Vitals:  No vitals were obtained today due to virtual visit.    Physical Exam   alert, active and no distress  PSYCH: Alert and oriented times 3; coherent speech, normal   rate and volume, able to articulate logical thoughts, able   to abstract reason, no tangential thoughts,  no hallucinations   or delusions  Her affect is normal and pleasant  RESP: No cough, no audible wheezing, able to talk in full sentences  Remainder of exam unable to be completed due to telephone visits    Diagnostic Test Results:   Diagnostic Test Results:  Labs reviewed in Epic  Pending orders and results ;    Assessment & Plan     Acute bronchitis with symptoms > 10 days  I discussed the pathophysiology of bronchitis and likely viral etiology.  I reviewed the risks and benefits of various over the counter and prescription medications.  Additionally, we reviewed the infectious nature of such infections and techniques to minimize transmission and future infections.  Will Start:  - doxycycline hyclate (VIBRA-TABS) 100 MG tablet  Dispense: 20 tablet; Refill: 0  - guaiFENesin-codeine (ROBITUSSIN AC) 100-10 MG/5ML solution  Dispense: 120 mL; Refill: 0    Hair loss  Will follow up and/or notify patient of  results via My Chart to determine further need for followup  - Zinc      Ordering of each unique test  Prescription drug management   Time spent doing chart review, history and exam, documentation and further activities per the note       See Patient Instructions  Patient Instructions     PLAN:   1.   Symptomatic therapy suggested: rest, increase fluids, OTC Robitussin DM, and call prn if symptoms persist or worsen.  2.  Orders Placed This Encounter   Medications     doxycycline hyclate (VIBRA-TABS) 100 MG tablet     Sig: Take 1 tablet (100 mg) by mouth 2 times daily for 10 days     Dispense:  20 tablet     Refill:  0     3. Patient needs to follow up in if no improvement,or sooner if worsening of symptoms or other symptoms develop.        Return in about 1 week (around 7/15/2022), or if symptoms worsen or fail to improve.    VINCENZO Whipple [facility]          Phone call duration: 20 minutes    .  ..

## 2022-07-08 NOTE — PATIENT INSTRUCTIONS
PLAN:   1.   Symptomatic therapy suggested: rest, increase fluids, OTC Robitussin DM, and call prn if symptoms persist or worsen.  2.  Orders Placed This Encounter   Medications    doxycycline hyclate (VIBRA-TABS) 100 MG tablet     Sig: Take 1 tablet (100 mg) by mouth 2 times daily for 10 days     Dispense:  20 tablet     Refill:  0     3. Patient needs to follow up in if no improvement,or sooner if worsening of symptoms or other symptoms develop.

## 2022-07-13 ENCOUNTER — PRE VISIT (OUTPATIENT)
Dept: ORTHOPEDICS | Facility: CLINIC | Age: 62
End: 2022-07-13

## 2022-07-13 ENCOUNTER — OFFICE VISIT (OUTPATIENT)
Dept: PODIATRY | Facility: CLINIC | Age: 62
End: 2022-07-13
Attending: NURSE PRACTITIONER
Payer: COMMERCIAL

## 2022-07-13 VITALS — WEIGHT: 117 LBS | OXYGEN SATURATION: 98 % | HEART RATE: 76 BPM | BODY MASS INDEX: 20.99 KG/M2

## 2022-07-13 DIAGNOSIS — L84 CALLUS: ICD-10-CM

## 2022-07-13 DIAGNOSIS — M20.62 DEFORMITY OF TOE OF LEFT FOOT: Primary | ICD-10-CM

## 2022-07-13 PROCEDURE — 99203 OFFICE O/P NEW LOW 30 MIN: CPT | Performed by: PODIATRIST

## 2022-07-13 NOTE — PROGRESS NOTES
Subjective:    Pt is seen today in consult from Ernestina De Leon with the c/c of painful fifth toe.  Points to distal lateral toe.  Has had this for a few months.  Pain aggravated by activity and relieved by rest.  Describes as burning pain.  Denies new activities.  Denies erythema blistering or drainage.  Denies increased deformity.    ROS:  See above.    No Known Allergies    Current Outpatient Medications   Medication Sig Dispense Refill     azelastine (ASTELIN) 0.1 % nasal spray Spray 1 spray into both nostrils 2 times daily 30 mL 3     clotrimazole-betamethasone (LOTRISONE) 1-0.05 % external cream APPLY TO AFFECTED AREA(S) TOPICALLY DAILY AS NEEDED 15 g 0     diclofenac (VOLTAREN) 1 % topical gel Apply 2 g topically 4 times daily 100 g 3     doxycycline hyclate (VIBRA-TABS) 100 MG tablet Take 1 tablet (100 mg) by mouth 2 times daily for 10 days 20 tablet 0     guaiFENesin-codeine (ROBITUSSIN AC) 100-10 MG/5ML solution Take 5-10 mLs by mouth every 6 hours as needed for cough 120 mL 0     Misc Natural Products (OSTEO BI-FLEX JOINT SHIELD PO) Take 1 tablet by mouth daily       Multiple Vitamins-Minerals (CENTRUM SILVER) per tablet Take 1 tablet by mouth daily       VITAMIN D, CHOLECALCIFEROL, PO Take 5,000 Units by mouth daily         Patient Active Problem List   Diagnosis     DDD (degenerative disc disease), cervical     Neck pain on left side     CARDIOVASCULAR SCREENING; LDL GOAL LESS THAN 160     Adjustment disorder with mixed anxiety and depressed mood     Family history of thyroid disease     Intertrigo     Tendinopathy of rotator cuff, right     Chronic pain of both shoulders     Internal hemorrhoid, bleeding     Chronic right shoulder pain     Sleeping difficulty     Tendonitis of ankle or foot     Left foot pain     Positive TB test       Past Medical History:   Diagnosis Date     Arthritis      Depressive disorder      Positive TB test        Past Surgical History:   Procedure Laterality Date      COLONOSCOPY N/A 8/21/2019    Procedure: Colonoscopy, With Polypectomy And Biopsy;  Surgeon: Duane, William Charles, MD;  Location: [facility] OR     COLONOSCOPY WITH CO2 INSUFFLATION N/A 8/21/2019    Procedure: COLONOSCOPY, WITH CO2 INSUFFLATION;  Surgeon: Duane, William Charles, MD;  Location: [facility] OR       Family History   Problem Relation Age of Onset     Colon Cancer Mother      Lung Cancer Father      Lung Cancer Brother      Thyroid Disease Sister        Social History     Tobacco Use     Smoking status: Never Smoker     Smokeless tobacco: Never Used   Substance Use Topics     Alcohol use: No         Objective:  Pulse 76   Wt 53.1 kg (117 lb)   SpO2 98%   BMI 20.99 kg/m  .      Constitutional/ general:  Pt is in no apparent distress, appears well-nourished.  Cooperative with history and physical exam.     Psych:  The patient answered questions appropriately.  Normal affect.  Seems to have reasonable expectations, in terms of treatment.     Lungs:  Non labored breathing, non labored speech. No cough.  No audible wheezing. Even, quiet breathing.       Vascular:  Pedal pulses are palpable bilaterally for both the DP and PT arteries.  CFT < 3 sec.  No edema.  Pedal hair growth noted.     Neuro:  Alert and oriented x 3. Coordinated gait.  Light touch sensation is intact     Derm: Normal texture and turgor.  No erythema, ecchymosis, or cyanosis.  No open lesions.     Musculoskeletal:    Lower extremity muscle strength is normal.  Patient is ambulatory without an assistive device or brace  Normal arch with weightbearing.  Left fifth toe varus rotation.  Diffuse shearing hyperkeratotic lesion distal lateral.    A/P   Left fifth toe deformity   Left fifth toe callus    Callus/calluses debrided with a fifteen blade.   Discussed how deformity causing increased pressure here.  We will keep this down with a pumice stone.  Discussed stiff shoes at all times both inside and outside to offload this and I made suggestions.  We gave  her a crest pad to keep offloaded.  If still bothersome surgery next step.  RTC prn.   Thank you for allowing me participate in the care of this patient.        Ramiro Camilo DPM, FACFAS

## 2022-07-13 NOTE — LETTER
7/13/2022         RE: Karina Melvin  [address] [place] [address]        Dear Colleague,    Thank you for referring your patient, Karina Melvin, to the [facility]. Please see a copy of my visit note below.    Subjective:    Pt is seen today in consult from Ernestina De Leon with the c/c of painful fifth toe.  Points to distal lateral toe.  Has had this for a few months.  Pain aggravated by activity and relieved by rest.  Describes as burning pain.  Denies new activities.  Denies erythema blistering or drainage.  Denies increased deformity.    ROS:  See above.    No Known Allergies    Current Outpatient Medications   Medication Sig Dispense Refill     azelastine (ASTELIN) 0.1 % nasal spray Spray 1 spray into both nostrils 2 times daily 30 mL 3     clotrimazole-betamethasone (LOTRISONE) 1-0.05 % external cream APPLY TO AFFECTED AREA(S) TOPICALLY DAILY AS NEEDED 15 g 0     diclofenac (VOLTAREN) 1 % topical gel Apply 2 g topically 4 times daily 100 g 3     doxycycline hyclate (VIBRA-TABS) 100 MG tablet Take 1 tablet (100 mg) by mouth 2 times daily for 10 days 20 tablet 0     guaiFENesin-codeine (ROBITUSSIN AC) 100-10 MG/5ML solution Take 5-10 mLs by mouth every 6 hours as needed for cough 120 mL 0     Misc Natural Products (OSTEO BI-FLEX JOINT SHIELD PO) Take 1 tablet by mouth daily       Multiple Vitamins-Minerals (CENTRUM SILVER) per tablet Take 1 tablet by mouth daily       VITAMIN D, CHOLECALCIFEROL, PO Take 5,000 Units by mouth daily         Patient Active Problem List   Diagnosis     DDD (degenerative disc disease), cervical     Neck pain on left side     CARDIOVASCULAR SCREENING; LDL GOAL LESS THAN 160     Adjustment disorder with mixed anxiety and depressed mood     Family history of thyroid disease     Intertrigo     Tendinopathy of rotator cuff, right     Chronic pain of both shoulders     Internal hemorrhoid, bleeding     Chronic right shoulder pain     Sleeping  difficulty     Tendonitis of ankle or foot     Left foot pain     Positive TB test       Past Medical History:   Diagnosis Date     Arthritis      Depressive disorder      Positive TB test        Past Surgical History:   Procedure Laterality Date     COLONOSCOPY N/A 8/21/2019    Procedure: Colonoscopy, With Polypectomy And Biopsy;  Surgeon: Duane, William Charles, MD;  Location: [facility] OR     COLONOSCOPY WITH CO2 INSUFFLATION N/A 8/21/2019    Procedure: COLONOSCOPY, WITH CO2 INSUFFLATION;  Surgeon: Duane, William Charles, MD;  Location: [facility] OR       Family History   Problem Relation Age of Onset     Colon Cancer Mother      Lung Cancer Father      Lung Cancer Brother      Thyroid Disease Sister        Social History     Tobacco Use     Smoking status: Never Smoker     Smokeless tobacco: Never Used   Substance Use Topics     Alcohol use: No         Objective:  Pulse 76   Wt 53.1 kg (117 lb)   SpO2 98%   BMI 20.99 kg/m  .      Constitutional/ general:  Pt is in no apparent distress, appears well-nourished.  Cooperative with history and physical exam.     Psych:  The patient answered questions appropriately.  Normal affect.  Seems to have reasonable expectations, in terms of treatment.     Lungs:  Non labored breathing, non labored speech. No cough.  No audible wheezing. Even, quiet breathing.       Vascular:  Pedal pulses are palpable bilaterally for both the DP and PT arteries.  CFT < 3 sec.  No edema.  Pedal hair growth noted.     Neuro:  Alert and oriented x 3. Coordinated gait.  Light touch sensation is intact     Derm: Normal texture and turgor.  No erythema, ecchymosis, or cyanosis.  No open lesions.     Musculoskeletal:    Lower extremity muscle strength is normal.  Patient is ambulatory without an assistive device or brace  Normal arch with weightbearing.  Left fifth toe varus rotation.  Diffuse shearing hyperkeratotic lesion distal lateral.    A/P   Left fifth toe deformity   Left fifth toe  callus    Callus/calluses debrided with a fifteen blade.   Discussed how deformity causing increased pressure here.  We will keep this down with a pumice stone.  Discussed stiff shoes at all times both inside and outside to offload this and I made suggestions.  We gave her a crest pad to keep offloaded.  If still bothersome surgery next step.  RTC prn.   Thank you for allowing me participate in the care of this patient.        Ramiro Camilo DPM, FACFAS            Again, thank you for allowing me to participate in the care of your patient.        Sincerely,        Ramiro Camilo DPM

## 2022-07-13 NOTE — PATIENT INSTRUCTIONS
We wish you continued good healing. If you have any questions or concerns, please do not hesitate to contact us at  [phone]    [email or web address] (secure e-mail communication and access to your chart) to send a message or to make an appointment.    Please remember to call and schedule a follow up appointment if one was recommended at your earliest convenience.     PODIATRY CLINIC HOURS  TELEPHONE NUMBER    Dr. Ramiro LARRYPHELGA [facility]        Clinics:  [place] Painting CMA   Tuesday 1PM-6PM  [place]  Wednesday 745AM-330PM  [place]  Thursday/Friday 745AM-230PM  [place] [facility] APPOINTMENTS  [phone]    [place] APPOINTMENTS  [phone]        If you need a medication refill, please contact us you may need lab work and/or a follow up visit prior to your refill (i.e. Antifungal medications).  If MRI needed please call Imaging at [phone] or [phone]  HOW DO I GET MY KNEE SCOOTER? Knee scooters can be picked up at ANY Medical Supply stores with your knee scooter Prescription.  OR  Bring your signed prescription to an [facility] Medical Equipment showroom.

## 2022-09-17 ENCOUNTER — HEALTH MAINTENANCE LETTER (OUTPATIENT)
Age: 62
End: 2022-09-17

## 2022-10-25 NOTE — PATIENT INSTRUCTIONS
PLAN:   1.   Symptomatic therapy suggested:   Will start on prilosec once a day to help with reflux and cough  Will start on Trazdone at bedtime to help with sleep.    2.  Orders Placed This Encounter   Medications    omeprazole (PRILOSEC OTC) 20 MG EC tablet     Sig: Take 1 tablet (20 mg) by mouth daily     Dispense:  30 tablet     Refill:  1    traZODone (DESYREL) 50 MG tablet     Sig: Take 1 tablet (50 mg) by mouth At Bedtime     Dispense:  30 tablet     Refill:  1     Orders Placed This Encounter   Procedures    T4 free    TSH    Thyroid peroxidase antibody    Thyroid stimulating immunoglobulin       3. Patient needs to follow up in if no improvement,or sooner if worsening of symptoms or other symptoms develop.  FUTURE LABS:       - Schedule a non-fasting blood draw   Will follow up and/or notify patient of  results via My Chart to determine further need for followup  Follow up in 1 month if not improving.

## 2022-10-25 NOTE — PROGRESS NOTES
Estrella is a 62 year old who is being evaluated via a billable video visit.      How would you like to obtain your AVS? MyChart  If the video visit is dropped, the invitation should be resent by: Text to cell phone: [phone]  Will anyone else be joining your video visit? No        Subjective   Estrella is a 62 year old, presenting for the following health issues:  Cough      HPI     -dry cough, mostly at night, on and off for a while now, using robitussin for this    -hair loss, ongoing issue, thinning, no issues with thyroid but does have family Hx of thyroid issues  Has noticed a couple of months     -lack of energy, daytime drowsiness  Has a problem with sleep disorder. When goes to bed will toss and turn for an hour  Will wake up about 2:30 and can not go back to sleep   Does not snore   Has been having an issue ever since had Covid in 2020 has had a sleep difficulty   Sister passed away in October and ever since is worse  Does says feels depressed but is manageable and not wanting counseling at this time        Acute Illness  Acute illness concerns: just at night a dry cough on and off   Will drink cough medicine   Onset/Duration: has been going on a month  Symptoms:  Fever: No  Chills/Sweats: No  Headache (location?): No  Sinus Pressure: No  Conjunctivitis:  No  Ear Pain: no  Rhinorrhea: No  Congestion: No  Sore Throat: No  Cough: YES-non-productive  Wheeze: No  Decreased Appetite: No  Nausea: No  Vomiting: No  Diarrhea: No  Dysuria/Freq.: No  Dysuria or Hematuria: No  Fatigue/Achiness: YES  Will eat some 10:30 pm and will go to bed an hour after   Sick/Strep Exposure: No  Therapies tried and outcome: None    Labs reviewed in EPIC  BP Readings from Last 3 Encounters:   05/16/22 125/81   11/16/20 122/73   08/21/20 136/80    Wt Readings from Last 3 Encounters:   07/13/22 53.1 kg (117 lb)   05/16/22 53.3 kg (117 lb 8 oz)   11/16/20 55 kg (121 lb 3.2 oz)                  Patient Active Problem List   Diagnosis     DDD  (degenerative disc disease), cervical     Neck pain on left side     CARDIOVASCULAR SCREENING; LDL GOAL LESS THAN 160     Adjustment disorder with mixed anxiety and depressed mood     Family history of thyroid disease     Intertrigo     Tendinopathy of rotator cuff, right     Chronic pain of both shoulders     Internal hemorrhoid, bleeding     Chronic right shoulder pain     Sleeping difficulty     Tendonitis of ankle or foot     Left foot pain     Positive TB test     Past Surgical History:   Procedure Laterality Date     COLONOSCOPY N/A 8/21/2019    Procedure: Colonoscopy, With Polypectomy And Biopsy;  Surgeon: Duane, William Charles, MD;  Location: [facility] OR     COLONOSCOPY WITH CO2 INSUFFLATION N/A 8/21/2019    Procedure: COLONOSCOPY, WITH CO2 INSUFFLATION;  Surgeon: Duane, William Charles, MD;  Location: [facility] OR       Social History     Tobacco Use     Smoking status: Never     Smokeless tobacco: Never   Substance Use Topics     Alcohol use: No     Family History   Problem Relation Age of Onset     Colon Cancer Mother      Lung Cancer Father      Lung Cancer Brother      Thyroid Disease Sister          Current Outpatient Medications   Medication Sig Dispense Refill     azelastine (ASTELIN) 0.1 % nasal spray Spray 1 spray into both nostrils 2 times daily 30 mL 3     clotrimazole-betamethasone (LOTRISONE) 1-0.05 % external cream APPLY TO AFFECTED AREA(S) TOPICALLY DAILY AS NEEDED 15 g 0     diclofenac (VOLTAREN) 1 % topical gel Apply 2 g topically 4 times daily 100 g 3     guaiFENesin-codeine (ROBITUSSIN AC) 100-10 MG/5ML solution Take 5-10 mLs by mouth every 6 hours as needed for cough 120 mL 0     Misc Natural Products (OSTEO BI-FLEX JOINT SHIELD PO) Take 1 tablet by mouth daily       Multiple Vitamins-Minerals (CENTRUM SILVER) per tablet Take 1 tablet by mouth daily       omeprazole (PRILOSEC OTC) 20 MG EC tablet Take 1 tablet (20 mg) by mouth daily 30 tablet 1     traZODone (DESYREL) 50 MG tablet Take 1 tablet (50  mg) by mouth At Bedtime 30 tablet 1     VITAMIN D, CHOLECALCIFEROL, PO Take 5,000 Units by mouth daily       No Known Allergies      Review of Systems   Constitutional, HEENT, cardiovascular, pulmonary, gi and gu systems are negative, except as otherwise noted.      Objective           Vitals:  No vitals were obtained today due to virtual visit.    Physical Exam   GENERAL: Healthy, alert and no distress  EYES: Eyes grossly normal to inspection and conjunctivae and sclerae normal  HENT: normal cephalic/atraumatic, oropharynx clear and oral mucous membranes moist  RESP: No audible wheeze, cough, or visible cyanosis.  No visible retractions or increased work of breathing.    MS: extremities normal- no gross deformities noted  SKIN: Visible skin clear. No significant rash, abnormal pigmentation or lesions.  NEURO: Normal strength and tone, sensory exam grossly normal and mentation intact  PSYCH: Mentation appears normal, affect normal/bright, judgement and insight intact, normal speech and appearance well-groomed.    Pending orders and results     Assessment & Plan     Gastroesophageal reflux disease without esophagitis  Discussed that these symptoms is likely related to reflux or GERD. Discussed non-pharmacologic treatment, including elevating the head of bed, decrease food before bedtime and decreased caffeine and nicotine and alcohol.  Went over meds for reflux. Pt will try Prilosec . Recheck if not improving as expected.  Will Start:  - omeprazole (PRILOSEC OTC) 20 MG EC tablet  Dispense: 30 tablet; Refill: 1    Hair loss  - T4 free  - TSH  - Thyroid peroxidase antibody  - Thyroid stimulating immunoglobulin    Screening for thyroid disorder  - T4 free  - TSH  - Thyroid peroxidase antibody  - Thyroid stimulating immunoglobulin    Insomnia, unspecified type  I discussed the concepts and specifics of good sleep hygiene.  she will do her best to implement these recommendations.  Will Start:  - traZODone (DESYREL) 50 MG  tablet  Dispense: 30 tablet; Refill: 1      Prescription drug management   Time spent doing chart review, history and exam, documentation and further activities per the note  {Provider  Link to [facility] Help Grid :279831}     See Patient Instructions  Patient Instructions     PLAN:   1.   Symptomatic therapy suggested:   Will start on prilosec once a day to help with reflux and cough  Will start on Trazdone at bedtime to help with sleep.    2.  Orders Placed This Encounter   Medications     omeprazole (PRILOSEC OTC) 20 MG EC tablet     Sig: Take 1 tablet (20 mg) by mouth daily     Dispense:  30 tablet     Refill:  1     traZODone (DESYREL) 50 MG tablet     Sig: Take 1 tablet (50 mg) by mouth At Bedtime     Dispense:  30 tablet     Refill:  1     Orders Placed This Encounter   Procedures     T4 free     TSH     Thyroid peroxidase antibody     Thyroid stimulating immunoglobulin       3. Patient needs to follow up in if no improvement,or sooner if worsening of symptoms or other symptoms develop.  FUTURE LABS:       - Schedule a non-fasting blood draw   Will follow up and/or notify patient of  results via My Chart to determine further need for followup  Follow up in 1 month if not improving.          No follow-ups on file.    VINCENZO [facility]          Video-Visit Details    Video Start Time: 5:24 PM    Type of service:  Video Visit    Video End Time:5:42 PM    Originating Location (pt. Location): Home        Distant Location (provider location):  On-site    Platform used for Video Visit: Richar

## 2022-10-28 NOTE — RESULT ENCOUNTER NOTE
Kasia Melvin,    Attached are your test results.  Thyroid antibodies are normal    Please contact us if you have any questions.    Ernestina De Leon, CNP

## 2022-10-30 NOTE — RESULT ENCOUNTER NOTE
Kasia Melvin,    Attached are your test results.  Zinc level is normal    Please contact us if you have any questions.    Ernestina De Leon, CNP

## 2022-12-23 NOTE — PROGRESS NOTES
Estrella is a 62 year old who is being evaluated via a billable telephone visit.      What phone number would you like to be contacted at? [phone]   How would you like to obtain your AVS? Gisela    Distant Location (provider location):  Off-site        Subjective   Estrella is a 62 year old, presenting for the following health issues:  Pain      HPI     Headache  Duration of complaint: x1 month   Description:   Location: left occipital/temporal   Character: sharp pain  Frequency:  EOD  Duration:  Few hours  Intensity: moderate  Progression of Symptoms:  same  Accompanying Signs & Symptoms:  Stiff neck: YES- on occassion  Neck or upper back pain: no   Fever: no  Sinus pressure: no   Nausea or vomiting: YES- one time  Dizziness: YES  Numbness: no   Weakness: no   Visual changes: YES  History:   Head trauma: no  Family history of migraines: YES- older sister  Previous tests for headaches: no   Neurologist evaluations: no  Able to do daily activities: YES  Wake with a headaches: YES- one time  Do headaches wake you up: YES  Daily pain medication use: no, pt uses Tylenol  Work/school stressors/changes: no   Precipitating factors:   Does light make it worse: no   Does sound make it worse: YES  Alleviating factors:  Does sleep help: no  Therapies Tried and outcome: Tylenol  Comes and goes   Had a very sudden sharp pain   Did have visual changes some intermittent blurriness and then will go away   Will last 5 minutes   The Tylenol will help with the headache but has them every other day   No real history of persistent of headaches in her past   No history of aneurysm in her family   Used to have migraines but none in many years     Labs reviewed in EPIC  BP Readings from Last 3 Encounters:   05/16/22 125/81   11/16/20 122/73   08/21/20 136/80    Wt Readings from Last 3 Encounters:   07/13/22 53.1 kg (117 lb)   05/16/22 53.3 kg (117 lb 8 oz)   11/16/20 55 kg (121 lb 3.2 oz)                  Patient Active Problem List    Diagnosis     DDD (degenerative disc disease), cervical     Neck pain on left side     CARDIOVASCULAR SCREENING; LDL GOAL LESS THAN 160     Adjustment disorder with mixed anxiety and depressed mood     Family history of thyroid disease     Intertrigo     Tendinopathy of rotator cuff, right     Chronic pain of both shoulders     Internal hemorrhoid, bleeding     Chronic right shoulder pain     Sleeping difficulty     Tendonitis of ankle or foot     Left foot pain     Positive TB test     Past Surgical History:   Procedure Laterality Date     COLONOSCOPY N/A 8/21/2019    Procedure: Colonoscopy, With Polypectomy And Biopsy;  Surgeon: Duane, William Charles, MD;  Location: [facility] OR     COLONOSCOPY WITH CO2 INSUFFLATION N/A 8/21/2019    Procedure: COLONOSCOPY, WITH CO2 INSUFFLATION;  Surgeon: Duane, William Charles, MD;  Location: [facility] OR       Social History     Tobacco Use     Smoking status: Never     Smokeless tobacco: Never   Substance Use Topics     Alcohol use: No     Family History   Problem Relation Age of Onset     Colon Cancer Mother      Lung Cancer Father      Lung Cancer Brother      Thyroid Disease Sister          Current Outpatient Medications   Medication Sig Dispense Refill     azelastine (ASTELIN) 0.1 % nasal spray Spray 1 spray into both nostrils 2 times daily 30 mL 3     clotrimazole-betamethasone (LOTRISONE) 1-0.05 % external cream APPLY TO AFFECTED AREA(S) TOPICALLY DAILY AS NEEDED 15 g 0     diclofenac (VOLTAREN) 1 % topical gel Apply 2 g topically 4 times daily 100 g 3     guaiFENesin-codeine (ROBITUSSIN AC) 100-10 MG/5ML solution Take 5-10 mLs by mouth every 6 hours as needed for cough 120 mL 0     Misc Natural Products (OSTEO BI-FLEX JOINT SHIELD PO) Take 1 tablet by mouth daily       Multiple Vitamins-Minerals (CENTRUM SILVER) per tablet Take 1 tablet by mouth daily       omeprazole (PRILOSEC OTC) 20 MG EC tablet Take 1 tablet (20 mg) by mouth daily 30 tablet 1     VITAMIN D, CHOLECALCIFEROL, PO  Take 5,000 Units by mouth daily       traZODone (DESYREL) 50 MG tablet Take 1 tablet (50 mg) by mouth At Bedtime (Patient not taking: Reported on 12/23/2022) 30 tablet 1     No Known Allergies    Review of Systems   All other systems reviewed and are negative.         Objective    Vitals - Patient Reported  Pain Score: Mild Pain (3)  Pain Loc: Head      Vitals:  No vitals were obtained today due to virtual visit.    Physical Exam   alert, active and no distress  PSYCH: Alert and oriented times 3; coherent speech, normal   rate and volume, able to articulate logical thoughts, able   to abstract reason, no tangential thoughts, no hallucinations   or delusions  Her affect is normal and pleasant  RESP: No cough, no audible wheezing, able to talk in full sentences  Remainder of exam unable to be completed due to telephone visits    Diagnostic Test Results:   Diagnostic Test Results:  Labs reviewed in Epic  Pending orders and results       Assessment & Plan     New onset of headaches after age 50  Will follow up and/or notify patient of  results via My Chart to determine further need for followup  Rule out potential vascular headache   - MR BRAIN W/O CONTRAST  - MRA BRAIN ([place] [facility] [place]) W CONTRAST  - MRA NECK (CAROTIDS) W CONTRAST      Ordering of each unique test   Time spent doing chart review, history and exam, documentation and further activities per the note       See Patient Instructions  Patient Instructions     PLAN:   1.   Symptomatic therapy suggested: Continue current medications as prescribed.     2.  Orders Placed This Encounter   Procedures     MR BRAIN W/O CONTRAST     MRA BRAIN ([place] [facility] [place]) W CONTRAST     MRA NECK (CAROTIDS) W CONTRAST       3. Patient needs to follow up in if no improvement,or sooner if worsening of symptoms or other symptoms develop.  FURTHER TESTING:       - MRI brain   I will place order. Please call [phone] to schedule.  Will follow up and/or notify patient of  results  via My Chart to determine further need for followup    I will place order. Please call [phone] to schedule.    Return in about 1 month (around 1/23/2023), or if symptoms worsen or fail to improve, for MRI.    VINCENZO [facility]          Phone call duration: 13 minutes

## 2022-12-23 NOTE — PATIENT INSTRUCTIONS
PLAN:   1.   Symptomatic therapy suggested: Continue current medications as prescribed.     2.  Orders Placed This Encounter   Procedures    MR BRAIN W/O CONTRAST    MRA BRAIN ([place] [facility] [place]) W CONTRAST    MRA NECK (CAROTIDS) W CONTRAST       3. Patient needs to follow up in if no improvement,or sooner if worsening of symptoms or other symptoms develop.  FURTHER TESTING:       - MRI brain   I will place order. Please call [phone] to schedule.  Will follow up and/or notify patient of  results via My Chart to determine further need for followup

## 2023-01-01 ENCOUNTER — TELEPHONE (OUTPATIENT)
Dept: FAMILY MEDICINE | Facility: CLINIC | Age: 63
End: 2023-01-01
Payer: COMMERCIAL

## 2023-01-01 ENCOUNTER — TELEPHONE (OUTPATIENT)
Dept: ONCOLOGY | Facility: CLINIC | Age: 63
End: 2023-01-01

## 2023-01-01 ENCOUNTER — HOSPITAL ENCOUNTER (OUTPATIENT)
Facility: AMBULATORY SURGERY CENTER | Age: 63
Discharge: HOME | End: 2023-07-27
Attending: RADIOLOGY
Payer: COMMERCIAL

## 2023-01-01 ENCOUNTER — VIRTUAL VISIT (OUTPATIENT)
Dept: FAMILY MEDICINE | Facility: CLINIC | Age: 63
End: 2023-01-01
Payer: COMMERCIAL

## 2023-01-01 ENCOUNTER — PATIENT OUTREACH (OUTPATIENT)
Dept: ONCOLOGY | Facility: CLINIC | Age: 63
End: 2023-01-01

## 2023-01-01 ENCOUNTER — VIRTUAL VISIT (OUTPATIENT)
Dept: ONCOLOGY | Facility: CLINIC | Age: 63
End: 2023-01-01
Attending: INTERNAL MEDICINE
Payer: COMMERCIAL

## 2023-01-01 ENCOUNTER — PATIENT OUTREACH (OUTPATIENT)
Dept: CARE COORDINATION | Facility: CLINIC | Age: 63
End: 2023-01-01
Payer: COMMERCIAL

## 2023-01-01 ENCOUNTER — OFFICE VISIT (OUTPATIENT)
Dept: FAMILY MEDICINE | Facility: CLINIC | Age: 63
End: 2023-01-01
Payer: COMMERCIAL

## 2023-01-01 ENCOUNTER — TELEPHONE (OUTPATIENT)
Dept: SURGERY | Facility: CLINIC | Age: 63
End: 2023-01-01

## 2023-01-01 ENCOUNTER — TELEPHONE (OUTPATIENT)
Dept: FAMILY MEDICINE | Facility: CLINIC | Age: 63
End: 2023-01-01

## 2023-01-01 ENCOUNTER — ANCILLARY PROCEDURE (OUTPATIENT)
Dept: RADIOLOGY | Facility: AMBULATORY SURGERY CENTER | Age: 63
End: 2023-01-01
Attending: INTERNAL MEDICINE
Payer: COMMERCIAL

## 2023-01-01 ENCOUNTER — APPOINTMENT (OUTPATIENT)
Dept: GENERAL RADIOLOGY | Facility: CLINIC | Age: 63
End: 2023-01-01
Payer: COMMERCIAL

## 2023-01-01 ENCOUNTER — APPOINTMENT (OUTPATIENT)
Dept: GENERAL RADIOLOGY | Facility: CLINIC | Age: 63
End: 2023-01-01
Attending: FAMILY MEDICINE
Payer: COMMERCIAL

## 2023-01-01 ENCOUNTER — PRE VISIT (OUTPATIENT)
Dept: ONCOLOGY | Facility: CLINIC | Age: 63
End: 2023-01-01
Payer: COMMERCIAL

## 2023-01-01 ENCOUNTER — LAB (OUTPATIENT)
Dept: ONCOLOGY | Facility: CLINIC | Age: 63
End: 2023-01-01
Payer: COMMERCIAL

## 2023-01-01 ENCOUNTER — ANCILLARY PROCEDURE (OUTPATIENT)
Dept: GENERAL RADIOLOGY | Facility: CLINIC | Age: 63
End: 2023-01-01
Attending: INTERNAL MEDICINE
Payer: COMMERCIAL

## 2023-01-01 ENCOUNTER — HOSPITAL ENCOUNTER (OUTPATIENT)
Facility: CLINIC | Age: 63
Setting detail: OBSERVATION
Discharge: HOSPICE | End: 2023-08-24
Attending: FAMILY MEDICINE | Admitting: FAMILY MEDICINE
Payer: COMMERCIAL

## 2023-01-01 ENCOUNTER — ANESTHESIA EVENT (OUTPATIENT)
Dept: SURGERY | Facility: AMBULATORY SURGERY CENTER | Age: 63
End: 2023-01-01
Payer: COMMERCIAL

## 2023-01-01 ENCOUNTER — PRE VISIT (OUTPATIENT)
Dept: ONCOLOGY | Facility: CLINIC | Age: 63
End: 2023-01-01

## 2023-01-01 ENCOUNTER — APPOINTMENT (OUTPATIENT)
Dept: LAB | Facility: CLINIC | Age: 63
End: 2023-01-01
Attending: INTERNAL MEDICINE
Payer: COMMERCIAL

## 2023-01-01 ENCOUNTER — TELEPHONE (OUTPATIENT)
Dept: RHEUMATOLOGY | Facility: CLINIC | Age: 63
End: 2023-01-01

## 2023-01-01 ENCOUNTER — APPOINTMENT (OUTPATIENT)
Dept: INTERVENTIONAL RADIOLOGY/VASCULAR | Facility: CLINIC | Age: 63
End: 2023-01-01
Attending: SURGERY
Payer: COMMERCIAL

## 2023-01-01 ENCOUNTER — NURSING HOME VISIT (OUTPATIENT)
Dept: GERIATRICS | Facility: CLINIC | Age: 63
End: 2023-01-01
Payer: COMMERCIAL

## 2023-01-01 ENCOUNTER — INFUSION THERAPY VISIT (OUTPATIENT)
Dept: INFUSION THERAPY | Facility: CLINIC | Age: 63
End: 2023-01-01
Payer: COMMERCIAL

## 2023-01-01 ENCOUNTER — MEDICAL CORRESPONDENCE (OUTPATIENT)
Dept: HEALTH INFORMATION MANAGEMENT | Facility: CLINIC | Age: 63
End: 2023-01-01
Payer: COMMERCIAL

## 2023-01-01 ENCOUNTER — ANCILLARY PROCEDURE (OUTPATIENT)
Dept: GENERAL RADIOLOGY | Facility: CLINIC | Age: 63
End: 2023-01-01
Attending: NURSE PRACTITIONER
Payer: COMMERCIAL

## 2023-01-01 ENCOUNTER — APPOINTMENT (OUTPATIENT)
Dept: CT IMAGING | Facility: CLINIC | Age: 63
End: 2023-01-01
Payer: COMMERCIAL

## 2023-01-01 ENCOUNTER — ONCOLOGY VISIT (OUTPATIENT)
Dept: ONCOLOGY | Facility: CLINIC | Age: 63
End: 2023-01-01
Attending: SURGERY
Payer: COMMERCIAL

## 2023-01-01 ENCOUNTER — HOSPITAL ENCOUNTER (OUTPATIENT)
Facility: CLINIC | Age: 63
Discharge: HOME | End: 2023-06-28
Attending: OBSTETRICS & GYNECOLOGY | Admitting: OBSTETRICS & GYNECOLOGY
Payer: COMMERCIAL

## 2023-01-01 ENCOUNTER — VIRTUAL VISIT (OUTPATIENT)
Dept: PALLIATIVE CARE | Facility: CLINIC | Age: 63
End: 2023-01-01
Attending: INTERNAL MEDICINE
Payer: COMMERCIAL

## 2023-01-01 ENCOUNTER — ANESTHESIA (OUTPATIENT)
Dept: SURGERY | Facility: AMBULATORY SURGERY CENTER | Age: 63
End: 2023-01-01
Payer: COMMERCIAL

## 2023-01-01 ENCOUNTER — APPOINTMENT (OUTPATIENT)
Dept: CT IMAGING | Facility: CLINIC | Age: 63
End: 2023-01-01
Attending: FAMILY MEDICINE
Payer: COMMERCIAL

## 2023-01-01 ENCOUNTER — VIRTUAL VISIT (OUTPATIENT)
Dept: ONCOLOGY | Facility: CLINIC | Age: 63
End: 2023-01-01
Payer: COMMERCIAL

## 2023-01-01 ENCOUNTER — DOCUMENTATION ONLY (OUTPATIENT)
Dept: OTHER | Facility: CLINIC | Age: 63
End: 2023-01-01

## 2023-01-01 ENCOUNTER — ANCILLARY PROCEDURE (OUTPATIENT)
Dept: ULTRASOUND IMAGING | Facility: CLINIC | Age: 63
End: 2023-01-01
Attending: NURSE PRACTITIONER
Payer: COMMERCIAL

## 2023-01-01 ENCOUNTER — LAB (OUTPATIENT)
Dept: LAB | Facility: CLINIC | Age: 63
End: 2023-01-01
Payer: COMMERCIAL

## 2023-01-01 ENCOUNTER — OFFICE VISIT (OUTPATIENT)
Dept: PEDIATRICS | Facility: CLINIC | Age: 63
End: 2023-01-01
Payer: COMMERCIAL

## 2023-01-01 ENCOUNTER — HOSPITAL ENCOUNTER (OUTPATIENT)
Facility: CLINIC | Age: 63
Discharge: HOME | End: 2023-05-25
Attending: SURGERY | Admitting: STUDENT IN AN ORGANIZED HEALTH CARE EDUCATION/TRAINING PROGRAM
Payer: COMMERCIAL

## 2023-01-01 ENCOUNTER — APPOINTMENT (OUTPATIENT)
Dept: CT IMAGING | Facility: CLINIC | Age: 63
End: 2023-01-01
Attending: EMERGENCY MEDICINE
Payer: COMMERCIAL

## 2023-01-01 ENCOUNTER — ANESTHESIA EVENT (OUTPATIENT)
Dept: SURGERY | Facility: CLINIC | Age: 63
End: 2023-01-01
Payer: COMMERCIAL

## 2023-01-01 ENCOUNTER — OFFICE VISIT (OUTPATIENT)
Dept: SURGERY | Facility: CLINIC | Age: 63
End: 2023-01-01
Payer: COMMERCIAL

## 2023-01-01 ENCOUNTER — ANCILLARY PROCEDURE (OUTPATIENT)
Dept: CT IMAGING | Facility: CLINIC | Age: 63
End: 2023-01-01
Attending: NURSE PRACTITIONER
Payer: COMMERCIAL

## 2023-01-01 ENCOUNTER — PATIENT OUTREACH (OUTPATIENT)
Dept: CARE COORDINATION | Facility: CLINIC | Age: 63
End: 2023-01-01

## 2023-01-01 ENCOUNTER — PATIENT OUTREACH (OUTPATIENT)
Dept: ONCOLOGY | Facility: CLINIC | Age: 63
End: 2023-01-01
Payer: COMMERCIAL

## 2023-01-01 ENCOUNTER — ONCOLOGY VISIT (OUTPATIENT)
Dept: ONCOLOGY | Facility: CLINIC | Age: 63
End: 2023-01-01
Payer: COMMERCIAL

## 2023-01-01 ENCOUNTER — ANCILLARY PROCEDURE (OUTPATIENT)
Dept: MRI IMAGING | Facility: CLINIC | Age: 63
End: 2023-01-01
Attending: NURSE PRACTITIONER
Payer: COMMERCIAL

## 2023-01-01 ENCOUNTER — HOSPITAL ENCOUNTER (OUTPATIENT)
Dept: CARDIOLOGY | Facility: CLINIC | Age: 63
Discharge: HOME | End: 2023-05-05
Attending: NURSE PRACTITIONER | Admitting: NURSE PRACTITIONER
Payer: COMMERCIAL

## 2023-01-01 ENCOUNTER — APPOINTMENT (OUTPATIENT)
Dept: MEDSURG UNIT | Facility: CLINIC | Age: 63
End: 2023-01-01
Attending: SURGERY
Payer: COMMERCIAL

## 2023-01-01 ENCOUNTER — ANESTHESIA (OUTPATIENT)
Dept: SURGERY | Facility: CLINIC | Age: 63
End: 2023-01-01
Payer: COMMERCIAL

## 2023-01-01 ENCOUNTER — VIRTUAL VISIT (OUTPATIENT)
Dept: ONCOLOGY | Facility: CLINIC | Age: 63
End: 2023-01-01
Attending: NURSE PRACTITIONER
Payer: COMMERCIAL

## 2023-01-01 ENCOUNTER — HOSPITAL ENCOUNTER (EMERGENCY)
Facility: CLINIC | Age: 63
Discharge: HOME | End: 2023-08-13
Attending: EMERGENCY MEDICINE | Admitting: EMERGENCY MEDICINE
Payer: COMMERCIAL

## 2023-01-01 ENCOUNTER — MYC MEDICAL ADVICE (OUTPATIENT)
Dept: FAMILY MEDICINE | Facility: CLINIC | Age: 63
End: 2023-01-01

## 2023-01-01 ENCOUNTER — ANCILLARY PROCEDURE (OUTPATIENT)
Dept: CT IMAGING | Facility: CLINIC | Age: 63
End: 2023-01-01
Attending: INTERNAL MEDICINE
Payer: COMMERCIAL

## 2023-01-01 ENCOUNTER — HEALTH MAINTENANCE LETTER (OUTPATIENT)
Age: 63
End: 2023-01-01

## 2023-01-01 ENCOUNTER — HOSPITAL ENCOUNTER (OUTPATIENT)
Facility: AMBULATORY SURGERY CENTER | Age: 63
Discharge: HOME | End: 2023-06-08
Attending: STUDENT IN AN ORGANIZED HEALTH CARE EDUCATION/TRAINING PROGRAM | Admitting: STUDENT IN AN ORGANIZED HEALTH CARE EDUCATION/TRAINING PROGRAM
Payer: COMMERCIAL

## 2023-01-01 ENCOUNTER — APPOINTMENT (OUTPATIENT)
Dept: ULTRASOUND IMAGING | Facility: CLINIC | Age: 63
End: 2023-01-01
Payer: COMMERCIAL

## 2023-01-01 VITALS
BODY MASS INDEX: 22.3 KG/M2 | HEART RATE: 70 BPM | SYSTOLIC BLOOD PRESSURE: 120 MMHG | WEIGHT: 118 LBS | DIASTOLIC BLOOD PRESSURE: 80 MMHG

## 2023-01-01 VITALS
HEART RATE: 59 BPM | HEIGHT: 61 IN | SYSTOLIC BLOOD PRESSURE: 127 MMHG | OXYGEN SATURATION: 96 % | BODY MASS INDEX: 22.09 KG/M2 | WEIGHT: 117 LBS | TEMPERATURE: 98 F | RESPIRATION RATE: 16 BRPM | DIASTOLIC BLOOD PRESSURE: 84 MMHG

## 2023-01-01 VITALS
WEIGHT: 112.3 LBS | HEIGHT: 62 IN | HEART RATE: 95 BPM | TEMPERATURE: 98.1 F | SYSTOLIC BLOOD PRESSURE: 109 MMHG | DIASTOLIC BLOOD PRESSURE: 69 MMHG | OXYGEN SATURATION: 98 % | BODY MASS INDEX: 20.67 KG/M2 | RESPIRATION RATE: 18 BRPM

## 2023-01-01 VITALS
SYSTOLIC BLOOD PRESSURE: 116 MMHG | DIASTOLIC BLOOD PRESSURE: 79 MMHG | WEIGHT: 108.9 LBS | TEMPERATURE: 98 F | HEIGHT: 61 IN | BODY MASS INDEX: 20.56 KG/M2 | RESPIRATION RATE: 16 BRPM | HEART RATE: 77 BPM | OXYGEN SATURATION: 96 %

## 2023-01-01 VITALS
BODY MASS INDEX: 19.83 KG/M2 | OXYGEN SATURATION: 96 % | HEART RATE: 61 BPM | DIASTOLIC BLOOD PRESSURE: 76 MMHG | WEIGHT: 105 LBS | HEIGHT: 61 IN | SYSTOLIC BLOOD PRESSURE: 109 MMHG | TEMPERATURE: 98.2 F | RESPIRATION RATE: 18 BRPM

## 2023-01-01 VITALS
OXYGEN SATURATION: 96 % | WEIGHT: 122.7 LBS | SYSTOLIC BLOOD PRESSURE: 117 MMHG | BODY MASS INDEX: 22.58 KG/M2 | RESPIRATION RATE: 16 BRPM | DIASTOLIC BLOOD PRESSURE: 75 MMHG | HEART RATE: 68 BPM | TEMPERATURE: 98 F | HEIGHT: 62 IN

## 2023-01-01 VITALS
BODY MASS INDEX: 21.67 KG/M2 | WEIGHT: 118.5 LBS | SYSTOLIC BLOOD PRESSURE: 117 MMHG | TEMPERATURE: 98.2 F | DIASTOLIC BLOOD PRESSURE: 77 MMHG | RESPIRATION RATE: 16 BRPM | HEART RATE: 85 BPM

## 2023-01-01 VITALS
WEIGHT: 117.1 LBS | TEMPERATURE: 98 F | SYSTOLIC BLOOD PRESSURE: 130 MMHG | RESPIRATION RATE: 12 BRPM | BODY MASS INDEX: 21.55 KG/M2 | DIASTOLIC BLOOD PRESSURE: 79 MMHG | HEIGHT: 62 IN | HEART RATE: 70 BPM | OXYGEN SATURATION: 96 %

## 2023-01-01 VITALS
SYSTOLIC BLOOD PRESSURE: 105 MMHG | HEART RATE: 73 BPM | TEMPERATURE: 97.3 F | DIASTOLIC BLOOD PRESSURE: 69 MMHG | OXYGEN SATURATION: 95 %

## 2023-01-01 VITALS
OXYGEN SATURATION: 97 % | DIASTOLIC BLOOD PRESSURE: 78 MMHG | RESPIRATION RATE: 18 BRPM | SYSTOLIC BLOOD PRESSURE: 109 MMHG | WEIGHT: 110.3 LBS | HEART RATE: 66 BPM | BODY MASS INDEX: 20.17 KG/M2 | TEMPERATURE: 97.9 F

## 2023-01-01 VITALS — BODY MASS INDEX: 20.61 KG/M2 | WEIGHT: 112 LBS | HEIGHT: 62 IN

## 2023-01-01 VITALS
HEIGHT: 62 IN | DIASTOLIC BLOOD PRESSURE: 78 MMHG | SYSTOLIC BLOOD PRESSURE: 119 MMHG | HEART RATE: 56 BPM | BODY MASS INDEX: 19.88 KG/M2 | WEIGHT: 108 LBS | OXYGEN SATURATION: 99 % | TEMPERATURE: 97.1 F | RESPIRATION RATE: 16 BRPM

## 2023-01-01 VITALS — BODY MASS INDEX: 19.88 KG/M2 | WEIGHT: 108 LBS | HEIGHT: 62 IN

## 2023-01-01 VITALS
OXYGEN SATURATION: 97 % | DIASTOLIC BLOOD PRESSURE: 90 MMHG | HEART RATE: 67 BPM | TEMPERATURE: 98.7 F | SYSTOLIC BLOOD PRESSURE: 140 MMHG | HEIGHT: 61 IN | WEIGHT: 109 LBS | BODY MASS INDEX: 20.58 KG/M2 | RESPIRATION RATE: 16 BRPM

## 2023-01-01 VITALS
HEART RATE: 82 BPM | SYSTOLIC BLOOD PRESSURE: 137 MMHG | WEIGHT: 118.8 LBS | BODY MASS INDEX: 21.73 KG/M2 | DIASTOLIC BLOOD PRESSURE: 85 MMHG | TEMPERATURE: 98.5 F | RESPIRATION RATE: 16 BRPM | OXYGEN SATURATION: 98 %

## 2023-01-01 VITALS
DIASTOLIC BLOOD PRESSURE: 75 MMHG | OXYGEN SATURATION: 99 % | BODY MASS INDEX: 21.53 KG/M2 | TEMPERATURE: 97.3 F | HEIGHT: 62 IN | WEIGHT: 117 LBS | RESPIRATION RATE: 16 BRPM | HEART RATE: 74 BPM | SYSTOLIC BLOOD PRESSURE: 123 MMHG

## 2023-01-01 VITALS
RESPIRATION RATE: 16 BRPM | DIASTOLIC BLOOD PRESSURE: 71 MMHG | WEIGHT: 108.9 LBS | BODY MASS INDEX: 20.58 KG/M2 | HEART RATE: 81 BPM | TEMPERATURE: 97.5 F | OXYGEN SATURATION: 93 % | SYSTOLIC BLOOD PRESSURE: 115 MMHG

## 2023-01-01 VITALS
BODY MASS INDEX: 21.39 KG/M2 | WEIGHT: 113.32 LBS | TEMPERATURE: 96.5 F | HEART RATE: 82 BPM | SYSTOLIC BLOOD PRESSURE: 107 MMHG | DIASTOLIC BLOOD PRESSURE: 62 MMHG | RESPIRATION RATE: 15 BRPM | HEIGHT: 61 IN | OXYGEN SATURATION: 96 %

## 2023-01-01 VITALS — HEIGHT: 62 IN | BODY MASS INDEX: 19.88 KG/M2 | WEIGHT: 108 LBS

## 2023-01-01 DIAGNOSIS — G47.00 INSOMNIA, UNSPECIFIED TYPE: Primary | ICD-10-CM

## 2023-01-01 DIAGNOSIS — R93.5 ABNORMAL CT OF THE ABDOMEN: ICD-10-CM

## 2023-01-01 DIAGNOSIS — C25.9 MALIGNANT NEOPLASM OF PANCREAS METASTATIC TO LIVER (H): Primary | ICD-10-CM

## 2023-01-01 DIAGNOSIS — Z13.6 CARDIOVASCULAR SCREENING; LDL GOAL LESS THAN 160: ICD-10-CM

## 2023-01-01 DIAGNOSIS — C78.7 MALIGNANT NEOPLASM OF PANCREAS METASTATIC TO LIVER (H): ICD-10-CM

## 2023-01-01 DIAGNOSIS — J30.2 SEASONAL ALLERGIC RHINITIS, UNSPECIFIED TRIGGER: ICD-10-CM

## 2023-01-01 DIAGNOSIS — G89.3 NEOPLASM RELATED PAIN: ICD-10-CM

## 2023-01-01 DIAGNOSIS — C25.2 MALIGNANT NEOPLASM OF TAIL OF PANCREAS (H): Primary | ICD-10-CM

## 2023-01-01 DIAGNOSIS — D27.1: ICD-10-CM

## 2023-01-01 DIAGNOSIS — N83.8 OVARIAN MASS: Primary | ICD-10-CM

## 2023-01-01 DIAGNOSIS — R14.0 BLOATING: ICD-10-CM

## 2023-01-01 DIAGNOSIS — R51.9 NEW ONSET OF HEADACHES AFTER AGE 50: ICD-10-CM

## 2023-01-01 DIAGNOSIS — N83.8 OVARIAN MASS: ICD-10-CM

## 2023-01-01 DIAGNOSIS — Z13.29 SCREENING FOR THYROID DISORDER: ICD-10-CM

## 2023-01-01 DIAGNOSIS — C78.7 MALIGNANT NEOPLASM OF PANCREAS METASTATIC TO LIVER (H): Primary | ICD-10-CM

## 2023-01-01 DIAGNOSIS — R63.4 WEIGHT LOSS: ICD-10-CM

## 2023-01-01 DIAGNOSIS — C25.9 MALIGNANT NEOPLASM OF PANCREAS METASTATIC TO LIVER (H): ICD-10-CM

## 2023-01-01 DIAGNOSIS — C25.9 PANCREATIC ADENOCARCINOMA (H): Primary | ICD-10-CM

## 2023-01-01 DIAGNOSIS — R42 DIZZINESS: Primary | ICD-10-CM

## 2023-01-01 DIAGNOSIS — C25.9 MALIGNANT NEOPLASM OF PANCREAS, UNSPECIFIED LOCATION OF MALIGNANCY (H): ICD-10-CM

## 2023-01-01 DIAGNOSIS — R10.13 ABDOMINAL PAIN, EPIGASTRIC: ICD-10-CM

## 2023-01-01 DIAGNOSIS — M25.562 PAIN IN BOTH KNEES, UNSPECIFIED CHRONICITY: ICD-10-CM

## 2023-01-01 DIAGNOSIS — J01.00 ACUTE NON-RECURRENT MAXILLARY SINUSITIS: Primary | ICD-10-CM

## 2023-01-01 DIAGNOSIS — K86.89 PANCREATIC MASS: ICD-10-CM

## 2023-01-01 DIAGNOSIS — R53.1 WEAKNESS GENERALIZED: ICD-10-CM

## 2023-01-01 DIAGNOSIS — R10.84 GENERALIZED ABDOMINAL PAIN: ICD-10-CM

## 2023-01-01 DIAGNOSIS — R07.89 ATYPICAL CHEST PAIN: ICD-10-CM

## 2023-01-01 DIAGNOSIS — M25.50 PAIN IN JOINT, MULTIPLE SITES: ICD-10-CM

## 2023-01-01 DIAGNOSIS — R06.02 SOB (SHORTNESS OF BREATH): ICD-10-CM

## 2023-01-01 DIAGNOSIS — R11.0 NAUSEA: ICD-10-CM

## 2023-01-01 DIAGNOSIS — G89.29 CHRONIC PAIN OF BOTH SHOULDERS: ICD-10-CM

## 2023-01-01 DIAGNOSIS — Z71.89 ADVANCE CARE PLANNING: ICD-10-CM

## 2023-01-01 DIAGNOSIS — M25.511 CHRONIC PAIN OF BOTH SHOULDERS: ICD-10-CM

## 2023-01-01 DIAGNOSIS — C25.2 MALIGNANT NEOPLASM OF TAIL OF PANCREAS (H): ICD-10-CM

## 2023-01-01 DIAGNOSIS — C79.9: ICD-10-CM

## 2023-01-01 DIAGNOSIS — Z13.1 SCREENING FOR DIABETES MELLITUS (DM): ICD-10-CM

## 2023-01-01 DIAGNOSIS — R42 DIZZINESS: ICD-10-CM

## 2023-01-01 DIAGNOSIS — R07.89 ATYPICAL CHEST PAIN: Primary | ICD-10-CM

## 2023-01-01 DIAGNOSIS — R10.84 ABDOMINAL PAIN, GENERALIZED: ICD-10-CM

## 2023-01-01 DIAGNOSIS — G89.29 CHRONIC NONINTRACTABLE HEADACHE, UNSPECIFIED HEADACHE TYPE: ICD-10-CM

## 2023-01-01 DIAGNOSIS — M25.561 PAIN IN BOTH KNEES, UNSPECIFIED CHRONICITY: ICD-10-CM

## 2023-01-01 DIAGNOSIS — C25.9 PANCREATIC ADENOCARCINOMA (H): ICD-10-CM

## 2023-01-01 DIAGNOSIS — R10.12 LUQ ABDOMINAL PAIN: Primary | ICD-10-CM

## 2023-01-01 DIAGNOSIS — Z90.722 S/P BILATERAL SALPINGO-OOPHORECTOMY: ICD-10-CM

## 2023-01-01 DIAGNOSIS — G47.00 INSOMNIA, UNSPECIFIED TYPE: ICD-10-CM

## 2023-01-01 DIAGNOSIS — K86.89 PANCREATIC MASS: Primary | ICD-10-CM

## 2023-01-01 DIAGNOSIS — R10.84 GENERALIZED ABDOMINAL PAIN: Primary | ICD-10-CM

## 2023-01-01 DIAGNOSIS — F32.0 CURRENT MILD EPISODE OF MAJOR DEPRESSIVE DISORDER, UNSPECIFIED WHETHER RECURRENT (H): ICD-10-CM

## 2023-01-01 DIAGNOSIS — Z51.5 HOSPICE CARE PATIENT: ICD-10-CM

## 2023-01-01 DIAGNOSIS — R11.2 NAUSEA AND VOMITING, UNSPECIFIED VOMITING TYPE: ICD-10-CM

## 2023-01-01 DIAGNOSIS — Z12.11 SCREEN FOR COLON CANCER: Primary | ICD-10-CM

## 2023-01-01 DIAGNOSIS — E86.0 DEHYDRATION: ICD-10-CM

## 2023-01-01 DIAGNOSIS — R10.12 LUQ ABDOMINAL PAIN: ICD-10-CM

## 2023-01-01 DIAGNOSIS — H81.10 BENIGN PAROXYSMAL POSITIONAL VERTIGO, UNSPECIFIED LATERALITY: ICD-10-CM

## 2023-01-01 DIAGNOSIS — K85.90 ACUTE PANCREATITIS, UNSPECIFIED COMPLICATION STATUS, UNSPECIFIED PANCREATITIS TYPE: ICD-10-CM

## 2023-01-01 DIAGNOSIS — L30.4 INTERTRIGO: ICD-10-CM

## 2023-01-01 DIAGNOSIS — N83.202 LEFT OVARIAN CYST: ICD-10-CM

## 2023-01-01 DIAGNOSIS — R14.1 GAS PAIN: ICD-10-CM

## 2023-01-01 DIAGNOSIS — R10.30 LOWER ABDOMINAL PAIN: Primary | ICD-10-CM

## 2023-01-01 DIAGNOSIS — M25.512 CHRONIC PAIN OF BOTH SHOULDERS: ICD-10-CM

## 2023-01-01 DIAGNOSIS — L27.0 DRUG-INDUCED SKIN RASH: ICD-10-CM

## 2023-01-01 DIAGNOSIS — Z13.0 SCREENING FOR DISORDER OF BLOOD AND BLOOD-FORMING ORGANS: ICD-10-CM

## 2023-01-01 DIAGNOSIS — Z90.722 S/P BILATERAL SALPINGO-OOPHORECTOMY: Primary | ICD-10-CM

## 2023-01-01 DIAGNOSIS — R10.33 PERIUMBILICAL PAIN: ICD-10-CM

## 2023-01-01 DIAGNOSIS — K59.01 SLOW TRANSIT CONSTIPATION: ICD-10-CM

## 2023-01-01 DIAGNOSIS — R51.9 CHRONIC NONINTRACTABLE HEADACHE, UNSPECIFIED HEADACHE TYPE: ICD-10-CM

## 2023-01-01 LAB
ABO/RH(D): NORMAL
ALBUMIN SERPL BCG-MCNC: 3.3 G/DL (ref 3.5–5.2)
ALBUMIN SERPL BCG-MCNC: 3.5 G/DL (ref 3.5–5.2)
ALBUMIN SERPL BCG-MCNC: 3.7 G/DL (ref 3.5–5.2)
ALBUMIN SERPL BCG-MCNC: 3.8 G/DL (ref 3.5–5.2)
ALBUMIN SERPL BCG-MCNC: 3.9 G/DL (ref 3.5–5.2)
ALBUMIN SERPL BCG-MCNC: 4.1 G/DL (ref 3.5–5.2)
ALBUMIN SERPL BCG-MCNC: 4.2 G/DL (ref 3.5–5.2)
ALBUMIN SERPL-MCNC: 3.5 G/DL (ref 3.4–5)
ALBUMIN SERPL-MCNC: 3.6 G/DL (ref 3.4–5)
ALBUMIN UR-MCNC: NEGATIVE MG/DL
ALBUMIN UR-MCNC: NEGATIVE MG/DL
ALP SERPL-CCNC: 285 U/L (ref 35–104)
ALP SERPL-CCNC: 295 U/L (ref 35–104)
ALP SERPL-CCNC: 355 U/L (ref 35–104)
ALP SERPL-CCNC: 445 U/L (ref 35–104)
ALP SERPL-CCNC: 456 U/L (ref 35–104)
ALP SERPL-CCNC: 485 U/L (ref 35–104)
ALP SERPL-CCNC: 487 U/L (ref 35–104)
ALP SERPL-CCNC: 59 U/L (ref 40–150)
ALP SERPL-CCNC: 83 U/L (ref 40–150)
ALT SERPL W P-5'-P-CCNC: 18 U/L (ref 0–50)
ALT SERPL W P-5'-P-CCNC: 24 U/L (ref 0–50)
ALT SERPL W P-5'-P-CCNC: 26 U/L (ref 0–50)
ALT SERPL W P-5'-P-CCNC: 26 U/L (ref 0–50)
ALT SERPL W P-5'-P-CCNC: 29 U/L (ref 0–50)
ALT SERPL W P-5'-P-CCNC: 38 U/L (ref 0–50)
ALT SERPL W P-5'-P-CCNC: 41 U/L (ref 0–50)
ALT SERPL W P-5'-P-CCNC: 44 U/L (ref 10–35)
ALT SERPL W P-5'-P-CCNC: 48 U/L (ref 0–50)
ANA SER QL IF: NEGATIVE
ANION GAP SERPL CALCULATED.3IONS-SCNC: 10 MMOL/L (ref 7–15)
ANION GAP SERPL CALCULATED.3IONS-SCNC: 11 MMOL/L (ref 7–15)
ANION GAP SERPL CALCULATED.3IONS-SCNC: 5 MMOL/L (ref 3–14)
ANION GAP SERPL CALCULATED.3IONS-SCNC: 5 MMOL/L (ref 3–14)
ANION GAP SERPL CALCULATED.3IONS-SCNC: 9 MMOL/L (ref 7–15)
ANTIBODY SCREEN: NEGATIVE
APPEARANCE UR: CLEAR
APPEARANCE UR: CLEAR
APTT PPP: 28 SECONDS (ref 22–38)
APTT PPP: 30 SECONDS (ref 22–38)
AST SERPL W P-5'-P-CCNC: 24 U/L (ref 0–45)
AST SERPL W P-5'-P-CCNC: 28 U/L (ref 0–45)
AST SERPL W P-5'-P-CCNC: 35 U/L (ref 0–45)
AST SERPL W P-5'-P-CCNC: 46 U/L (ref 0–45)
AST SERPL W P-5'-P-CCNC: 59 U/L (ref 0–45)
AST SERPL W P-5'-P-CCNC: 64 U/L (ref 0–45)
AST SERPL W P-5'-P-CCNC: 68 U/L (ref 10–35)
AST SERPL W P-5'-P-CCNC: 70 U/L (ref 0–45)
AST SERPL W P-5'-P-CCNC: 76 U/L (ref 0–45)
ATRIAL RATE - MUSE: 70 BPM
ATRIAL RATE - MUSE: 70 BPM
BASOPHILS # BLD AUTO: 0 10E3/UL (ref 0–0.2)
BASOPHILS # BLD AUTO: 0.1 10E3/UL (ref 0–0.2)
BASOPHILS # BLD AUTO: 0.1 10E3/UL (ref 0–0.2)
BASOPHILS NFR BLD AUTO: 1 %
BILIRUB SERPL-MCNC: 0.3 MG/DL
BILIRUB SERPL-MCNC: 0.4 MG/DL
BILIRUB SERPL-MCNC: 0.4 MG/DL (ref 0.2–1.3)
BILIRUB SERPL-MCNC: 0.5 MG/DL (ref 0.2–1.3)
BILIRUB SERPL-MCNC: 0.7 MG/DL
BILIRUB SERPL-MCNC: 0.8 MG/DL
BILIRUB SERPL-MCNC: 0.9 MG/DL
BILIRUB UR QL STRIP: NEGATIVE
BILIRUB UR QL STRIP: NEGATIVE
BUN SERPL-MCNC: 11 MG/DL (ref 8–23)
BUN SERPL-MCNC: 11.5 MG/DL (ref 8–23)
BUN SERPL-MCNC: 11.9 MG/DL (ref 8–23)
BUN SERPL-MCNC: 13.8 MG/DL (ref 8–23)
BUN SERPL-MCNC: 14 MG/DL (ref 7–30)
BUN SERPL-MCNC: 15 MG/DL (ref 7–30)
BUN SERPL-MCNC: 3.9 MG/DL (ref 8–23)
BUN SERPL-MCNC: 5.3 MG/DL (ref 8–23)
BUN SERPL-MCNC: 7.3 MG/DL (ref 8–23)
CALCIUM SERPL-MCNC: 8.6 MG/DL (ref 8.8–10.2)
CALCIUM SERPL-MCNC: 8.7 MG/DL (ref 8.5–10.1)
CALCIUM SERPL-MCNC: 8.8 MG/DL (ref 8.5–10.1)
CALCIUM SERPL-MCNC: 9 MG/DL (ref 8.8–10.2)
CALCIUM SERPL-MCNC: 9.1 MG/DL (ref 8.8–10.2)
CALCIUM SERPL-MCNC: 9.2 MG/DL (ref 8.8–10.2)
CALCIUM SERPL-MCNC: 9.5 MG/DL (ref 8.8–10.2)
CANCER AG125 SERPL-ACNC: 198 U/ML
CANCER AG19-9 SERPL IA-ACNC: ABNORMAL U/ML
CHLORIDE BLD-SCNC: 106 MMOL/L (ref 94–109)
CHLORIDE BLD-SCNC: 109 MMOL/L (ref 94–109)
CHLORIDE SERPL-SCNC: 100 MMOL/L (ref 98–107)
CHLORIDE SERPL-SCNC: 94 MMOL/L (ref 98–107)
CHLORIDE SERPL-SCNC: 99 MMOL/L (ref 98–107)
CHOLEST SERPL-MCNC: 217 MG/DL
CO2 SERPL-SCNC: 26 MMOL/L (ref 20–32)
CO2 SERPL-SCNC: 28 MMOL/L (ref 20–32)
COLOR UR AUTO: COLORLESS
COLOR UR AUTO: YELLOW
CREAT SERPL-MCNC: 0.49 MG/DL (ref 0.51–0.95)
CREAT SERPL-MCNC: 0.5 MG/DL (ref 0.51–0.95)
CREAT SERPL-MCNC: 0.5 MG/DL (ref 0.51–0.95)
CREAT SERPL-MCNC: 0.51 MG/DL (ref 0.51–0.95)
CREAT SERPL-MCNC: 0.54 MG/DL (ref 0.51–0.95)
CREAT SERPL-MCNC: 0.55 MG/DL (ref 0.51–0.95)
CREAT SERPL-MCNC: 0.57 MG/DL (ref 0.51–0.95)
CREAT SERPL-MCNC: 0.6 MG/DL (ref 0.52–1.04)
CREAT SERPL-MCNC: 0.92 MG/DL (ref 0.52–1.04)
CRP SERPL-MCNC: 3 MG/L (ref 0–8)
DEPRECATED CALCIDIOL+CALCIFEROL SERPL-MC: 137 UG/L (ref 20–75)
DEPRECATED HCO3 PLAS-SCNC: 24 MMOL/L (ref 22–29)
DEPRECATED HCO3 PLAS-SCNC: 24 MMOL/L (ref 22–29)
DEPRECATED HCO3 PLAS-SCNC: 25 MMOL/L (ref 22–29)
DEPRECATED HCO3 PLAS-SCNC: 26 MMOL/L (ref 22–29)
DEPRECATED HCO3 PLAS-SCNC: 27 MMOL/L (ref 22–29)
DIASTOLIC BLOOD PRESSURE - MUSE: NORMAL MMHG
DIASTOLIC BLOOD PRESSURE - MUSE: NORMAL MMHG
EOSINOPHIL # BLD AUTO: 0.2 10E3/UL (ref 0–0.7)
EOSINOPHIL # BLD AUTO: 0.2 10E3/UL (ref 0–0.7)
EOSINOPHIL # BLD AUTO: 0.3 10E3/UL (ref 0–0.7)
EOSINOPHIL NFR BLD AUTO: 2 %
EOSINOPHIL NFR BLD AUTO: 3 %
EOSINOPHIL NFR BLD AUTO: 4 %
ERYTHROCYTE [DISTWIDTH] IN BLOOD BY AUTOMATED COUNT: 11.5 % (ref 10–15)
ERYTHROCYTE [DISTWIDTH] IN BLOOD BY AUTOMATED COUNT: 11.8 % (ref 10–15)
ERYTHROCYTE [DISTWIDTH] IN BLOOD BY AUTOMATED COUNT: 12.2 % (ref 10–15)
ERYTHROCYTE [DISTWIDTH] IN BLOOD BY AUTOMATED COUNT: 12.2 % (ref 10–15)
ERYTHROCYTE [DISTWIDTH] IN BLOOD BY AUTOMATED COUNT: 12.6 % (ref 10–15)
ERYTHROCYTE [DISTWIDTH] IN BLOOD BY AUTOMATED COUNT: 12.9 % (ref 10–15)
ERYTHROCYTE [DISTWIDTH] IN BLOOD BY AUTOMATED COUNT: 13.1 % (ref 10–15)
ERYTHROCYTE [DISTWIDTH] IN BLOOD BY AUTOMATED COUNT: 13.2 % (ref 10–15)
ERYTHROCYTE [DISTWIDTH] IN BLOOD BY AUTOMATED COUNT: 13.5 % (ref 10–15)
ERYTHROCYTE [SEDIMENTATION RATE] IN BLOOD BY WESTERGREN METHOD: 18 MM/HR (ref 0–30)
FASTING STATUS PATIENT QL REPORTED: YES
GFR SERPL CREATININE-BSD FRML MDRD: 70 ML/MIN/1.73M2
GFR SERPL CREATININE-BSD FRML MDRD: >90 ML/MIN/1.73M2
GLUCOSE BLD-MCNC: 109 MG/DL (ref 70–99)
GLUCOSE BLD-MCNC: 95 MG/DL (ref 70–99)
GLUCOSE BLDC GLUCOMTR-MCNC: 85 MG/DL (ref 70–99)
GLUCOSE BLDC GLUCOMTR-MCNC: 94 MG/DL (ref 70–99)
GLUCOSE SERPL-MCNC: 101 MG/DL (ref 70–99)
GLUCOSE SERPL-MCNC: 102 MG/DL (ref 70–99)
GLUCOSE SERPL-MCNC: 160 MG/DL (ref 70–99)
GLUCOSE SERPL-MCNC: 184 MG/DL (ref 70–99)
GLUCOSE SERPL-MCNC: 91 MG/DL (ref 70–99)
GLUCOSE SERPL-MCNC: 93 MG/DL (ref 70–99)
GLUCOSE SERPL-MCNC: 95 MG/DL (ref 70–99)
GLUCOSE UR STRIP-MCNC: NEGATIVE MG/DL
GLUCOSE UR STRIP-MCNC: NEGATIVE MG/DL
HCT VFR BLD AUTO: 35 % (ref 35–47)
HCT VFR BLD AUTO: 36.1 % (ref 35–47)
HCT VFR BLD AUTO: 36.4 % (ref 35–47)
HCT VFR BLD AUTO: 37 % (ref 35–47)
HCT VFR BLD AUTO: 37.4 % (ref 35–47)
HCT VFR BLD AUTO: 39.7 % (ref 35–47)
HCT VFR BLD AUTO: 43 % (ref 35–47)
HCT VFR BLD AUTO: 44.5 % (ref 35–47)
HCT VFR BLD AUTO: 45 % (ref 35–47)
HDLC SERPL-MCNC: 88 MG/DL
HGB BLD-MCNC: 11.5 G/DL (ref 11.7–15.7)
HGB BLD-MCNC: 11.7 G/DL (ref 11.7–15.7)
HGB BLD-MCNC: 11.8 G/DL (ref 11.7–15.7)
HGB BLD-MCNC: 11.9 G/DL (ref 11.7–15.7)
HGB BLD-MCNC: 11.9 G/DL (ref 11.7–15.7)
HGB BLD-MCNC: 13.2 G/DL (ref 11.7–15.7)
HGB BLD-MCNC: 13.9 G/DL (ref 11.7–15.7)
HGB BLD-MCNC: 14.9 G/DL (ref 11.7–15.7)
HGB BLD-MCNC: 15.1 G/DL (ref 11.7–15.7)
HGB UR QL STRIP: NEGATIVE
HGB UR QL STRIP: NEGATIVE
HOLD SPECIMEN: NORMAL
IMM GRANULOCYTES # BLD: 0 10E3/UL
IMM GRANULOCYTES NFR BLD: 0 %
INR PPP: 0.95 (ref 0.85–1.15)
INR PPP: 1.02 (ref 0.85–1.15)
INTERPRETATION ECG - MUSE: NORMAL
INTERPRETATION ECG - MUSE: NORMAL
KETONES UR STRIP-MCNC: NEGATIVE MG/DL
KETONES UR STRIP-MCNC: NEGATIVE MG/DL
LACTATE SERPL-SCNC: 0.8 MMOL/L (ref 0.7–2)
LDLC SERPL CALC-MCNC: 107 MG/DL
LEUKOCYTE ESTERASE UR QL STRIP: NEGATIVE
LEUKOCYTE ESTERASE UR QL STRIP: NEGATIVE
LIPASE SERPL-CCNC: 314 U/L (ref 73–393)
LIPASE SERPL-CCNC: 55 U/L (ref 13–60)
LIPASE SERPL-CCNC: 84 U/L (ref 13–60)
LYMPHOCYTES # BLD AUTO: 1 10E3/UL (ref 0.8–5.3)
LYMPHOCYTES # BLD AUTO: 1.4 10E3/UL (ref 0.8–5.3)
LYMPHOCYTES # BLD AUTO: 1.5 10E3/UL (ref 0.8–5.3)
LYMPHOCYTES NFR BLD AUTO: 13 %
LYMPHOCYTES NFR BLD AUTO: 16 %
LYMPHOCYTES NFR BLD AUTO: 19 %
LYMPHOCYTES NFR BLD AUTO: 19 %
LYMPHOCYTES NFR BLD AUTO: 25 %
MCH RBC QN AUTO: 30.1 PG (ref 26.5–33)
MCH RBC QN AUTO: 30.5 PG (ref 26.5–33)
MCH RBC QN AUTO: 30.6 PG (ref 26.5–33)
MCH RBC QN AUTO: 30.8 PG (ref 26.5–33)
MCH RBC QN AUTO: 31 PG (ref 26.5–33)
MCH RBC QN AUTO: 31.2 PG (ref 26.5–33)
MCH RBC QN AUTO: 31.3 PG (ref 26.5–33)
MCHC RBC AUTO-ENTMCNC: 31.8 G/DL (ref 31.5–36.5)
MCHC RBC AUTO-ENTMCNC: 31.9 G/DL (ref 31.5–36.5)
MCHC RBC AUTO-ENTMCNC: 31.9 G/DL (ref 31.5–36.5)
MCHC RBC AUTO-ENTMCNC: 32.3 G/DL (ref 31.5–36.5)
MCHC RBC AUTO-ENTMCNC: 32.7 G/DL (ref 31.5–36.5)
MCHC RBC AUTO-ENTMCNC: 33.2 G/DL (ref 31.5–36.5)
MCHC RBC AUTO-ENTMCNC: 33.4 G/DL (ref 31.5–36.5)
MCHC RBC AUTO-ENTMCNC: 33.5 G/DL (ref 31.5–36.5)
MCHC RBC AUTO-ENTMCNC: 33.6 G/DL (ref 31.5–36.5)
MCV RBC AUTO: 92 FL (ref 78–100)
MCV RBC AUTO: 93 FL (ref 78–100)
MCV RBC AUTO: 94 FL (ref 78–100)
MCV RBC AUTO: 94 FL (ref 78–100)
MCV RBC AUTO: 95 FL (ref 78–100)
MCV RBC AUTO: 96 FL (ref 78–100)
MONOCYTES # BLD AUTO: 0.5 10E3/UL (ref 0–1.3)
MONOCYTES # BLD AUTO: 0.7 10E3/UL (ref 0–1.3)
MONOCYTES # BLD AUTO: 0.7 10E3/UL (ref 0–1.3)
MONOCYTES # BLD AUTO: 0.8 10E3/UL (ref 0–1.3)
MONOCYTES # BLD AUTO: 0.9 10E3/UL (ref 0–1.3)
MONOCYTES NFR BLD AUTO: 11 %
MONOCYTES NFR BLD AUTO: 11 %
MONOCYTES NFR BLD AUTO: 8 %
MONOCYTES NFR BLD AUTO: 8 %
MONOCYTES NFR BLD AUTO: 9 %
MUCOUS THREADS #/AREA URNS LPF: PRESENT /LPF
NEUTROPHILS # BLD AUTO: 3.7 10E3/UL (ref 1.6–8.3)
NEUTROPHILS # BLD AUTO: 5 10E3/UL (ref 1.6–8.3)
NEUTROPHILS # BLD AUTO: 5.1 10E3/UL (ref 1.6–8.3)
NEUTROPHILS # BLD AUTO: 5.8 10E3/UL (ref 1.6–8.3)
NEUTROPHILS # BLD AUTO: 6.5 10E3/UL (ref 1.6–8.3)
NEUTROPHILS NFR BLD AUTO: 62 %
NEUTROPHILS NFR BLD AUTO: 67 %
NEUTROPHILS NFR BLD AUTO: 68 %
NEUTROPHILS NFR BLD AUTO: 72 %
NEUTROPHILS NFR BLD AUTO: 72 %
NITRATE UR QL: NEGATIVE
NITRATE UR QL: NEGATIVE
NONHDLC SERPL-MCNC: 129 MG/DL
NRBC # BLD AUTO: 0 10E3/UL
NRBC BLD AUTO-RTO: 0 /100
NT-PROBNP SERPL-MCNC: 140 PG/ML (ref 0–900)
P AXIS - MUSE: 78 DEGREES
P AXIS - MUSE: 87 DEGREES
PATH REPORT.COMMENTS IMP SPEC: ABNORMAL
PATH REPORT.COMMENTS IMP SPEC: NORMAL
PATH REPORT.COMMENTS IMP SPEC: YES
PATH REPORT.COMMENTS IMP SPEC: YES
PATH REPORT.FINAL DX SPEC: ABNORMAL
PATH REPORT.FINAL DX SPEC: ABNORMAL
PATH REPORT.FINAL DX SPEC: NORMAL
PATH REPORT.FINAL DX SPEC: NORMAL
PATH REPORT.GROSS SPEC: ABNORMAL
PATH REPORT.GROSS SPEC: ABNORMAL
PATH REPORT.GROSS SPEC: NORMAL
PATH REPORT.MICROSCOPIC SPEC OTHER STN: ABNORMAL
PATH REPORT.MICROSCOPIC SPEC OTHER STN: ABNORMAL
PATH REPORT.MICROSCOPIC SPEC OTHER STN: NORMAL
PATH REPORT.MICROSCOPIC SPEC OTHER STN: NORMAL
PATH REPORT.RELEVANT HX SPEC: ABNORMAL
PATH REPORT.RELEVANT HX SPEC: ABNORMAL
PATH REPORT.RELEVANT HX SPEC: NORMAL
PATH REPORT.RELEVANT HX SPEC: NORMAL
PH UR STRIP: 5.5 [PH] (ref 5–7)
PH UR STRIP: 6 [PH] (ref 5–7)
PHOTO IMAGE: ABNORMAL
PHOTO IMAGE: NORMAL
PLATELET # BLD AUTO: 152 10E3/UL (ref 150–450)
PLATELET # BLD AUTO: 193 10E3/UL (ref 150–450)
PLATELET # BLD AUTO: 212 10E3/UL (ref 150–450)
PLATELET # BLD AUTO: 215 10E3/UL (ref 150–450)
PLATELET # BLD AUTO: 217 10E3/UL (ref 150–450)
PLATELET # BLD AUTO: 217 10E3/UL (ref 150–450)
PLATELET # BLD AUTO: 218 10E3/UL (ref 150–450)
PLATELET # BLD AUTO: 237 10E3/UL (ref 150–450)
PLATELET # BLD AUTO: 257 10E3/UL (ref 150–450)
POTASSIUM BLD-SCNC: 3.9 MMOL/L (ref 3.4–5.3)
POTASSIUM BLD-SCNC: 4.1 MMOL/L (ref 3.4–5.3)
POTASSIUM SERPL-SCNC: 3.7 MMOL/L (ref 3.4–5.3)
POTASSIUM SERPL-SCNC: 3.8 MMOL/L (ref 3.4–5.3)
POTASSIUM SERPL-SCNC: 3.9 MMOL/L (ref 3.4–5.3)
POTASSIUM SERPL-SCNC: 4 MMOL/L (ref 3.4–5.3)
POTASSIUM SERPL-SCNC: 4.2 MMOL/L (ref 3.4–5.3)
PR INTERVAL - MUSE: 168 MS
PR INTERVAL - MUSE: 172 MS
PROT SERPL-MCNC: 6.5 G/DL (ref 6.4–8.3)
PROT SERPL-MCNC: 6.7 G/DL (ref 6.4–8.3)
PROT SERPL-MCNC: 6.8 G/DL (ref 6.4–8.3)
PROT SERPL-MCNC: 7.1 G/DL (ref 6.4–8.3)
PROT SERPL-MCNC: 7.2 G/DL (ref 6.4–8.3)
PROT SERPL-MCNC: 7.3 G/DL (ref 6.8–8.8)
PROT SERPL-MCNC: 7.6 G/DL (ref 6.8–8.8)
QRS DURATION - MUSE: 68 MS
QRS DURATION - MUSE: 72 MS
QT - MUSE: 418 MS
QT - MUSE: 424 MS
QTC - MUSE: 451 MS
QTC - MUSE: 457 MS
R AXIS - MUSE: -20 DEGREES
R AXIS - MUSE: -22 DEGREES
RBC # BLD AUTO: 3.77 10E6/UL (ref 3.8–5.2)
RBC # BLD AUTO: 3.82 10E6/UL (ref 3.8–5.2)
RBC # BLD AUTO: 3.82 10E6/UL (ref 3.8–5.2)
RBC # BLD AUTO: 3.92 10E6/UL (ref 3.8–5.2)
RBC # BLD AUTO: 3.96 10E6/UL (ref 3.8–5.2)
RBC # BLD AUTO: 4.29 10E6/UL (ref 3.8–5.2)
RBC # BLD AUTO: 4.62 10E6/UL (ref 3.8–5.2)
RBC # BLD AUTO: 4.8 10E6/UL (ref 3.8–5.2)
RBC # BLD AUTO: 4.82 10E6/UL (ref 3.8–5.2)
RBC #/AREA URNS AUTO: NORMAL /HPF
RBC URINE: 0 /HPF
RHEUMATOID FACT SER NEPH-ACNC: <7 IU/ML
SODIUM SERPL-SCNC: 132 MMOL/L (ref 136–145)
SODIUM SERPL-SCNC: 134 MMOL/L (ref 136–145)
SODIUM SERPL-SCNC: 135 MMOL/L (ref 136–145)
SODIUM SERPL-SCNC: 135 MMOL/L (ref 136–145)
SODIUM SERPL-SCNC: 136 MMOL/L (ref 136–145)
SODIUM SERPL-SCNC: 136 MMOL/L (ref 136–145)
SODIUM SERPL-SCNC: 137 MMOL/L (ref 136–145)
SODIUM SERPL-SCNC: 139 MMOL/L (ref 133–144)
SODIUM SERPL-SCNC: 140 MMOL/L (ref 133–144)
SP GR UR STRIP: 1.01 (ref 1–1.03)
SP GR UR STRIP: 1.01 (ref 1–1.03)
SPECIMEN EXPIRATION DATE: NORMAL
SPECIMEN STATUS: NORMAL
SYSTOLIC BLOOD PRESSURE - MUSE: NORMAL MMHG
SYSTOLIC BLOOD PRESSURE - MUSE: NORMAL MMHG
T AXIS - MUSE: 18 DEGREES
T AXIS - MUSE: 47 DEGREES
T4 FREE SERPL-MCNC: 1.03 NG/DL (ref 0.76–1.46)
TRIGL SERPL-MCNC: 109 MG/DL
TROPONIN T SERPL HS-MCNC: <6 NG/L
TROPONIN T SERPL HS-MCNC: <6 NG/L
TSH SERPL DL<=0.005 MIU/L-ACNC: 0.51 MU/L (ref 0.4–4)
UROBILINOGEN UR STRIP-MCNC: NORMAL MG/DL
UROBILINOGEN UR STRIP-MCNC: NORMAL MG/DL
VENTRICULAR RATE- MUSE: 70 BPM
VENTRICULAR RATE- MUSE: 70 BPM
WBC # BLD AUTO: 2.5 10E3/UL (ref 4–11)
WBC # BLD AUTO: 6.1 10E3/UL (ref 4–11)
WBC # BLD AUTO: 6.4 10E3/UL (ref 4–11)
WBC # BLD AUTO: 6.5 10E3/UL (ref 4–11)
WBC # BLD AUTO: 7 10E3/UL (ref 4–11)
WBC # BLD AUTO: 7.4 10E3/UL (ref 4–11)
WBC # BLD AUTO: 7.5 10E3/UL (ref 4–11)
WBC # BLD AUTO: 8 10E3/UL (ref 4–11)
WBC # BLD AUTO: 9.1 10E3/UL (ref 4–11)
WBC #/AREA URNS AUTO: NORMAL /HPF
WBC URINE: 1 /HPF

## 2023-01-01 PROCEDURE — 250N000011 HC RX IP 250 OP 636: Mod: JZ

## 2023-01-01 PROCEDURE — 250N000011 HC RX IP 250 OP 636: Performed by: ANESTHESIOLOGY

## 2023-01-01 PROCEDURE — 99232 SBSQ HOSP IP/OBS MODERATE 35: CPT | Performed by: NURSE PRACTITIONER

## 2023-01-01 PROCEDURE — G0378 HOSPITAL OBSERVATION PER HR: HCPCS

## 2023-01-01 PROCEDURE — 99222 1ST HOSP IP/OBS MODERATE 55: CPT | Performed by: INTERNAL MEDICINE

## 2023-01-01 PROCEDURE — 85025 COMPLETE CBC W/AUTO DIFF WBC: CPT | Performed by: EMERGENCY MEDICINE

## 2023-01-01 PROCEDURE — 250N000011 HC RX IP 250 OP 636: Mod: JZ | Performed by: OBSTETRICS & GYNECOLOGY

## 2023-01-01 PROCEDURE — 36415 COLL VENOUS BLD VENIPUNCTURE: CPT | Performed by: NURSE PRACTITIONER

## 2023-01-01 PROCEDURE — 93321 DOPPLER ECHO F-UP/LMTD STD: CPT | Mod: 26 | Performed by: INTERNAL MEDICINE

## 2023-01-01 PROCEDURE — 99207 PR NO CHARGE LOS: CPT

## 2023-01-01 PROCEDURE — 250N000011 HC RX IP 250 OP 636: Mod: JZ | Performed by: NURSE PRACTITIONER

## 2023-01-01 PROCEDURE — 99213 OFFICE O/P EST LOW 20 MIN: CPT | Mod: VID | Performed by: NURSE PRACTITIONER

## 2023-01-01 PROCEDURE — 250N000011 HC RX IP 250 OP 636: Performed by: OBSTETRICS & GYNECOLOGY

## 2023-01-01 PROCEDURE — 250N000009 HC RX 250: Performed by: ANESTHESIOLOGY

## 2023-01-01 PROCEDURE — 71045 X-RAY EXAM CHEST 1 VIEW: CPT | Mod: 26 | Performed by: RADIOLOGY

## 2023-01-01 PROCEDURE — 93325 DOPPLER ECHO COLOR FLOW MAPG: CPT | Mod: 26 | Performed by: INTERNAL MEDICINE

## 2023-01-01 PROCEDURE — 36415 COLL VENOUS BLD VENIPUNCTURE: CPT | Performed by: ANESTHESIOLOGY

## 2023-01-01 PROCEDURE — 93010 ELECTROCARDIOGRAM REPORT: CPT | Performed by: FAMILY MEDICINE

## 2023-01-01 PROCEDURE — 84439 ASSAY OF FREE THYROXINE: CPT

## 2023-01-01 PROCEDURE — 73562 X-RAY EXAM OF KNEE 3: CPT | Mod: RT | Performed by: RADIOLOGY

## 2023-01-01 PROCEDURE — 272N000001 HC OR GENERAL SUPPLY STERILE: Performed by: OBSTETRICS & GYNECOLOGY

## 2023-01-01 PROCEDURE — 85027 COMPLETE CBC AUTOMATED: CPT | Performed by: INTERNAL MEDICINE

## 2023-01-01 PROCEDURE — 99442 PR PHYSICIAN TELEPHONE EVALUATION 11-20 MIN: CPT | Mod: 93 | Performed by: NURSE PRACTITIONER

## 2023-01-01 PROCEDURE — 88112 CYTOPATH CELL ENHANCE TECH: CPT | Mod: 26 | Performed by: PATHOLOGY

## 2023-01-01 PROCEDURE — 96376 TX/PRO/DX INJ SAME DRUG ADON: CPT

## 2023-01-01 PROCEDURE — 80053 COMPREHEN METABOLIC PANEL: CPT | Performed by: NURSE PRACTITIONER

## 2023-01-01 PROCEDURE — 85027 COMPLETE CBC AUTOMATED: CPT

## 2023-01-01 PROCEDURE — G0463 HOSPITAL OUTPT CLINIC VISIT: HCPCS | Performed by: INTERNAL MEDICINE

## 2023-01-01 PROCEDURE — 83690 ASSAY OF LIPASE: CPT | Performed by: INTERNAL MEDICINE

## 2023-01-01 PROCEDURE — 258N000003 HC RX IP 258 OP 636: Performed by: NURSE PRACTITIONER

## 2023-01-01 PROCEDURE — 97802 MEDICAL NUTRITION INDIV IN: CPT | Mod: VID | Performed by: DIETITIAN, REGISTERED

## 2023-01-01 PROCEDURE — 36415 COLL VENOUS BLD VENIPUNCTURE: CPT | Performed by: INTERNAL MEDICINE

## 2023-01-01 PROCEDURE — 71275 CT ANGIOGRAPHY CHEST: CPT

## 2023-01-01 PROCEDURE — 71045 X-RAY EXAM CHEST 1 VIEW: CPT

## 2023-01-01 PROCEDURE — 99214 OFFICE O/P EST MOD 30 MIN: CPT | Performed by: NURSE PRACTITIONER

## 2023-01-01 PROCEDURE — 99285 EMERGENCY DEPT VISIT HI MDM: CPT | Mod: 25 | Performed by: EMERGENCY MEDICINE

## 2023-01-01 PROCEDURE — 86901 BLOOD TYPING SEROLOGIC RH(D): CPT | Performed by: ANESTHESIOLOGY

## 2023-01-01 PROCEDURE — 85610 PROTHROMBIN TIME: CPT | Performed by: NURSE PRACTITIONER

## 2023-01-01 PROCEDURE — 93971 EXTREMITY STUDY: CPT | Mod: 26 | Performed by: RADIOLOGY

## 2023-01-01 PROCEDURE — 99441 PR PHYSICIAN TELEPHONE EVALUATION 5-10 MIN: CPT | Mod: 93 | Performed by: NURSE PRACTITIONER

## 2023-01-01 PROCEDURE — 272N000505 HC NEEDLE CR5

## 2023-01-01 PROCEDURE — 370N000017 HC ANESTHESIA TECHNICAL FEE, PER MIN: Performed by: OBSTETRICS & GYNECOLOGY

## 2023-01-01 PROCEDURE — 76942 ECHO GUIDE FOR BIOPSY: CPT | Mod: 26 | Performed by: STUDENT IN AN ORGANIZED HEALTH CARE EDUCATION/TRAINING PROGRAM

## 2023-01-01 PROCEDURE — 36561 INSERT TUNNELED CV CATH: CPT | Performed by: STUDENT IN AN ORGANIZED HEALTH CARE EDUCATION/TRAINING PROGRAM

## 2023-01-01 PROCEDURE — 82962 GLUCOSE BLOOD TEST: CPT

## 2023-01-01 PROCEDURE — 99285 EMERGENCY DEPT VISIT HI MDM: CPT | Mod: 25 | Performed by: FAMILY MEDICINE

## 2023-01-01 PROCEDURE — 250N000009 HC RX 250: Performed by: STUDENT IN AN ORGANIZED HEALTH CARE EDUCATION/TRAINING PROGRAM

## 2023-01-01 PROCEDURE — 99215 OFFICE O/P EST HI 40 MIN: CPT | Mod: VID | Performed by: NURSE PRACTITIONER

## 2023-01-01 PROCEDURE — 72197 MRI PELVIS W/O & W/DYE: CPT | Performed by: RADIOLOGY

## 2023-01-01 PROCEDURE — 83690 ASSAY OF LIPASE: CPT | Performed by: FAMILY MEDICINE

## 2023-01-01 PROCEDURE — 93000 ELECTROCARDIOGRAM COMPLETE: CPT | Performed by: NURSE PRACTITIONER

## 2023-01-01 PROCEDURE — 250N000013 HC RX MED GY IP 250 OP 250 PS 637: Performed by: NURSE PRACTITIONER

## 2023-01-01 PROCEDURE — 99254 IP/OBS CNSLTJ NEW/EST MOD 60: CPT | Mod: GC | Performed by: INTERNAL MEDICINE

## 2023-01-01 PROCEDURE — 36415 COLL VENOUS BLD VENIPUNCTURE: CPT | Performed by: EMERGENCY MEDICINE

## 2023-01-01 PROCEDURE — 96375 TX/PRO/DX INJ NEW DRUG ADDON: CPT

## 2023-01-01 PROCEDURE — 96375 TX/PRO/DX INJ NEW DRUG ADDON: CPT | Performed by: FAMILY MEDICINE

## 2023-01-01 PROCEDURE — 85730 THROMBOPLASTIN TIME PARTIAL: CPT | Performed by: FAMILY MEDICINE

## 2023-01-01 PROCEDURE — 88112 CYTOPATH CELL ENHANCE TECH: CPT | Mod: TC | Performed by: OBSTETRICS & GYNECOLOGY

## 2023-01-01 PROCEDURE — 74177 CT ABD & PELVIS W/CONTRAST: CPT | Performed by: RADIOLOGY

## 2023-01-01 PROCEDURE — 86140 C-REACTIVE PROTEIN: CPT

## 2023-01-01 PROCEDURE — 999N000141 HC STATISTIC PRE-PROCEDURE NURSING ASSESSMENT: Performed by: OBSTETRICS & GYNECOLOGY

## 2023-01-01 PROCEDURE — 99244 OFF/OP CNSLTJ NEW/EST MOD 40: CPT | Performed by: SURGERY

## 2023-01-01 PROCEDURE — 81001 URINALYSIS AUTO W/SCOPE: CPT | Performed by: INTERNAL MEDICINE

## 2023-01-01 PROCEDURE — 99417 PROLNG OP E/M EACH 15 MIN: CPT | Performed by: INTERNAL MEDICINE

## 2023-01-01 PROCEDURE — 80053 COMPREHEN METABOLIC PANEL: CPT | Performed by: OBSTETRICS & GYNECOLOGY

## 2023-01-01 PROCEDURE — 99207 PR NO CHARGE NURSE ONLY: CPT

## 2023-01-01 PROCEDURE — 99207 PR BUNDLED PROCEDURE IN GLOBAL PKG STATISTIC: CPT | Performed by: OBSTETRICS & GYNECOLOGY

## 2023-01-01 PROCEDURE — 99205 OFFICE O/P NEW HI 60 MIN: CPT | Performed by: OBSTETRICS & GYNECOLOGY

## 2023-01-01 PROCEDURE — 93018 CV STRESS TEST I&R ONLY: CPT | Performed by: INTERNAL MEDICINE

## 2023-01-01 PROCEDURE — 36590 REMOVAL TUNNELED CV CATH: CPT | Mod: RT | Performed by: RADIOLOGY

## 2023-01-01 PROCEDURE — 96413 CHEMO IV INFUSION 1 HR: CPT | Performed by: INTERNAL MEDICINE

## 2023-01-01 PROCEDURE — 96367 TX/PROPH/DG ADDL SEQ IV INF: CPT | Performed by: INTERNAL MEDICINE

## 2023-01-01 PROCEDURE — 88305 TISSUE EXAM BY PATHOLOGIST: CPT | Mod: 26 | Performed by: PATHOLOGY

## 2023-01-01 PROCEDURE — 96417 CHEMO IV INFUS EACH ADDL SEQ: CPT | Performed by: INTERNAL MEDICINE

## 2023-01-01 PROCEDURE — 93005 ELECTROCARDIOGRAM TRACING: CPT | Performed by: FAMILY MEDICINE

## 2023-01-01 PROCEDURE — 258N000003 HC RX IP 258 OP 636: Performed by: FAMILY MEDICINE

## 2023-01-01 PROCEDURE — 250N000013 HC RX MED GY IP 250 OP 250 PS 637: Performed by: INTERNAL MEDICINE

## 2023-01-01 PROCEDURE — 88305 TISSUE EXAM BY PATHOLOGIST: CPT | Mod: TC | Performed by: OBSTETRICS & GYNECOLOGY

## 2023-01-01 PROCEDURE — 99024 POSTOP FOLLOW-UP VISIT: CPT | Performed by: OBSTETRICS & GYNECOLOGY

## 2023-01-01 PROCEDURE — 85610 PROTHROMBIN TIME: CPT | Performed by: FAMILY MEDICINE

## 2023-01-01 PROCEDURE — 74019 RADEX ABDOMEN 2 VIEWS: CPT | Mod: GC | Performed by: RADIOLOGY

## 2023-01-01 PROCEDURE — 250N000011 HC RX IP 250 OP 636: Performed by: FAMILY MEDICINE

## 2023-01-01 PROCEDURE — 250N000013 HC RX MED GY IP 250 OP 250 PS 637: Performed by: STUDENT IN AN ORGANIZED HEALTH CARE EDUCATION/TRAINING PROGRAM

## 2023-01-01 PROCEDURE — 99214 OFFICE O/P EST MOD 30 MIN: CPT | Mod: VID | Performed by: NURSE PRACTITIONER

## 2023-01-01 PROCEDURE — 99232 SBSQ HOSP IP/OBS MODERATE 35: CPT | Performed by: INTERNAL MEDICINE

## 2023-01-01 PROCEDURE — 36415 COLL VENOUS BLD VENIPUNCTURE: CPT

## 2023-01-01 PROCEDURE — 36415 COLL VENOUS BLD VENIPUNCTURE: CPT | Performed by: FAMILY MEDICINE

## 2023-01-01 PROCEDURE — 86850 RBC ANTIBODY SCREEN: CPT | Performed by: ANESTHESIOLOGY

## 2023-01-01 PROCEDURE — 258N000003 HC RX IP 258 OP 636: Performed by: ANESTHESIOLOGY

## 2023-01-01 PROCEDURE — 47000 NEEDLE BIOPSY OF LIVER PERQ: CPT | Mod: GC | Performed by: STUDENT IN AN ORGANIZED HEALTH CARE EDUCATION/TRAINING PROGRAM

## 2023-01-01 PROCEDURE — 74177 CT ABD & PELVIS W/CONTRAST: CPT

## 2023-01-01 PROCEDURE — 93000 ELECTROCARDIOGRAM COMPLETE: CPT | Performed by: OBSTETRICS & GYNECOLOGY

## 2023-01-01 PROCEDURE — 99497 ADVNCD CARE PLAN 30 MIN: CPT | Performed by: NURSE PRACTITIONER

## 2023-01-01 PROCEDURE — 99205 OFFICE O/P NEW HI 60 MIN: CPT | Performed by: INTERNAL MEDICINE

## 2023-01-01 PROCEDURE — 86431 RHEUMATOID FACTOR QUANT: CPT

## 2023-01-01 PROCEDURE — 71275 CT ANGIOGRAPHY CHEST: CPT | Mod: 26 | Performed by: RADIOLOGY

## 2023-01-01 PROCEDURE — 96361 HYDRATE IV INFUSION ADD-ON: CPT | Performed by: EMERGENCY MEDICINE

## 2023-01-01 PROCEDURE — 88184 FLOWCYTOMETRY/ TC 1 MARKER: CPT | Performed by: SURGERY

## 2023-01-01 PROCEDURE — 85025 COMPLETE CBC W/AUTO DIFF WBC: CPT | Performed by: INTERNAL MEDICINE

## 2023-01-01 PROCEDURE — 36561 INSERT TUNNELED CV CATH: CPT

## 2023-01-01 PROCEDURE — 88189 FLOWCYTOMETRY/READ 16 & >: CPT | Mod: GC | Performed by: PATHOLOGY

## 2023-01-01 PROCEDURE — 84484 ASSAY OF TROPONIN QUANT: CPT

## 2023-01-01 PROCEDURE — 76830 TRANSVAGINAL US NON-OB: CPT

## 2023-01-01 PROCEDURE — 250N000013 HC RX MED GY IP 250 OP 250 PS 637: Performed by: OBSTETRICS & GYNECOLOGY

## 2023-01-01 PROCEDURE — 93010 ELECTROCARDIOGRAM REPORT: CPT | Performed by: EMERGENCY MEDICINE

## 2023-01-01 PROCEDURE — 99152 MOD SED SAME PHYS/QHP 5/>YRS: CPT

## 2023-01-01 PROCEDURE — 258N000003 HC RX IP 258 OP 636

## 2023-01-01 PROCEDURE — 88161 CYTOPATH SMEAR OTHER SOURCE: CPT | Mod: TC | Performed by: SURGERY

## 2023-01-01 PROCEDURE — 96375 TX/PRO/DX INJ NEW DRUG ADDON: CPT | Mod: 59 | Performed by: EMERGENCY MEDICINE

## 2023-01-01 PROCEDURE — 99205 OFFICE O/P NEW HI 60 MIN: CPT | Mod: 24 | Performed by: INTERNAL MEDICINE

## 2023-01-01 PROCEDURE — 85025 COMPLETE CBC W/AUTO DIFF WBC: CPT | Performed by: FAMILY MEDICINE

## 2023-01-01 PROCEDURE — 71046 X-RAY EXAM CHEST 2 VIEWS: CPT | Mod: 26 | Performed by: RADIOLOGY

## 2023-01-01 PROCEDURE — 250N000011 HC RX IP 250 OP 636: Mod: JZ | Performed by: EMERGENCY MEDICINE

## 2023-01-01 PROCEDURE — 99000 SPECIMEN HANDLING OFFICE-LAB: CPT | Performed by: INTERNAL MEDICINE

## 2023-01-01 PROCEDURE — 250N000025 HC SEVOFLURANE, PER MIN: Performed by: OBSTETRICS & GYNECOLOGY

## 2023-01-01 PROCEDURE — 99309 SBSQ NF CARE MODERATE MDM 30: CPT | Performed by: NURSE PRACTITIONER

## 2023-01-01 PROCEDURE — 71275 CT ANGIOGRAPHY CHEST: CPT | Mod: GC | Performed by: RADIOLOGY

## 2023-01-01 PROCEDURE — 250N000011 HC RX IP 250 OP 636: Performed by: NURSE PRACTITIONER

## 2023-01-01 PROCEDURE — 88305 TISSUE EXAM BY PATHOLOGIST: CPT | Mod: 26 | Performed by: STUDENT IN AN ORGANIZED HEALTH CARE EDUCATION/TRAINING PROGRAM

## 2023-01-01 PROCEDURE — 80053 COMPREHEN METABOLIC PANEL: CPT | Performed by: INTERNAL MEDICINE

## 2023-01-01 PROCEDURE — 86301 IMMUNOASSAY TUMOR CA 19-9: CPT | Performed by: INTERNAL MEDICINE

## 2023-01-01 PROCEDURE — 250N000011 HC RX IP 250 OP 636: Performed by: STUDENT IN AN ORGANIZED HEALTH CARE EDUCATION/TRAINING PROGRAM

## 2023-01-01 PROCEDURE — 99215 OFFICE O/P EST HI 40 MIN: CPT | Mod: VID | Performed by: INTERNAL MEDICINE

## 2023-01-01 PROCEDURE — 77001 FLUOROGUIDE FOR VEIN DEVICE: CPT | Mod: 26 | Performed by: STUDENT IN AN ORGANIZED HEALTH CARE EDUCATION/TRAINING PROGRAM

## 2023-01-01 PROCEDURE — 96361 HYDRATE IV INFUSION ADD-ON: CPT | Performed by: FAMILY MEDICINE

## 2023-01-01 PROCEDURE — 99205 OFFICE O/P NEW HI 60 MIN: CPT | Performed by: NURSE PRACTITIONER

## 2023-01-01 PROCEDURE — 710N000010 HC RECOVERY PHASE 1, LEVEL 2, PER MIN: Performed by: OBSTETRICS & GYNECOLOGY

## 2023-01-01 PROCEDURE — 85652 RBC SED RATE AUTOMATED: CPT

## 2023-01-01 PROCEDURE — 96375 TX/PRO/DX INJ NEW DRUG ADDON: CPT | Performed by: INTERNAL MEDICINE

## 2023-01-01 PROCEDURE — 76937 US GUIDE VASCULAR ACCESS: CPT | Mod: 26 | Performed by: STUDENT IN AN ORGANIZED HEALTH CARE EDUCATION/TRAINING PROGRAM

## 2023-01-01 PROCEDURE — 70548 MR ANGIOGRAPHY NECK W/DYE: CPT | Performed by: RADIOLOGY

## 2023-01-01 PROCEDURE — 85730 THROMBOPLASTIN TIME PARTIAL: CPT | Performed by: NURSE PRACTITIONER

## 2023-01-01 PROCEDURE — 80053 COMPREHEN METABOLIC PANEL: CPT | Performed by: EMERGENCY MEDICINE

## 2023-01-01 PROCEDURE — 96376 TX/PRO/DX INJ SAME DRUG ADON: CPT | Mod: 59 | Performed by: EMERGENCY MEDICINE

## 2023-01-01 PROCEDURE — 99239 HOSP IP/OBS DSCHRG MGMT >30: CPT | Performed by: INTERNAL MEDICINE

## 2023-01-01 PROCEDURE — 84443 ASSAY THYROID STIM HORMONE: CPT

## 2023-01-01 PROCEDURE — C9113 INJ PANTOPRAZOLE SODIUM, VIA: HCPCS | Mod: JZ | Performed by: FAMILY MEDICINE

## 2023-01-01 PROCEDURE — 82306 VITAMIN D 25 HYDROXY: CPT

## 2023-01-01 PROCEDURE — 71046 X-RAY EXAM CHEST 2 VIEWS: CPT

## 2023-01-01 PROCEDURE — 96374 THER/PROPH/DIAG INJ IV PUSH: CPT | Mod: 59 | Performed by: EMERGENCY MEDICINE

## 2023-01-01 PROCEDURE — 250N000011 HC RX IP 250 OP 636: Performed by: EMERGENCY MEDICINE

## 2023-01-01 PROCEDURE — 93321 DOPPLER ECHO F-UP/LMTD STD: CPT | Mod: TC

## 2023-01-01 PROCEDURE — 80061 LIPID PANEL: CPT

## 2023-01-01 PROCEDURE — 88305 TISSUE EXAM BY PATHOLOGIST: CPT | Mod: TC | Performed by: SURGERY

## 2023-01-01 PROCEDURE — 93005 ELECTROCARDIOGRAM TRACING: CPT | Performed by: EMERGENCY MEDICINE

## 2023-01-01 PROCEDURE — A9585 GADOBUTROL INJECTION: HCPCS | Performed by: RADIOLOGY

## 2023-01-01 PROCEDURE — 99207 MRA BRAIN (CIRCLE OF WILLIS) W/O CONTRAST: CPT | Performed by: RADIOLOGY

## 2023-01-01 PROCEDURE — 96374 THER/PROPH/DIAG INJ IV PUSH: CPT | Mod: 59 | Performed by: FAMILY MEDICINE

## 2023-01-01 PROCEDURE — 83690 ASSAY OF LIPASE: CPT | Performed by: EMERGENCY MEDICINE

## 2023-01-01 PROCEDURE — 255N000002 HC RX 255 OP 636: Performed by: INTERNAL MEDICINE

## 2023-01-01 PROCEDURE — 86038 ANTINUCLEAR ANTIBODIES: CPT

## 2023-01-01 PROCEDURE — 80053 COMPREHEN METABOLIC PANEL: CPT

## 2023-01-01 PROCEDURE — 70553 MRI BRAIN STEM W/O & W/DYE: CPT | Performed by: RADIOLOGY

## 2023-01-01 PROCEDURE — 85027 COMPLETE CBC AUTOMATED: CPT | Performed by: NURSE PRACTITIONER

## 2023-01-01 PROCEDURE — 80053 COMPREHEN METABOLIC PANEL: CPT | Performed by: FAMILY MEDICINE

## 2023-01-01 PROCEDURE — 250N000013 HC RX MED GY IP 250 OP 250 PS 637

## 2023-01-01 PROCEDURE — 93350 STRESS TTE ONLY: CPT | Mod: 26 | Performed by: INTERNAL MEDICINE

## 2023-01-01 PROCEDURE — 83880 ASSAY OF NATRIURETIC PEPTIDE: CPT | Performed by: FAMILY MEDICINE

## 2023-01-01 PROCEDURE — 360N000076 HC SURGERY LEVEL 3, PER MIN: Performed by: OBSTETRICS & GYNECOLOGY

## 2023-01-01 PROCEDURE — 250N000013 HC RX MED GY IP 250 OP 250 PS 637: Performed by: RADIOLOGY

## 2023-01-01 PROCEDURE — 83605 ASSAY OF LACTIC ACID: CPT | Performed by: FAMILY MEDICINE

## 2023-01-01 PROCEDURE — 88185 FLOWCYTOMETRY/TC ADD-ON: CPT | Performed by: SURGERY

## 2023-01-01 PROCEDURE — 99207 PR APP CREDIT; MD BILLING SHARED VISIT: CPT | Performed by: NURSE PRACTITIONER

## 2023-01-01 PROCEDURE — 88333 PATH CONSLTJ SURG CYTO XM 1: CPT | Mod: 26 | Performed by: STUDENT IN AN ORGANIZED HEALTH CARE EDUCATION/TRAINING PROGRAM

## 2023-01-01 PROCEDURE — 250N000011 HC RX IP 250 OP 636: Mod: JZ | Performed by: FAMILY MEDICINE

## 2023-01-01 PROCEDURE — 76856 US EXAM PELVIC COMPLETE: CPT

## 2023-01-01 PROCEDURE — 84484 ASSAY OF TROPONIN QUANT: CPT | Performed by: FAMILY MEDICINE

## 2023-01-01 PROCEDURE — 86304 IMMUNOASSAY TUMOR CA 125: CPT | Performed by: OBSTETRICS & GYNECOLOGY

## 2023-01-01 PROCEDURE — 74177 CT ABD & PELVIS W/CONTRAST: CPT | Mod: 26 | Performed by: RADIOLOGY

## 2023-01-01 PROCEDURE — 99215 OFFICE O/P EST HI 40 MIN: CPT | Performed by: INTERNAL MEDICINE

## 2023-01-01 PROCEDURE — 250N000011 HC RX IP 250 OP 636: Mod: JZ | Performed by: STUDENT IN AN ORGANIZED HEALTH CARE EDUCATION/TRAINING PROGRAM

## 2023-01-01 PROCEDURE — 99215 OFFICE O/P EST HI 40 MIN: CPT | Mod: 95 | Performed by: INTERNAL MEDICINE

## 2023-01-01 PROCEDURE — 999N000133 HC STATISTIC PP CARE STAGE 2

## 2023-01-01 PROCEDURE — 99214 OFFICE O/P EST MOD 30 MIN: CPT | Mod: 93 | Performed by: NURSE PRACTITIONER

## 2023-01-01 PROCEDURE — 999N000102 HC STATISTIC NO CHARGE CLINIC VISIT: Mod: GT,95 | Performed by: INTERNAL MEDICINE

## 2023-01-01 PROCEDURE — 81001 URINALYSIS AUTO W/SCOPE: CPT | Performed by: FAMILY MEDICINE

## 2023-01-01 PROCEDURE — 93016 CV STRESS TEST SUPVJ ONLY: CPT | Performed by: INTERNAL MEDICINE

## 2023-01-01 PROCEDURE — 85027 COMPLETE CBC AUTOMATED: CPT | Performed by: OBSTETRICS & GYNECOLOGY

## 2023-01-01 PROCEDURE — 99207 PR NO CHARGE LOS: CPT | Mod: 93 | Performed by: NURSE PRACTITIONER

## 2023-01-01 PROCEDURE — 99152 MOD SED SAME PHYS/QHP 5/>YRS: CPT | Mod: GC | Performed by: STUDENT IN AN ORGANIZED HEALTH CARE EDUCATION/TRAINING PROGRAM

## 2023-01-01 PROCEDURE — 93971 EXTREMITY STUDY: CPT | Mod: LT

## 2023-01-01 PROCEDURE — 999N000142 HC STATISTIC PROCEDURE PREP ONLY

## 2023-01-01 DEVICE — CATH PORT POWERPORT CLEARVUE SLIM 6FR 5616000
Type: IMPLANTABLE DEVICE | Site: NECK | Status: NON-FUNCTIONAL
Removed: 2023-07-27

## 2023-01-01 RX ORDER — AMOXICILLIN 250 MG
2 CAPSULE ORAL 2 TIMES DAILY
Status: DISCONTINUED | OUTPATIENT
Start: 2023-01-01 | End: 2023-01-01 | Stop reason: HOSPADM

## 2023-01-01 RX ORDER — PANTOPRAZOLE SODIUM 40 MG/1
40 TABLET, DELAYED RELEASE ORAL
Status: DISCONTINUED | OUTPATIENT
Start: 2023-01-01 | End: 2023-01-01 | Stop reason: HOSPADM

## 2023-01-01 RX ORDER — DIPHENHYDRAMINE HYDROCHLORIDE 50 MG/ML
50 INJECTION INTRAMUSCULAR; INTRAVENOUS
Status: CANCELLED
Start: 2023-01-01

## 2023-01-01 RX ORDER — IOPAMIDOL 755 MG/ML
51 INJECTION, SOLUTION INTRAVASCULAR ONCE
Status: COMPLETED | OUTPATIENT
Start: 2023-01-01 | End: 2023-01-01

## 2023-01-01 RX ORDER — HYDROMORPHONE HCL IN WATER/PF 6 MG/30 ML
0.2 PATIENT CONTROLLED ANALGESIA SYRINGE INTRAVENOUS EVERY 5 MIN PRN
Status: DISCONTINUED | OUTPATIENT
Start: 2023-01-01 | End: 2023-01-01 | Stop reason: HOSPADM

## 2023-01-01 RX ORDER — GADOBUTROL 604.72 MG/ML
7.5 INJECTION INTRAVENOUS ONCE
Status: COMPLETED | OUTPATIENT
Start: 2023-01-01 | End: 2023-01-01

## 2023-01-01 RX ORDER — NALOXONE HYDROCHLORIDE 0.4 MG/ML
0.4 INJECTION, SOLUTION INTRAMUSCULAR; INTRAVENOUS; SUBCUTANEOUS
Status: DISCONTINUED | OUTPATIENT
Start: 2023-01-01 | End: 2023-01-01 | Stop reason: HOSPADM

## 2023-01-01 RX ORDER — HEPARIN SODIUM,PORCINE 10 UNIT/ML
5 VIAL (ML) INTRAVENOUS
Status: CANCELLED | OUTPATIENT
Start: 2023-01-01

## 2023-01-01 RX ORDER — TRAMADOL HYDROCHLORIDE 50 MG/1
50 TABLET ORAL EVERY 6 HOURS
Status: DISCONTINUED | OUTPATIENT
Start: 2023-01-01 | End: 2023-01-01 | Stop reason: HOSPADM

## 2023-01-01 RX ORDER — HEPARIN SODIUM,PORCINE 10 UNIT/ML
5-10 VIAL (ML) INTRAVENOUS EVERY 24 HOURS
Status: DISCONTINUED | OUTPATIENT
Start: 2023-01-01 | End: 2023-01-01 | Stop reason: HOSPADM

## 2023-01-01 RX ORDER — EPINEPHRINE 1 MG/ML
0.3 INJECTION, SOLUTION INTRAMUSCULAR; SUBCUTANEOUS EVERY 5 MIN PRN
Status: CANCELLED | OUTPATIENT
Start: 2023-01-01

## 2023-01-01 RX ORDER — LIDOCAINE/PRILOCAINE 2.5 %-2.5%
CREAM (GRAM) TOPICAL PRN
Qty: 30 G | Refills: 3 | Status: SHIPPED | OUTPATIENT
Start: 2023-01-01

## 2023-01-01 RX ORDER — BISACODYL 10 MG
10 SUPPOSITORY, RECTAL RECTAL DAILY
Status: CANCELLED | OUTPATIENT
Start: 2023-01-01

## 2023-01-01 RX ORDER — FENTANYL CITRATE 50 UG/ML
25 INJECTION, SOLUTION INTRAMUSCULAR; INTRAVENOUS EVERY 5 MIN PRN
Status: DISCONTINUED | OUTPATIENT
Start: 2023-01-01 | End: 2023-01-01 | Stop reason: HOSPADM

## 2023-01-01 RX ORDER — METOCLOPRAMIDE 5 MG/1
5 TABLET ORAL
Qty: 28 TABLET | Refills: 0 | Status: SHIPPED | OUTPATIENT
Start: 2023-01-01 | End: 2023-01-01

## 2023-01-01 RX ORDER — IOPAMIDOL 755 MG/ML
66 INJECTION, SOLUTION INTRAVASCULAR ONCE
Status: COMPLETED | OUTPATIENT
Start: 2023-01-01 | End: 2023-01-01

## 2023-01-01 RX ORDER — CEFAZOLIN SODIUM 2 G/50ML
2 SOLUTION INTRAVENOUS
Status: COMPLETED | OUTPATIENT
Start: 2023-01-01 | End: 2023-01-01

## 2023-01-01 RX ORDER — FENTANYL CITRATE 50 UG/ML
INJECTION, SOLUTION INTRAMUSCULAR; INTRAVENOUS PRN
Status: DISCONTINUED | OUTPATIENT
Start: 2023-01-01 | End: 2023-01-01

## 2023-01-01 RX ORDER — ONDANSETRON 2 MG/ML
4 INJECTION INTRAMUSCULAR; INTRAVENOUS EVERY 30 MIN PRN
Status: DISCONTINUED | OUTPATIENT
Start: 2023-01-01 | End: 2023-01-01 | Stop reason: HOSPADM

## 2023-01-01 RX ORDER — METHYLPREDNISOLONE SODIUM SUCCINATE 125 MG/2ML
125 INJECTION, POWDER, LYOPHILIZED, FOR SOLUTION INTRAMUSCULAR; INTRAVENOUS
Status: CANCELLED
Start: 2023-01-01

## 2023-01-01 RX ORDER — ALBUTEROL SULFATE 90 UG/1
1-2 AEROSOL, METERED RESPIRATORY (INHALATION)
Status: CANCELLED
Start: 2023-01-01

## 2023-01-01 RX ORDER — TRAMADOL HYDROCHLORIDE 50 MG/1
50 TABLET ORAL 4 TIMES DAILY PRN
Status: DISCONTINUED | OUTPATIENT
Start: 2023-01-01 | End: 2023-01-01

## 2023-01-01 RX ORDER — TRAMADOL HYDROCHLORIDE 50 MG/1
50 TABLET ORAL EVERY 4 HOURS
Qty: 180 TABLET | Refills: 0 | Status: SHIPPED | OUTPATIENT
Start: 2023-01-01

## 2023-01-01 RX ORDER — PACLITAXEL 100 MG/20ML
125 INJECTION, POWDER, LYOPHILIZED, FOR SUSPENSION INTRAVENOUS ONCE
Status: CANCELLED | OUTPATIENT
Start: 2023-01-01

## 2023-01-01 RX ORDER — LORAZEPAM 2 MG/ML
0.5 INJECTION INTRAMUSCULAR EVERY 4 HOURS PRN
Status: CANCELLED | OUTPATIENT
Start: 2023-01-01

## 2023-01-01 RX ORDER — SIMETHICONE 80 MG
80 TABLET,CHEWABLE ORAL 4 TIMES DAILY PRN
Status: CANCELLED | OUTPATIENT
Start: 2023-01-01

## 2023-01-01 RX ORDER — TRAMADOL HYDROCHLORIDE 50 MG/1
50 TABLET ORAL 4 TIMES DAILY PRN
Qty: 180 TABLET | Refills: 0 | Status: ON HOLD | OUTPATIENT
Start: 2023-01-01 | End: 2023-01-01

## 2023-01-01 RX ORDER — ONDANSETRON 2 MG/ML
8 INJECTION INTRAMUSCULAR; INTRAVENOUS ONCE
Status: COMPLETED | OUTPATIENT
Start: 2023-01-01 | End: 2023-01-01

## 2023-01-01 RX ORDER — TRAMADOL HYDROCHLORIDE 50 MG/1
50 TABLET ORAL
Qty: 24 TABLET | Refills: 0 | Status: SHIPPED | OUTPATIENT
Start: 2023-01-01 | End: 2023-01-01

## 2023-01-01 RX ORDER — LIDOCAINE/PRILOCAINE 2.5 %-2.5%
CREAM (GRAM) TOPICAL PRN
Qty: 30 G | Refills: 3 | Status: SHIPPED | OUTPATIENT
Start: 2023-01-01 | End: 2023-01-01

## 2023-01-01 RX ORDER — MEPERIDINE HYDROCHLORIDE 25 MG/ML
25 INJECTION INTRAMUSCULAR; INTRAVENOUS; SUBCUTANEOUS EVERY 30 MIN PRN
Status: CANCELLED | OUTPATIENT
Start: 2023-01-01

## 2023-01-01 RX ORDER — PACLITAXEL 100 MG/20ML
190 INJECTION, POWDER, LYOPHILIZED, FOR SUSPENSION INTRAVENOUS ONCE
Status: CANCELLED | OUTPATIENT
Start: 2023-01-01

## 2023-01-01 RX ORDER — HEPARIN SODIUM 10000 [USP'U]/ML
5000 INJECTION, SOLUTION INTRAVENOUS; SUBCUTANEOUS
Status: CANCELLED | OUTPATIENT
Start: 2023-01-01

## 2023-01-01 RX ORDER — ONDANSETRON 2 MG/ML
8 INJECTION INTRAMUSCULAR; INTRAVENOUS ONCE
Status: CANCELLED | OUTPATIENT
Start: 2023-01-01

## 2023-01-01 RX ORDER — LIDOCAINE 40 MG/G
CREAM TOPICAL
Status: DISCONTINUED | OUTPATIENT
Start: 2023-01-01 | End: 2023-01-01 | Stop reason: HOSPADM

## 2023-01-01 RX ORDER — PHENAZOPYRIDINE HYDROCHLORIDE 100 MG/1
200 TABLET, FILM COATED ORAL ONCE
Status: CANCELLED | OUTPATIENT
Start: 2023-01-01 | End: 2023-01-01

## 2023-01-01 RX ORDER — SODIUM CHLORIDE, SODIUM LACTATE, POTASSIUM CHLORIDE, CALCIUM CHLORIDE 600; 310; 30; 20 MG/100ML; MG/100ML; MG/100ML; MG/100ML
INJECTION, SOLUTION INTRAVENOUS CONTINUOUS PRN
Status: DISCONTINUED | OUTPATIENT
Start: 2023-01-01 | End: 2023-01-01

## 2023-01-01 RX ORDER — ALBUTEROL SULFATE 0.83 MG/ML
2.5 SOLUTION RESPIRATORY (INHALATION)
Status: CANCELLED | OUTPATIENT
Start: 2023-01-01

## 2023-01-01 RX ORDER — TRAMADOL HYDROCHLORIDE 50 MG/1
50 TABLET ORAL 4 TIMES DAILY PRN
Qty: 60 TABLET | Refills: 0 | Status: SHIPPED | OUTPATIENT
Start: 2023-01-01 | End: 2023-01-01

## 2023-01-01 RX ORDER — HYDROMORPHONE HYDROCHLORIDE 1 MG/ML
0.4 INJECTION, SOLUTION INTRAMUSCULAR; INTRAVENOUS; SUBCUTANEOUS EVERY 5 MIN PRN
Status: DISCONTINUED | OUTPATIENT
Start: 2023-01-01 | End: 2023-01-01 | Stop reason: HOSPADM

## 2023-01-01 RX ORDER — METOCLOPRAMIDE HYDROCHLORIDE 5 MG/ML
5 INJECTION INTRAMUSCULAR; INTRAVENOUS ONCE
Status: COMPLETED | OUTPATIENT
Start: 2023-01-01 | End: 2023-01-01

## 2023-01-01 RX ORDER — HEPARIN SODIUM (PORCINE) LOCK FLUSH IV SOLN 100 UNIT/ML 100 UNIT/ML
5 SOLUTION INTRAVENOUS
Status: DISCONTINUED | OUTPATIENT
Start: 2023-01-01 | End: 2023-01-01 | Stop reason: HOSPADM

## 2023-01-01 RX ORDER — HEPARIN SODIUM (PORCINE) LOCK FLUSH IV SOLN 100 UNIT/ML 100 UNIT/ML
5 SOLUTION INTRAVENOUS
Status: CANCELLED | OUTPATIENT
Start: 2023-01-01

## 2023-01-01 RX ORDER — LABETALOL HYDROCHLORIDE 5 MG/ML
10 INJECTION, SOLUTION INTRAVENOUS
Status: DISCONTINUED | OUTPATIENT
Start: 2023-01-01 | End: 2023-01-01 | Stop reason: HOSPADM

## 2023-01-01 RX ORDER — PROPOFOL 10 MG/ML
INJECTION, EMULSION INTRAVENOUS CONTINUOUS PRN
Status: DISCONTINUED | OUTPATIENT
Start: 2023-01-01 | End: 2023-01-01

## 2023-01-01 RX ORDER — LIDOCAINE HYDROCHLORIDE AND EPINEPHRINE 10; 10 MG/ML; UG/ML
INJECTION, SOLUTION INFILTRATION; PERINEURAL PRN
Status: DISCONTINUED | OUTPATIENT
Start: 2023-01-01 | End: 2023-01-01 | Stop reason: HOSPADM

## 2023-01-01 RX ORDER — IBUPROFEN 200 MG
600 TABLET ORAL EVERY 6 HOURS PRN
Status: DISCONTINUED | OUTPATIENT
Start: 2023-01-01 | End: 2023-01-01 | Stop reason: HOSPADM

## 2023-01-01 RX ORDER — AZELASTINE 1 MG/ML
1 SPRAY, METERED NASAL 2 TIMES DAILY PRN
Status: DISCONTINUED | OUTPATIENT
Start: 2023-01-01 | End: 2023-01-01 | Stop reason: HOSPADM

## 2023-01-01 RX ORDER — LIDOCAINE HYDROCHLORIDE 20 MG/ML
INJECTION, SOLUTION INFILTRATION; PERINEURAL PRN
Status: DISCONTINUED | OUTPATIENT
Start: 2023-01-01 | End: 2023-01-01

## 2023-01-01 RX ORDER — SODIUM CHLORIDE 9 MG/ML
INJECTION, SOLUTION INTRAVENOUS CONTINUOUS
Status: DISCONTINUED | OUTPATIENT
Start: 2023-01-01 | End: 2023-01-01 | Stop reason: HOSPADM

## 2023-01-01 RX ORDER — AMOXICILLIN 250 MG
2 CAPSULE ORAL 2 TIMES DAILY
Qty: 30 TABLET | Refills: 0
Start: 2023-01-01

## 2023-01-01 RX ORDER — ACETAMINOPHEN 325 MG/1
650 TABLET ORAL EVERY 6 HOURS
Status: CANCELLED | OUTPATIENT
Start: 2023-01-01

## 2023-01-01 RX ORDER — PROPOFOL 10 MG/ML
INJECTION, EMULSION INTRAVENOUS PRN
Status: DISCONTINUED | OUTPATIENT
Start: 2023-01-01 | End: 2023-01-01

## 2023-01-01 RX ORDER — ONDANSETRON 2 MG/ML
4 INJECTION INTRAMUSCULAR; INTRAVENOUS EVERY 6 HOURS PRN
Status: DISCONTINUED | OUTPATIENT
Start: 2023-01-01 | End: 2023-01-01 | Stop reason: HOSPADM

## 2023-01-01 RX ORDER — PACLITAXEL 100 MG/20ML
190 INJECTION, POWDER, LYOPHILIZED, FOR SUSPENSION INTRAVENOUS ONCE
Status: COMPLETED | OUTPATIENT
Start: 2023-01-01 | End: 2023-01-01

## 2023-01-01 RX ORDER — HEPARIN SODIUM (PORCINE) LOCK FLUSH IV SOLN 100 UNIT/ML 100 UNIT/ML
5-10 SOLUTION INTRAVENOUS
Status: DISCONTINUED | OUTPATIENT
Start: 2023-01-01 | End: 2023-01-01 | Stop reason: HOSPADM

## 2023-01-01 RX ORDER — KETOROLAC TROMETHAMINE 15 MG/ML
30 INJECTION, SOLUTION INTRAMUSCULAR; INTRAVENOUS ONCE
Status: COMPLETED | OUTPATIENT
Start: 2023-01-01 | End: 2023-01-01

## 2023-01-01 RX ORDER — ACETAMINOPHEN 650 MG/1
650 SUPPOSITORY RECTAL EVERY 6 HOURS PRN
Status: DISCONTINUED | OUTPATIENT
Start: 2023-01-01 | End: 2023-01-01 | Stop reason: HOSPADM

## 2023-01-01 RX ORDER — HEPARIN SODIUM 5000 [USP'U]/.5ML
5000 INJECTION, SOLUTION INTRAVENOUS; SUBCUTANEOUS
Status: COMPLETED | OUTPATIENT
Start: 2023-01-01 | End: 2023-01-01

## 2023-01-01 RX ORDER — PANTOPRAZOLE SODIUM 40 MG/1
40 TABLET, DELAYED RELEASE ORAL
DISCHARGE
Start: 2023-01-01

## 2023-01-01 RX ORDER — ACETAMINOPHEN 325 MG/1
650 TABLET ORAL EVERY 6 HOURS PRN
Status: DISCONTINUED | OUTPATIENT
Start: 2023-01-01 | End: 2023-01-01 | Stop reason: HOSPADM

## 2023-01-01 RX ORDER — BISACODYL 5 MG
5 TABLET, DELAYED RELEASE (ENTERIC COATED) ORAL DAILY PRN
DISCHARGE
Start: 2023-01-01 | End: 2023-01-01

## 2023-01-01 RX ORDER — PHENAZOPYRIDINE HYDROCHLORIDE 200 MG/1
200 TABLET, FILM COATED ORAL ONCE
Status: COMPLETED | OUTPATIENT
Start: 2023-01-01 | End: 2023-01-01

## 2023-01-01 RX ORDER — POLYETHYLENE GLYCOL 3350 17 G/17G
17 POWDER, FOR SOLUTION ORAL DAILY PRN
Status: CANCELLED | OUTPATIENT
Start: 2023-01-01

## 2023-01-01 RX ORDER — NALOXONE HYDROCHLORIDE 0.4 MG/ML
0.2 INJECTION, SOLUTION INTRAMUSCULAR; INTRAVENOUS; SUBCUTANEOUS
Status: DISCONTINUED | OUTPATIENT
Start: 2023-01-01 | End: 2023-01-01 | Stop reason: HOSPADM

## 2023-01-01 RX ORDER — CEFAZOLIN SODIUM/WATER 2 G/20 ML
2 SYRINGE (ML) INTRAVENOUS SEE ADMIN INSTRUCTIONS
Status: DISCONTINUED | OUTPATIENT
Start: 2023-01-01 | End: 2023-01-01 | Stop reason: HOSPADM

## 2023-01-01 RX ORDER — FLUMAZENIL 0.1 MG/ML
0.2 INJECTION, SOLUTION INTRAVENOUS
Status: DISCONTINUED | OUTPATIENT
Start: 2023-01-01 | End: 2023-01-01 | Stop reason: HOSPADM

## 2023-01-01 RX ORDER — HYDROMORPHONE HYDROCHLORIDE 1 MG/ML
0.5 INJECTION, SOLUTION INTRAMUSCULAR; INTRAVENOUS; SUBCUTANEOUS EVERY 30 MIN PRN
Status: DISCONTINUED | OUTPATIENT
Start: 2023-01-01 | End: 2023-01-01

## 2023-01-01 RX ORDER — AZELASTINE 1 MG/ML
1 SPRAY, METERED NASAL 2 TIMES DAILY
Qty: 30 ML | Refills: 3 | Status: SHIPPED | OUTPATIENT
Start: 2023-01-01

## 2023-01-01 RX ORDER — POLYETHYLENE GLYCOL 3350 17 G/17G
17 POWDER, FOR SOLUTION ORAL DAILY
Status: DISCONTINUED | OUTPATIENT
Start: 2023-01-01 | End: 2023-01-01 | Stop reason: HOSPADM

## 2023-01-01 RX ORDER — ONDANSETRON 4 MG/1
4 TABLET, ORALLY DISINTEGRATING ORAL EVERY 6 HOURS PRN
Refills: 0 | DISCHARGE
Start: 2023-01-01

## 2023-01-01 RX ORDER — TRAMADOL HYDROCHLORIDE 50 MG/1
50 TABLET ORAL EVERY 6 HOURS PRN
Status: DISCONTINUED | OUTPATIENT
Start: 2023-01-01 | End: 2023-01-01 | Stop reason: HOSPADM

## 2023-01-01 RX ORDER — PROCHLORPERAZINE MALEATE 10 MG
10 TABLET ORAL EVERY 6 HOURS PRN
Qty: 30 TABLET | Refills: 2 | Status: ON HOLD | OUTPATIENT
Start: 2023-01-01 | End: 2023-01-01

## 2023-01-01 RX ORDER — CEFAZOLIN SODIUM/WATER 2 G/20 ML
2 SYRINGE (ML) INTRAVENOUS
Status: COMPLETED | OUTPATIENT
Start: 2023-01-01 | End: 2023-01-01

## 2023-01-01 RX ORDER — TRAMADOL HYDROCHLORIDE 50 MG/1
50 TABLET ORAL EVERY 6 HOURS PRN
Qty: 30 TABLET | Refills: 0 | Status: SHIPPED | OUTPATIENT
Start: 2023-01-01 | End: 2023-01-01

## 2023-01-01 RX ORDER — ONDANSETRON 4 MG/1
4 TABLET, ORALLY DISINTEGRATING ORAL EVERY 30 MIN PRN
Status: DISCONTINUED | OUTPATIENT
Start: 2023-01-01 | End: 2023-01-01 | Stop reason: HOSPADM

## 2023-01-01 RX ORDER — ACETAMINOPHEN 500 MG
1000 TABLET ORAL ONCE
Status: COMPLETED | OUTPATIENT
Start: 2023-01-01 | End: 2023-01-01

## 2023-01-01 RX ORDER — HEPARIN SODIUM,PORCINE 10 UNIT/ML
5-10 VIAL (ML) INTRAVENOUS
Status: DISCONTINUED | OUTPATIENT
Start: 2023-01-01 | End: 2023-01-01 | Stop reason: HOSPADM

## 2023-01-01 RX ORDER — BISACODYL 5 MG
5 TABLET, DELAYED RELEASE (ENTERIC COATED) ORAL DAILY PRN
Status: DISCONTINUED | OUTPATIENT
Start: 2023-01-01 | End: 2023-01-01 | Stop reason: HOSPADM

## 2023-01-01 RX ORDER — OXYCODONE HYDROCHLORIDE 5 MG/1
5 TABLET ORAL
Qty: 24 TABLET | Refills: 0 | Status: SHIPPED | OUTPATIENT
Start: 2023-01-01

## 2023-01-01 RX ORDER — HEPARIN SODIUM (PORCINE) LOCK FLUSH IV SOLN 100 UNIT/ML 100 UNIT/ML
500 SOLUTION INTRAVENOUS EVERY 8 HOURS
Status: DISCONTINUED | OUTPATIENT
Start: 2023-01-01 | End: 2023-01-01 | Stop reason: HOSPADM

## 2023-01-01 RX ORDER — LIDOCAINE HYDROCHLORIDE 10 MG/ML
INJECTION, SOLUTION INFILTRATION; PERINEURAL PRN
Status: DISCONTINUED | OUTPATIENT
Start: 2023-01-01 | End: 2023-01-01 | Stop reason: HOSPADM

## 2023-01-01 RX ORDER — HEPARIN SODIUM,PORCINE 10 UNIT/ML
VIAL (ML) INTRAVENOUS PRN
Status: DISCONTINUED | OUTPATIENT
Start: 2023-01-01 | End: 2023-01-01 | Stop reason: HOSPADM

## 2023-01-01 RX ORDER — TRAMADOL HYDROCHLORIDE 50 MG/1
50 TABLET ORAL
Qty: 24 TABLET | Refills: 0
Start: 2023-01-01 | End: 2023-01-01

## 2023-01-01 RX ORDER — ACETAMINOPHEN 325 MG/1
975 TABLET ORAL ONCE
Status: COMPLETED | OUTPATIENT
Start: 2023-01-01 | End: 2023-01-01

## 2023-01-01 RX ORDER — LIDOCAINE HYDROCHLORIDE 10 MG/ML
INJECTION, SOLUTION EPIDURAL; INFILTRATION; INTRACAUDAL; PERINEURAL DAILY PRN
Status: DISCONTINUED | OUTPATIENT
Start: 2023-01-01 | End: 2023-01-01 | Stop reason: HOSPADM

## 2023-01-01 RX ORDER — ACETAMINOPHEN 500 MG
500-1000 TABLET ORAL EVERY 6 HOURS PRN
Status: ON HOLD | COMMUNITY
End: 2023-01-01

## 2023-01-01 RX ORDER — AMOXICILLIN 250 MG
2 CAPSULE ORAL 2 TIMES DAILY
Status: CANCELLED | OUTPATIENT
Start: 2023-01-01

## 2023-01-01 RX ORDER — AMOXICILLIN 250 MG
1-2 CAPSULE ORAL 2 TIMES DAILY
Qty: 30 TABLET | Refills: 0 | Status: SHIPPED | OUTPATIENT
Start: 2023-01-01 | End: 2023-01-01

## 2023-01-01 RX ORDER — OXYCODONE HYDROCHLORIDE 10 MG/1
10 TABLET ORAL EVERY 4 HOURS PRN
Status: CANCELLED | OUTPATIENT
Start: 2023-01-01

## 2023-01-01 RX ORDER — DEXAMETHASONE SODIUM PHOSPHATE 4 MG/ML
INJECTION, SOLUTION INTRA-ARTICULAR; INTRALESIONAL; INTRAMUSCULAR; INTRAVENOUS; SOFT TISSUE PRN
Status: DISCONTINUED | OUTPATIENT
Start: 2023-01-01 | End: 2023-01-01

## 2023-01-01 RX ORDER — TRAMADOL HYDROCHLORIDE 50 MG/1
50 TABLET ORAL ONCE
Status: COMPLETED | OUTPATIENT
Start: 2023-01-01 | End: 2023-01-01

## 2023-01-01 RX ORDER — DIPHENHYDRAMINE HCL 25 MG
25 CAPSULE ORAL EVERY 6 HOURS PRN
Status: ON HOLD | COMMUNITY
End: 2023-01-01

## 2023-01-01 RX ORDER — AMOXICILLIN 250 MG
1 CAPSULE ORAL 2 TIMES DAILY
Status: CANCELLED | OUTPATIENT
Start: 2023-01-01

## 2023-01-01 RX ORDER — AMITRIPTYLINE HYDROCHLORIDE 10 MG/1
10-20 TABLET ORAL AT BEDTIME
Qty: 60 TABLET | Refills: 1 | Status: SHIPPED | OUTPATIENT
Start: 2023-01-01 | End: 2023-01-01

## 2023-01-01 RX ORDER — METRONIDAZOLE 500 MG/100ML
500 INJECTION, SOLUTION INTRAVENOUS
Status: COMPLETED | OUTPATIENT
Start: 2023-01-01 | End: 2023-01-01

## 2023-01-01 RX ORDER — SODIUM CHLORIDE, SODIUM LACTATE, POTASSIUM CHLORIDE, CALCIUM CHLORIDE 600; 310; 30; 20 MG/100ML; MG/100ML; MG/100ML; MG/100ML
INJECTION, SOLUTION INTRAVENOUS CONTINUOUS
Status: DISCONTINUED | OUTPATIENT
Start: 2023-01-01 | End: 2023-01-01 | Stop reason: HOSPADM

## 2023-01-01 RX ORDER — IBUPROFEN 200 MG
600 TABLET ORAL EVERY 6 HOURS
Status: CANCELLED | OUTPATIENT
Start: 2023-01-01

## 2023-01-01 RX ORDER — HYDROMORPHONE HCL IN WATER/PF 6 MG/30 ML
0.4 PATIENT CONTROLLED ANALGESIA SYRINGE INTRAVENOUS EVERY 5 MIN PRN
Status: DISCONTINUED | OUTPATIENT
Start: 2023-01-01 | End: 2023-01-01 | Stop reason: HOSPADM

## 2023-01-01 RX ORDER — ONDANSETRON 2 MG/ML
INJECTION INTRAMUSCULAR; INTRAVENOUS PRN
Status: DISCONTINUED | OUTPATIENT
Start: 2023-01-01 | End: 2023-01-01

## 2023-01-01 RX ORDER — AMOXICILLIN 250 MG
1 CAPSULE ORAL 2 TIMES DAILY
Status: DISCONTINUED | OUTPATIENT
Start: 2023-01-01 | End: 2023-01-01 | Stop reason: HOSPADM

## 2023-01-01 RX ORDER — HEPARIN SODIUM (PORCINE) LOCK FLUSH IV SOLN 100 UNIT/ML 100 UNIT/ML
5 SOLUTION INTRAVENOUS EVERY 8 HOURS
Status: DISCONTINUED | OUTPATIENT
Start: 2023-01-01 | End: 2023-01-01 | Stop reason: HOSPADM

## 2023-01-01 RX ORDER — TRAMADOL HYDROCHLORIDE 50 MG/1
50 TABLET ORAL EVERY 6 HOURS PRN
Qty: 30 TABLET | Refills: 1 | Status: SHIPPED | OUTPATIENT
Start: 2023-01-01 | End: 2023-01-01

## 2023-01-01 RX ORDER — SENNOSIDES 8.6 MG
8.6 TABLET ORAL 2 TIMES DAILY PRN
Status: DISCONTINUED | OUTPATIENT
Start: 2023-01-01 | End: 2023-01-01 | Stop reason: HOSPADM

## 2023-01-01 RX ORDER — ACETAMINOPHEN 325 MG/1
650 TABLET ORAL EVERY 4 HOURS PRN
Qty: 100 TABLET | Refills: 0 | Status: SHIPPED | OUTPATIENT
Start: 2023-01-01

## 2023-01-01 RX ORDER — FENTANYL CITRATE 50 UG/ML
50 INJECTION, SOLUTION INTRAMUSCULAR; INTRAVENOUS EVERY 5 MIN PRN
Status: DISCONTINUED | OUTPATIENT
Start: 2023-01-01 | End: 2023-01-01 | Stop reason: HOSPADM

## 2023-01-01 RX ORDER — MECLIZINE HYDROCHLORIDE 25 MG/1
25 TABLET ORAL 3 TIMES DAILY PRN
Qty: 30 TABLET | Refills: 1 | Status: SHIPPED | OUTPATIENT
Start: 2023-01-01 | End: 2023-01-01

## 2023-01-01 RX ORDER — TRAMADOL HYDROCHLORIDE 50 MG/1
50 TABLET ORAL EVERY 6 HOURS PRN
Qty: 15 TABLET | Refills: 0 | Status: SHIPPED | OUTPATIENT
Start: 2023-01-01 | End: 2023-01-01

## 2023-01-01 RX ORDER — CLINDAMYCIN PHOSPHATE 900 MG/50ML
900 INJECTION, SOLUTION INTRAVENOUS
Status: DISCONTINUED | OUTPATIENT
Start: 2023-01-01 | End: 2023-01-01

## 2023-01-01 RX ORDER — CLOTRIMAZOLE AND BETAMETHASONE DIPROPIONATE 10; .64 MG/G; MG/G
CREAM TOPICAL 2 TIMES DAILY
Qty: 15 G | Refills: 0 | Status: ON HOLD | OUTPATIENT
Start: 2023-01-01 | End: 2023-01-01

## 2023-01-01 RX ORDER — CLINDAMYCIN PHOSPHATE 900 MG/50ML
900 INJECTION, SOLUTION INTRAVENOUS SEE ADMIN INSTRUCTIONS
Status: DISCONTINUED | OUTPATIENT
Start: 2023-01-01 | End: 2023-01-01

## 2023-01-01 RX ORDER — TRAMADOL HYDROCHLORIDE 50 MG/1
50 TABLET ORAL EVERY 6 HOURS PRN
Qty: 60 TABLET | Refills: 0 | Status: SHIPPED | OUTPATIENT
Start: 2023-01-01 | End: 2023-01-01

## 2023-01-01 RX ORDER — ONDANSETRON 4 MG/1
4 TABLET, ORALLY DISINTEGRATING ORAL EVERY 6 HOURS PRN
Status: DISCONTINUED | OUTPATIENT
Start: 2023-01-01 | End: 2023-01-01 | Stop reason: HOSPADM

## 2023-01-01 RX ORDER — IOPAMIDOL 755 MG/ML
65 INJECTION, SOLUTION INTRAVASCULAR ONCE
Status: COMPLETED | OUTPATIENT
Start: 2023-01-01 | End: 2023-01-01

## 2023-01-01 RX ORDER — IOPAMIDOL 755 MG/ML
67 INJECTION, SOLUTION INTRAVASCULAR ONCE
Status: COMPLETED | OUTPATIENT
Start: 2023-01-01 | End: 2023-01-01

## 2023-01-01 RX ORDER — OXYCODONE HYDROCHLORIDE 5 MG/1
5 TABLET ORAL
Status: DISCONTINUED | OUTPATIENT
Start: 2023-01-01 | End: 2023-01-01 | Stop reason: HOSPADM

## 2023-01-01 RX ORDER — OXYCODONE HYDROCHLORIDE 5 MG/1
5 TABLET ORAL EVERY 4 HOURS PRN
Status: CANCELLED | OUTPATIENT
Start: 2023-01-01

## 2023-01-01 RX ORDER — POLYETHYLENE GLYCOL 3350 17 G/17G
17 POWDER, FOR SOLUTION ORAL DAILY
DISCHARGE
Start: 2023-01-01

## 2023-01-01 RX ORDER — HYDROMORPHONE HYDROCHLORIDE 1 MG/ML
0.5 INJECTION, SOLUTION INTRAMUSCULAR; INTRAVENOUS; SUBCUTANEOUS EVERY 30 MIN PRN
Status: COMPLETED | OUTPATIENT
Start: 2023-01-01 | End: 2023-01-01

## 2023-01-01 RX ORDER — BISACODYL 5 MG
5 TABLET, DELAYED RELEASE (ENTERIC COATED) ORAL DAILY
Qty: 1 TABLET | Refills: 0
Start: 2023-01-01

## 2023-01-01 RX ORDER — BUPIVACAINE HYDROCHLORIDE 2.5 MG/ML
INJECTION, SOLUTION EPIDURAL; INFILTRATION; INTRACAUDAL PRN
Status: DISCONTINUED | OUTPATIENT
Start: 2023-01-01 | End: 2023-01-01 | Stop reason: HOSPADM

## 2023-01-01 RX ORDER — HYDROMORPHONE HYDROCHLORIDE 1 MG/ML
0.5 INJECTION, SOLUTION INTRAMUSCULAR; INTRAVENOUS; SUBCUTANEOUS EVERY 4 HOURS PRN
Status: DISCONTINUED | OUTPATIENT
Start: 2023-01-01 | End: 2023-01-01 | Stop reason: HOSPADM

## 2023-01-01 RX ORDER — LIDOCAINE 40 MG/G
CREAM TOPICAL
Status: CANCELLED | OUTPATIENT
Start: 2023-01-01

## 2023-01-01 RX ORDER — FENTANYL CITRATE 50 UG/ML
25-50 INJECTION, SOLUTION INTRAMUSCULAR; INTRAVENOUS EVERY 5 MIN PRN
Status: DISCONTINUED | OUTPATIENT
Start: 2023-01-01 | End: 2023-01-01 | Stop reason: HOSPADM

## 2023-01-01 RX ORDER — ONDANSETRON 2 MG/ML
4 INJECTION INTRAMUSCULAR; INTRAVENOUS EVERY 30 MIN PRN
Status: DISCONTINUED | OUTPATIENT
Start: 2023-01-01 | End: 2023-01-01

## 2023-01-01 RX ORDER — PROCHLORPERAZINE MALEATE 5 MG
10 TABLET ORAL EVERY 6 HOURS PRN
Status: DISCONTINUED | OUTPATIENT
Start: 2023-01-01 | End: 2023-01-01 | Stop reason: HOSPADM

## 2023-01-01 RX ORDER — HYDROMORPHONE HYDROCHLORIDE 1 MG/ML
0.2 INJECTION, SOLUTION INTRAMUSCULAR; INTRAVENOUS; SUBCUTANEOUS EVERY 5 MIN PRN
Status: DISCONTINUED | OUTPATIENT
Start: 2023-01-01 | End: 2023-01-01 | Stop reason: HOSPADM

## 2023-01-01 RX ORDER — IBUPROFEN 600 MG/1
600 TABLET, FILM COATED ORAL EVERY 6 HOURS PRN
Qty: 30 TABLET | Refills: 0 | Status: SHIPPED | OUTPATIENT
Start: 2023-01-01 | End: 2023-01-01

## 2023-01-01 RX ADMIN — FENTANYL CITRATE 50 MCG: 50 INJECTION, SOLUTION INTRAMUSCULAR; INTRAVENOUS at 09:01

## 2023-01-01 RX ADMIN — OXYCODONE HYDROCHLORIDE 5 MG: 5 TABLET ORAL at 08:15

## 2023-01-01 RX ADMIN — ACETAMINOPHEN 650 MG: 325 TABLET, FILM COATED ORAL at 07:48

## 2023-01-01 RX ADMIN — PANTOPRAZOLE SODIUM 40 MG: 40 TABLET, DELAYED RELEASE ORAL at 09:03

## 2023-01-01 RX ADMIN — FENTANYL CITRATE 25 MCG: 50 INJECTION, SOLUTION INTRAMUSCULAR; INTRAVENOUS at 15:04

## 2023-01-01 RX ADMIN — SODIUM CHLORIDE: 9 INJECTION, SOLUTION INTRAVENOUS at 05:18

## 2023-01-01 RX ADMIN — HYDROMORPHONE HYDROCHLORIDE 0.5 MG: 1 INJECTION, SOLUTION INTRAMUSCULAR; INTRAVENOUS; SUBCUTANEOUS at 22:04

## 2023-01-01 RX ADMIN — SODIUM CHLORIDE, SODIUM LACTATE, POTASSIUM CHLORIDE, CALCIUM CHLORIDE: 600; 310; 30; 20 INJECTION, SOLUTION INTRAVENOUS at 08:15

## 2023-01-01 RX ADMIN — SODIUM CHLORIDE, POTASSIUM CHLORIDE, SODIUM LACTATE AND CALCIUM CHLORIDE: 600; 310; 30; 20 INJECTION, SOLUTION INTRAVENOUS at 07:57

## 2023-01-01 RX ADMIN — SODIUM CHLORIDE 1000 ML: 9 INJECTION, SOLUTION INTRAVENOUS at 22:04

## 2023-01-01 RX ADMIN — HEPARIN SODIUM (PORCINE) LOCK FLUSH IV SOLN 100 UNIT/ML 5 ML: 100 SOLUTION at 11:18

## 2023-01-01 RX ADMIN — DEXAMETHASONE SODIUM PHOSPHATE 6 MG: 4 INJECTION, SOLUTION INTRA-ARTICULAR; INTRALESIONAL; INTRAMUSCULAR; INTRAVENOUS; SOFT TISSUE at 08:11

## 2023-01-01 RX ADMIN — TRAMADOL HYDROCHLORIDE 50 MG: 50 TABLET, COATED ORAL at 00:09

## 2023-01-01 RX ADMIN — SUGAMMADEX 200 MG: 100 INJECTION, SOLUTION INTRAVENOUS at 09:17

## 2023-01-01 RX ADMIN — FENTANYL CITRATE 50 MCG: 50 INJECTION, SOLUTION INTRAMUSCULAR; INTRAVENOUS at 08:33

## 2023-01-01 RX ADMIN — POLYETHYLENE GLYCOL 3350 17 G: 17 POWDER, FOR SOLUTION ORAL at 17:24

## 2023-01-01 RX ADMIN — MIDAZOLAM HYDROCHLORIDE 0.5 MG: 1 INJECTION, SOLUTION INTRAMUSCULAR; INTRAVENOUS at 15:04

## 2023-01-01 RX ADMIN — HYDROMORPHONE HYDROCHLORIDE 0.4 MG: 0.2 INJECTION, SOLUTION INTRAMUSCULAR; INTRAVENOUS; SUBCUTANEOUS at 10:35

## 2023-01-01 RX ADMIN — OXYCODONE HYDROCHLORIDE 5 MG: 5 TABLET ORAL at 23:12

## 2023-01-01 RX ADMIN — PANTOPRAZOLE SODIUM 40 MG: 40 INJECTION, POWDER, FOR SOLUTION INTRAVENOUS at 22:04

## 2023-01-01 RX ADMIN — Medication 2 G: at 08:06

## 2023-01-01 RX ADMIN — MIDAZOLAM 2 MG: 1 INJECTION INTRAMUSCULAR; INTRAVENOUS at 07:51

## 2023-01-01 RX ADMIN — SODIUM CHLORIDE: 9 INJECTION, SOLUTION INTRAVENOUS at 16:26

## 2023-01-01 RX ADMIN — HYDROMORPHONE HYDROCHLORIDE 0.4 MG: 0.2 INJECTION, SOLUTION INTRAMUSCULAR; INTRAVENOUS; SUBCUTANEOUS at 10:23

## 2023-01-01 RX ADMIN — SENNOSIDES AND DOCUSATE SODIUM 2 TABLET: 50; 8.6 TABLET ORAL at 08:04

## 2023-01-01 RX ADMIN — MIDAZOLAM HYDROCHLORIDE 0.5 MG: 1 INJECTION, SOLUTION INTRAMUSCULAR; INTRAVENOUS at 14:35

## 2023-01-01 RX ADMIN — KETOROLAC TROMETHAMINE 30 MG: 15 INJECTION, SOLUTION INTRAMUSCULAR; INTRAVENOUS at 16:13

## 2023-01-01 RX ADMIN — SODIUM CHLORIDE 1000 ML: 9 INJECTION, SOLUTION INTRAVENOUS at 06:10

## 2023-01-01 RX ADMIN — HEPARIN SODIUM (PORCINE) LOCK FLUSH IV SOLN 100 UNIT/ML 5 ML: 100 SOLUTION at 15:30

## 2023-01-01 RX ADMIN — HYDROMORPHONE HYDROCHLORIDE 0.5 MG: 1 INJECTION, SOLUTION INTRAMUSCULAR; INTRAVENOUS; SUBCUTANEOUS at 02:54

## 2023-01-01 RX ADMIN — TRAMADOL HYDROCHLORIDE 50 MG: 50 TABLET, COATED ORAL at 06:22

## 2023-01-01 RX ADMIN — METOCLOPRAMIDE 5 MG: 5 INJECTION, SOLUTION INTRAMUSCULAR; INTRAVENOUS at 20:54

## 2023-01-01 RX ADMIN — TRAMADOL HYDROCHLORIDE 50 MG: 50 TABLET, COATED ORAL at 12:51

## 2023-01-01 RX ADMIN — Medication 250 ML: at 11:56

## 2023-01-01 RX ADMIN — OXYCODONE HYDROCHLORIDE 2.5 MG: 5 TABLET ORAL at 03:14

## 2023-01-01 RX ADMIN — HEPARIN SODIUM (PORCINE) LOCK FLUSH IV SOLN 100 UNIT/ML 5 ML: 100 SOLUTION at 13:48

## 2023-01-01 RX ADMIN — FENTANYL CITRATE 25 MCG: 50 INJECTION, SOLUTION INTRAMUSCULAR; INTRAVENOUS at 14:48

## 2023-01-01 RX ADMIN — PHENAZOPYRIDINE 200 MG: 200 TABLET ORAL at 06:44

## 2023-01-01 RX ADMIN — IOPAMIDOL 66 ML: 755 INJECTION, SOLUTION INTRAVENOUS at 00:08

## 2023-01-01 RX ADMIN — GADOBUTROL 5 ML: 604.72 INJECTION INTRAVENOUS at 11:43

## 2023-01-01 RX ADMIN — ACETAMINOPHEN 1000 MG: 500 TABLET ORAL at 17:08

## 2023-01-01 RX ADMIN — ONDANSETRON 4 MG: 2 INJECTION INTRAMUSCULAR; INTRAVENOUS at 09:51

## 2023-01-01 RX ADMIN — TRAMADOL HYDROCHLORIDE 50 MG: 50 TABLET, COATED ORAL at 19:32

## 2023-01-01 RX ADMIN — PROPOFOL 125 MCG/KG/MIN: 10 INJECTION, EMULSION INTRAVENOUS at 08:55

## 2023-01-01 RX ADMIN — MIDAZOLAM HYDROCHLORIDE 0.5 MG: 1 INJECTION, SOLUTION INTRAMUSCULAR; INTRAVENOUS at 14:48

## 2023-01-01 RX ADMIN — ONDANSETRON 4 MG: 2 INJECTION INTRAMUSCULAR; INTRAVENOUS at 20:47

## 2023-01-01 RX ADMIN — SENNOSIDES AND DOCUSATE SODIUM 2 TABLET: 50; 8.6 TABLET ORAL at 07:39

## 2023-01-01 RX ADMIN — Medication 250 ML: at 14:19

## 2023-01-01 RX ADMIN — LIDOCAINE HYDROCHLORIDE 40 MG: 20 INJECTION, SOLUTION INFILTRATION; PERINEURAL at 08:55

## 2023-01-01 RX ADMIN — SENNOSIDES AND DOCUSATE SODIUM 1 TABLET: 50; 8.6 TABLET ORAL at 12:54

## 2023-01-01 RX ADMIN — SODIUM CHLORIDE: 9 INJECTION, SOLUTION INTRAVENOUS at 18:04

## 2023-01-01 RX ADMIN — GADOBUTROL 5.5 ML: 604.72 INJECTION INTRAVENOUS at 16:23

## 2023-01-01 RX ADMIN — FENTANYL CITRATE 50 MCG: 50 INJECTION, SOLUTION INTRAMUSCULAR; INTRAVENOUS at 08:00

## 2023-01-01 RX ADMIN — HYDROMORPHONE HYDROCHLORIDE 0.2 MG: 0.2 INJECTION, SOLUTION INTRAMUSCULAR; INTRAVENOUS; SUBCUTANEOUS at 10:14

## 2023-01-01 RX ADMIN — HEPARIN SODIUM 5000 UNITS: 5000 INJECTION, SOLUTION INTRAVENOUS; SUBCUTANEOUS at 07:03

## 2023-01-01 RX ADMIN — Medication 250 ML: at 13:44

## 2023-01-01 RX ADMIN — PACLITAXEL 190 MG: 100 INJECTION, POWDER, LYOPHILIZED, FOR SUSPENSION INTRAVENOUS at 14:05

## 2023-01-01 RX ADMIN — PERFLUTREN 5 ML: 6.52 INJECTION, SUSPENSION INTRAVENOUS at 09:25

## 2023-01-01 RX ADMIN — TRAMADOL HYDROCHLORIDE 50 MG: 50 TABLET, COATED ORAL at 12:37

## 2023-01-01 RX ADMIN — HYDROMORPHONE HYDROCHLORIDE 0.2 MG: 0.2 INJECTION, SOLUTION INTRAMUSCULAR; INTRAVENOUS; SUBCUTANEOUS at 10:48

## 2023-01-01 RX ADMIN — ONDANSETRON 4 MG: 2 INJECTION INTRAMUSCULAR; INTRAVENOUS at 14:30

## 2023-01-01 RX ADMIN — IOPAMIDOL 65 ML: 755 INJECTION, SOLUTION INTRAVENOUS at 17:52

## 2023-01-01 RX ADMIN — TRAMADOL HYDROCHLORIDE 50 MG: 50 TABLET, COATED ORAL at 06:04

## 2023-01-01 RX ADMIN — HYDROMORPHONE HYDROCHLORIDE 0.5 MG: 1 INJECTION, SOLUTION INTRAMUSCULAR; INTRAVENOUS; SUBCUTANEOUS at 21:53

## 2023-01-01 RX ADMIN — SODIUM CHLORIDE, SODIUM LACTATE, POTASSIUM CHLORIDE, CALCIUM CHLORIDE: 600; 310; 30; 20 INJECTION, SOLUTION INTRAVENOUS at 08:53

## 2023-01-01 RX ADMIN — TRAMADOL HYDROCHLORIDE 50 MG: 50 TABLET, COATED ORAL at 18:35

## 2023-01-01 RX ADMIN — PACLITAXEL 190 MG: 100 INJECTION, POWDER, LYOPHILIZED, FOR SUSPENSION INTRAVENOUS at 12:17

## 2023-01-01 RX ADMIN — SODIUM CHLORIDE: 9 INJECTION, SOLUTION INTRAVENOUS at 04:36

## 2023-01-01 RX ADMIN — PROPOFOL 30 MG: 10 INJECTION, EMULSION INTRAVENOUS at 08:55

## 2023-01-01 RX ADMIN — FENTANYL CITRATE 50 MCG: 50 INJECTION, SOLUTION INTRAMUSCULAR; INTRAVENOUS at 08:24

## 2023-01-01 RX ADMIN — SODIUM CHLORIDE, POTASSIUM CHLORIDE, SODIUM LACTATE AND CALCIUM CHLORIDE 1000 ML: 600; 310; 30; 20 INJECTION, SOLUTION INTRAVENOUS at 16:16

## 2023-01-01 RX ADMIN — ACETAMINOPHEN 650 MG: 325 TABLET, FILM COATED ORAL at 22:55

## 2023-01-01 RX ADMIN — IOPAMIDOL 67 ML: 755 INJECTION, SOLUTION INTRAVASCULAR at 12:55

## 2023-01-01 RX ADMIN — Medication 5 ML: at 09:15

## 2023-01-01 RX ADMIN — METRONIDAZOLE 500 MG: 500 INJECTION, SOLUTION INTRAVENOUS at 07:18

## 2023-01-01 RX ADMIN — PROCHLORPERAZINE MALEATE 10 MG: 5 TABLET ORAL at 15:33

## 2023-01-01 RX ADMIN — FENTANYL CITRATE 50 MCG: 50 INJECTION, SOLUTION INTRAMUSCULAR; INTRAVENOUS at 09:37

## 2023-01-01 RX ADMIN — ONDANSETRON 8 MG: 2 INJECTION INTRAMUSCULAR; INTRAVENOUS at 14:20

## 2023-01-01 RX ADMIN — PROPOFOL 70 MG: 10 INJECTION, EMULSION INTRAVENOUS at 08:00

## 2023-01-01 RX ADMIN — ACETAMINOPHEN 650 MG: 325 TABLET, FILM COATED ORAL at 11:12

## 2023-01-01 RX ADMIN — HYDROMORPHONE HYDROCHLORIDE 0.5 MG: 1 INJECTION, SOLUTION INTRAMUSCULAR; INTRAVENOUS; SUBCUTANEOUS at 16:15

## 2023-01-01 RX ADMIN — CEFAZOLIN SODIUM 2 G: 2 SOLUTION INTRAVENOUS at 08:58

## 2023-01-01 RX ADMIN — PHENYLEPHRINE HYDROCHLORIDE 100 MCG: 10 INJECTION INTRAVENOUS at 08:17

## 2023-01-01 RX ADMIN — MIDAZOLAM HYDROCHLORIDE 0.5 MG: 1 INJECTION, SOLUTION INTRAMUSCULAR; INTRAVENOUS at 15:09

## 2023-01-01 RX ADMIN — HYDROMORPHONE HYDROCHLORIDE 0.5 MG: 1 INJECTION, SOLUTION INTRAMUSCULAR; INTRAVENOUS; SUBCUTANEOUS at 07:39

## 2023-01-01 RX ADMIN — Medication 50 MG: at 08:02

## 2023-01-01 RX ADMIN — LIDOCAINE HYDROCHLORIDE 10 ML: 10 INJECTION, SOLUTION EPIDURAL; INFILTRATION; INTRACAUDAL; PERINEURAL at 14:54

## 2023-01-01 RX ADMIN — ONDANSETRON 4 MG: 2 INJECTION INTRAMUSCULAR; INTRAVENOUS at 09:07

## 2023-01-01 RX ADMIN — TRAMADOL HYDROCHLORIDE 50 MG: 50 TABLET, COATED ORAL at 07:39

## 2023-01-01 RX ADMIN — TRAMADOL HYDROCHLORIDE 50 MG: 50 TABLET, COATED ORAL at 10:37

## 2023-01-01 RX ADMIN — FENTANYL CITRATE 50 MCG: 50 INJECTION, SOLUTION INTRAMUSCULAR; INTRAVENOUS at 09:47

## 2023-01-01 RX ADMIN — SENNOSIDES 8.6 MG: 8.6 TABLET, FILM COATED ORAL at 21:12

## 2023-01-01 RX ADMIN — PACLITAXEL 190 MG: 100 INJECTION, POWDER, LYOPHILIZED, FOR SUSPENSION INTRAVENOUS at 14:23

## 2023-01-01 RX ADMIN — TRAMADOL HYDROCHLORIDE 50 MG: 50 TABLET, COATED ORAL at 00:40

## 2023-01-01 RX ADMIN — ONDANSETRON 8 MG: 2 INJECTION INTRAMUSCULAR; INTRAVENOUS at 13:44

## 2023-01-01 RX ADMIN — PHENYLEPHRINE HYDROCHLORIDE 100 MCG: 10 INJECTION INTRAVENOUS at 08:12

## 2023-01-01 RX ADMIN — ONDANSETRON 4 MG: 4 TABLET, ORALLY DISINTEGRATING ORAL at 08:11

## 2023-01-01 RX ADMIN — Medication 5 ML: at 16:20

## 2023-01-01 RX ADMIN — IOPAMIDOL 51 ML: 755 INJECTION, SOLUTION INTRAVASCULAR at 12:34

## 2023-01-01 RX ADMIN — TRAMADOL HYDROCHLORIDE 50 MG: 50 TABLET, COATED ORAL at 17:24

## 2023-01-01 RX ADMIN — ACETAMINOPHEN 650 MG: 325 TABLET, FILM COATED ORAL at 12:59

## 2023-01-01 RX ADMIN — LIDOCAINE HYDROCHLORIDE 40 MG: 20 INJECTION, SOLUTION INFILTRATION; PERINEURAL at 08:00

## 2023-01-01 RX ADMIN — FENTANYL CITRATE 25 MCG: 50 INJECTION, SOLUTION INTRAMUSCULAR; INTRAVENOUS at 14:35

## 2023-01-01 RX ADMIN — ONDANSETRON 4 MG: 2 INJECTION INTRAMUSCULAR; INTRAVENOUS at 16:14

## 2023-01-01 RX ADMIN — Medication 20 MG: at 08:42

## 2023-01-01 RX ADMIN — SENNOSIDES AND DOCUSATE SODIUM 2 TABLET: 50; 8.6 TABLET ORAL at 20:29

## 2023-01-01 RX ADMIN — HYDROMORPHONE HYDROCHLORIDE 0.5 MG: 1 INJECTION, SOLUTION INTRAMUSCULAR; INTRAVENOUS; SUBCUTANEOUS at 16:24

## 2023-01-01 RX ADMIN — SENNOSIDES AND DOCUSATE SODIUM 2 TABLET: 50; 8.6 TABLET ORAL at 09:03

## 2023-01-01 RX ADMIN — HYDROMORPHONE HYDROCHLORIDE 0.5 MG: 1 INJECTION, SOLUTION INTRAMUSCULAR; INTRAVENOUS; SUBCUTANEOUS at 03:58

## 2023-01-01 RX ADMIN — LIDOCAINE HYDROCHLORIDE 60 MG: 20 INJECTION, SOLUTION INFILTRATION; PERINEURAL at 08:30

## 2023-01-01 RX ADMIN — SENNOSIDES AND DOCUSATE SODIUM 2 TABLET: 50; 8.6 TABLET ORAL at 20:09

## 2023-01-01 RX ADMIN — ONDANSETRON 8 MG: 2 INJECTION INTRAMUSCULAR; INTRAVENOUS at 11:56

## 2023-01-01 RX ADMIN — SODIUM CHLORIDE: 9 INJECTION, SOLUTION INTRAVENOUS at 13:08

## 2023-01-01 RX ADMIN — PANTOPRAZOLE SODIUM 40 MG: 40 TABLET, DELAYED RELEASE ORAL at 08:04

## 2023-01-01 RX ADMIN — HEPARIN SODIUM (PORCINE) LOCK FLUSH IV SOLN 100 UNIT/ML 500 UNITS: 100 SOLUTION at 15:54

## 2023-01-01 RX ADMIN — PROPOFOL 200 MCG/KG/MIN: 10 INJECTION, EMULSION INTRAVENOUS at 08:30

## 2023-01-01 RX ADMIN — HYDROMORPHONE HYDROCHLORIDE 0.5 MG: 1 INJECTION, SOLUTION INTRAMUSCULAR; INTRAVENOUS; SUBCUTANEOUS at 18:40

## 2023-01-01 RX ADMIN — FENTANYL CITRATE 25 MCG: 50 INJECTION, SOLUTION INTRAMUSCULAR; INTRAVENOUS at 15:09

## 2023-01-01 RX ADMIN — TRAMADOL HYDROCHLORIDE 50 MG: 50 TABLET, COATED ORAL at 11:41

## 2023-01-01 ASSESSMENT — ACTIVITIES OF DAILY LIVING (ADL)
ADLS_ACUITY_SCORE: 26
ADLS_ACUITY_SCORE: 35
ADLS_ACUITY_SCORE: 31
ADLS_ACUITY_SCORE: 35
ADLS_ACUITY_SCORE: 31
ADLS_ACUITY_SCORE: 35
ADLS_ACUITY_SCORE: 20
ADLS_ACUITY_SCORE: 31
ADLS_ACUITY_SCORE: 20
ADLS_ACUITY_SCORE: 31
ADLS_ACUITY_SCORE: 26
DEPENDENT_IADLS:: INDEPENDENT
ADLS_ACUITY_SCORE: 31
ADLS_ACUITY_SCORE: 35
ADLS_ACUITY_SCORE: 35
ADLS_ACUITY_SCORE: 26
ADLS_ACUITY_SCORE: 31
ADLS_ACUITY_SCORE: 35
ADLS_ACUITY_SCORE: 26
ADLS_ACUITY_SCORE: 35
ADLS_ACUITY_SCORE: 26
ADLS_ACUITY_SCORE: 35
ADLS_ACUITY_SCORE: 26
ADLS_ACUITY_SCORE: 26
ADLS_ACUITY_SCORE: 20
ADLS_ACUITY_SCORE: 35
ADLS_ACUITY_SCORE: 35
ADLS_ACUITY_SCORE: 20
ADLS_ACUITY_SCORE: 35
ADLS_ACUITY_SCORE: 35
ADLS_ACUITY_SCORE: 31
ADLS_ACUITY_SCORE: 20
ADLS_ACUITY_SCORE: 26
ADLS_ACUITY_SCORE: 35
ADLS_ACUITY_SCORE: 35
ADLS_ACUITY_SCORE: 26
ADLS_ACUITY_SCORE: 31
ADLS_ACUITY_SCORE: 31
ADLS_ACUITY_SCORE: 35
ADLS_ACUITY_SCORE: 31
ADLS_ACUITY_SCORE: 31
ADLS_ACUITY_SCORE: 33
ADLS_ACUITY_SCORE: 31
ADLS_ACUITY_SCORE: 35
ADLS_ACUITY_SCORE: 35

## 2023-01-01 ASSESSMENT — PAIN SCALES - GENERAL
PAINLEVEL: EXTREME PAIN (8)
PAINLEVEL: MODERATE PAIN (5)
PAINLEVEL: SEVERE PAIN (6)
PAINLEVEL: SEVERE PAIN (6)
PAINLEVEL: MODERATE PAIN (5)
PAINLEVEL: MODERATE PAIN (4)
PAINLEVEL: MODERATE PAIN (5)

## 2023-01-24 NOTE — RESULT ENCOUNTER NOTE
Kasia Melvin,    Attached are your test results.  Your MRI and MRA were all normal which is reassuring    Please contact us if you have any questions.    Ernestina De Leon, CNP

## 2023-01-25 NOTE — RESULT ENCOUNTER NOTE
Kasia Melvin,    Attached are your test results.  Your MRA is normal except noted   There is complete opacification of the right maxillary sinus and the  left sphenoid locule with scattered mucosal thickening within the  ethmoid air cells  Have you been having issues with your sinuses?   Please contact us if you have any questions.    Ernestina De Leon, CNP

## 2023-01-30 NOTE — PATIENT INSTRUCTIONS
PLAN:   1.   Symptomatic therapy suggested: will start on antibiotics for the sinus infection   Will also start on amitriptyline at bedtime to prevent the headaches and will help with sleep.  Continue flonase   2.  Orders Placed This Encounter   Medications    amoxicillin-clavulanate (AUGMENTIN) 875-125 MG tablet     Sig: Take 1 tablet by mouth 2 times daily     Dispense:  20 tablet     Refill:  0    amitriptyline (ELAVIL) 10 MG tablet     Sig: Take 1-2 tablets (10-20 mg) by mouth At Bedtime     Dispense:  60 tablet     Refill:  1     3. Patient needs to follow up in if no improvement,or sooner if worsening of symptoms or other symptoms develop.  Will follow up in a month and if persistent symptoms will refer to neurology

## 2023-01-30 NOTE — PROGRESS NOTES
Estrella is a 62 year old who is being evaluated via a billable telephone visit.      What phone number would you like to be contacted at?    [phone]      How would you like to obtain your AVS? Gisela    Distant Location (provider location):  Off-site        Subjective   Estrella is a 62 year old, presenting for the following health issues:  No chief complaint on file.      History of Present Illness       Reason for visit:  MRI FOLLOW UP    She eats 2-3 servings of fruits and vegetables daily.She consumes 1 sweetened beverage(s) daily.She exercises with enough effort to increase her heart rate 60 or more minutes per day.  She exercises with enough effort to increase her heart rate 3 or less days per week.   She is taking medications regularly.      headaches are about the same. Does admits has sinus issues and has been using the Flonase   However her headaches are more in the back of her head   She also does not sleep well for a long time   Trazodone did not help but still   Labs reviewed in EPIC  BP Readings from Last 3 Encounters:   05/16/22 125/81   11/16/20 122/73   08/21/20 136/80    Wt Readings from Last 3 Encounters:   07/13/22 53.1 kg (117 lb)   05/16/22 53.3 kg (117 lb 8 oz)   11/16/20 55 kg (121 lb 3.2 oz)                  Patient Active Problem List   Diagnosis     DDD (degenerative disc disease), cervical     Neck pain on left side     CARDIOVASCULAR SCREENING; LDL GOAL LESS THAN 160     Adjustment disorder with mixed anxiety and depressed mood     Family history of thyroid disease     Intertrigo     Tendinopathy of rotator cuff, right     Chronic pain of both shoulders     Internal hemorrhoid, bleeding     Chronic right shoulder pain     Sleeping difficulty     Tendonitis of ankle or foot     Left foot pain     Positive TB test     Past Surgical History:   Procedure Laterality Date     COLONOSCOPY N/A 8/21/2019    Procedure: Colonoscopy, With Polypectomy And Biopsy;  Surgeon: Duane, William Charles, MD;   Location: [facility]     COLONOSCOPY WITH CO2 INSUFFLATION N/A 8/21/2019    Procedure: COLONOSCOPY, WITH CO2 INSUFFLATION;  Surgeon: Duane, William Charles, MD;  Location: [facility]       Social History     Tobacco Use     Smoking status: Never     Smokeless tobacco: Never   Substance Use Topics     Alcohol use: No     Family History   Problem Relation Age of Onset     Colon Cancer Mother      Lung Cancer Father      Lung Cancer Brother      Thyroid Disease Sister          Current Outpatient Medications   Medication Sig Dispense Refill     amitriptyline (ELAVIL) 10 MG tablet Take 1-2 tablets (10-20 mg) by mouth At Bedtime 60 tablet 1     amoxicillin-clavulanate (AUGMENTIN) 875-125 MG tablet Take 1 tablet by mouth 2 times daily 20 tablet 0     azelastine (ASTELIN) 0.1 % nasal spray Spray 1 spray into both nostrils 2 times daily 30 mL 3     clotrimazole-betamethasone (LOTRISONE) 1-0.05 % external cream APPLY TO AFFECTED AREA(S) TOPICALLY DAILY AS NEEDED 15 g 0     diclofenac (VOLTAREN) 1 % topical gel Apply 2 g topically 4 times daily 100 g 3     Misc Natural Products (OSTEO BI-FLEX JOINT SHIELD PO) Take 1 tablet by mouth daily       Multiple Vitamins-Minerals (CENTRUM SILVER) per tablet Take 1 tablet by mouth daily       VITAMIN D, CHOLECALCIFEROL, PO Take 5,000 Units by mouth daily       guaiFENesin-codeine (ROBITUSSIN AC) 100-10 MG/5ML solution Take 5-10 mLs by mouth every 6 hours as needed for cough 120 mL 0     omeprazole (PRILOSEC OTC) 20 MG EC tablet Take 1 tablet (20 mg) by mouth daily 30 tablet 1     No Known Allergies    Review of Systems   Constitutional, HEENT, cardiovascular, pulmonary, GI, , musculoskeletal, neuro, skin, endocrine and psych systems are negative, except as otherwise noted.      Objective           Vitals:  No vitals were obtained today due to virtual visit.    Physical Exam   alert, active and no distress  PSYCH: Alert and oriented times 3; coherent speech, normal   rate and volume, able to  articulate logical thoughts, able   to abstract reason, no tangential thoughts, no hallucinations   or delusions  Her affect is normal and pleasant  RESP: No cough, no audible wheezing, able to talk in full sentences  Remainder of exam unable to be completed due to telephone visits    Recent Results (from the past 744 hour(s))   MRA Brain ([place]) wo Contrast    Narrative    MRI brain without and with contrast  MRA of the head without contrast  Neck MRA without and with contrast    Provided History:  Headache; Neurologic deficit, non-traumatic; Visual  symptoms; Neurologic deficit onset > 24 hours; No known/automatically  detected potential contraindications to imaging; New onset of  headaches after age 50.  ICD-10: New onset of headaches after age 50    Comparison:  None available.      Technique:   Brain MRI:  Axial diffusion, FLAIR, T2-weighted, susceptibility, and  coronal T1-weighted images were obtained without intravenous contrast.  Following intravenous gadolinium-based contrast administration, axial  and coronal T1-weighted images were obtained.    Head MRA: 3D time-of-flight MRA of the [place] was performed  without intravenous contrast.  Neck MRA:  Limited non contrast 2DTOF images were obtained of the  mid-cervical region. Following intravenous gadolinium-based contrast  administration, a contrast enhanced MRA of the neck/cervical vessels  was performed.  Three-dimensional reconstructions of the neck and head MRA were  created, which were reviewed by the radiologist.    Dose: 5.5ml Gadavist    Findings:   Brain MRI: Axial diffusion weighted images demonstrate no definite  acute infarct. . There is no definite intracranial hemorrhage on  susceptibility images. Ventricles are proportionate to the cerebral  sulci. Contrast-enhanced images of the brain demonstrate no abnormal  intra- or extra-axial enhancement.    There is complete opacification of the right maxillary sinus and the  left  sphenoid locule with scattered mucosal thickening within the  ethmoid air cells. Mastoid air cells are clear.    Head MRA demonstrates no definite aneurysm or stenosis of the major  intracranial arteries. Anterior and left posterior commuting arteries  are patent. Right posterior commuting artery is not well-visualized.  Left P2/P3 posterior cerebral artery segments are asymmetrically small  compared to the right.    Neck MRA demonstrates patent major cervical arteries. The normal  distal right internal carotid artery measures 5 mm. The normal distal  left internal carotid artery measures 5 mm. Antegrade flow in the  major cervical vasculature.      Impression    Impression:  1. No evidence of acute infarction or intracranial hemorrhage.  2. No abnormal enhancing lesions intracranially.  3. Head MRA demonstrates no definite aneurysm or stenosis of the major  intracranial arteries.  4. Neck MRA demonstrates patent major cervical arteries.    GURU REYES MD         SYSTEM ID:  [id number]   MR Brain w/o & w Contrast    Narrative    MRI brain without and with contrast  MRA of the head without contrast  Neck MRA without and with contrast    Provided History:  Headache; Neurologic deficit, non-traumatic; Visual  symptoms; Neurologic deficit onset > 24 hours; No known/automatically  detected potential contraindications to imaging; New onset of  headaches after age 50.  ICD-10: New onset of headaches after age 50    Comparison:  None available.      Technique:   Brain MRI:  Axial diffusion, FLAIR, T2-weighted, susceptibility, and  coronal T1-weighted images were obtained without intravenous contrast.  Following intravenous gadolinium-based contrast administration, axial  and coronal T1-weighted images were obtained.    Head MRA: 3D time-of-flight MRA of the [place] was performed  without intravenous contrast.  Neck MRA:  Limited non contrast 2DTOF images were obtained of the  mid-cervical region. Following  intravenous gadolinium-based contrast  administration, a contrast enhanced MRA of the neck/cervical vessels  was performed.  Three-dimensional reconstructions of the neck and head MRA were  created, which were reviewed by the radiologist.    Dose: 5.5ml Gadavist    Findings:   Brain MRI: Axial diffusion weighted images demonstrate no definite  acute infarct. . There is no definite intracranial hemorrhage on  susceptibility images. Ventricles are proportionate to the cerebral  sulci. Contrast-enhanced images of the brain demonstrate no abnormal  intra- or extra-axial enhancement.    There is complete opacification of the right maxillary sinus and the  left sphenoid locule with scattered mucosal thickening within the  ethmoid air cells. Mastoid air cells are clear.    Head MRA demonstrates no definite aneurysm or stenosis of the major  intracranial arteries. Anterior and left posterior commuting arteries  are patent. Right posterior commuting artery is not well-visualized.  Left P2/P3 posterior cerebral artery segments are asymmetrically small  compared to the right.    Neck MRA demonstrates patent major cervical arteries. The normal  distal right internal carotid artery measures 5 mm. The normal distal  left internal carotid artery measures 5 mm. Antegrade flow in the  major cervical vasculature.      Impression    Impression:  1. No evidence of acute infarction or intracranial hemorrhage.  2. No abnormal enhancing lesions intracranially.  3. Head MRA demonstrates no definite aneurysm or stenosis of the major  intracranial arteries.  4. Neck MRA demonstrates patent major cervical arteries.    GURU REYES MD         SYSTEM ID:  [id number]   MRA NECK (CAROTIDS) W CONTRAST    Narrative    MRI brain without and with contrast  MRA of the head without contrast  Neck MRA without and with contrast    Provided History:  Headache; Neurologic deficit, non-traumatic; Visual  symptoms; Neurologic deficit onset > 24 hours; No  known/automatically  detected potential contraindications to imaging; New onset of  headaches after age 50.  ICD-10: New onset of headaches after age 50    Comparison:  None available.      Technique:   Brain MRI:  Axial diffusion, FLAIR, T2-weighted, susceptibility, and  coronal T1-weighted images were obtained without intravenous contrast.  Following intravenous gadolinium-based contrast administration, axial  and coronal T1-weighted images were obtained.    Head MRA: 3D time-of-flight MRA of the [place] was performed  without intravenous contrast.  Neck MRA:  Limited non contrast 2DTOF images were obtained of the  mid-cervical region. Following intravenous gadolinium-based contrast  administration, a contrast enhanced MRA of the neck/cervical vessels  was performed.  Three-dimensional reconstructions of the neck and head MRA were  created, which were reviewed by the radiologist.    Dose: 5.5ml Gadavist    Findings:   Brain MRI: Axial diffusion weighted images demonstrate no definite  acute infarct. . There is no definite intracranial hemorrhage on  susceptibility images. Ventricles are proportionate to the cerebral  sulci. Contrast-enhanced images of the brain demonstrate no abnormal  intra- or extra-axial enhancement.    There is complete opacification of the right maxillary sinus and the  left sphenoid locule with scattered mucosal thickening within the  ethmoid air cells. Mastoid air cells are clear.    Head MRA demonstrates no definite aneurysm or stenosis of the major  intracranial arteries. Anterior and left posterior commuting arteries  are patent. Right posterior commuting artery is not well-visualized.  Left P2/P3 posterior cerebral artery segments are asymmetrically small  compared to the right.    Neck MRA demonstrates patent major cervical arteries. The normal  distal right internal carotid artery measures 5 mm. The normal distal  left internal carotid artery measures 5 mm. Antegrade flow in  the  major cervical vasculature.      Impression    Impression:  1. No evidence of acute infarction or intracranial hemorrhage.  2. No abnormal enhancing lesions intracranially.  3. Head MRA demonstrates no definite aneurysm or stenosis of the major  intracranial arteries.  4. Neck MRA demonstrates patent major cervical arteries.    GURU REYES MD         SYSTEM ID:  [id number]       Assessment & Plan     Acute non-recurrent maxillary sinusitis  Discussed the nature and pathophysiology of sinusitis and treatment including the role of antibiotics and the need to get over the underlying trigger, URI or allergies.  Discussed the nature and pathophysiology of sinusitis and treatment including the role of antibiotics and the need to get over the underlying trigger, URI or allergies.  - amoxicillin-clavulanate (AUGMENTIN) 875-125 MG tablet  Dispense: 20 tablet; Refill: 0    Chronic nonintractable headache, unspecified headache type  Headache Plan:  New Rx started today (see orders)  Follow up in 1 month.  - amitriptyline (ELAVIL) 10 MG tablet  Dispense: 60 tablet; Refill: 1    Insomnia, unspecified type  I discussed the concepts and specifics of good sleep hygiene.  she will do her best to implement these recommendations.  Will Start:  - amitriptyline (ELAVIL) 10 MG tablet  Dispense: 60 tablet; Refill: 1      Prescription drug management   Time spent doing chart review, history and exam, documentation and further activities per the note       See Patient Instructions  Patient Instructions     PLAN:   1.   Symptomatic therapy suggested: will start on antibiotics for the sinus infection   Will also start on amitriptyline at bedtime to prevent the headaches and will help with sleep.  Continue flonase   2.  Orders Placed This Encounter   Medications     amoxicillin-clavulanate (AUGMENTIN) 875-125 MG tablet     Sig: Take 1 tablet by mouth 2 times daily     Dispense:  20 tablet     Refill:  0     amitriptyline (ELAVIL) 10  MG tablet     Sig: Take 1-2 tablets (10-20 mg) by mouth At Bedtime     Dispense:  60 tablet     Refill:  1     3. Patient needs to follow up in if no improvement,or sooner if worsening of symptoms or other symptoms develop.  Will follow up in a month and if persistent symptoms will refer to neurology       Return in about 1 month (around 2/28/2023), or if symptoms worsen or fail to improve.    VINCENZO Whipple [facility]          Phone call duration: 13 minutes

## 2023-03-14 NOTE — PATIENT INSTRUCTIONS
PLAN:   1.   Symptomatic therapy suggested: Continue current medications as prescribed.   Increase calcium to 1000mg and 1000iu Vit D    2.  Orders Placed This Encounter   Procedures    Adult Sleep Eval & Management  Referral     3. Patient needs to follow up in if no improvement,or sooner if worsening of symptoms or other symptoms develop.  CONSULTATION/REFERRAL to sleep study

## 2023-03-14 NOTE — PROGRESS NOTES
Estrella is a 62 year old who is being evaluated via a billable video visit.      How would you like to obtain your AVS? MyChart  If the video visit is dropped, the invitation should be resent by: Send to e-mail at: [email or web address]  Will anyone else be joining your video visit? No              Subjective   Estrella is a 62 year old, presenting for the following health issues:  Sleep Problem      History of Present Illness       Reason for visit:  Sleep problems f/u    She eats 2-3 servings of fruits and vegetables daily.She consumes 2 sweetened beverage(s) daily.She exercises with enough effort to increase her heart rate 60 or more minutes per day.  She exercises with enough effort to increase her heart rate 3 or less days per week.   She is taking medications regularly.     SUBJECTIVE: Karina Melvin 62 year old female who presents with periodic leg movement.   Patient has had these signs/symptoms for 2 months.  Patient has tried   OTC medications with limited success.   Will be waking up in the middle of the night   Her friend told her about getting a sleep study   Has to get up and walk around at night and then go back to bed to sleep   Hard time staying asleep will wake up 2 am and can not go back to sleep   Every morning when she gets up in the morning is sneezing  Does not drink a lot of caffeine   Drinks coffee in the morning     Also needing a refill on her astelin nasal spray     Having itching in her axilla and had cream for this in the past that worked    Labs reviewed in EPIC  BP Readings from Last 3 Encounters:   05/16/22 125/81   11/16/20 122/73   08/21/20 136/80    Wt Readings from Last 3 Encounters:   07/13/22 53.1 kg (117 lb)   05/16/22 53.3 kg (117 lb 8 oz)   11/16/20 55 kg (121 lb 3.2 oz)                  Patient Active Problem List   Diagnosis     DDD (degenerative disc disease), cervical     Neck pain on left side     CARDIOVASCULAR SCREENING; LDL GOAL LESS THAN 160     Adjustment disorder with  mixed anxiety and depressed mood     Family history of thyroid disease     Intertrigo     Tendinopathy of rotator cuff, right     Chronic pain of both shoulders     Internal hemorrhoid, bleeding     Chronic right shoulder pain     Sleeping difficulty     Tendonitis of ankle or foot     Left foot pain     Positive TB test     Past Surgical History:   Procedure Laterality Date     COLONOSCOPY N/A 8/21/2019    Procedure: Colonoscopy, With Polypectomy And Biopsy;  Surgeon: Duane, William Charles, MD;  Location: [facility]     COLONOSCOPY WITH CO2 INSUFFLATION N/A 8/21/2019    Procedure: COLONOSCOPY, WITH CO2 INSUFFLATION;  Surgeon: Duane, William Charles, MD;  Location: [facility]       Social History     Tobacco Use     Smoking status: Never     Smokeless tobacco: Never   Substance Use Topics     Alcohol use: No     Family History   Problem Relation Age of Onset     Colon Cancer Mother      Lung Cancer Father      Lung Cancer Brother      Thyroid Disease Sister          Current Outpatient Medications   Medication Sig Dispense Refill     amitriptyline (ELAVIL) 10 MG tablet Take 1-2 tablets (10-20 mg) by mouth At Bedtime 60 tablet 1     amoxicillin-clavulanate (AUGMENTIN) 875-125 MG tablet Take 1 tablet by mouth 2 times daily 20 tablet 0     azelastine (ASTELIN) 0.1 % nasal spray Spray 1 spray into both nostrils 2 times daily 30 mL 3     clotrimazole-betamethasone (LOTRISONE) 1-0.05 % external cream APPLY TO AFFECTED AREA(S) TOPICALLY DAILY AS NEEDED 15 g 0     diclofenac (VOLTAREN) 1 % topical gel Apply 2 g topically 4 times daily 100 g 3     Misc Natural Products (OSTEO BI-FLEX JOINT SHIELD PO) Take 1 tablet by mouth daily       Multiple Vitamins-Minerals (CENTRUM SILVER) per tablet Take 1 tablet by mouth daily       VITAMIN D, CHOLECALCIFEROL, PO Take 5,000 Units by mouth daily       No Known Allergies          Review of Systems   Constitutional, HEENT, cardiovascular, pulmonary, gi and gu systems are negative, except as  otherwise noted.      Objective           Vitals:  No vitals were obtained today due to virtual visit.    Physical Exam   GENERAL: Healthy, alert and no distress  EYES: Eyes grossly normal to inspection.  No discharge or erythema, or obvious scleral/conjunctival abnormalities.  HENT: normal cephalic/atraumatic and oral mucous membranes moist  RESP: No audible wheeze, cough, or visible cyanosis.  No visible retractions or increased work of breathing.    MS: No gross musculoskeletal defects noted.  Normal range of motion.  No visible edema.  SKIN: .lss  NEURO: mentation intact  PSYCH: Mentation appears normal, affect normal/bright, judgement and insight intact, normal speech and appearance well-groomed.      Assessment & Plan     Insomnia, unspecified type  I discussed the concepts and specifics of good sleep hygiene.  she will do her best to implement these recommendations.  Will refer for sleep evaluation   - Adult Sleep Eval & Management  Referral    Intertrigo  Will Start:  - clotrimazole-betamethasone (LOTRISONE) 1-0.05 % external cream  Dispense: 15 g; Refill: 0    Seasonal allergic rhinitis, unspecified trigger  The pathophysiology of allergic rhinitis  is explained.  We've discussed various treatment modalities such as avoidance of allergens, use of antihistamine/decongestants, inhaled nasal steroids  The patient indicates their understanding of these issues.  Will Start:  - azelastine (ASTELIN) 0.1 % nasal spray  Dispense: 30 mL; Refill: 3      Prescription drug management   Time spent doing chart review, history and exam, documentation and further activities per the note       See Patient Instructions  Patient Instructions     PLAN:   1.   Symptomatic therapy suggested: Continue current medications as prescribed.   Increase calcium to 1000mg and 1000iu Vit D    2.  Orders Placed This Encounter   Procedures     Adult Sleep Eval & Management  Referral       3. Patient needs to follow up in if  no improvement,or sooner if worsening of symptoms or other symptoms develop.  CONSULTATION/REFERRAL to sleep study             Return in about 3 months (around 6/14/2023) for Schedule physical.    VINCENZO Whipple [facility]          Video-Visit Details    Type of service:  Video Visit   Video Start Time: 4:55 pm   Video End Time:5:11 PM    Originating Location (pt. Location): Home    Distant Location (provider location):  On-site  Platform used for Video Visit: [employer]

## 2023-04-24 NOTE — PATIENT INSTRUCTIONS
PLAN:   1.   Symptomatic therapy suggested: increase fluids and call prn if symptoms persist or worsen.  Will try antivert as needed for dizziness   2.  Orders Placed This Encounter   Medications    meclizine (ANTIVERT) 25 MG tablet     Sig: Take 1 tablet (25 mg) by mouth 3 times daily as needed for dizziness     Dispense:  30 tablet     Refill:  1     Orders Placed This Encounter   Procedures    REVIEW OF HEALTH MAINTENANCE PROTOCOL ORDERS    CBC with platelets    Comprehensive metabolic panel    T4 free    TSH       3. Patient needs to follow up in if no improvement,or sooner if worsening of symptoms or other symptoms develop.  FUTURE LABS:       - Schedule a non-fasting blood draw   Patient will call when needed  Follow up in 1 week.

## 2023-04-24 NOTE — PROGRESS NOTES
Estrella is a 62 year old who is being evaluated via a billable telephone visit.      What phone number would you like to be contacted at? [phone]  How would you like to obtain your AVS? Gisela    Distant Location (provider location):  On-site        Subjective   Estrella is a 62 year old, presenting for the following health issues:  Dizziness and Knee Pain        4/24/2023    11:30 AM   Additional Questions   Roomed by Kinga     History of Present Illness       Reason for visit:  Dizziness  Symptom onset:  1-2 weeks ago  Symptoms include:  In the morning, spinning, headaches come and go  Symptom intensity:  Moderate  Symptom progression:  Staying the same  What makes it worse:  Standing up fast  What makes it better:  Tylneol    She eats 0-1 servings of fruits and vegetables daily.She consumes 1 sweetened beverage(s) daily.She exercises with enough effort to increase her heart rate 60 or more minutes per day.  She exercises with enough effort to increase her heart rate 3 or less days per week.   She is taking medications regularly.       Dizziness  Onset/Duration: a week   Description:   Do you feel faint: No  Does it feel like the surroundings (bed, room) are moving: YES  Unsteady/off balance: No  Have you passed out or fallen: No  Intensity: especially in the morning or when gets up in the middle of the night   Progression of Symptoms: waxing and waning  Accompanying Signs & Symptoms:  Heart palpitations or chest pain: left chest pressure in the morning like a pain for last two morning   Nausea, vomiting: No  Weakness or lack of coordination in arms or legs: YES- problem with her right knee pain is uncomfortable   Vision or speech changes: No  Numbness or tingling: some numbness in hands in the morning   Ringing in ears (Tinnitus): No  Hearing Loss: No  History:   Head trauma/concussion history: No  Previous similar symptoms: No  Recent bleeding history: No  Any new medications (BP?): No  Precipitating factors:    Worse with activity: YES  Worse with head movement: YES  Alleviating factors:   Does staying in a fixed position give relief: YES  Therapies tried and outcome: None  Also has a headache today about 6/10 for a couple days   Not waking her up at night   Tylenol will help the pain       Labs reviewed in EPIC  BP Readings from Last 3 Encounters:   05/16/22 125/81   11/16/20 122/73   08/21/20 136/80    Wt Readings from Last 3 Encounters:   07/13/22 53.1 kg (117 lb)   05/16/22 53.3 kg (117 lb 8 oz)   11/16/20 55 kg (121 lb 3.2 oz)                  Patient Active Problem List   Diagnosis     DDD (degenerative disc disease), cervical     Neck pain on left side     CARDIOVASCULAR SCREENING; LDL GOAL LESS THAN 160     Adjustment disorder with mixed anxiety and depressed mood     Family history of thyroid disease     Intertrigo     Tendinopathy of rotator cuff, right     Chronic pain of both shoulders     Internal hemorrhoid, bleeding     Chronic right shoulder pain     Sleeping difficulty     Tendonitis of ankle or foot     Left foot pain     Positive TB test     Past Surgical History:   Procedure Laterality Date     COLONOSCOPY N/A 8/21/2019    Procedure: Colonoscopy, With Polypectomy And Biopsy;  Surgeon: Duane, William Charles, MD;  Location: [facility] OR     COLONOSCOPY WITH CO2 INSUFFLATION N/A 8/21/2019    Procedure: COLONOSCOPY, WITH CO2 INSUFFLATION;  Surgeon: Duane, William Charles, MD;  Location: [facility] OR       Social History     Tobacco Use     Smoking status: Never     Smokeless tobacco: Never   Vaping Use     Vaping status: Never Used   Substance Use Topics     Alcohol use: No     Family History   Problem Relation Age of Onset     Colon Cancer Mother      Lung Cancer Father      Lung Cancer Brother      Thyroid Disease Sister          Current Outpatient Medications   Medication Sig Dispense Refill     azelastine (ASTELIN) 0.1 % nasal spray Spray 1 spray into both nostrils 2 times daily 30 mL 3      clotrimazole-betamethasone (LOTRISONE) 1-0.05 % external cream Apply topically 2 times daily 15 g 0     diclofenac (VOLTAREN) 1 % topical gel Apply 2 g topically 4 times daily 100 g 3     Multiple Vitamins-Minerals (CENTRUM SILVER) per tablet Take 1 tablet by mouth daily       VITAMIN D, CHOLECALCIFEROL, PO Take 5,000 Units by mouth daily       amitriptyline (ELAVIL) 10 MG tablet Take 1-2 tablets (10-20 mg) by mouth At Bedtime 60 tablet 1     Misc Natural Products (OSTEO BI-FLEX JOINT SHIELD PO) Take 1 tablet by mouth daily       No Known Allergies          Review of Systems   Constitutional, HEENT, cardiovascular, pulmonary, GI, , musculoskeletal, neuro, skin, endocrine and psych systems are negative, except as otherwise noted.      Objective           Vitals:  No vitals were obtained today due to virtual visit.    Physical Exam   alert, active and no distress  PSYCH: Alert and oriented times 3; coherent speech, normal   rate and volume, able to articulate logical thoughts, able   to abstract reason, no tangential thoughts, no hallucinations   or delusions  Her affect is normal, pleasant and full  RESP: No cough, no audible wheezing, able to talk in full sentences  Remainder of exam unable to be completed due to telephone visits    Diagnostic Test Results:   Diagnostic Test Results:  Labs reviewed in Epic  Pending orders and results '    Assessment & Plan     Dizziness  - CBC with platelets  - Comprehensive metabolic panel  - T4 free  - TSH  - meclizine (ANTIVERT) 25 MG tablet  Dispense: 30 tablet; Refill: 1  Will follow up and/or notify patient of  results via My Chart to determine further need for followup  For many people this type of dizziness is self-limited over a period of days or weeks.  Antivert may help but does not treat the problem itself.  Canalith repositioning can solve this problem often in one visit.  A referral to physical therapy along with a summary of the problem were reviewed with the  patient.  If the dizziness persists a radiographic work-up or electronystagmogram can be pursued.  Follow up next week with in perosn appointment       Benign paroxysmal positional vertigo, unspecified laterality  Will Start:  - meclizine (ANTIVERT) 25 MG tablet  Dispense: 30 tablet; Refill: 1      Ordering of each unique test  Prescription drug management   Time spent by me doing chart review, history and exam, documentation and further activities per the note       See Patient Instructions  Patient Instructions     PLAN:   1.   Symptomatic therapy suggested: increase fluids and call prn if symptoms persist or worsen.  Will try antivert as needed for dizziness   2.  Orders Placed This Encounter   Medications     meclizine (ANTIVERT) 25 MG tablet     Sig: Take 1 tablet (25 mg) by mouth 3 times daily as needed for dizziness     Dispense:  30 tablet     Refill:  1     Orders Placed This Encounter   Procedures     REVIEW OF HEALTH MAINTENANCE PROTOCOL ORDERS     CBC with platelets     Comprehensive metabolic panel     T4 free     TSH       3. Patient needs to follow up in if no improvement,or sooner if worsening of symptoms or other symptoms develop.  FUTURE LABS:       - Schedule a non-fasting blood draw   Patient will call when needed  Follow up in 1 week.      VINCENZO Whipple [facility]          Phone call duration: 19 minutes

## 2023-04-27 NOTE — RESULT ENCOUNTER NOTE
Kasia Melvin,    Attached are your test results.  -Normal red blood cell (hgb) levels, normal white blood cell count and normal platelet levels.  -Liver and gallbladder tests are normal (ALT,AST, Alk phos, bilirubin), kidney function is normal (Cr, GFR), sodium is normal, potassium is normal, calcium is normal, glucose is normal.  -TSH (thyroid stimulating hormone) level is normal which indicates normal thyroid function.   Please contact us if you have any questions.    Ernestina De Leon, CNP

## 2023-05-01 NOTE — PATIENT INSTRUCTIONS
.PLAN:   1.   Symptomatic therapy suggested: volatren gel for knees.  2.  Orders Placed This Encounter   Medications    diclofenac (VOLTAREN) 1 % topical gel     Sig: Apply 4 g topically 4 times daily     Dispense:  100 g     Refill:  1     Orders Placed This Encounter   Procedures    XR Knee Bilateral 3 Views    Lipid panel reflex to direct LDL Fasting    Erythrocyte sedimentation rate auto    CRP inflammation    Anti Nuclear Brianne IgG by IFA with Reflex    Rheumatoid factor    Vitamin D Deficiency    EKG 12-lead complete w/read - Clinics    Echocardiogram Exercise Stress       3. Patient needs to follow up in if no improvement,or sooner if worsening of symptoms or other symptoms develop.  FURTHER TESTING:       - stress echo   Bilateral knee xrays   FUTURE LABS:       - Schedule a fasting blood draw   Will follow up and/or notify patient of  results via My Chart to determine further need for followup

## 2023-05-01 NOTE — PROGRESS NOTES
Harman De La Rosa is a 62 year old, presenting for the following health issues:  Musculoskeletal Problem and Chest Pain    Hair is falling out.  Shoulder Pain  Knee Pain  Finger joint pain    5/1/2023     8:54 AM   Additional Questions   Roomed by Rob     Musculoskeletal Problem       Joint or Musculoskeletal Pain  Duration of complaint:   Description:   Location: left shoulder, right shoulder,   left knee, right knee and this new    finger on left hand 5th finger   Character: Dull ache  Intensity: mild, moderate  Progression of Symptoms: knees are getting worse   Accompanying Signs & Symptoms: Other symptoms: none  History: Previous similar pain: YES- shoulders not new     Precipitating factors: Trauma or overuse: no  Alleviating factors: Improved by: nothing  Therapies Tried and outcome:       CHEST PAIN     Onset: 3 days     Description:   Location:  left side  Character: tight  Radiation: from left side over shoulder  Duration: intermittent     Intensity: 6/10    Progression of Symptoms:  intermittent    Accompanying Signs & Symptoms:  Shortness of breath: YES  Sweating: no  Nausea/vomiting: no  Lightheadedness: YES  Palpitations: YES- once in a while   Fever/Chills: no  Cough: no  Heartburn: YES- and took ROM and pepcic     History:   Family history of heart disease no  Tobacco use: no    Precipitating factors:   Worse with exertion: no  Worse with deep breaths :  no  Related to food: no    Alleviating factors:  Just taking a deep and and leaning over        Therapies Tried and outcome:   Had been having vertigo and the antivert helped but makes her sleepy     Lab work is in process  Labs reviewed in EPIC  BP Readings from Last 3 Encounters:   05/01/23 117/75   05/16/22 125/81   11/16/20 122/73    Wt Readings from Last 3 Encounters:   05/01/23 55.7 kg (122 lb 11.2 oz)   07/13/22 53.1 kg (117 lb)   05/16/22 53.3 kg (117 lb 8 oz)                  Patient Active Problem List   Diagnosis     DDD (degenerative  disc disease), cervical     Neck pain on left side     CARDIOVASCULAR SCREENING; LDL GOAL LESS THAN 160     Adjustment disorder with mixed anxiety and depressed mood     Family history of thyroid disease     Intertrigo     Tendinopathy of rotator cuff, right     Chronic pain of both shoulders     Internal hemorrhoid, bleeding     Chronic right shoulder pain     Sleeping difficulty     Tendonitis of ankle or foot     Left foot pain     Positive TB test     Past Surgical History:   Procedure Laterality Date     COLONOSCOPY N/A 8/21/2019    Procedure: Colonoscopy, With Polypectomy And Biopsy;  Surgeon: Duane, William Charles, MD;  Location: [facility]     COLONOSCOPY WITH CO2 INSUFFLATION N/A 8/21/2019    Procedure: COLONOSCOPY, WITH CO2 INSUFFLATION;  Surgeon: Duane, William Charles, MD;  Location: [facility] OR       Social History     Tobacco Use     Smoking status: Never     Smokeless tobacco: Never   Vaping Use     Vaping status: Never Used   Substance Use Topics     Alcohol use: No     Family History   Problem Relation Age of Onset     Colon Cancer Mother      Lung Cancer Father      Lung Cancer Brother      Thyroid Disease Sister          Current Outpatient Medications   Medication Sig Dispense Refill     azelastine (ASTELIN) 0.1 % nasal spray Spray 1 spray into both nostrils 2 times daily 30 mL 3     clotrimazole-betamethasone (LOTRISONE) 1-0.05 % external cream Apply topically 2 times daily 15 g 0     diclofenac (VOLTAREN) 1 % topical gel Apply 4 g topically 4 times daily 100 g 1     diclofenac (VOLTAREN) 1 % topical gel Apply 2 g topically 4 times daily 100 g 3     meclizine (ANTIVERT) 25 MG tablet Take 1 tablet (25 mg) by mouth 3 times daily as needed for dizziness 30 tablet 1     Multiple Vitamins-Minerals (CENTRUM SILVER) per tablet Take 1 tablet by mouth daily       VITAMIN D, CHOLECALCIFEROL, PO Take 5,000 Units by mouth daily       No Known Allergies    Review of Systems   Constitutional, HEENT, cardiovascular,  "pulmonary, GI, , musculoskeletal, neuro, skin, endocrine and psych systems are negative, except as otherwise noted.      Objective    /75   Pulse 68   Temp 98  F (36.7  C) (Oral)   Resp 16   Ht 1.575 m (5' 2\")   Wt 55.7 kg (122 lb 11.2 oz)   SpO2 96%   BMI 22.44 kg/m    Body mass index is 22.44 kg/m .  Physical Exam   GENERAL APPEARANCE: healthy, alert and no distress  NECK: no adenopathy, no asymmetry, masses, or scars and thyroid normal to palpation  RESP: lungs clear to auscultation - no rales, rhonchi or wheezes  CV: regular rates and rhythm and no murmur, click or rub  ABDOMEN: soft, non-tender  MS: extremities normal- no gross deformities noted  ORTHO: .Inspection: no swelling, bruising, discoloration, or obvious deformity or asymmetry  Tender: SC joint  Non-tender: proximal-mid clavicle, mid-distal clavicle, AC joint and acromion  Range of Motion  Active:all normal  Passive: all normal  Strength: rotator cuff strength full  SKIN: no suspicious lesions or rashes  NEURO: Normal strength and tone, mentation intact and speech normal  PSYCH: mentation appears normal and affect normal/bright  MENTAL STATUS EXAM:  Appearance/Behavior: No apparent distress, Casually groomed and Dressed appropriately for weather  Speech: Normal  Mood/Affect: normal affect  Insight: Adequate    Normal Sinus Rhythm, rate 62 , normal axis, normal intervals, no acute ST/T changes c/w ischemia, no acute ST-T wave changes suggestive of ischemia, no LVH by voltage criteria, nonspecific intraventricular conduction delay    Assessment & Plan     Atypical chest pain  .will schedule stress echo rule out cardiac ischemia   - EKG 12-lead complete w/read - Clinics  - Echocardiogram Exercise Stress    Pain in joint, multiple sites  Will rule out inflammatory arthritis   - Erythrocyte sedimentation rate auto  - CRP inflammation  - Anti Nuclear Brianne IgG by IFA with Reflex  - Rheumatoid factor  - Vitamin D Deficiency    Chronic pain of " both shoulders  Further workup and treatment could be done if symptoms persist, worsen or new related symptoms occur. The patient will call in that eventuality.      CARDIOVASCULAR SCREENING; LDL GOAL LESS THAN 160  - Lipid panel reflex to direct LDL Fasting    Screening for disorder of blood and blood-forming organs  CBC    Screening for diabetes mellitus (DM)  CMP    Screening for thyroid disorder  TSH and free T4    Dizziness  Will follow up and/or notify patient of  results via My Chart to determine further need for followup      Benign paroxysmal positional vertigo, unspecified laterality  For many people this type of dizziness is self-limited over a period of days or weeks.  Antivert may help but does not treat the problem itself.  Canalith repositioning can solve this problem often in one visit.  A referral to physical therapy along with a summary of the problem were reviewed with the patient.  If the dizziness persists a radiographic work-up or electronystagmogram can be pursued.      Pain in both knees, unspecified chronicity  Will follow up and/or notify patient of  results via My Chart to determine further need for followup  - diclofenac (VOLTAREN) 1 % topical gel  Dispense: 100 g; Refill: 1  - XR Knee Bilateral 3 Views  - Orthopedic  Referral      Ordering of each unique test  Prescription drug management   Time spent by me doing chart review, history and exam, documentation and further activities per the note       See Patient Instructions  Patient Instructions     .PLAN:   1.   Symptomatic therapy suggested: volatren gel for knees.  2.  Orders Placed This Encounter   Medications     diclofenac (VOLTAREN) 1 % topical gel     Sig: Apply 4 g topically 4 times daily     Dispense:  100 g     Refill:  1     Orders Placed This Encounter   Procedures     XR Knee Bilateral 3 Views     Lipid panel reflex to direct LDL Fasting     Erythrocyte sedimentation rate auto     CRP inflammation     Anti Nuclear  Brianne IgG by IFA with Reflex     Rheumatoid factor     Vitamin D Deficiency     EKG 12-lead complete w/read - Clinics     Echocardiogram Exercise Stress       3. Patient needs to follow up in if no improvement,or sooner if worsening of symptoms or other symptoms develop.  FURTHER TESTING:       - stress echo   Bilateral knee xrays   FUTURE LABS:       - Schedule a fasting blood draw   Will follow up and/or notify patient of  results via My Chart to determine further need for followup      VINCENZO Whipple [facility]

## 2023-05-02 NOTE — RESULT ENCOUNTER NOTE
Kasia Melvin,    Attached are your test results.  Vitamin D elevated. Not sure how much you are taking but we need to back down some    Please contact us if you have any questions.    Ernestina De Leon, CNP

## 2023-05-05 NOTE — RESULT ENCOUNTER NOTE
Kasia Melvin,    Attached are your test results.  Stress test is normal which is reassuring    Please contact us if you have any questions.    Ernestina De Leon, CNP

## 2023-05-08 NOTE — PROGRESS NOTES
Estrella is a 62 year old who is being evaluated via a billable telephone visit.      What phone number would you like to be contacted at? [phone]    How would you like to obtain your AVS? Gisela  Distant Location (provider location):  On-site        Subjective   Estrella is a 62 year old, presenting for the following health issues:  Abdominal Pain        5/1/2023     8:54 AM   Additional Questions   Roomed by Rob     HPI      Abdominal/Flank Pain  Duration of complaint: about a week ago   Thought was food poisoning but having no diarrhea of vomiting just cramping pain   Was wondering about getting an antibiotic   Does not want to go to the ER but will if symptoms worsen   Will try to get into [facility] in the morning she is off so could come in   Description:   Character: Cramping  Location: lower abdomen   Radiation: None  Intensity: 7/10  Progression of Symptoms:  same  Accompanying Signs & Symptoms:  Fever/Chills: no  Gas/Bloating: YES  Nausea: no  Vomitting: no  Diarrhea: YES- slight   Dysuria or Hematuria: no  History:   Trauma: no  Previous similar pain: no   Previous tests done: none  Precipitating factors:   Does the pain change with:     Food: no     BM: no    Urination: no  Alleviating factors: nothing   Therapies Tried and outcome: fluids   LMP:  not applicable    Labs reviewed in EPIC  BP Readings from Last 3 Encounters:   05/01/23 117/75   05/16/22 125/81   11/16/20 122/73    Wt Readings from Last 3 Encounters:   05/01/23 55.7 kg (122 lb 11.2 oz)   07/13/22 53.1 kg (117 lb)   05/16/22 53.3 kg (117 lb 8 oz)                  Patient Active Problem List   Diagnosis     DDD (degenerative disc disease), cervical     Neck pain on left side     CARDIOVASCULAR SCREENING; LDL GOAL LESS THAN 160     Adjustment disorder with mixed anxiety and depressed mood     Family history of thyroid disease     Intertrigo     Tendinopathy of rotator cuff, right     Chronic pain of both shoulders     Internal hemorrhoid, bleeding      Chronic right shoulder pain     Sleeping difficulty     Tendonitis of ankle or foot     Left foot pain     Positive TB test     Past Surgical History:   Procedure Laterality Date     COLONOSCOPY N/A 8/21/2019    Procedure: Colonoscopy, With Polypectomy And Biopsy;  Surgeon: Duane, William Charles, MD;  Location: [facility] OR     COLONOSCOPY WITH CO2 INSUFFLATION N/A 8/21/2019    Procedure: COLONOSCOPY, WITH CO2 INSUFFLATION;  Surgeon: Duane, William Charles, MD;  Location: [facility] OR       Social History     Tobacco Use     Smoking status: Never     Smokeless tobacco: Never   Vaping Use     Vaping status: Never Used   Substance Use Topics     Alcohol use: No     Family History   Problem Relation Age of Onset     Colon Cancer Mother      Lung Cancer Father      Lung Cancer Brother      Thyroid Disease Sister          Current Outpatient Medications   Medication Sig Dispense Refill     azelastine (ASTELIN) 0.1 % nasal spray Spray 1 spray into both nostrils 2 times daily 30 mL 3     clotrimazole-betamethasone (LOTRISONE) 1-0.05 % external cream Apply topically 2 times daily 15 g 0     diclofenac (VOLTAREN) 1 % topical gel Apply 4 g topically 4 times daily 100 g 1     diclofenac (VOLTAREN) 1 % topical gel Apply 2 g topically 4 times daily 100 g 3     meclizine (ANTIVERT) 25 MG tablet Take 1 tablet (25 mg) by mouth 3 times daily as needed for dizziness 30 tablet 1     Multiple Vitamins-Minerals (CENTRUM SILVER) per tablet Take 1 tablet by mouth daily       VITAMIN D, CHOLECALCIFEROL, PO Take 5,000 Units by mouth daily       No Known Allergies          Review of Systems   Constitutional, HEENT, cardiovascular, pulmonary, gi and gu systems are negative, except as otherwise noted.      Objective           Vitals:  No vitals were obtained today due to virtual visit.    Physical Exam   alert, active and no distress  PSYCH: Alert and oriented times 3; coherent speech, normal   rate and volume, able to articulate logical thoughts, able   to  abstract reason, no tangential thoughts, no hallucinations   or delusions  Her affect is normal and pleasant  RESP: No cough, no audible wheezing, able to talk in full sentences  Remainder of exam unable to be completed due to telephone visits      Assessment & Plan     Lower abdominal pain  .discussed this visit needs to be seen in person and discussed referral to [facility] and patient agreed   - Referral to Acute and Diagnostic Services (Day of diagnostic / First order acute)         See Patient Instructions      VINCENZO Whipple [facility]          Phone call duration: 8 minutes

## 2023-05-08 NOTE — RESULT ENCOUNTER NOTE
Kasia Melvin,    Attached are your test results.  Xrays looked normal   Will refer you to orthopedics to see what they recommend   Please contact us if you have any questions.    Ernestina De Leon, CNP

## 2023-05-08 NOTE — RESULT ENCOUNTER NOTE
Kasia Melvin,    Attached are your test results.  Lupus and rheumatoid markers are normal which is reassuring   -LDL(bad) cholesterol level is elevated which can increase your heart disease risk.  A diet high in fat and simple carbohydrates, genetics and being overweight can contribute to this. ADVISE: exercising 150 minutes of aerobic exercise per week (30 minutes for 5 days per week or 50 minutes for 3 days per week are options) and eating a low saturated fat/low carbohydrate diet are helpful to improve this. In 12 months, you should recheck your fasting cholesterol panel by scheduling a lab-only appointment.   Please contact us if you have any questions.    Ernestina De Leon, CNP

## 2023-05-09 NOTE — PROGRESS NOTES
Assessment & Plan   Problem List Items Addressed This Visit    None  Visit Diagnoses     LUQ abdominal pain    -  Primary    Relevant Orders    CBC with platelets differential (Completed)    Comprehensive metabolic panel (Completed)    UA with Microscopic reflex to Culture (Completed)    Lipase (Completed)    XR Abdomen 2 Views    UA Microscopic with Reflex to Culture (Completed)    Periumbilical pain        Relevant Orders    CBC with platelets differential (Completed)    Comprehensive metabolic panel (Completed)    UA with Microscopic reflex to Culture (Completed)    Lipase (Completed)    XR Abdomen 2 Views    UA Microscopic with Reflex to Culture (Completed)    Bloating        Relevant Orders    CBC with platelets differential (Completed)    Comprehensive metabolic panel (Completed)    UA with Microscopic reflex to Culture (Completed)    Lipase (Completed)    XR Abdomen 2 Views    UA Microscopic with Reflex to Culture (Completed)    Gas pain             62-year-old female with abdominal pain over virtual visit with in person exam here today at acute diagnostic services.    Differential constipation gas, colitis, diverticulitis, UTI/pyelo-/kidney stone, cholecystitis, pancreatitis, muscle wall pain    Pain localized to left upper quadrant and periumbilical     Labs CBC, CMP, lipase, and urinalysis.    Imaging with abdominal x-ray and consideration CT of abdomen/pelvis with and without contrast    Labs normal.  X-ray shows gas in area of pain  With symptoms of bloating exam with increased bowel sounds and x-ray showing gas and areas of pain, suggest gas pain.  White count not elevated for me to be is concerned about diverticulitis.  Nor was her exam is concerning for this.  At this point recommend conservative therapy with MiraLAX and Gas-X  With follow-up with her provider.       Patient Instructions     Xray shows large amount of gas and stool throughout abdomen.  Greatest gas bubble size is in the left upper  quadrant.    I would like you to take MiraLAX once daily to help clear out the stool  And Gas-X throughout the day to help with gas pains.      Blood counts -normal.  White count is normal.  This suggests there is not a serious underlying infection or process going on.    Electrolytes, liver and kidney test -normal    Lipase pancreas test -normal    Urinalysis is normal.  This shows that you do not have a kidney stone or infection.  You do not have a bladder infection causing the symptoms      At this point, with your symptoms of bloating and increased gas sounds on exam with x-ray showing largest gas bubble in area of pain, symptoms consistent with gas pain.    All screening labs are normal.  At this point I do not think you need abdominal CT    I would like you to follow-up with your primary provider in 1 week after treatment suggestions.                Mary Rajan MD  [facility]        40 minutes spent by me on the date of the encounter doing chart review, history and exam, documentation and further activities per the note        Harman De La Rosa is a 62 year old, presenting for the following health issues:  Abdominal Pain    HPI     Pain History:  When did you first notice your pain? X 1 week    Have you seen anyone else for your pain? No  How has your pain affected your ability to work? Not applicable  Where in your body do you have pain? Abdominal/Flank Pain  Onset/Duration: x 1 week   Description:   Character: Stabbing  Location: left upper quadrant  Radiation: None and Pelvic region  Intensity: severe  Progression of Symptoms:  same  Accompanying Signs & Symptoms:  Fever/Chills: No  Gas/Bloating: YES  Nausea: No  Vomitting: No  Diarrhea: No  Constipation: No  Dysuria or Hematuria: No  History:   Trauma: No  Previous similar pain: No  Previous tests done: echocardiogram   Precipitating factors:   Does the pain change with:     Food: No    Bowel Movement: No    Urination:  No   Other factors:  No  Therapies tried and outcome: Tea, Tums pepto-bismol, no relief.   No LMP recorded. Patient is postmenopausal.          Per provider  Referral from [facility] provider Ernestina De Leon who had virtual visit yesterday evening with patient.  62-year-old female with lower abdominal cramping for the past week.  Patient has contributed her symptoms to food poisoning.  Pain has been 7 out of 10.  Lower cramps.  Denies nausea vomiting and diarrhea.  Referrals to [facility] for labs and CT imaging of abdomen pelvis.  Concern included diverticulitis    Had left over chicken salad  Points to Pain in LUQ and just belly button  Worse with laying at night    Had ECHO & ekg for atypical into chest that radiated from her abdomen last week   Echo report normal    Review Of Systems  No fever/new sweat/chill.  Skin: negative  Ears/Nose/Throat: NO uri sx.  Respiratory: Notices breathing symptoms with her atypical chest pain evaluated last week.    Gastrointestinal: negative, poor appetite, nausea and vomiting  Always reflux  bowel movement daily - thinks loose.  Genitourinary: negative, dysuria and hematuria  Musculoskeletal: ache    Colonoscopy reviewed 2019 remarkable for polyps.  No diverticulosis noted  Patient Active Problem List   Diagnosis     DDD (degenerative disc disease), cervical     Neck pain on left side     CARDIOVASCULAR SCREENING; LDL GOAL LESS THAN 160     Adjustment disorder with mixed anxiety and depressed mood     Family history of thyroid disease     Intertrigo     Tendinopathy of rotator cuff, right     Chronic pain of both shoulders     Internal hemorrhoid, bleeding     Chronic right shoulder pain     Sleeping difficulty     Tendonitis of ankle or foot     Left foot pain     Positive TB test     Current Outpatient Medications   Medication Sig Dispense Refill     azelastine (ASTELIN) 0.1 % nasal spray Spray 1 spray into both nostrils 2 times daily 30 mL 3      clotrimazole-betamethasone (LOTRISONE) 1-0.05 % external cream Apply topically 2 times daily 15 g 0     diclofenac (VOLTAREN) 1 % topical gel Apply 4 g topically 4 times daily 100 g 1     diclofenac (VOLTAREN) 1 % topical gel Apply 2 g topically 4 times daily 100 g 3     meclizine (ANTIVERT) 25 MG tablet Take 1 tablet (25 mg) by mouth 3 times daily as needed for dizziness 30 tablet 1     Multiple Vitamins-Minerals (CENTRUM SILVER) per tablet Take 1 tablet by mouth daily       VITAMIN D, CHOLECALCIFEROL, PO Take 5,000 Units by mouth daily               Objective    /69 (BP Location: Right arm, Patient Position: Sitting, Cuff Size: Adult Regular)   Pulse 73   Temp 97.3  F (36.3  C) (Oral)   SpO2 95%   There is no height or weight on file to calculate BMI.  Physical Exam  Vitals reviewed.   Constitutional:       General: She is not in acute distress.     Appearance: Normal appearance. She is not ill-appearing, toxic-appearing or diaphoretic.   HENT:      Mouth/Throat:      Mouth: Mucous membranes are moist.   Eyes:      General: No scleral icterus.  Cardiovascular:      Rate and Rhythm: Normal rate and regular rhythm.      Pulses: Normal pulses.      Heart sounds: Normal heart sounds.   Pulmonary:      Effort: Pulmonary effort is normal.      Breath sounds: Normal breath sounds.   Abdominal:      Palpations: Abdomen is soft.      Tenderness: There is abdominal tenderness (Periumbilical tenderness with left upper quadrant.  Also present but less in the left middle quadrant). There is no right CVA tenderness, left CVA tenderness, guarding or rebound.      Comments: Increased bowel sounds   Musculoskeletal:      Right lower leg: No edema.      Left lower leg: No edema.   Skin:     Findings: No rash.   Neurological:      General: No focal deficit present.      Mental Status: She is alert.            Xray - Reviewed and interpreted by me.  Gas throughout abdomen with larger stool burden right side and mid  transverse colon   Largest gas bubble left upper quadrant  Results for orders placed or performed in visit on 05/09/23 (from the past 24 hour(s))   UA with Microscopic reflex to Culture    Specimen: Urine, Clean Catch   Result Value Ref Range    Color Urine Colorless Colorless, Straw, Light Yellow, Yellow    Appearance Urine Clear Clear    Glucose Urine Negative Negative mg/dL    Bilirubin Urine Negative Negative    Ketones Urine Negative Negative mg/dL    Specific Gravity Urine 1.006 0.999 - 1.035    Blood Urine Negative Negative    pH Urine 6.0 5.0 - 7.0    Protein Albumin Urine Negative Negative mg/dL    Nitrite Urine Negative Negative    Leukocyte Esterase Urine Negative Negative    Urobilinogen Urine Normal Normal, 2.0 mg/dL   UA Microscopic with Reflex to Culture   Result Value Ref Range    RBC Urine None Seen 0-2 /HPF /HPF    WBC Urine None Seen 0-5 /HPF /HPF    Narrative    Urine Culture not indicated   Lipase   Result Value Ref Range    Lipase 314 73 - 393 U/L   CBC with platelets differential    Narrative    The following orders were created for panel order CBC with platelets differential.  Procedure                               Abnormality         Status                     ---------                               -----------         ------                     CBC with platelets and d...[[id number]]                      Final result                 Please view results for these tests on the individual orders.   Comprehensive metabolic panel   Result Value Ref Range    Sodium 140 133 - 144 mmol/L    Potassium 4.1 3.4 - 5.3 mmol/L    Chloride 109 94 - 109 mmol/L    Carbon Dioxide (CO2) 26 20 - 32 mmol/L    Anion Gap 5 3 - 14 mmol/L    Urea Nitrogen 14 7 - 30 mg/dL    Creatinine 0.92 0.52 - 1.04 mg/dL    Calcium 8.7 8.5 - 10.1 mg/dL    Glucose 109 (H) 70 - 99 mg/dL    Alkaline Phosphatase 83 40 - 150 U/L    AST 28 0 - 45 U/L    ALT 24 0 - 50 U/L    Protein Total 7.6 6.8 - 8.8 g/dL    Albumin 3.6 3.4 - 5.0  g/dL    Bilirubin Total 0.4 0.2 - 1.3 mg/dL    GFR Estimate 70 >60 mL/min/1.73m2   CBC with platelets and differential   Result Value Ref Range    WBC Count 6.1 4.0 - 11.0 10e3/uL    RBC Count 4.80 3.80 - 5.20 10e6/uL    Hemoglobin 14.9 11.7 - 15.7 g/dL    Hematocrit 44.5 35.0 - 47.0 %    MCV 93 78 - 100 fL    MCH 31.0 26.5 - 33.0 pg    MCHC 33.5 31.5 - 36.5 g/dL    RDW 11.8 10.0 - 15.0 %    Platelet Count 218 150 - 450 10e3/uL    % Neutrophils 62 %    % Lymphocytes 25 %    % Monocytes 8 %    % Eosinophils 4 %    % Basophils 1 %    % Immature Granulocytes 0 %    NRBCs per 100 WBC 0 <1 /100    Absolute Neutrophils 3.7 1.6 - 8.3 10e3/uL    Absolute Lymphocytes 1.5 0.8 - 5.3 10e3/uL    Absolute Monocytes 0.5 0.0 - 1.3 10e3/uL    Absolute Eosinophils 0.3 0.0 - 0.7 10e3/uL    Absolute Basophils 0.0 0.0 - 0.2 10e3/uL    Absolute Immature Granulocytes 0.0 <=0.4 10e3/uL    Absolute NRBCs 0.0 10e3/uL

## 2023-05-09 NOTE — Clinical Note
Hi I just evaluated Estrella.  I think it is gas pain.  Labs are normal.  X-ray did show gas.  With her exam lab findings and x-ray I did not feel she needed a CT today.  I request she follow-up with you in a week thank you.  Mary

## 2023-05-09 NOTE — PATIENT INSTRUCTIONS
Xray shows large amount of gas and stool throughout abdomen.  Greatest gas bubble size is in the left upper quadrant.    I would like you to take MiraLAX once daily to help clear out the stool  And Gas-X throughout the day to help with gas pains.      Blood counts -normal.  White count is normal.  This suggests there is not a serious underlying infection or process going on.    Electrolytes, liver and kidney test -normal    Lipase pancreas test -normal    Urinalysis is normal.  This shows that you do not have a kidney stone or infection.  You do not have a bladder infection causing the symptoms      At this point, with your symptoms of bloating and increased gas sounds on exam with x-ray showing largest gas bubble in area of pain, symptoms consistent with gas pain.    All screening labs are normal.  At this point I do not think you need abdominal CT    I would like you to follow-up with your primary provider in 1 week after treatment suggestions.

## 2023-05-09 NOTE — TELEPHONE ENCOUNTER
Patient calling to inquire about getting further testing done after virtual visit with PCP yesterday. States there was discussion about going to [facility] and patient has not heard anything from [facility] yet.    RN notes provider placed referral for [facility].     Routing to provider to follow-up with [facility] referral and initiate provider to provider review.    Hayley OKEEFE RN  [facility] Primary Care Triage

## 2023-05-11 NOTE — TELEPHONE ENCOUNTER
Reason for Call:  Appointment Request    Patient requesting this type of appt: Chronic Diease Management/Medication/Follow-Up    Requested provider: Ernestina De Leon    Reason patient unable to be scheduled: Not within requested timeframe    When does patient want to be seen/preferred time: Same day    Comments: looking for appt today as virtual appt to talk about ct scans and 2 medications they took after that. Pain is still the same.    Could we send this information to you in [employer] or would you prefer to receive a phone call?:   Patient would prefer a phone call   Okay to leave a detailed message?: Yes at Cell number on file:    Telephone Information:   Mobile [phone]       Call taken on 5/11/2023 at 2:45 PM by Lisa Herrera

## 2023-05-12 NOTE — PATIENT INSTRUCTIONS
PLAN:   1.   Symptomatic therapy suggested: continue the Miralax daily.  2.    Orders Placed This Encounter   Procedures    CT Abdomen Pelvis w Contrast       3. Patient needs to follow up in if no improvement,or sooner if worsening of symptoms or other symptoms develop.  FURTHER TESTING:       - CT abdomen and pelvis  Will follow up and/or notify patient of  results via My Chart to determine further need for followup

## 2023-05-12 NOTE — PROGRESS NOTES
Estrella is a 62 year old who is being evaluated via a billable video visit.      How would you like to obtain your AVS? MyChart  If the video visit is dropped, the invitation should be resent by: Text to cell phone: [phone]  Will anyone else be joining your video visit? No    Subjective   Estrella is a 62 year old, presenting for the following health issues:  Abdominal Pain        5/12/2023    10:03 AM   Additional Questions   Roomed by Kinga     History of Present Illness       Reason for visit:  Still having abdominal pain (sharp)    She eats 0-1 servings of fruits and vegetables daily.She consumes 0 sweetened beverage(s) daily.She exercises with enough effort to increase her heart rate 60 or more minutes per day.  She exercises with enough effort to increase her heart rate 3 or less days per week.   She is taking medications regularly.         Abdominal/Flank Pain  Duration of complaint: still having the pain   Can not eat solid food but makes her feel bad   Description:   Character: Sharp and Dull ache  Location: left upper quadrant left lower quadrant  Radiation: None  Intensity: 7/10  Progression of Symptoms:  constant  Accompanying Signs & Symptoms:  Fever/Chills: no  Gas/Bloating: YES  Nausea: no  Vomitting: no  Diarrhea: no  Dysuria or Hematuria: no  History:   Trauma: no  Previous similar pain: no   Previous tests done: x-ray  Precipitating factors:   Does the pain change with:     Food: no     BM: no    Urination: no  Alleviating factors: tried   Therapies Tried and outcome: Tried apple cider vinegar   Pain is worse at night    LMP:  not applicable    Labs reviewed in EPIC  BP Readings from Last 3 Encounters:   05/09/23 105/69   05/01/23 117/75   05/16/22 125/81    Wt Readings from Last 3 Encounters:   05/01/23 55.7 kg (122 lb 11.2 oz)   07/13/22 53.1 kg (117 lb)   05/16/22 53.3 kg (117 lb 8 oz)                  Patient Active Problem List   Diagnosis     DDD (degenerative disc disease), cervical     Neck pain  on left side     CARDIOVASCULAR SCREENING; LDL GOAL LESS THAN 160     Adjustment disorder with mixed anxiety and depressed mood     Family history of thyroid disease     Intertrigo     Tendinopathy of rotator cuff, right     Chronic pain of both shoulders     Internal hemorrhoid, bleeding     Chronic right shoulder pain     Sleeping difficulty     Tendonitis of ankle or foot     Left foot pain     Positive TB test     Past Surgical History:   Procedure Laterality Date     COLONOSCOPY N/A 8/21/2019    Procedure: Colonoscopy, With Polypectomy And Biopsy;  Surgeon: Duane, William Charles, MD;  Location: [facility] OR     COLONOSCOPY WITH CO2 INSUFFLATION N/A 8/21/2019    Procedure: COLONOSCOPY, WITH CO2 INSUFFLATION;  Surgeon: Duane, William Charles, MD;  Location: [facility] OR       Social History     Tobacco Use     Smoking status: Never     Smokeless tobacco: Never   Vaping Use     Vaping status: Never Used   Substance Use Topics     Alcohol use: No     Family History   Problem Relation Age of Onset     Colon Cancer Mother      Lung Cancer Father      Lung Cancer Brother      Thyroid Disease Sister          Current Outpatient Medications   Medication Sig Dispense Refill     azelastine (ASTELIN) 0.1 % nasal spray Spray 1 spray into both nostrils 2 times daily 30 mL 3     clotrimazole-betamethasone (LOTRISONE) 1-0.05 % external cream Apply topically 2 times daily 15 g 0     diclofenac (VOLTAREN) 1 % topical gel Apply 4 g topically 4 times daily 100 g 1     diclofenac (VOLTAREN) 1 % topical gel Apply 2 g topically 4 times daily 100 g 3     meclizine (ANTIVERT) 25 MG tablet Take 1 tablet (25 mg) by mouth 3 times daily as needed for dizziness 30 tablet 1     Multiple Vitamins-Minerals (CENTRUM SILVER) per tablet Take 1 tablet by mouth daily       VITAMIN D, CHOLECALCIFEROL, PO Take 5,000 Units by mouth daily       No Known Allergies    Review of Systems   Constitutional, HEENT, cardiovascular, pulmonary, gi and gu systems are negative,  except as otherwise noted.      Objective           Vitals:  No vitals were obtained today due to virtual visit.    Physical Exam   GENERAL: alert and no distress  EYES: Eyes grossly normal to inspection and conjunctivae and sclerae normal  HENT: normal cephalic/atraumatic and oral mucous membranes moist  RESP: No audible wheeze, cough, or visible cyanosis.  No visible retractions or increased work of breathing.    MS: extremities normal- no gross deformities noted  SKIN: Visible skin clear. No significant rash, abnormal pigmentation or lesions.  NEURO: mentation intact  PSYCH: Mentation appears normal, affect normal/bright, judgement and insight intact, normal speech and appearance well-groomed.    Office Visit on 05/09/2023   Component Date Value Ref Range Status     Lipase 05/09/2023 314  73 - 393 U/L Final     Color Urine 05/09/2023 Colorless  Colorless, Straw, Light Yellow, Yellow Final     Appearance Urine 05/09/2023 Clear  Clear Final     Glucose Urine 05/09/2023 Negative  Negative mg/dL Final     Bilirubin Urine 05/09/2023 Negative  Negative Final     Ketones Urine 05/09/2023 Negative  Negative mg/dL Final     Specific Gravity Urine 05/09/2023 1.006  0.999 - 1.035 Final     Blood Urine 05/09/2023 Negative  Negative Final     pH Urine 05/09/2023 6.0  5.0 - 7.0 Final     Protein Albumin Urine 05/09/2023 Negative  Negative mg/dL Final     Nitrite Urine 05/09/2023 Negative  Negative Final     Leukocyte Esterase Urine 05/09/2023 Negative  Negative Final     Urobilinogen Urine 05/09/2023 Normal  Normal, 2.0 mg/dL Final     Sodium 05/09/2023 140  133 - 144 mmol/L Final     Potassium 05/09/2023 4.1  3.4 - 5.3 mmol/L Final     Chloride 05/09/2023 109  94 - 109 mmol/L Final     Carbon Dioxide (CO2) 05/09/2023 26  20 - 32 mmol/L Final     Anion Gap 05/09/2023 5  3 - 14 mmol/L Final     Urea Nitrogen 05/09/2023 14  7 - 30 mg/dL Final     Creatinine 05/09/2023 0.92  0.52 - 1.04 mg/dL Final     Calcium 05/09/2023 8.7  8.5  - 10.1 mg/dL Final     Glucose 05/09/2023 109 (H)  70 - 99 mg/dL Final     Alkaline Phosphatase 05/09/2023 83  40 - 150 U/L Final     AST 05/09/2023 28  0 - 45 U/L Final     ALT 05/09/2023 24  0 - 50 U/L Final     Protein Total 05/09/2023 7.6  6.8 - 8.8 g/dL Final     Albumin 05/09/2023 3.6  3.4 - 5.0 g/dL Final     Bilirubin Total 05/09/2023 0.4  0.2 - 1.3 mg/dL Final     GFR Estimate 05/09/2023 70  >60 mL/min/1.73m2 Final    eGFR calculated using 2021 CKD-EPI equation.     WBC Count 05/09/2023 6.1  4.0 - 11.0 10e3/uL Final     RBC Count 05/09/2023 4.80  3.80 - 5.20 10e6/uL Final     Hemoglobin 05/09/2023 14.9  11.7 - 15.7 g/dL Final     Hematocrit 05/09/2023 44.5  35.0 - 47.0 % Final     MCV 05/09/2023 93  78 - 100 fL Final     MCH 05/09/2023 31.0  26.5 - 33.0 pg Final     MCHC 05/09/2023 33.5  31.5 - 36.5 g/dL Final     RDW 05/09/2023 11.8  10.0 - 15.0 % Final     Platelet Count 05/09/2023 218  150 - 450 10e3/uL Final     % Neutrophils 05/09/2023 62  % Final     % Lymphocytes 05/09/2023 25  % Final     % Monocytes 05/09/2023 8  % Final     % Eosinophils 05/09/2023 4  % Final     % Basophils 05/09/2023 1  % Final     % Immature Granulocytes 05/09/2023 0  % Final     NRBCs per 100 WBC 05/09/2023 0  <1 /100 Final     Absolute Neutrophils 05/09/2023 3.7  1.6 - 8.3 10e3/uL Final     Absolute Lymphocytes 05/09/2023 1.5  0.8 - 5.3 10e3/uL Final     Absolute Monocytes 05/09/2023 0.5  0.0 - 1.3 10e3/uL Final     Absolute Eosinophils 05/09/2023 0.3  0.0 - 0.7 10e3/uL Final     Absolute Basophils 05/09/2023 0.0  0.0 - 0.2 10e3/uL Final     Absolute Immature Granulocytes 05/09/2023 0.0  <=0.4 10e3/uL Final     Absolute NRBCs 05/09/2023 0.0  10e3/uL Final     RBC Urine 05/09/2023 None Seen  0-2 /HPF /HPF Final     WBC Urine 05/09/2023 None Seen  0-5 /HPF /HPF Final       Assessment & Plan     Generalized abdominal pain  Will follow up and/or notify patient of  results via My Chart to determine further need for  followup  Concerned for possible mass vs diverticulitis   - CT Abdomen Pelvis w Contrast      Ordering of each unique test   Time spent by me doing chart review, history and exam, documentation and further activities per the note       See Patient Instructions  Patient Instructions     PLAN:   1.   Symptomatic therapy suggested: continue the Miralax daily.  2.    Orders Placed This Encounter   Procedures     CT Abdomen Pelvis w Contrast       3. Patient needs to follow up in if no improvement,or sooner if worsening of symptoms or other symptoms develop.  FURTHER TESTING:       - CT abdomen and pelvis  Will follow up and/or notify patient of  results via My Chart to determine further need for followup        [facility]          Video-Visit Details    Type of service:  Video Visit   Video Start Time: 3:39 PM  Video End Time:3:47 PM    Originating Location (pt. Location): Home  Distant Location (provider location):  Off-site  Platform used for Video Visit: Rich[employer]

## 2023-05-16 NOTE — TELEPHONE ENCOUNTER
Patient calling about results for CT scan completed today. Patient is concerned about the impression notes and what she needs to do next. Patient still having abdominal pain, and states she has not been sleeping well due to it. RN notes no interpretation of results available yet, and relayed staff will give her call back once provider has reviewed the scan.     Routing to provider to review and advise.    MEENA Hardy  [facility] Primary Care Triage

## 2023-05-16 NOTE — RESULT ENCOUNTER NOTE
Kasia Melvin,    Attached are your test results.  I tried to call you but got your voice mailbox   I wanted to reach you as I placed a referral to a surgeon and wanted you to know before they called you to make an appointment   There is a mass in your pancreas but we are not sure what it is yet so we need to get you to a surgeon to help get a biopsy of the mass.   Also you have a cyst on your ovary as well and we need to get an ultrasound of that as well   I will place order. Please call [phone] to schedule.  I will try to reach you by phone again but I needed you to know this before they started calling you so you knew why    Please contact us if you have any questions.    Ernestina De Leon, CNP

## 2023-05-17 NOTE — TELEPHONE ENCOUNTER
Can we reach out to referral to help with getting her an appointment due to concerning CT scan abdomen and pelvis for possible pancreatic cancer

## 2023-05-19 NOTE — TELEPHONE ENCOUNTER
Called and spoke with patient regarding an appointment with general surgeon. The next available appointment is Friday May 26, 2023 at 10:30 am with Dr. Taylor at the [place] location. Patient prefers to be seen sooner if possible. To secure an appointment, writer scheduled patient in the next available which is next Friday.     Patient accepted the appointment but would like to be notified if a sooner appointment is available.     Will route to provider if a sooner appointment is needed.     Itz Aguayo RN on 5/19/2023 at 9:51 AM

## 2023-05-19 NOTE — CONSULTS
Outpatient IR Biopsy Referral    Patient is a 63 y/o female with a PMH of DDD, depression, anxiety, abdominal pain, pancreatic mass. IR has been asked to biopsy a mass.    CT 5/16/23 PANCREAS: There is an ill-defined hypoenhancing mass in the tail the pancreas. The mass measures 4.5 cm long by 2.0 cm diameter. There is associated pancreatic ductal dilation within the tip of the pancreatic tail.    HEPATOBILIARY: There are numerous low density peripherally enhancing lesions scattered throughout the liver, largest of which is seen in the right hepatic lobe inferiorly, and measures 2.5 cm diameter. Remaining lesions appear in the one-1.5 cm range. No calcified gallstones.    IMPRESSION:   1. Mass in the pancreatic tail with numerous liver and left adrenal lesions. Findings are highly suspicious for metastatic pancreatic cancer.  2. Incidental left ovarian cyst. This could be further evaluated with ultrasound, depending on the clinical picture.    Case and imaging CT 5/16/523 was reviewed with Dr. Grande from IR and CT with US in the room for a liver biopsy IR provider of the day choice is approved. CT 5/16/23. Discussed case with Dr. Olivo who has not yet seen the patient, apt is 5/26/26, would like biopsy as soon as possible to expedite treatment plans.  Agreeable to a liver lesion biopsy IR providers choice.  Patient aware and agreeable to liver lesion biopsy.     Procedure order, surgical pathology and leukemia lymphoma orders placed.    If requesting team would like samples sent for anything else please enter them prior to scheduled procedure.    Primary team Dr. Taylor Surgery  made aware of IR recommendations via epic messaging.    Angie LOYA  Interventional Radiology   IR on-call pager: [phone]

## 2023-05-19 NOTE — TELEPHONE ENCOUNTER
[facility] Call Center    Phone Message    May a detailed message be left on voicemail: yes     Reason for Call: The patient has an urgent referral to General Surgery. The patient is concerned because she wants a biopsy done as soon as possible. Writer saw that next available with Dr. Alas is out until August. Can someone please review referral and call patient back to advise? Thank you!    Action Taken: Message routed to:  Adult Clinics: Surgery, General p [phone]    Travel Screening: Not Applicable

## 2023-05-20 NOTE — RESULT ENCOUNTER NOTE
Kasia Melvin,    Attached are your test results.  You have a quite large ovarian cyst and they recommend a MRI to evaluate further   I will place order. Please call [phone] to schedule.   Please contact us if you have any questions.    Ernestina De Leon, CNP

## 2023-05-23 NOTE — TELEPHONE ENCOUNTER
Patient calling to report that she has consultation with general surgeon regarding pancreatic mass on 5/26. Also has MRI of pelvis scheduled for 6/2.    Patient states that she continues to experience pain in abdominal area. She has been using Tylenol around the clock to manage pain with no relief. Patient inquiring if provider is able to send in a prescription for pain medication.    Routing to provider to review and advise.    MEENA Hardy  [facility] Primary Care Triage

## 2023-05-23 NOTE — TELEPHONE ENCOUNTER
RN called patient and relayed provider message below. Patient verbalized good understanding. Also states that she was able to schedule CT liver biopsy for 5/25/2023. No further questions or concerns.    MEENA Hardy  [facility] Primary Care Triage

## 2023-05-25 NOTE — PROGRESS NOTES
Pt arrives to 2a for liver biopsy. H&P is up to date. Consent needs to be signed. INR/PTT sent to lab. Pt c/o left abdominal pain, heat pack given to pt per request. Pt reports her brother in law will be coming post procedure to provide ride home.

## 2023-05-25 NOTE — PRE-PROCEDURE
GENERAL PRE-PROCEDURE:   Procedure:  Image-guided liver lesion biopsy  Date/Time:  5/25/2023 2:00 PM    Verbal consent obtained?: Yes    Written consent obtained?: Yes    Risks and benefits: Risks, benefits and alternatives were discussed    Consent given by:  Patient  Patient states understanding of procedure being performed: Yes    Patient's understanding of procedure matches consent: Yes    Procedure consent matches procedure scheduled: Yes    Expected level of sedation:  Moderate  Appropriately NPO:  Yes  ASA Class:  3  Mallampati  :  Grade 1- soft palate, uvula, tonsillar pillars, and posterior pharyngeal wall visible  Lungs:  Lungs clear with good breath sounds bilaterally  Heart:  Normal heart sounds and rate  History & Physical reviewed:  History and physical reviewed and no updates needed  Statement of review:  I have reviewed the lab findings, diagnostic data, medications, and the plan for sedation

## 2023-05-25 NOTE — PROCEDURES
[facility]    Procedure: IR Procedure Note    Date/Time: 5/25/2023 3:22 PM    Performed by: Khari Serrano DO  Authorized by: Khari Serrano DO      UNIVERSAL PROTOCOL   Site Marked: NA  Prior Images Obtained and Reviewed:  Yes  Required items: Required blood products, implants, devices and special equipment available    Patient identity confirmed:  Verbally with patient, arm band, provided demographic data and hospital-assigned identification number  Patient was reevaluated immediately before administering moderate or deep sedation or anesthesia  Confirmation Checklist:  Patient's identity using two indicators, relevant allergies, procedure was appropriate and matched the consent or emergent situation and correct equipment/implants were available  Time out: Immediately prior to the procedure a time out was called    Universal Protocol: the Joint Commission Universal Protocol was followed    Preparation: Patient was prepped and draped in usual sterile fashion       ANESTHESIA    Anesthesia: Local infiltration  Local Anesthetic:  Lidocaine 1% without epinephrine      SEDATION  Patient Sedated: Yes    Vital signs: Vital signs monitored during sedation    See dictated procedure note for full details.  Findings: Segment 2 liver mass biopsied with on sight pathology present with 6 total passes and three adequate cores sent in formalin, one in RPMI. Gelfoam torpedoes used for tract closure.    Specimens: none    Complications: None    Condition: Stable    Plan: Routine aftercare.      PROCEDURE    Patient Tolerance:  Patient tolerated the procedure well with no immediate complications  Length of time physician/provider present for 1:1 monitoring during sedation: 30

## 2023-05-25 NOTE — PROGRESS NOTES
Pt returns to 2a s/p liver biopsy. Mid abdominal site CDI, soft, flat, non tender. Pt alert, tolerating PO.

## 2023-05-25 NOTE — PROGRESS NOTES
Patient Name: Karina Melvin  Medical Record Number: [id number]  Today's Date: 5/25/2023    Procedure: Image guided liver lesion biopsy  Proceduralist: Dr. Harjinder Montesinos  Pathology present: Yes     Procedure Start: 1448  Procedure end: 1518  Sedation medications administered: Fentanyl 100mcg and Versed 2mg     Report given to: MEENA Rowland  : LUIS    Other Notes: Pt arrived to IR room 1 from . Consent reviewed. Pt denies any questions or concerns regarding procedure. Pt positioned supin and monitored per protocol. Pt tolerated procedure without any noted complications. Pt transferred back to .

## 2023-05-25 NOTE — LETTER
"June 2, 2023      Estrella Melvin  [address] [address]        Dear ,    We are writing to inform you of your test results.    I believe you probably already know the diagnosis and are being seen by oncology.   I am thinking about you and praying for you in this time of turmoil.    If there is something I can do that would be helpful please let me know.   Benjamin Taylor MD      Resulted Orders   Surgical pathology exam - Liver Lesion   Result Value Ref Range    Case Report       Surgical Pathology Report                         Case: [id number]                                  Authorizing Provider:  Benjamin Taylor MD Collected:           05/25/2023 03:13 PM          Ordering Location:     [facility]     Received:            05/25/2023 03:35 PM                                 [address] [place]                                                            Pathologist:           Doreen Suarez MD                                                          Specimen:    Liver, Liver mass biopsy                                                                   Final Diagnosis       LIVER LESION, NEEDLE BIOPSY:   -Positive for malignancy  -Metastatic adenocarcinoma, see comment        Comment       The patient has an imaging impression of a pancreatic mass and suspected metastatic lesions in the liver.  The morphologic findings in this case are compatible with metastatic adenocarcinoma of pancreatic origin in the absence of any other primary lesion.  Clinical and radiologic correlation is recommended.        Clinical Information       Pancreatic mass liver lesions      Gross Description       A(A). Liver, Liver mass biopsy:  ATP: Liver mass: \"Adequate\" (Jhon Harry CT([facility]) via telepathology)    The specimen is received in formalin with proper patient identification, labeled \"liver mass biopsy\".  The specimen consists of a 1.1 x 1.0 x 0.1 cm aggregate of " yellow-tan soft tissue cores, which are entirely submitted in cassettes A1-A2.          Microscopic Description       Microscopic examination was performed.       MCRS Yes (A) N/A    Performing Labs       The technical component of this testing was completed at [facility]        Case Images     Flow Cytometry Tissue   Result Value Ref Range    Case Report       Flow Cytometry Report                             Case: [id number]                                  Authorizing Provider:  Benjamin Taylor MD Collected:           05/25/2023 03:13 PM          Ordering Location:     [facility]     Received:            05/25/2023 03:31 PM                                 [address] [place]                                                            Pathologist:           Toya Olivier MD                                                        Specimen:    Liver                                                                                      Flow Interpretation       A. Liver:  -Polytypic B cells  No aberrant immunophenotype on T cells  See comment        Comment       There is no immunophenotypic evidence of non-Hodgkin lymphoma. Hodgkin lymphoma cannot be excluded by flow cytometry. T cell lymphomas cannot always be detected by this flow cytometry assay. Neoplastic cells, including large cells, may not survive specimen processing. This sample may not be representative. Final interpretation requires correlation with morphologic and clinical features.         Flow Phenotypic Data       21% of total events are CD45 positive and are viable by 7-AAD. A low percentage may be caused by low viability and/or a low number of CD45 positive cells. Of the CD45 positive leukocytes, 58% are viable by 7-AAD.    Unless otherwise indicated, percentages reported below are based on the total number of CD45 positive viable leukocytes. If applicable, percentage  of plasma cells is from total viable nucleated cells.    8% polytypic B cells    68% T cells with a CD4:CD8 ratio of 2.0:1.     9% NK cells    A resident/fellow in an ACGME accredited training program was involved in the initial review, preparation, and/or interpretation of this case.  I, as the senior physician, attest that I: (i) reviewed patient clinical records if indicated; (ii) reviewed relevant lab test results; (iii) examined the relevant preparation(s) for the specimen(s); and (iv) agree with the report, diagnosis(es), and interpretation as documented by the resident/fellow and edited/confirmed by me. Reporting resident/fellow: Dr. Mireille Noble      Flow Processing Information       Multi-color flow analysis is performed for the following markers: CD2, CD3, CD4, CD5, CD7, CD8, CD10, CD16, CD19, CD20, CD34, CD38, CD45, CD56, CD57, and kappa and lambda immunoglobulin light chains. Cells are gated to isolate populations (CD45 versus side scatter and forward scatter versus side scatter), to exclude debris (forward scatter versus side scatter) and to exclude cell doublets (forward scatter height versus forward scatter width and side scatter height versus side scatter width). Forward scatter varies with cell size. Side scatter varies with the amount of cytoplasmic granules. Intensity for CD45 usually increases as hematolymphoid cells mature.       Clinical Information       62 year old female with pancreatic mass and liver lesions, flow cytometry is performed to help rule out lymphoproliferative process.      FDA Disclaimer       This test was developed and its performance characteristics determined by the [facility]. It has not been cleared or approved by the US Food and Drug Administration.  FDA does not require this test to go through premarket FDA review. This test is used for clinical purposes and should not be regarded as investigational or for  research. This laboratory is certified under the Clinical Laboratory Improvement Amendments (CLIA) as qualified to perform high complexity clinical laboratory testing.        Performing Labs       The technical component of this testing was completed at [facility]           If you have any questions or concerns, please call the clinic at the number listed above.       Sincerely,      Benjamin Taylor MD

## 2023-05-25 NOTE — DISCHARGE INSTRUCTIONS
[facility]    Interventional Radiology  Patient Instructions Following Biopsy    AFTER YOU GO HOME  If you were given sedation DO NOT drive or operate machinery at home or at work for at least 24 hours  DO relax and take it easy for 48 hours, no strenuous activity for 24 hours  DO drink plenty of fluids  DO resume your regular diet, unless otherwise instructed by your Primary Physician  Keep the dressing dry and in place for 24 hours.  DO NOT SMOKE FOR AT LEAST 24 HOURS, if you have been given any medications that were to help you relax or sedate you during your procedure  DO NOT drink alcoholic beverages the day of your procedure  DO NOT do any strenuous exercise or lifting (> 10 lbs) for at least 7 days following your procedure  DO NOT take a shower for at least 12 hours following your procedure, no bath/pool for 5 days.   Remove dressing after shower the next day. Replace with Band aid for 2 days.  Never leave a wet dressing in place.  DO NOT make any important or legal decisions for 24 hours following your procedure  There should be minimum drainage from the biopsy site    CALL THE PHYSICIAN IF:  You start bleeding from the procedure site.  If you do start to bleed from that site, lie down flat and hold pressure on the site for a minimum of 10 minutes.  Your physician will tell you if you need to return to the hospital  You develop nausea or vomiting  You have excessive swelling, redness, or tenderness at the site  You have drainage that looks like it is infected.  You experience severe pain  You develop hives or a rash or unexplained itching  You develop shortness of breath  You develop a temperature of 101 degrees F or greater  You develop chest pain or cough up blood, lightheadedness or fainting      [facility] INTERVENTIONAL RADIOLOGY DEPARTMENT  Procedure Physicians: Dr Zach Louie                                       Date of procedure: May 25, 2023  Telephone Numbers: [phone]       Monday-Friday 7:30 am to 4:00 pm  [phone] After 4:00 pm Monday-Friday, Weekends & Holidays.   Ask for the Interventional Radiologist on call.  Someone is on call 24 hrs/day  [facility] toll free number: [phone] Monday-Friday 8:00 am to 4:30 pm  [facility] Emergency Dept: [phone]

## 2023-05-25 NOTE — PROGRESS NOTES
Pt has tolerated PO. Ambulates in pradhan with steady gait. Voided x1. Discharge instructions reviewed with pt, pt verbalizes understanding. Upper mid abdominal site CDI, soft, flat. Pt given Tylenol for headache and abdominal discomfort (pt came in with 7/10 left abdominal pain, pt also reports some site soreness). PIV discontinued.   1745 Pt's brother in law has arrived. Pt transported to vehicle via wheelchair.

## 2023-05-26 NOTE — PROGRESS NOTES
New Patient Oncology Nurse Navigator Note     Referring provider: Dr Taylor, Gen Surg    Referring Clinic/Organization: [facility]     Referred to: Medical Oncology    Requested provider (if applicable): First available - did not specify     Referral Received: 05/26/23       Evaluation for : CT concerning for metastatic pancreatic malignancy     Clinical History (per Nurse review of records provided):      * 5/16/23 CT Abd/Pelvis ([facility])  IMPRESSION:   1.  Mass in the pancreatic tail with numerous liver and left adrenal lesions. Findings are highly suspicious for metastatic pancreatic cancer.  2.  Incidental left ovarian cyst. This could be further evaluated with ultrasound, depending on the clinical picture.      * 5/25/2023 IR liver biopsy ([facility])  IMPRESSION:     Ultrasound-guided biopsy of left segment 2/3 liver mass.      Pathology results are pending as of 5/26/23 at 11:40am.     * 5/26/23 Pt met w/ Dr Taylor, recommending Oncology consult.      Clinical Assessment / Barriers to Care (Per Nurse):    Pt needs Med Onc to guide further care; possible pancreatic malignancy with mets to liver, adrenal. Pt lives in [place]; due to holiday on Mon 5/29/23, all Oncology clinics are full. Dr Cruz can see this pt 6/1 Thur at 11am, in [facility] at [facility]. MD can arrange future visits at his [facility] [place] [facility] site as needed. Will offer 6/1 to pt, or schedule per pt preference.       Records Location: [facility]     Records Needed:     None - internal referral    Additional testing needed prior to consult:     TBD (if path is + for malignancy, may order CT chest, , etc. Not likely a surgical candidate)        Luis Mendosa, RN, BSN, OCN  Oncology New Patient Nurse Navigator   [facility]  [phone]

## 2023-05-26 NOTE — PROGRESS NOTES
Dear Ernestina Berger  I was asked to see this patient by Ernestina De Leon for please see below.  I have seen Karina Melvin and as you know his chief complaint is   Having pain in the lower abdomen and the left upper quadrant.     Has been losing weight and not able to eat solid food. But able to drink.   HPI:  Patient is a 62 year old female  with complaints abdomen pain and weight loss and trouble eating    Patient has family history of colon cancer and lung problems          10 Point review of systems all others are negative.   /80   Pulse 70   Wt 53.5 kg (118 lb)   BMI 22.30 kg/m      Past Medical History:   Diagnosis Date     Arthritis      Depressive disorder      Positive TB test        Past Surgical History:   Procedure Laterality Date     COLONOSCOPY N/A 8/21/2019    Procedure: Colonoscopy, With Polypectomy And Biopsy;  Surgeon: Duane, William Charles, MD;  Location: [facility]     COLONOSCOPY WITH CO2 INSUFFLATION N/A 8/21/2019    Procedure: COLONOSCOPY, WITH CO2 INSUFFLATION;  Surgeon: Duane, William Charles, MD;  Location: [facility] OR       Social History     Socioeconomic History     Marital status: Single     Spouse name: Not on file     Number of children: Not on file     Years of education: Not on file     Highest education level: Not on file   Occupational History     Not on file   Tobacco Use     Smoking status: Never     Smokeless tobacco: Never   Vaping Use     Vaping status: Never Used   Substance and Sexual Activity     Alcohol use: No     Drug use: No     Sexual activity: Not Currently     Partners: Male   Other Topics Concern     Parent/sibling w/ CABG, MI or angioplasty before 65F 55M? Not Asked   Social History Narrative     Not on file     Social Determinants of Health     Financial Resource Strain: Not on file   Food Insecurity: Not on file   Transportation Needs: Not on file   Physical Activity: Not on file   Stress: Not on file   Social Connections: Not on file   Intimate  Partner Violence: Not on file   Housing Stability: Not on file       Current Outpatient Medications   Medication Sig Dispense Refill     acetaminophen (TYLENOL) 500 MG tablet Take 500-1,000 mg by mouth every 6 hours as needed for mild pain       azelastine (ASTELIN) 0.1 % nasal spray Spray 1 spray into both nostrils 2 times daily 30 mL 3     clotrimazole-betamethasone (LOTRISONE) 1-0.05 % external cream Apply topically 2 times daily 15 g 0     diclofenac (VOLTAREN) 1 % topical gel Apply 4 g topically 4 times daily 100 g 1     meclizine (ANTIVERT) 25 MG tablet Take 1 tablet (25 mg) by mouth 3 times daily as needed for dizziness 30 tablet 1     traMADol (ULTRAM) 50 MG tablet Take 1 tablet (50 mg) by mouth every 6 hours as needed for severe pain 15 tablet 0     VITAMIN D, CHOLECALCIFEROL, PO Take 5,000 Units by mouth daily         Above was reviewed  Physical exam: /80   Pulse 70   Wt 53.5 kg (118 lb)   BMI 22.30 kg/m     Patient able to get up on table without difficulty.   Patient is alert and orientated.   Head eyes, nose and mouth within normal limits.    No costal vertebral angle tenderness noted.  Having pain in the lower abdomen and the left upper quadrant.        Skin was warm and pink  Normal Affect      Lower extremity edema is not present.    Labs show:   Result Notes              Component Ref Range & Units 2 wk ago 1 mo ago 1 yr ago 3 yr ago 5 yr ago    Sodium 133 - 144 mmol/L 140  139  140  143  142     Potassium 3.4 - 5.3 mmol/L 4.1  3.9  3.9  4.3  3.6     Chloride 94 - 109 mmol/L 109  106  109  107  108     Carbon Dioxide (CO2) 20 - 32 mmol/L 26  28  27  32  29     Anion Gap 3 - 14 mmol/L 5  5  4  4  5     Urea Nitrogen 7 - 30 mg/dL 14  15  13  12  10     Creatinine 0.52 - 1.04 mg/dL 0.92  0.60  0.59  0.59  0.63     Calcium 8.5 - 10.1 mg/dL 8.7  8.8  8.9  9.1  8.2 Low      Glucose 70 - 99 mg/dL 109 High   95  103 High   86  84 CM     Alkaline Phosphatase 40 - 150 U/L 83  59  40       AST 0 - 45  U/L 28  24  16  13  11     ALT 0 - 50 U/L 24  18  20  14  14     Protein Total 6.8 - 8.8 g/dL 7.6  7.3  7.4  7.8  7.4     Albumin 3.4 - 5.0 g/dL 3.6  3.5  3.6  3.9  3.8     Bilirubin Total 0.2 - 1.3 mg/dL 0.4  0.5  0.6  0.5  0.5     GFR Estimate >60 mL/min/1.73m2 70  >90 CM  >90 CM  >90 R, CM  >90 R, CM    Comment: eGFR calculated using 2021 CKD-EPI equation.   Resulting Agency  [facility]                    Component Ref Range & Units 2 wk ago 1 mo ago 1 yr ago 3 yr ago 5 yr ago     WBC Count 4.0 - 11.0 10e3/uL 6.1  6.5  4.9  4.8 R  4.6 R     RBC Count 3.80 - 5.20 10e6/uL 4.80  4.82  4.76  4.74 R  4.72 R     Hemoglobin 11.7 - 15.7 g/dL 14.9  15.1  15.0  14.9  15.0     Hematocrit 35.0 - 47.0 % 44.5  45.0  44.0  45.3  44.1     MCV 78 - 100 fL 93  93  92  96 R  93 R     MCH 26.5 - 33.0 pg 31.0  31.3  31.5  31.4  31.8     MCHC 31.5 - 36.5 g/dL 33.5  33.6  34.1  32.9  34.0     RDW 10.0 - 15.0 % 11.8  12.2  12.0  11.4  12.4     Platelet Count 150 - 450 10e3/uL 218  193  260  227 R  225 R     % Neutrophils % 62    56.8  63.2     % Lymphocytes % 25    32.4  29.3     % Monocytes % 8    7.7  5.6     % Eosinophils % 4    2.3  1.5     % Basophils % 1    0.6  0.4     % Immature Granulocytes % 0         NRBCs per 100 WBC <1 /100 0         Absolute Neutrophils 1.6 - 8.3 10e3/uL 3.7         Absolute Lymphocytes 0.8 - 5.3 10e3/uL 1.5         Absolute Monocytes 0.0 - 1.3 10e3/uL 0.5         Absolute Eosinophils 0.0 - 0.7 10e3/uL 0.3         Absolute Basophils 0.0 - 0.2 10e3/uL 0.0         Absolute Immature Granulocytes <=0.4 10e3/uL 0.0         Absolute NRBCs 10e3/uL 0.0        Resulting Agency  [facility]                     Narrative & Impression   EXAM: CT ABDOMEN PELVIS W CONTRAST  LOCATION: [facility]  DATE/TIME: 5/16/2023 1:04 PM CDT     INDICATION:  Generalized abdominal pain  COMPARISON: None.  TECHNIQUE: CT scan of the abdomen and pelvis was performed  following injection of IV contrast. Multiplanar reformats were obtained. Dose reduction techniques were used.  CONTRAST: 67 ml isovue 370      FINDINGS:   LOWER CHEST: Normal.     HEPATOBILIARY: There are numerous low density peripherally enhancing lesions scattered throughout the liver, largest of which is seen in the right hepatic lobe inferiorly, and measures 2.5 cm diameter. Remaining lesions appear in the one-1.5 cm range. No   calcified gallstones.     PANCREAS: There is an ill-defined hypoenhancing mass in the tail the pancreas. The mass measures 4.5 cm long by 2.0 cm diameter. There is associated pancreatic ductal dilation within the tip of the pancreatic tail.     SPLEEN: Normal.     ADRENAL GLANDS: There is a low-density 2 cm mass in the left adrenal gland inferiorly. Right adrenal gland unremarkable.     KIDNEYS/BLADDER: Normal.     BOWEL: Mild distal colonic diverticulosis. No free air, free fluid, or inflammatory change. No obstruction. Normal appendix.     LYMPH NODES: Normal.     VASCULATURE: Unremarkable.     PELVIC ORGANS: There is a 6.8 x 3.9 x 4.8 cm cyst within the left posterior pelvis, likely left ovarian. Uterus and right adnexa unremarkable.     MUSCULOSKELETAL: No suspicious bone lesion.                                                                      IMPRESSION:   1.  Mass in the pancreatic tail with numerous liver and left adrenal lesions. Findings are highly suspicious for metastatic pancreatic cancer.  2.  Incidental left ovarian cyst. This could be further evaluated with ultrasound, depending on the clinical picture.       Having pain in the lower abdomen and the left upper quadrant.     Has been losing weight and not able to eat solid food. But able to drink. ssessment: patient biopsy is pending but looks like metastatic pancreatic cancer and at this time is not going to be a candidate for surgery, but will need chemo therapy or at least discuss with the oncologist what options   She has.    Plan to do needs to see oncology.   Will try to get this set up asap.       Time spent with patient and family with greater than 50% of the time in discussion was 45 minutes. This also included looking at films and looking over the chart and discussion with other physicians involved with the patient care.    Benjamin Taylor MD\

## 2023-05-26 NOTE — LETTER
5/26/2023         RE: Karina Melvin  [address] [facility] [address]        Dear Colleague,    Thank you for referring your patient, Karina Melvin, to the [facility]. Please see a copy of my visit note below.    Dear Ernestina Berger  I was asked to see this patient by Ernestina De Leon for please see below.  I have seen Karina Melvin and as you know his chief complaint is   Having pain in the lower abdomen and the left upper quadrant.     Has been losing weight and not able to eat solid food. But able to drink.   HPI:  Patient is a 62 year old female  with complaints abdomen pain and weight loss and trouble eating    Patient has family history of colon cancer and lung problems          10 Point review of systems all others are negative.   /80   Pulse 70   Wt 53.5 kg (118 lb)   BMI 22.30 kg/m      Past Medical History:   Diagnosis Date     Arthritis      Depressive disorder      Positive TB test        Past Surgical History:   Procedure Laterality Date     COLONOSCOPY N/A 8/21/2019    Procedure: Colonoscopy, With Polypectomy And Biopsy;  Surgeon: Duane, William Charles, MD;  Location: [facility]     COLONOSCOPY WITH CO2 INSUFFLATION N/A 8/21/2019    Procedure: COLONOSCOPY, WITH CO2 INSUFFLATION;  Surgeon: Duane, William Charles, MD;  Location: [facility]       Social History     Socioeconomic History     Marital status: Single     Spouse name: Not on file     Number of children: Not on file     Years of education: Not on file     Highest education level: Not on file   Occupational History     Not on file   Tobacco Use     Smoking status: Never     Smokeless tobacco: Never   Vaping Use     Vaping status: Never Used   Substance and Sexual Activity     Alcohol use: No     Drug use: No     Sexual activity: Not Currently     Partners: Male   Other Topics Concern     Parent/sibling w/ CABG, MI or angioplasty before 65F 55M? Not Asked   Social History Narrative      Not on file     Social Determinants of Health     Financial Resource Strain: Not on file   Food Insecurity: Not on file   Transportation Needs: Not on file   Physical Activity: Not on file   Stress: Not on file   Social Connections: Not on file   Intimate Partner Violence: Not on file   Housing Stability: Not on file       Current Outpatient Medications   Medication Sig Dispense Refill     acetaminophen (TYLENOL) 500 MG tablet Take 500-1,000 mg by mouth every 6 hours as needed for mild pain       azelastine (ASTELIN) 0.1 % nasal spray Spray 1 spray into both nostrils 2 times daily 30 mL 3     clotrimazole-betamethasone (LOTRISONE) 1-0.05 % external cream Apply topically 2 times daily 15 g 0     diclofenac (VOLTAREN) 1 % topical gel Apply 4 g topically 4 times daily 100 g 1     meclizine (ANTIVERT) 25 MG tablet Take 1 tablet (25 mg) by mouth 3 times daily as needed for dizziness 30 tablet 1     traMADol (ULTRAM) 50 MG tablet Take 1 tablet (50 mg) by mouth every 6 hours as needed for severe pain 15 tablet 0     VITAMIN D, CHOLECALCIFEROL, PO Take 5,000 Units by mouth daily         Above was reviewed  Physical exam: /80   Pulse 70   Wt 53.5 kg (118 lb)   BMI 22.30 kg/m     Patient able to get up on table without difficulty.   Patient is alert and orientated.   Head eyes, nose and mouth within normal limits.    No costal vertebral angle tenderness noted.  Having pain in the lower abdomen and the left upper quadrant.        Skin was warm and pink  Normal Affect      Lower extremity edema is not present.    Labs show:   Result Notes              Component Ref Range & Units 2 wk ago 1 mo ago 1 yr ago 3 yr ago 5 yr ago    Sodium 133 - 144 mmol/L 140  139  140  143  142     Potassium 3.4 - 5.3 mmol/L 4.1  3.9  3.9  4.3  3.6     Chloride 94 - 109 mmol/L 109  106  109  107  108     Carbon Dioxide (CO2) 20 - 32 mmol/L 26  28  27  32  29     Anion Gap 3 - 14 mmol/L 5  5  4  4  5     Urea Nitrogen 7 - 30 mg/dL 14  15   13  12  10     Creatinine 0.52 - 1.04 mg/dL 0.92  0.60  0.59  0.59  0.63     Calcium 8.5 - 10.1 mg/dL 8.7  8.8  8.9  9.1  8.2 Low      Glucose 70 - 99 mg/dL 109 High   95  103 High   86  84 CM     Alkaline Phosphatase 40 - 150 U/L 83  59  40       AST 0 - 45 U/L 28  24  16  13  11     ALT 0 - 50 U/L 24  18  20  14  14     Protein Total 6.8 - 8.8 g/dL 7.6  7.3  7.4  7.8  7.4     Albumin 3.4 - 5.0 g/dL 3.6  3.5  3.6  3.9  3.8     Bilirubin Total 0.2 - 1.3 mg/dL 0.4  0.5  0.6  0.5  0.5     GFR Estimate >60 mL/min/1.73m2 70  >90 CM  >90 CM  >90 R, CM  >90 R, CM    Comment: eGFR calculated using 2021 CKD-EPI equation.   Resulting Agency  [facility]                   Component Ref Range & Units 2 wk ago 1 mo ago 1 yr ago 3 yr ago 5 yr ago     WBC Count 4.0 - 11.0 10e3/uL 6.1  6.5  4.9  4.8 R  4.6 R     RBC Count 3.80 - 5.20 10e6/uL 4.80  4.82  4.76  4.74 R  4.72 R     Hemoglobin 11.7 - 15.7 g/dL 14.9  15.1  15.0  14.9  15.0     Hematocrit 35.0 - 47.0 % 44.5  45.0  44.0  45.3  44.1     MCV 78 - 100 fL 93  93  92  96 R  93 R     MCH 26.5 - 33.0 pg 31.0  31.3  31.5  31.4  31.8     MCHC 31.5 - 36.5 g/dL 33.5  33.6  34.1  32.9  34.0     RDW 10.0 - 15.0 % 11.8  12.2  12.0  11.4  12.4     Platelet Count 150 - 450 10e3/uL 218  193  260  227 R  225 R     % Neutrophils % 62    56.8  63.2     % Lymphocytes % 25    32.4  29.3     % Monocytes % 8    7.7  5.6     % Eosinophils % 4    2.3  1.5     % Basophils % 1    0.6  0.4     % Immature Granulocytes % 0         NRBCs per 100 WBC <1 /100 0         Absolute Neutrophils 1.6 - 8.3 10e3/uL 3.7         Absolute Lymphocytes 0.8 - 5.3 10e3/uL 1.5         Absolute Monocytes 0.0 - 1.3 10e3/uL 0.5         Absolute Eosinophils 0.0 - 0.7 10e3/uL 0.3         Absolute Basophils 0.0 - 0.2 10e3/uL 0.0         Absolute Immature Granulocytes <=0.4 10e3/uL 0.0         Absolute NRBCs 10e3/uL 0.0        Resulting Agency  [facility]                     Narrative  & Impression   EXAM: CT ABDOMEN PELVIS W CONTRAST  LOCATION: [facility]  DATE/TIME: 5/16/2023 1:04 PM CDT     INDICATION:  Generalized abdominal pain  COMPARISON: None.  TECHNIQUE: CT scan of the abdomen and pelvis was performed following injection of IV contrast. Multiplanar reformats were obtained. Dose reduction techniques were used.  CONTRAST: 67 ml isovue 370      FINDINGS:   LOWER CHEST: Normal.     HEPATOBILIARY: There are numerous low density peripherally enhancing lesions scattered throughout the liver, largest of which is seen in the right hepatic lobe inferiorly, and measures 2.5 cm diameter. Remaining lesions appear in the one-1.5 cm range. No   calcified gallstones.     PANCREAS: There is an ill-defined hypoenhancing mass in the tail the pancreas. The mass measures 4.5 cm long by 2.0 cm diameter. There is associated pancreatic ductal dilation within the tip of the pancreatic tail.     SPLEEN: Normal.     ADRENAL GLANDS: There is a low-density 2 cm mass in the left adrenal gland inferiorly. Right adrenal gland unremarkable.     KIDNEYS/BLADDER: Normal.     BOWEL: Mild distal colonic diverticulosis. No free air, free fluid, or inflammatory change. No obstruction. Normal appendix.     LYMPH NODES: Normal.     VASCULATURE: Unremarkable.     PELVIC ORGANS: There is a 6.8 x 3.9 x 4.8 cm cyst within the left posterior pelvis, likely left ovarian. Uterus and right adnexa unremarkable.     MUSCULOSKELETAL: No suspicious bone lesion.                                                                      IMPRESSION:   1.  Mass in the pancreatic tail with numerous liver and left adrenal lesions. Findings are highly suspicious for metastatic pancreatic cancer.  2.  Incidental left ovarian cyst. This could be further evaluated with ultrasound, depending on the clinical picture.       Having pain in the lower abdomen and the left upper quadrant.     Has been losing weight and not able to eat  solid food. But able to drink. ssessment: patient biopsy is pending but looks like metastatic pancreatic cancer and at this time is not going to be a candidate for surgery, but will need chemo therapy or at least discuss with the oncologist what options  She has.    Plan to do needs to see oncology.   Will try to get this set up asap.       Time spent with patient and family with greater than 50% of the time in discussion was 45 minutes. This also included looking at films and looking over the chart and discussion with other physicians involved with the patient care.    Benjamin Taylor MD\          Again, thank you for allowing me to participate in the care of your patient.        Sincerely,        Benjamin Taylor MD

## 2023-05-26 NOTE — PATIENT INSTRUCTIONS
Component Ref Range & Units 2 wk ago 1 mo ago 1 yr ago 3 yr ago 5 yr ago    Sodium 133 - 144 mmol/L 140  139  140  143  142     Potassium 3.4 - 5.3 mmol/L 4.1  3.9  3.9  4.3  3.6     Chloride 94 - 109 mmol/L 109  106  109  107  108     Carbon Dioxide (CO2) 20 - 32 mmol/L 26  28  27  32  29     Anion Gap 3 - 14 mmol/L 5  5  4  4  5     Urea Nitrogen 7 - 30 mg/dL 14  15  13  12  10     Creatinine 0.52 - 1.04 mg/dL 0.92  0.60  0.59  0.59  0.63     Calcium 8.5 - 10.1 mg/dL 8.7  8.8  8.9  9.1  8.2 Low      Glucose 70 - 99 mg/dL 109 High   95  103 High   86  84 CM     Alkaline Phosphatase 40 - 150 U/L 83  59  40       AST 0 - 45 U/L 28  24  16  13  11     ALT 0 - 50 U/L 24  18  20  14  14     Protein Total 6.8 - 8.8 g/dL 7.6  7.3  7.4  7.8  7.4     Albumin 3.4 - 5.0 g/dL 3.6  3.5  3.6  3.9  3.8     Bilirubin Total 0.2 - 1.3 mg/dL 0.4  0.5  0.6  0.5  0.5     GFR Estimate >60 mL/min/1.73m2 70  >90 CM  >90 CM  >90 R, CM  >90 R, CM    Comment: eGFR calculated using 2021 CKD-EPI equation.   Resulting Agency  [facility]                   Component Ref Range & Units 2 wk ago 1 mo ago 1 yr ago 3 yr ago 5 yr ago     WBC Count 4.0 - 11.0 10e3/uL 6.1  6.5  4.9  4.8 R  4.6 R     RBC Count 3.80 - 5.20 10e6/uL 4.80  4.82  4.76  4.74 R  4.72 R     Hemoglobin 11.7 - 15.7 g/dL 14.9  15.1  15.0  14.9  15.0     Hematocrit 35.0 - 47.0 % 44.5  45.0  44.0  45.3  44.1     MCV 78 - 100 fL 93  93  92  96 R  93 R     MCH 26.5 - 33.0 pg 31.0  31.3  31.5  31.4  31.8     MCHC 31.5 - 36.5 g/dL 33.5  33.6  34.1  32.9  34.0     RDW 10.0 - 15.0 % 11.8  12.2  12.0  11.4  12.4     Platelet Count 150 - 450 10e3/uL 218  193  260  227 R  225 R     % Neutrophils % 62    56.8  63.2     % Lymphocytes % 25    32.4  29.3     % Monocytes % 8    7.7  5.6     % Eosinophils % 4    2.3  1.5     % Basophils % 1    0.6  0.4     % Immature Granulocytes % 0         NRBCs per 100 WBC <1 /100 0         Absolute Neutrophils 1.6 - 8.3 10e3/uL    First Obstetric Visit       HPI       Sagrario Meyer is a 38 year old woman who presents for an initial prenatal visit at 8w3d weeks of pregnancy with RIO of Dec 30, 2019 by LMP of Patient's last menstrual period was 2019 (approximate)..      She has not had bleeding since her LMP.   She has had nausea resulting in non-bilious vomiting  Weight loss has not occurred.    This was a planned pregnancy.     OTHER CONCERNS: stomach pain and not able to work as the pain gets worse.              Labor Risk Assessment     Is the patient's age <18 or >40?     No  Patint's BMI is Body mass index is 21.3 kg/m .   Does patient have a BMI < 18.5?     No  Prior delivery within 6 months?      No  Ever delivered prior to 37 weeks gestation?  No  Pregnancy occur via In Vitro Fertilization?   No  Are you carrying twins?       No    The patient has the following additional risk factors for  labor:     none     Labor Risk Summary:  Pt is high risk for  Labor  No               Past Medical History     Past Medical History:   Diagnosis Date     Latent tuberculosis 2016    received 9 months of INH     See nurse history note, reviewed in detail.             Current Medications      Current Outpatient Medications   Medication Sig Dispense Refill     acetaminophen (TYLENOL) 500 MG tablet Take 1-2 tablets (500-1,000 mg) by mouth every 8 hours as needed for mild pain 100 tablet 11     doxylamine (UNISOM) 25 MG TABS tablet Take 1 to 2 capsules at bedtime for nausea. 60 each 1     hydrocortisone 2.5 % cream Apply to face 2 to 3 times a week. From Dermatology.       neomycin-polymixin-dexamethasone (MAXITROL) 0.1 % ophthalmic suspension Use in right eye 4 times a day for 10 days. 5 mL 1     Prenatal Vit-Fe Fumarate-FA (PRENATAL MULTIVITAMIN W/IRON) 27-0.8 MG tablet Take 1 tablet by mouth daily 100 tablet 3     vitamin B6 (PYRIDOXINE) 25 MG tablet Take 1 tablet (25 mg) by mouth every 6 hours as needed (nausea) 60  "tablet 6     vitamin D3 (CHOLECALCIFEROL) 2000 units tablet Take 1 tablet by mouth daily 100 tablet 3             Review of Systems      ROS:  No - Headache  No - Changes in vision  No - Chest Pain  No - Shortness of Breath  YES - Nausea   YES - Vomiting  YES - Abdominal pain   No - Contractions  No - Dysuria   No - Vaginal Discharge    No - Vaginal bleeding   No - Loss of Fluid   No - Extremity swelling     ====================================================           Physical Exam      BP 93/62 (BP Location: Left arm, Patient Position: Sitting, Cuff Size: Adult Regular)   Pulse 77   Temp 98  F (36.7  C) (Oral)   Resp 16   Ht 1.657 m (5' 5.25\")   Wt 58.5 kg (129 lb)   LMP 03/25/2019 (Approximate)   SpO2 100%   BMI 21.30 kg/m    GENERAL: healthy, alert and no distress  EYES: Eyes grossly normal to inspection, extraocular movements - intact, and PERRL  HENT: ear canals- normal; TMs- normal; Nose- normal; Mouth- no ulcers, no lesions  NECK: no tenderness, no adenopathy, no asymmetry, no masses, no stiffness; thyroid- normal to palpation  RESP: lungs clear to auscultation - no rales, no rhonchi, no wheezes  BREAST: no masses, no tenderness, no nipple discharge, no palpable axillary masses or adenopathy  CV: regular rates and rhythm, normal S1 S2, no S3 or S4 and no murmur, no click or rub -  ABDOMEN: soft, no tenderness, no  hepatosplenomegaly, no masses, normal bowel sounds. No scars noted. FHT not listened to due to low gestation.  MS: extremities- no gross deformities noted, no edema  SKIN: no suspicious lesions, no rashes  NEURO: strength and tone- normal, sensory exam- grossly normal, mentation- intact, speech- normal, reflexes- symmetric  BACK: no CVA tenderness, no paralumbar tenderness  GENITALIA:  BUS WNL, no lesions noted  VAGINA:  Pink, normal rugae and discharge normal and physiologic  CERVIX:  smooth, without discharge or CMT and nulliparous os,   firm/ closed 4 cm long.  UTERUS: Anteverted, " 3.7         Absolute Lymphocytes 0.8 - 5.3 10e3/uL 1.5         Absolute Monocytes 0.0 - 1.3 10e3/uL 0.5         Absolute Eosinophils 0.0 - 0.7 10e3/uL 0.3         Absolute Basophils 0.0 - 0.2 10e3/uL 0.0         Absolute Immature Granulocytes <=0.4 10e3/uL 0.0         Absolute NRBCs 10e3/uL 0.0        Resulting Agency  [facility]                     Narrative & Impression   EXAM: CT ABDOMEN PELVIS W CONTRAST  LOCATION: [facility]  DATE/TIME: 5/16/2023 1:04 PM CDT     INDICATION:  Generalized abdominal pain  COMPARISON: None.  TECHNIQUE: CT scan of the abdomen and pelvis was performed following injection of IV contrast. Multiplanar reformats were obtained. Dose reduction techniques were used.  CONTRAST: 67 ml isovue 370      FINDINGS:   LOWER CHEST: Normal.     HEPATOBILIARY: There are numerous low density peripherally enhancing lesions scattered throughout the liver, largest of which is seen in the right hepatic lobe inferiorly, and measures 2.5 cm diameter. Remaining lesions appear in the one-1.5 cm range. No   calcified gallstones.     PANCREAS: There is an ill-defined hypoenhancing mass in the tail the pancreas. The mass measures 4.5 cm long by 2.0 cm diameter. There is associated pancreatic ductal dilation within the tip of the pancreatic tail.     SPLEEN: Normal.     ADRENAL GLANDS: There is a low-density 2 cm mass in the left adrenal gland inferiorly. Right adrenal gland unremarkable.     KIDNEYS/BLADDER: Normal.     BOWEL: Mild distal colonic diverticulosis. No free air, free fluid, or inflammatory change. No obstruction. Normal appendix.     LYMPH NODES: Normal.     VASCULATURE: Unremarkable.     PELVIC ORGANS: There is a 6.8 x 3.9 x 4.8 cm cyst within the left posterior pelvis, likely left ovarian. Uterus and right adnexa unremarkable.     MUSCULOSKELETAL: No suspicious bone lesion.                                                                       IMPRESSION:   1.  Mass in the pancreatic tail with numerous liver and left adrenal lesions. Findings are highly suspicious for metastatic pancreatic cancer.  2.  Incidental left ovarian cyst. This could be further evaluated with ultrasound, depending on the clinical picture.       Having pain in the lower abdomen and the left upper quadrant.     Has been losing weight and not able to eat solid food. But able to drink. ssessment: patient biopsy is pending but looks like metastatic pancreatic cancer and at this time is not going to be a candidate for surgery, but will need chemo therapy or at least discuss with the oncologist what options  She has.    Plan to do needs to see oncology.   Will try to get this set up asap.     Please call [phone], for  [place] clinic or  for [facility], and let me know Benjamin Taylor MD  If you are not getting any where     nontender 10 weeks in size.  ADNEXA: Without masses or tenderness  PSYCH: Alert and oriented times 3; coherent speech, normal rate and volume, able to articulate logical thoughts, able to abstract reason, no tangential thoughts, no hallucinations or delusions. Affect is normal.  LYMPHATICS: ant. cervical- normal, post. cervical- normal, axillary- normal, supraclavicular- normal, inguinal- normal      Past labs reviewed:  Results for orders placed or performed in visit on 05/23/19   CBC with Plt ([facility])   Result Value Ref Range    WBC 8.8 4.0 - 11.0 K/uL    RBC 4.5 3.8 - 5.2 M/uL    Hemoglobin 13.7 11.7 - 15.7 g/dL    Hematocrit 42.8 35.0 - 47.0 %    MCV 96.2 78.0 - 100.0 fL    MCH 30.8 26.5 - 35.0    MCHC 32.0 32.0 - 36.0 g/dL    Platelets 251.0 150.0 - 450.0 K/uL   Urinalysis([facility])   Result Value Ref Range    Specific Gravity Urine 1.015 1.005 - 1.030    pH Urine >=9.0 4.5 - 8.0    Leukocyte Esterase UR Negative -ATIVE    Nitrite Urine Negative -ATIVE    Protein UR Negative -ATIVE    Glucose Urine Negative -ATIVE    Ketones Urine Negative -ATIVE    Urobilinogen mg/dL 0.2 E.U./dL 0.2 E.U./dL    Bilirubin UR Negative -ATIVE    Blood UR Negative -ATIVE       Varicella immune Yes  Hepatitis B immune has had three shots, unknown  Last pap never had.  She is due for this today.    =========================================         Assessment and Plan     Sagrario was seen today for prenatal care and medication reconciliation.    Diagnoses and all orders for this visit:    Supervision of high-risk pregnancy of elderly multigravida:   Having some nausea does not want any other medications.  Working on new housing options for her.    Still having some abdominal pain which is giving her trouble at work.  I think this may be due to her fibroid.  She will have ultrasound with [facility] during which they could perhaps recheck the fibroids growth.  She was not that tender on exam today.      -     ABO/Rh Type-[facility] ([facility])  -      Antibody Screen ([facility])  -     Chlamydia/Gono Amplified ([facility])  -     CBC with Plt ([facility])  -     Culture Urine ([facility])  -     Hepatitis B Surface Ag ([facility])  -     HIV Ag/Ab Screen [place] ([facility])  -     Lead, Blood ([facility])  -     Rubella  IgG ([facility])  -     Syphilis Screen [place] ([facility])  -     Urinalysis([facility])  -     GYN Cytology ([facility])  -     Hepatitis B Surface Ab ([facility])  -     MAT FETAL MED CTR REFERRAL-PREGNANCY    Elderly multigravida in first trimester  -     MAT FETAL MED CTR REFERRAL-PREGNANCY    Chronic right-sided low back pain with sciatica: She had this issue last fall.  It got much better with exercises so we discussed that she should restart her exercises to help her back and sciatic symptoms.  Continue Tylenol as needed.  Gave her a note for work to help her while she has having nausea pelvic pain and back pain.  Problems related to other legal circumstances: Gave her letter to help her  get to the [place].  She is also having housing issues she has an economy apartment in its difficult for her to sleep in the same place that she cooks due to her severe nausea from this.  They will not let her move until she has the baby.  Refer to legal services.  -     Legal Services Referral - [place] only        38 year old  , 8w3d weeks of pregnancy with RIO of Dec 30, 2019 by LMP of Patient's last menstrual period was 2019 (approximate)..      Pregnancy Risk Assessment: High Risk pregnancy  Discussed high risk conditions as follows:AMA, fibroid    -Dating US obtained and dating confirmed?   YES     -Taking PNV/Folate?     YES         - Ordered new OB labs: blood type and antibody screen, HIV, VDRL, hep B, rubella, UA/UC, gonorrhea/chlamydia, Pap smear and Hepatitis B immunity done today.    -Discussed risks and benefits of first trimester screen for trisomies, patient accepted. M referral placed.  - Discussed genetic screening.  Patient does want to pursue screening.   - Discussed screening for sickle cell anemia. Patient does not  want to pursue Hb electrophoresis.     - Discussed early screening for gestational diabetes. The patient does not have a history of GDM, BMI>30, h/o prediabetes/glucose intolerance, first degree relative with GDM or DM, or chronic HTN, so WILL NOT obtain early GCT or A1c.    - The patient does not h/o severe, early preeclampsia with delivery <34weeks, preeclampsia in more than one pregnancy, pre-gestational diabetes, chronic hypertension, renal disease, or autoimmune disease so WILL NOT start low dose aspirin (81mg) and calcium supplementation (1g-1.5g/day elemental = 2500mg- 3750mg/day Calcium carbonate) to prevent preeclampsia.    - The patient  does not have a history of spontaneous  birth so  WILL NOT consider progesterone starting at 16-20 weeks and/or serial transvaginal cervical length ultrasounds from 16-24 weeks.     - Prenatal vitamins ordered.  - Flu shot offered and was Declined.    Counseling given:   - Follow up in 4 weeks for return OB visit.  - Recommended weight gain for pregnancy: 25-35 lbs (pregravid BMI 18.5-24.9)      - Instructed on best evidence for: healthy diet and foods to avoid; exercise and activity during pregnancy; avoiding exposure to toxoplasmosis; safe use of seatbelts during pregnancy; and maintenance of a generally healthy lifestyle  -Patient to see OB educator/ RN today and/or next visit.    Discussed the harms, benefits, side effects and alternative therapies for current prescribed and OTC medications.      Options for treatment and follow-up care were reviewed with the patient and/or guardian. Sagrario Meyer and/or guardian engaged in the decision making process and verbalized understanding of the options discussed and agreed with the final plan.    Dee Webb MD

## 2023-05-26 NOTE — TELEPHONE ENCOUNTER
Dr Taylor would like this pts case reviewed and pt seen sooner please advise.    KAHLIL Rodriguez RN 5/26/2023 11:13 AM

## 2023-05-31 NOTE — TELEPHONE ENCOUNTER
RECORDS STATUS - ALL OTHER DIAGNOSIS    Pancreatic adenocarcinoma on liver bx,  Abnormal CT of the abdomen  RECORDS RECEIVED FROM:    Appt: 6/1/2023   NOTES STATUS DETAILS   OFFICE NOTE from referring provider  Benjamin Taylor MD/   OFFICE NOTE from medical oncologist     DISCHARGE SUMMARY from hospital Complete 5/25/2023 Pancreatic Mass    DISCHARGE REPORT from the ER     OPERATIVE REPORT     MEDICATION LIST Complete Epic    CLINICAL TRIAL TREATMENTS TO DATE     LABS     PATHOLOGY REPORTS Internal bx LIVER LESION, NEEDLE BIOPSY:   -Positive for malignancy  -Metastatic adenocarcinoma   ANYTHING RELATED TO DIAGNOSIS     GENONOMIC TESTING     TYPE:     IMAGING (NEED IMAGES & REPORT)     CT SCANS Complete CT Abdomen Pelvis    MRI Complete MRI pelvis 5/31/2023   Xray Abdomen      MAMMO     ULTRASOUND Complete US Pelvic    PET

## 2023-06-01 PROBLEM — C25.9 MALIGNANT NEOPLASM OF PANCREAS METASTATIC TO LIVER (H): Status: ACTIVE | Noted: 2023-01-01

## 2023-06-01 PROBLEM — C78.7 MALIGNANT NEOPLASM OF PANCREAS METASTATIC TO LIVER (H): Status: ACTIVE | Noted: 2023-01-01

## 2023-06-01 NOTE — PROGRESS NOTES
Oncology initial visit:  Date on this visit: 6/1/2023    Karina Melvin  is referred by self-referred for an oncology consultation. She requires evaluation for new diagnosis of     Primary Physician: Ernestina De Leon     History Of Present Illness:  Ms. Melvin is a 62 year old female who presents with       5/16/2023.  CT abdomen/pelvis was done for abdominal pain which showed ill-defined hypoenhancing mass in the tail of the pancreas measuring 4.5 cm x 2 cm with associated pancreatic ductal dilation.  In addition to that there are numerous low-density peripherally enhancing lesions throughout the liver the largest liver lesion is in the right hepatic lobe inferiorly measuring 2.5 cm.  There is also a low-density 2 cm mass in the left adrenal gland inferiorly.  6.8 x 3.9 x 4.8 cm cyst within the left posterior pelvis likely left ovarian cyst.  Overall these findings were suspicious for metastatic pancreatic cancer.    She had a liver biopsy on 5/25/2023 which showed metastatic adenocarcinoma compatible with metastatic adenocarcinoma of pancreatic origin.    5/19/2023.  Pelvic ultrasound showed 8.1 cm left ovarian cyst for which pelvic MRI was recommended.    5/31/2023.  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    She mentions to me that her pain in the mid to lower abdomen started around mid April 2023.  This has progressively worsened to the point that now she is taking tramadol every 6 hours.  This helps.  She denies nausea vomiting diarrhea constipation.  She has lost a few pounds.  She was very energetic to begin with and was feeling perfectly normal prior to all of this starting but now she feels very tired and fatigued.  She denies any significant neuropathy.  No new swellings.  No fevers.  Over the last 2 to 3 days she has started to notice some shortness of breath.  No fevers or coughing.  No pleuritic  chest pain.      ECOG 1-2    ROS:  A comprehensive ROS was otherwise neg      Past Medical/Surgical History:  Past Medical History:   Diagnosis Date     Arthritis      Depressive disorder      Positive TB test      Past Surgical History:   Procedure Laterality Date     COLONOSCOPY N/A 8/21/2019    Procedure: Colonoscopy, With Polypectomy And Biopsy;  Surgeon: Duane, William Charles, MD;  Location: [facility] OR     COLONOSCOPY WITH CO2 INSUFFLATION N/A 8/21/2019    Procedure: COLONOSCOPY, WITH CO2 INSUFFLATION;  Surgeon: Duane, William Charles, MD;  Location: [facility] OR     IR LIVER BIOPSY PERCUTANEOUS  5/25/2023     Cancer History:   As above    Allergies:  Allergies as of 06/01/2023     (No Known Allergies)     Current Medications:  Current Outpatient Medications   Medication Sig Dispense Refill     acetaminophen (TYLENOL) 500 MG tablet Take 500-1,000 mg by mouth every 6 hours as needed for mild pain       azelastine (ASTELIN) 0.1 % nasal spray Spray 1 spray into both nostrils 2 times daily 30 mL 3     diclofenac (VOLTAREN) 1 % topical gel Apply 4 g topically 4 times daily 100 g 1     VITAMIN D, CHOLECALCIFEROL, PO Take 5,000 Units by mouth daily       clotrimazole-betamethasone (LOTRISONE) 1-0.05 % external cream Apply topically 2 times daily (Patient not taking: Reported on 6/1/2023) 15 g 0     meclizine (ANTIVERT) 25 MG tablet Take 1 tablet (25 mg) by mouth 3 times daily as needed for dizziness (Patient not taking: Reported on 6/1/2023) 30 tablet 1      Family History:  Family History   Problem Relation Age of Onset     Colon Cancer Mother      Lung Cancer Father      Lung Cancer Brother      Thyroid Disease Sister    sister with ovarian cancer    No kids.   Social History:  Social History     Socioeconomic History     Marital status: Single     Spouse name: Not on file     Number of children: Not on file     Years of education: Not on file     Highest education level: Not on file   Occupational History     Not on file  "  Tobacco Use     Smoking status: Never     Smokeless tobacco: Never   Vaping Use     Vaping status: Never Used   Substance and Sexual Activity     Alcohol use: No     Drug use: No     Sexual activity: Not Currently     Partners: Male   Other Topics Concern     Parent/sibling w/ CABG, MI or angioplasty before 65F 55M? Not Asked   Social History Narrative     Not on file     Social Determinants of Health     Financial Resource Strain: Not on file   Food Insecurity: Not on file   Transportation Needs: Not on file   Physical Activity: Not on file   Stress: Not on file   Social Connections: Not on file   Intimate Partner Violence: Not At Risk (6/1/2023)    Humiliation, Afraid, Rape, and Kick questionnaire      Fear of Current or Ex-Partner: No      Emotionally Abused: No      Physically Abused: No      Sexually Abused: No   Housing Stability: Not on file   lives alone.  No smoke. Drinks etoh very occasionally  She is originally from [place]    Physical Exam:  /79   Pulse 70   Temp 98  F (36.7  C) (Oral)   Resp 12   Ht 1.579 m (5' 2.17\")   Wt 53.1 kg (117 lb 1.6 oz)   SpO2 96%   BMI 21.30 kg/m     Wt Readings from Last 4 Encounters:   06/01/23 53.1 kg (117 lb 1.6 oz)   05/26/23 53.5 kg (118 lb)   05/25/23 53.1 kg (117 lb)   05/01/23 55.7 kg (122 lb 11.2 oz)       CONSTITUTIONAL: no acute distress  EYES: PERRLA, no palor or icterus.   ENT/MOUTH: no mouth lesions. Ears normal  CVS: s1s2 no m r g .   RESPIRATORY: clear to auscultation b/l  GI: Abdomen is soft.  It is mildly tender in the epigastrium and on the left side.  No hepatosplenomegaly appreciated  NEURO: AAOX3  Grossly non focal neuro exam  INTEGUMENT: no obvious rashes  LYMPHATIC: no palpable cervical, supraclavicular, axillary or inguinal LAD  MUSCULOSKELETAL: Unremarkable. No bony tenderness.   EXTREMITIES: no edema  PSYCH: Mentation, mood and affect are normal. Decision making capacity is intact      Laboratory/Imaging " Studies      Reviewed  5/9/2023  CBC was unremarkable  CMP unremarkable.  Lipase 314.      ASSESSMENT/PLAN:    Metastatic pancreatic adenocarcinoma with multiple liver mets.    We discussed the situation in detail.    I believe she would benefit from additional mutation testing so we will send the tumor for Caris testing.    I would recommend checking CT of the chest as a baseline. Because of SOB, I recommend checking CTA Chest with PE Protocol to also rule out PE.  I have placed a stat order so that it could be done today.  We discussed that if she is found to have a blood clot then we will need to start her on blood thinners like Eliquis.    Unfortunately metastatic pancreatic adenocarcinoma is not a curable cancer.  Treatment would be palliative in nature and would consist of palliative chemotherapy.  We discussed need for port placement.  I would recommend starting gemcitabine/Abraxane.  We discussed the rational schedule and potential side effects of it in detail.  I am choosing gemcitabine/Abraxane over FOLFIRINOX because I think that she has a better chance of tolerating this as compared to FOLFIRINOX considering that over the last month or so, her performance status has declined.    We also discussed option of not trying the chemotherapy and only focusing on comfort measures.  This would be hospice approach.  I believe it is reasonable to try chemotherapy and she agrees.    I will also refer to palliative care.  I asked her if he needs any stronger pain medication but she would prefer continuing with tramadol for now so I refilled that.    Discussion regarding genetic testing.  Due to personal and family history of cancers, I recommend genetic counselor evaluation and I gave her a referral for that.      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  I will refer her to GYN oncology.    She wants to get her care at [place] but she is willing to start chemotherapy here  and then transfer to [place] later on.    See your provider before next dose of chemotherapy.    All questions were answered to her satisfaction.  She is agreeable and comfortable with the plan.    Nathen Cruz MD     Addendum:  CTA Chest Reviewed- No PE- plan remains the same as above    Lines and tubes: None.     Vessels: No central filling defect consistent with a pulmonary  embolism.  No aortic aneurysm.      Mediastinum: No thyroid nodules. Central tracheobronchial tree is  patent. Heart size is normal. No pericardial effusion.  Normal  thoracic vasculature. No thoracic lymphadenopathy.      Lungs: Mild biapical scarring. There are multiple scattered solid and  groundglass nodules throughout the lungs for example a 7 mm  groundglass nodule in the right upper lobe posteriorly (series 7,  image 73), and a 3 mm groundglass nodule in the posterior left upper  lobe (series 7, image 72). There is a 3 mm solid nodule in the right  lower lobe (series 7, image 224), a 4 mm nodule in the left lower lobe  (series 7, image 151) and a 3 mm nodule within the right lower lobe  which is stable compared to CT abdomen and pelvis 5/16/2023 (image  225). Mild bibasilar atelectasis. No consolidation. No pleural  effusion or pneumothorax.     Bones and soft tissues: No suspicious bone findings. Degenerative  changes throughout the visualized spine.     Upper abdomen: Limited evaluation demonstrates similar numerous  hypoattenuating lesions throughout the liver. Stable 2 cm adrenal  mass. The pancreatic tail remains enlarged related to the pancreatic  body, although the known pancreatic tail mass is not well-visualized  due to contrast timing.                                                                      IMPRESSION:   1. No central filling defect consistent with a pulmonary embolism.   2. There are multiple scattered solid and groundglass nodules  throughout the lungs. Given the patient's history of  biopsy-proven  adenocarcinoma, cannot exclude metastatic disease. Recommend continued  attention on follow-up.  3. Partially visualized abdomen demonstrates similar numerous  hypoattenuating metastatic lesions throughout the liver, a stable 2 cm  adrenal mass, and prominence of the pancreatic tail, although the  previously noted pancreatic tail mass is not well-visualized due to  contrast timing.     Nathen Cruz MD  6/1/2023

## 2023-06-01 NOTE — PROGRESS NOTES
Writer received Genetics - Cancer Risk referral, referred for:     Malignant neoplasm of pancreas metastatic to liver (H)     Reviewed for appropriate plan, and sent to New Patient Scheduling for completion.

## 2023-06-01 NOTE — NURSING NOTE
"Oncology Rooming Note    June 1, 2023 11:01 AM   Karina Melvin is a 62 year old female who presents for:    Chief Complaint   Patient presents with     Oncology Clinic Visit     Pancreatic adenocarcinoma      Initial Vitals: /79   Pulse 70   Temp 98  F (36.7  C) (Oral)   Resp 12   Ht 1.579 m (5' 2.17\")   Wt 53.1 kg (117 lb 1.6 oz)   SpO2 96%   BMI 21.30 kg/m   Estimated body mass index is 21.3 kg/m  as calculated from the following:    Height as of this encounter: 1.579 m (5' 2.17\").    Weight as of this encounter: 53.1 kg (117 lb 1.6 oz). Body surface area is 1.53 meters squared.  Extreme Pain (8) Comment: Data Unavailable   No LMP recorded. Patient is postmenopausal.  Allergies reviewed: Yes  Medications reviewed: Yes    Medications: Medication refills not needed today.  Pharmacy name entered into [facility]:    [facility] [place], [place] - [address] [address] [place] [address] N, [address]  [id number] [place], [place] - [address] AT [facility] [address]    Clinical concerns: Pt is experiencing shortness of breath for a couple of weeks now and says it stills feel the same since it has happened.      Misty Luong            "

## 2023-06-01 NOTE — PROGRESS NOTES
[facility]: Cancer Care Short Note                                                                                          Caris testing requisition submitted on pathology specimen [id number] as requested by Dr Cruz.      Kamila Ramachandran RN, BSN  RN Care Coordinator   [facility]

## 2023-06-01 NOTE — PROGRESS NOTES
New Patient Hematology / Oncology Nurse Navigator Note     Referral Date: 6/1/23    Referring provider: Dr BULMARO Cruz    Referring Clinic/Organization: [facility]     Referred to: Gynecology Oncology    Requested provider (if applicable): First available - did not specify     Evaluation for : Ovarian adenofibroma     Clinical History (per Nurse review of records provided):      6/1 Med Onc visit -- BOOKMARKED    5/31MR Pelvis -- BOOKMARKED    Clinical Assessment / Barriers to Care (Per Nurse):    No barriers identified, patient prefers Dr Camejo at [place] as he treated her sister      Records Location: [facility]     Records Needed:     See Pre-Visit encounter from CSS team for additional details on records collection. Additional questions on records needed for initial consult can be sent to  ONC ADULT NEW PATIENT RECORDS [[id number]] with a copy to  CANCER CARE NURSE NAVIGATION [[id number]]. Please include diagnosis and urgency in subject line.    Additional testing needed prior to consult:     N/A    Referral updates and Plan:     Triage instructions updated and sent to NPS for completion      ALISSA McneillN, RN, PHN, OCN  Hematology/Oncology Nurse Navigator   [facility]   [phone]   [email or web address]

## 2023-06-01 NOTE — PATIENT INSTRUCTIONS
CT Chest STAT    Schedule port    Start gemcitabine and abraxane    Refer to pall care    Refer to Genetics    She wants to get chemo in [place] but is willing to start here    Follow with me or NP before next dose of chemo    Refer to GYN Onc

## 2023-06-01 NOTE — LETTER
6/1/2023         RE: Karina Melvin  [address] [facility] [address]        Dear Colleague,    Thank you for referring your patient, Karina Melvin, to the [facility]. Please see a copy of my visit note below.    Oncology initial visit:  Date on this visit: 6/1/2023    Karina Melvin  is referred by self-referred for an oncology consultation. She requires evaluation for new diagnosis of     Primary Physician: Ernestina De Leon     History Of Present Illness:  Ms. Melvin is a 62 year old female who presents with       5/16/2023.  CT abdomen/pelvis was done for abdominal pain which showed ill-defined hypoenhancing mass in the tail of the pancreas measuring 4.5 cm x 2 cm with associated pancreatic ductal dilation.  In addition to that there are numerous low-density peripherally enhancing lesions throughout the liver the largest liver lesion is in the right hepatic lobe inferiorly measuring 2.5 cm.  There is also a low-density 2 cm mass in the left adrenal gland inferiorly.  6.8 x 3.9 x 4.8 cm cyst within the left posterior pelvis likely left ovarian cyst.  Overall these findings were suspicious for metastatic pancreatic cancer.    She had a liver biopsy on 5/25/2023 which showed metastatic adenocarcinoma compatible with metastatic adenocarcinoma of pancreatic origin.    5/19/2023.  Pelvic ultrasound showed 8.1 cm left ovarian cyst for which pelvic MRI was recommended.    5/31/2023.  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    She mentions to me that her pain in the mid to lower abdomen started around mid April 2023.  This has progressively worsened to the point that now she is taking tramadol every 6 hours.  This helps.  She denies nausea vomiting diarrhea constipation.  She has lost a few pounds.  She was very energetic to begin with and was feeling perfectly normal  prior to all of this starting but now she feels very tired and fatigued.  She denies any significant neuropathy.  No new swellings.  No fevers.  Over the last 2 to 3 days she has started to notice some shortness of breath.  No fevers or coughing.  No pleuritic chest pain.      ECOG 1-2    ROS:  A comprehensive ROS was otherwise neg      Past Medical/Surgical History:  Past Medical History:   Diagnosis Date    Arthritis     Depressive disorder     Positive TB test      Past Surgical History:   Procedure Laterality Date    COLONOSCOPY N/A 8/21/2019    Procedure: Colonoscopy, With Polypectomy And Biopsy;  Surgeon: Duane, William Charles, MD;  Location: [facility] OR    COLONOSCOPY WITH CO2 INSUFFLATION N/A 8/21/2019    Procedure: COLONOSCOPY, WITH CO2 INSUFFLATION;  Surgeon: Duane, William Charles, MD;  Location: [facility] OR    IR LIVER BIOPSY PERCUTANEOUS  5/25/2023     Cancer History:   As above    Allergies:  Allergies as of 06/01/2023    (No Known Allergies)     Current Medications:  Current Outpatient Medications   Medication Sig Dispense Refill    acetaminophen (TYLENOL) 500 MG tablet Take 500-1,000 mg by mouth every 6 hours as needed for mild pain      azelastine (ASTELIN) 0.1 % nasal spray Spray 1 spray into both nostrils 2 times daily 30 mL 3    diclofenac (VOLTAREN) 1 % topical gel Apply 4 g topically 4 times daily 100 g 1    VITAMIN D, CHOLECALCIFEROL, PO Take 5,000 Units by mouth daily      clotrimazole-betamethasone (LOTRISONE) 1-0.05 % external cream Apply topically 2 times daily (Patient not taking: Reported on 6/1/2023) 15 g 0    meclizine (ANTIVERT) 25 MG tablet Take 1 tablet (25 mg) by mouth 3 times daily as needed for dizziness (Patient not taking: Reported on 6/1/2023) 30 tablet 1      Family History:  Family History   Problem Relation Age of Onset    Colon Cancer Mother     Lung Cancer Father     Lung Cancer Brother     Thyroid Disease Sister    sister with ovarian cancer    No kids.   Social History:  Social  "History     Socioeconomic History    Marital status: Single     Spouse name: Not on file    Number of children: Not on file    Years of education: Not on file    Highest education level: Not on file   Occupational History    Not on file   Tobacco Use    Smoking status: Never    Smokeless tobacco: Never   Vaping Use    Vaping status: Never Used   Substance and Sexual Activity    Alcohol use: No    Drug use: No    Sexual activity: Not Currently     Partners: Male   Other Topics Concern    Parent/sibling w/ CABG, MI or angioplasty before 65F 55M? Not Asked   Social History Narrative    Not on file     Social Determinants of Health     Financial Resource Strain: Not on file   Food Insecurity: Not on file   Transportation Needs: Not on file   Physical Activity: Not on file   Stress: Not on file   Social Connections: Not on file   Intimate Partner Violence: Not At Risk (6/1/2023)    Humiliation, Afraid, Rape, and Kick questionnaire     Fear of Current or Ex-Partner: No     Emotionally Abused: No     Physically Abused: No     Sexually Abused: No   Housing Stability: Not on file   lives alone.  No smoke. Drinks etoh very occasionally  She is originally from [place]    Physical Exam:  /79   Pulse 70   Temp 98  F (36.7  C) (Oral)   Resp 12   Ht 1.579 m (5' 2.17\")   Wt 53.1 kg (117 lb 1.6 oz)   SpO2 96%   BMI 21.30 kg/m     Wt Readings from Last 4 Encounters:   06/01/23 53.1 kg (117 lb 1.6 oz)   05/26/23 53.5 kg (118 lb)   05/25/23 53.1 kg (117 lb)   05/01/23 55.7 kg (122 lb 11.2 oz)       CONSTITUTIONAL: no acute distress  EYES: PERRLA, no palor or icterus.   ENT/MOUTH: no mouth lesions. Ears normal  CVS: s1s2 no m r g .   RESPIRATORY: clear to auscultation b/l  GI: Abdomen is soft.  It is mildly tender in the epigastrium and on the left side.  No hepatosplenomegaly appreciated  NEURO: AAOX3  Grossly non focal neuro exam  INTEGUMENT: no obvious rashes  LYMPHATIC: no palpable cervical, supraclavicular, " axillary or inguinal LAD  MUSCULOSKELETAL: Unremarkable. No bony tenderness.   EXTREMITIES: no edema  PSYCH: Mentation, mood and affect are normal. Decision making capacity is intact      Laboratory/Imaging Studies      Reviewed  5/9/2023  CBC was unremarkable  CMP unremarkable.  Lipase 314.      ASSESSMENT/PLAN:    Metastatic pancreatic adenocarcinoma with multiple liver mets.    We discussed the situation in detail.    I believe she would benefit from additional mutation testing so we will send the tumor for Caris testing.    I would recommend checking CT of the chest as a baseline. Because of SOB, I recommend checking CTA Chest with PE Protocol to also rule out PE.  I have placed a stat order so that it could be done today.  We discussed that if she is found to have a blood clot then we will need to start her on blood thinners like Eliquis.    Unfortunately metastatic pancreatic adenocarcinoma is not a curable cancer.  Treatment would be palliative in nature and would consist of palliative chemotherapy.  We discussed need for port placement.  I would recommend starting gemcitabine/Abraxane.  We discussed the rational schedule and potential side effects of it in detail.  I am choosing gemcitabine/Abraxane over FOLFIRINOX because I think that she has a better chance of tolerating this as compared to FOLFIRINOX considering that over the last month or so, her performance status has declined.    We also discussed option of not trying the chemotherapy and only focusing on comfort measures.  This would be hospice approach.  I believe it is reasonable to try chemotherapy and she agrees.    I will also refer to palliative care.  I asked her if he needs any stronger pain medication but she would prefer continuing with tramadol for now so I refilled that.    Discussion regarding genetic testing.  Due to personal and family history of cancers, I recommend genetic counselor evaluation and I gave her a referral for  that.      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  I will refer her to GYN oncology.    She wants to get her care at [facility] but she is willing to start chemotherapy here and then transfer to [facility] later on.    See your provider before next dose of chemotherapy.    All questions were answered to her satisfaction.  She is agreeable and comfortable with the plan.    Nathen Cruz MD     Addendum:  CTA Chest Reviewed- No PE- plan remains the same as above    Lines and tubes: None.     Vessels: No central filling defect consistent with a pulmonary  embolism.  No aortic aneurysm.      Mediastinum: No thyroid nodules. Central tracheobronchial tree is  patent. Heart size is normal. No pericardial effusion.  Normal  thoracic vasculature. No thoracic lymphadenopathy.      Lungs: Mild biapical scarring. There are multiple scattered solid and  groundglass nodules throughout the lungs for example a 7 mm  groundglass nodule in the right upper lobe posteriorly (series 7,  image 73), and a 3 mm groundglass nodule in the posterior left upper  lobe (series 7, image 72). There is a 3 mm solid nodule in the right  lower lobe (series 7, image 224), a 4 mm nodule in the left lower lobe  (series 7, image 151) and a 3 mm nodule within the right lower lobe  which is stable compared to CT abdomen and pelvis 5/16/2023 (image  225). Mild bibasilar atelectasis. No consolidation. No pleural  effusion or pneumothorax.     Bones and soft tissues: No suspicious bone findings. Degenerative  changes throughout the visualized spine.     Upper abdomen: Limited evaluation demonstrates similar numerous  hypoattenuating lesions throughout the liver. Stable 2 cm adrenal  mass. The pancreatic tail remains enlarged related to the pancreatic  body, although the known pancreatic tail mass is not well-visualized  due to contrast timing.                                                                       IMPRESSION:   1. No central filling defect consistent with a pulmonary embolism.   2. There are multiple scattered solid and groundglass nodules  throughout the lungs. Given the patient's history of biopsy-proven  adenocarcinoma, cannot exclude metastatic disease. Recommend continued  attention on follow-up.  3. Partially visualized abdomen demonstrates similar numerous  hypoattenuating metastatic lesions throughout the liver, a stable 2 cm  adrenal mass, and prominence of the pancreatic tail, although the  previously noted pancreatic tail mass is not well-visualized due to  contrast timing.     Nathen Cruz MD  6/1/2023

## 2023-06-02 NOTE — PATIENT INSTRUCTIONS
Alpesh De La Rosa,    I'm sending you some information on the chemotherapy Dr. Cruz is planning for you.  Dorie, Dr. Cruz's nurse will touch bas with you on Friday, 6/9 when you start your chemotherapy.  Also, please don't hesitate to call with any other questions.    I am covering for Dorie today.       Jolanta Heath, NIRALI  Lund   [phone]

## 2023-06-02 NOTE — PROGRESS NOTES
Patient was seen by Dr. Cruz as a new patient consult for pancreatic cancer.  She was seen through the [facility] but plans to receive her treatment at [facility].  The plan for chemotherapy is Gemcitabine and Abraxane.  She is scheduled to start on Friday, 6/9.  Her PORT placement is scheduled for next week.  Order for PORT was faxed to [facility] @ [phone].    FAX confirmed as received.

## 2023-06-06 NOTE — PROGRESS NOTES
[facility]: Cancer Care Initial Note                                    Discussion with Patient:                                                      [facility]: Chemotherapy Education Notes    Chemotherapy education was completed with patient today over the phone. Handouts from [facility] were discussed and provided to the patient to take home, and were also sent to patient via [email or web address], mail and email.  Reviewed the following information with the patient and their support person(s):     Chemotherapy Regimen and Schedule: paclitaxel, gemcitabine, with infusions every 1 week.  3         cycles are planned.    Tests and/or procedures required prior to chemotherapy start:   1. Port - scheduled      General Chemotherapy Information: Specific medication names and medication delivery methods; possible chemotherapy side effects and management of side effects, including but not limited to: skin changes/nail changes, anemia, neutropenia, thrombocytopenia, diarrhea/constipation, nausea/vomiting, hair loss, memory changes, mouth sores, taste changes, appetite changes, peripheral neuropathy, fatigue, infertility, myelosuppression, increased risk for infection, increased risk for bleeding and/or bruising, possible allergic or hypersensitivity reaction.  Reviewed the importance of infection prevention, and ways to stay healthy during chemotherapy including eating a health, well-balanced diet, adequate protein intake, and drinking plenty of fluids.  Reviewed the practice of closely monitoring lab values throughout chemotherapy treatment, what lab tests will be checked (and what changes of these values meant), along with the possibility of IV hydration or blood product transfusion, or the need to defer or hold treatment.  Also reviewed signs/symptoms that should be reported to care team or on-call provider immediately, including: temperature of 100.4 degrees or higher, shortness of breath, chest pain, unusual  "bruising, bleeding symptoms, extreme fatigue, >4-6 episodes of diarrhea in 24 hours, uncontrolled nausea/vomiting, symptoms of dehydration, or signs of an allergic reaction.      Reviewed importance of Central line care (port-a-cath) or IV site care.  Provided [facility] handout \"Vascular Access Port Implantation,\" discussed port placement procedure and rationale for port placement, as well as usage and purpose of EMLA cream prior to port access. EMLA cream prescription will be sent to patient's preferred pharmacy.       Written Information: Patient was provided with the following handouts from [employer]: \"Getting Ready for Chemotherapy: What to Expect, Before, During, and After your Treatment,\" \"Eating Hints: Before, During, and After Cancer Treatment,\" printouts on possible chemotherapy side effects/ways to cope with side effects, \"When to Call the Doctor,\" and \"Self-Care Tips During Cancer Treatment.\"  Patient was also provided with drug-specific handouts from [facility].  Patient was also provided with information regarding various programs offered at [facility], hair loss resources (if applicable), a list of resources for cancer patients, as well as important contact information for our cancer clinic including scheduling team, nurse triage line, direct phone number for RN Care Coordinator, and the after-hours Nurse Advice Line.    No barriers to learning identified. Patient verbalized understanding of all written and verbal information. All questions answered to patient s satisfaction.  Learning barriers and method preference are documented in the patient education flowsheet. Patient states understanding and is in agreement with this plan.  Patient understands that they will be receiving a phone call from a member of our scheduling team to schedule future appointments.  Patient instructed to call with further questions or concerns.        Assessment:                                                  "     Initial  Current living arrangement:: I live in a private home  Informal Support system:: Family;Friends  Bed or wheelchair confined:: No  Mobility Status: Independent  Transportation means:: Regular car  Medication adherence problem (GOAL):: No  Knowledgeable about how to use meds:: Yes  Medication side effects suspected:: Yes  Advanced Care Plans/Directives on file:: No  Patient Reported Pain?: Yes, is currently well-managed  Pain Score: Mild Pain (3)    Plan of Care Education Review:   Assessment completed with:: Patient    Current living arrangement  I live in a private home    Plan of Care Education   Diagnosis:: Malignant neoplasm of pancreas metastatic to liver  Does patient understand diagnosis?: Yes  Tx plan/regimen:: Paclitaxel, gemcitabine  Does patient understand treatment plan/regimen?: Yes  Preparing for treatment:: Reviewed treatment preparation information with patient (vascular access, day of chemo, visitor policy, what to bring, etc.)  Vascular access:: Port  Side effect education:: Diarrhea/Constipation;Endocrine therapy (myalgias, arthralgias, hot flashes, vaginal dryness, mood disorder, and thinning of the bones);Fatigue;Hair loss;Infection;Lab value monitoring (anemia, neutropenia, thrombocytopenia);Mouth sores;Mylosuppression;Nausea/Vomiting;Neuropathy  Transportation means:: Regular car  Safety/self care at home reviewed with patient:: Yes  Informal Support system:: Family;Friends  Coping - concerns/fears reviewed with patient:: Yes  Plan of Care:: Lab appointment;Treatment schedule  When to call provider:: Bleeding;Increased shortness of breath;Uncontrolled nausea/vomiting;New/worsening pain;Shaking chills;Temperature >100.4F;Uncontrolled diarrhea/constipation  Reasons for deferring treatment reviewed with patient:: Yes    Evaluation of Learning  Patient Education Provided: Yes  Readiness:: Acceptance  Method:: Booklet/Handout;Literature;Explanation  Response:: Verbalizes understanding            Intervention/Education provided during outreach:                                                       Patient had no further questions, but has direct desk line should she think of any. I let her know that I would follow-up with her on Monday to see how she's feeling.    Dorie Quintana, RN, BSN  RN Care Coordinator - Oncology  [facility]

## 2023-06-08 NOTE — ADDENDUM NOTE
Addendum  created 06/08/23 1129 by Aldo Meeks MD    Attestation recorded in Intraprocedure, Intraprocedure Attestations filed

## 2023-06-08 NOTE — DISCHARGE INSTRUCTIONS
A collaboration between [facility] and [facility]  Experts in minimally invasive, targeted treatments performed using imaging guidance    Venous Access Device,  Port Catheter or Tunneled or Non-Tunneled Central Line Placement    Today you had a procedure today to install a venous access device; either a tunneled central vein catheter or a subcutaneous port catheter.    After you go home:  Drink plenty of fluids.  Generally 6-8 (8 ounce) glasses a day is recommended.  Resume your regular diet unless otherwise ordered by a medical provider.  Keep any applied tape/gauze dressings clean and dry.  Change tape/gauze dressings if they get wet or soiled.  You may shower the following day after procedure, however cover and protect from moisture any tape/gauze dressings.  You may let water hit and run over dried skin glue, but do not scrub.  Pat the area dry after showering.  Port placement incisions are closed with absorbable suture, meaning they do not need to be removed at a later date, and a topical skin adhesive (skin glue).  This glue will wear off in 7-14 days.  Do not remove before this time.  If 14 days have passed and residual glue is present, you may gently remove it.  Do not apply gels, lotions, or ointments to the glue site for the first 10 days as this may cause the glue to prematurely soften and fail.  Do not perform strenuous activities or lift greater than 10 pounds for the next three days.  If there is bleeding or oozing from the procedure site, apply firm pressure to the area for 5-10 minutes.  If the bleeding continues seek medical advice at the numbers below.  Mild procedure site discomfort can be treated with an ice pack and over-the-counter pain relievers.        For 24 hours after any sedation used:  Relax and take it easy.  No strenuous activities.  Do not drive or operate machines at home or at work.  No alcohol consumption.  Do not make any  important or legal decisions.    Call our Interventional Radiology (IR) service if:  If you start bleeding from the procedure site.  If you do start to bleed from the site, lie down and hold some pressure on the site.  Our radiology provider can help you decide if you need to return to the hospital.  If you have new or worsening pain related to the procedure.  If you have concerning swelling at the procedure site.  If you develop persistent nausea or vomiting.  If you develop hives or a rash or any unexplained itching.  If you have a fever of greater than 100.5  F and chills in the first 5 days after procedure.  Any other concerns related to your procedure.      [facility]  Interventional Radiology (IR)  [address]  [address] [facility] [place] [address]    Contact Number:  [phone]  (IR control desk)  Monday - Friday 8:00 am - 4:30 pm    After hours for urgent concerns:  [phone]  After 4:30 pm Monday - Friday, Weekends and Holidays.   Ask for Interventional Radiology on-call.  Someone is available 24 hours a day.  [facility] toll free number:  [phone]

## 2023-06-08 NOTE — BRIEF OP NOTE
[facility]    Brief Operative Note    Pre-operative diagnosis: Malignant neoplasm of pancreas, unspecified location of malignancy (H) [C25.9]  Post-operative diagnosis Same as pre-operative diagnosis    Procedure: Procedure(s):  Insert port vascular access  Surgeon: Surgeon(s) and Role:     * Shen Grande MD - Primary  Anesthesia: MAC   Estimated Blood Loss: Minimal    Drains: None  Specimens: * No specimens in log *  Findings:   Successful placement of port (right chest).  Complications: None.  Implants:   Implant Name Type Inv. Item Serial No.  Lot No. LRB No. Used Action   CATH PORT POWERPORT CLEARVUE SLIM 6FR [id number] - [id number] Port CATH PORT POWERPORT CLEARVUE SLIM 6FR [id number] EDIE [employer] [facility] [id number] Right 1 Implanted

## 2023-06-08 NOTE — ANESTHESIA PREPROCEDURE EVALUATION
Anesthesia Pre-Procedure Evaluation    Patient: Karina Melvin   MRN: [id number] : 1960        Procedure : Procedure(s):  Insert port vascular access          Past Medical History:   Diagnosis Date     Arthritis      Depressive disorder      Malignant neoplasm of pancreas metastatic to liver (H) 2023     Positive TB test       Past Surgical History:   Procedure Laterality Date     COLONOSCOPY N/A 2019    Procedure: Colonoscopy, With Polypectomy And Biopsy;  Surgeon: Duane, William Charles, MD;  Location: [facility] OR     COLONOSCOPY WITH CO2 INSUFFLATION N/A 2019    Procedure: COLONOSCOPY, WITH CO2 INSUFFLATION;  Surgeon: Duane, William Charles, MD;  Location: [facility] OR     IR LIVER BIOPSY PERCUTANEOUS  2023      No Known Allergies   Social History     Tobacco Use     Smoking status: Never     Smokeless tobacco: Never   Vaping Use     Vaping status: Never Used   Substance Use Topics     Alcohol use: No      Wt Readings from Last 1 Encounters:   23 53.1 kg (117 lb)        Anesthesia Evaluation            ROS/MED HX  ENT/Pulmonary:       Neurologic:       Cardiovascular:       METS/Exercise Tolerance:     Hematologic:       Musculoskeletal:   (+) arthritis,     GI/Hepatic:       Renal/Genitourinary:       Endo:       Psychiatric/Substance Use:     (+) psychiatric history anxiety and depression     Infectious Disease:       Malignancy:   (+) Malignancy, History of Other.Other CA pancreatic status post.    Other:            Physical Exam    Airway  airway exam normal           Respiratory Devices and Support         Dental       (+) Completely normal teeth      Cardiovascular   cardiovascular exam normal          Pulmonary   pulmonary exam normal                OUTSIDE LABS:  CBC:   Lab Results   Component Value Date    WBC 6.1 2023    WBC 6.5 2023    HGB 14.9 2023    HGB 15.1 2023    HCT 44.5 2023    HCT 45.0 2023     2023     2023      BMP:   Lab Results   Component Value Date     05/09/2023     04/26/2023    POTASSIUM 4.1 05/09/2023    POTASSIUM 3.9 04/26/2023    CHLORIDE 109 05/09/2023    CHLORIDE 106 04/26/2023    CO2 26 05/09/2023    CO2 28 04/26/2023    BUN 14 05/09/2023    BUN 15 04/26/2023    CR 0.92 05/09/2023    CR 0.60 04/26/2023     (H) 05/09/2023    GLC 95 04/26/2023     COAGS:   Lab Results   Component Value Date    PTT 28 05/25/2023    INR 0.95 05/25/2023     POC: No results found for: BGM, HCG, HCGS  HEPATIC:   Lab Results   Component Value Date    ALBUMIN 3.6 05/09/2023    PROTTOTAL 7.6 05/09/2023    ALT 24 05/09/2023    AST 28 05/09/2023    ALKPHOS 83 05/09/2023    BILITOTAL 0.4 05/09/2023     OTHER:   Lab Results   Component Value Date    IVAN 8.7 05/09/2023    LIPASE 314 05/09/2023    TSH 0.51 04/26/2023    T4 1.03 04/26/2023    CRP 3.0 05/04/2023    SED 18 05/04/2023       Anesthesia Plan    ASA Status:  3   NPO Status:  NPO Appropriate    Anesthesia Type: MAC.     - Reason for MAC: immobility needed   Induction: Intravenous.   Maintenance: TIVA.        Consents    Anesthesia Plan(s) and associated risks, benefits, and realistic alternatives discussed. Questions answered and patient/representative(s) expressed understanding.    - Discussed:     - Discussed with:  Patient      - Extended Intubation/Ventilatory Support Discussed: No.      - Patient is DNR/DNI Status: No    Use of blood products discussed: No .     Postoperative Care       PONV prophylaxis: Background Propofol Infusion     Comments:           H&P reviewed: Unable to attach H&P to encounter due to EHR limitations. H&P Update: appropriate H&P reviewed, patient examined. No interval changes since H&P (within 30 days).         Aldo Meeks MD

## 2023-06-08 NOTE — ANESTHESIA POSTPROCEDURE EVALUATION
Patient: Karina Melvin    Procedure: Procedure(s):  Insert port vascular access       Anesthesia Type:  MAC    Note:  Disposition: Outpatient   Postop Pain Control: Uneventful            Sign Out: Well controlled pain   PONV: No   Neuro/Psych: Uneventful            Sign Out: Acceptable/Baseline neuro status   Airway/Respiratory: Uneventful            Sign Out: Acceptable/Baseline resp. status   CV/Hemodynamics: Uneventful            Sign Out: Acceptable CV status; No obvious hypovolemia; No obvious fluid overload   Other NRE: NONE   DID A NON-ROUTINE EVENT OCCUR? No           Last vitals:  Vitals Value Taken Time   /75 06/08/23 1015   Temp 36.3  C (97.3  F) 06/08/23 1015   Pulse 74 06/08/23 1015   Resp 16 06/08/23 1015   SpO2 99 % 06/08/23 1015       Electronically Signed By: Aldo Meeks MD  June 8, 2023  11:28 AM

## 2023-06-08 NOTE — ANESTHESIA CARE TRANSFER NOTE
Patient: Karina Melvin    Procedure: Procedure(s):  Insert port vascular access       Diagnosis: Malignant neoplasm of pancreas, unspecified location of malignancy (H) [C25.9]  Diagnosis Additional Information: No value filed.    Anesthesia Type:   MAC     Note:    Oropharynx: oropharynx clear of all foreign objects and spontaneously breathing  Level of Consciousness: awake  Oxygen Supplementation: room air    Independent Airway: airway patency satisfactory and stable  Dentition: dentition unchanged  Vital Signs Stable: post-procedure vital signs reviewed and stable  Report to RN Given: handoff report given  Patient transferred to: Phase II    Handoff Report: Identifed the Patient, Identified the Reponsible Provider, Reviewed the pertinent medical history, Discussed the surgical course, Reviewed Intra-OP anesthesia mangement and issues during anesthesia, Set expectations for post-procedure period and Allowed opportunity for questions and acknowledgement of understanding      Vitals:  Vitals Value Taken Time   /72    Temp 36.4    Pulse 75    Resp 16    SpO2 99        Electronically Signed By: VINCENZO Schulz CRNA  June 8, 2023  9:36 AM

## 2023-06-09 NOTE — PROGRESS NOTES
Social Work - Intervention  [facility]  Data/Intervention:    Patient Name: Karina Melvin  /Age: 1960 (62 year old)     Visit Type: in person  Referral Source: Infusion RN  Reason for Referral: SW introduction      Collaborated With: Estrella       Psychosocial Information/Concerns:  Estrella is recently diagnosed with metastatic pancreatic cancer. She is here today for C1D1 abraxane/gemzar.      Intervention/Education/Resources Provided:  SW met with Estrella in the infusion clinic this afternoon. SW introduced self and oriented Estrella to SW role. Estrella was pleasant and appreciative of introduction and check in. She states that she is still processing the information and isn't quite sure what she may need. SW normalized this and validated Estrella's feelings. Estrella is taking a leave from work, she reports that at this time she does not have questions about that process. She needs to think things through for now, but agreed to reach out to SW with any other questions or needs moving forward.      Assessment/Plan:  SW will remain available as needed and appropriate.      Provided patient/family with contact information and availability.    Terri Pham, MSW, LICSW, OSW-C (she/her)  Clinical , Adult Oncology  Phone: [phone]    [place] (M, W, F)  [place] (Thu)

## 2023-06-09 NOTE — PROGRESS NOTES
Infusion Nursing Note:  Karina Melvin presents today for C1D1 Abraxane/Gemzar.    Patient seen by provider today: No   present during visit today: Not Applicable.    Note: Patient is familiar with the infusion center, as her sister recently was a patient here.  Oriented her to call light and when to call the nurse.    Intravenous Access:  Implanted Port.    Treatment Conditions:  Lab Results   Component Value Date    HGB 13.9 06/09/2023    WBC 7.5 06/09/2023    ANEU 2.7 06/17/2019    ANEUTAUTO 5.1 06/09/2023     06/09/2023      Lab Results   Component Value Date     (L) 06/09/2023    POTASSIUM 4.2 06/09/2023    CR 0.54 06/09/2023    IVAN 9.5 06/09/2023    BILITOTAL 0.4 06/09/2023    ALBUMIN 4.2 06/09/2023    ALT 44 (H) 06/09/2023    AST 68 (H) 06/09/2023     Results reviewed, labs MET treatment parameters, ok to proceed with treatment.    Post Infusion Assessment:  Patient tolerated infusion without incident.  Blood return noted pre and post infusion.  Site patent and intact, free from redness, edema or discomfort.  No evidence of extravasations.  Access discontinued per protocol.     Discharge Plan:   Patient will return 6/16/2023 for next appointment.   Future appts have been reviewed and crosschecked with appt note and plan.  Patient discharged in stable condition accompanied by: self.  Departure Mode: Ambulatory.    Laurita Tabor RN-BSN, PHN, OCN  [facility]

## 2023-06-09 NOTE — PROGRESS NOTES
Port accessed using 20 G 3/4 inch needle.  Labs collected from port.  Line flushed with NS and Heparin.  Pt tolerated procedure.  Port left accessed for infusion.    Laurita Tabor RN-BSN, PHN, OCN  [facility]

## 2023-06-13 NOTE — CONFIDENTIAL NOTE
Writer attempted to call pt with recommendations. Left VM that recommendations would be sent via [employer] as it has been checked recently. Advised to call clinic with any questions.

## 2023-06-13 NOTE — CONFIDENTIAL NOTE
Pt calling in to report that she started to develop a rash about 2 days ago. She reports that the rash is on parts of her legs, arms, and abdomen. It is red, raised, and itchy and she has not yet taken any medication for the rash. She denies shortness of breath, open sores, fever, pain, numbness/tingling, swelling, or signs of bleeding.    Writer advised to start utilizing hydrocortisone cream, cool washcloths, and to protect the areas from sunlight if possible. Pt verbalized understanding.    Will route to provider for additional recommendations.

## 2023-06-13 NOTE — PROGRESS NOTES
[facility]: Cancer Care Long Assessment    Discussion with Patient:                                                      Spoke to patient following c1d1. Patient states that she has developed a rash roughly 2 days ago that is all over her body. She said that the bumps are raised and itchy. She states that she has not yet taken anything for the rash, but would prefer to have something topical vs oral if Dr. Cruz would recommend anything for it. Noted patient called triage for this.    She also states that she continues to have abdominal pain, which is not new to her. This is relatively unchanged, and she takes tramadol for this at bed time.    She notes that she had an episode of nausea/vomiting over the weekend, but took oral anti-nausea med and this helped and she has not had another episode since.    All symptoms as documented below.    Dates of Treament:                                                      Infusion given in last 28 days     Administered MAR Action Medication Dose Rate Visit    06/09/2023 14:23 New Bag PACLitaxel-protein bound (ABRAXANE) infusion 190 mg 190 mg 76 mL/hr Infusion Therapy Visit on 06/09/2023 in [facility]    06/09/2023 15:20 New Bag gemcitabine (GEMZAR) 1,600 mg in sodium chloride 0.9 % 317.08 mL infusion 1,600 mg 634.2 mL/hr Infusion Therapy Visit on 06/09/2023 in [facility]          Assessment:                                                      Assessment completed with:: Patient    Constitutional  Patient Reported Constitutional Symptoms?: Yes  Fatigue: Fatigue relieved by rest    Neurosensory  Patient Reported Neurosensory Symptoms?: No    Eye Disorders  Patient Reported Eye Disorders?: No    Ear Disorders  Ear Disorders  Patient Reported Ear Disorder?: No    Cardiovascular  Patient Reported Cardiovascular Symptoms?: No    Respiratory  Patient Reported Respiratory Symptoms?: No    Gastrointestinal  Patient  Reported Gastrointestinal Symptoms?: Yes  Nausea: Loss of appetite without alteration in eating habits  Vomiting: Intervention not indicated    Genitourinary  Patient Reported Genitourinary Symptoms?: No    Lymph System Disorders  Patient Reported Lymph System Disorders?: No    Musculoskeletal and Connective Tissue Disorders  Patient Reported Musculoskeletal or Connective Tissue Disorders?: No    Integumentary  Patient Reported Integumentary Symptoms?: Yes  Rash Maculo-Papular: Macules/papules covering less than 10% BSA with or without symptoms (e.g., pruritus, burning, tightness)    Pain  Patient Reported Pain?: Yes, is currently well-managed  Pain Score: Moderate Pain (5)  Pain Loc: Abdomen    Patient Coping  Accepting    Clinic Utilization  Patient Aware of Next Appointment?: Yes    Other Patient Concerns  Other Patient Reported Concerns: No      Intervention/Education provided during outreach:                                                       Message sent to provider for recommendations for rash/pain.  Dorie Quintana, RN, BSN  RN Care Coordinator - Oncology  [facility]

## 2023-06-13 NOTE — PROGRESS NOTES
Message relayed to the patient and she agrees with the recommendations - writer will follow-up with her tomorrow.  Dorie Quintana, RN, BSN  RN Care Coordinator - Oncology  [facility]

## 2023-06-13 NOTE — CONFIDENTIAL NOTE
Writer called pt with recommendations from Dr. Cruz.    Nathen Cruz MD  You; Radha Quintana, RN 4 minutes ago (10:21 AM)     AO  Benadryl 25 mg q 6 hrs      No answer, left VM for pt to call triage to discuss.

## 2023-06-14 NOTE — PROGRESS NOTES
Social Work - Telephone/CiDRAhart message  [facility]  Data:   Patient Name: Karina Melvin    /Age: 1960 (62 year old)      Referral Source: Voicemail from Estrella  Reason for Referral: resource follow up    Intervention: Left voice message for patient on 2023. SW provided contact information and encouraged Estrella to call back when she is able.     Received follow up voicemail this afternoon. SW made second attempt to reach Estrella. SW left second voicemail, encouraged Estrella to call back when she is able.     Plan:  will await patient's return phone call/message and provide assistance at that time.      Terri Pham, MSW, LICSW, OSW-C (she/her)  Clinical , Adult Oncology  Phone: [phone]    [place] (M, W, F)  [place] (Thu)

## 2023-06-14 NOTE — TELEPHONE ENCOUNTER
RECORDS STATUS - ALL OTHER DIAGNOSIS      RECORDS RECEIVED FROM: Epic   DATE RECEIVED:    NOTES STATUS DETAILS   OFFICE NOTE from referring provider Epic 06/01/23: Dr. Nathen Cruz   OFFICE NOTE from medical oncologist Epic 06/01/23: Dr. Nathen Cruz   OFFICE NOTE from other specialist Epic 05/26/23: Dr. Benjamin Taylor   DISCHARGE SUMMARY from hospital [facility] 05/25/23: [facility]   MEDICATION LIST [facility]    LABS     PATHOLOGY REPORTS Reports in EPIC Flow Cytom:  05/25/23: [id number]    Surg Path:  05/25/23: [id number]    Gyn Cyto:  05/16/22: [id number]   ANYTHING RELATED TO DIAGNOSIS Epic Most recent 06/09/23   IMAGING (NEED IMAGES & REPORT)     CT SCANS PACS 06/01/23: CT Chest  05/16/23: CT Abd Pel   MRI PACS 05/31/23: MR Pelvis   ULTRASOUND PACS 05/19/23: US Pelvic

## 2023-06-14 NOTE — PROGRESS NOTES
Spoke to patient for follow-up regarding rash. She states that she's been taking benadryl every 6 hours and her rash is doing better, though it is still there. Advised to continue to take for the next couple of days. Pt will call in a couple of days if rash hasn't resolved. Pt will follow-up with Dr. Camejo next week as well.    Dorie Quintana, RN, BSN  RN Care Coordinator - Oncology  [facility]

## 2023-06-15 NOTE — PROGRESS NOTES
Social Work - Follow-Up  [facility]    Data/Intervention:    Patient Name: Karina Melvin  /Age: 1960 (62 year old)    Reason for Follow-Up:  Disability questions    Collaborated With:    Estrella    Intervention/Education/Resources Provided:  CRYSTAL received call from Estrella this morning, after several back and forth messages. Estrella states she is doing ok. She is interested in information about short and long term disability, primarily how it works and how to access it. CRYSTAL talked Estrella through these benefits. Estrella understands she needs to speak to her manager/HR team to initiate the process and she can bring forms to [employer] that need medical documentation. CRYSTAL briefly introduced social security disability, but Estrella does not want to focus on that at this time.    CRYSTAL encouraged Estrella to call back with any other questions or needs.     Assessment/Plan:  SW will continue to remain available as needed and appropriate.     Previously provided patient/family with writer's contact information and availability.      Terri Pham, ROSALINDA, LICSW, OSW-C (she/her)  Clinical , Adult Oncology  Phone: [phone]    [place] (M, W, F)  [place] (Thu)

## 2023-06-16 NOTE — PROGRESS NOTES
"Patient's name and  were verified.  See Doc Flowsheet - IV assess for details.  IVAD accessed with 20G 3/\" garcia gripper plus needle  blood return positive: YES  Site without redness, tenderness or swelling: YES  flushed with 20cc NS and 5cc 100u/ml heparin  Needle: left accessed for infusion appointment  Comments: Patient's port accessed using sterile procedure. Blood return noted. Lab tubes drawn, labeled and sent to lab. Patient tolerated lab draw well.     Gisell Baxter RN, BSN  "

## 2023-06-16 NOTE — PROGRESS NOTES
Infusion Nursing Note:  Kairna Melvin presents today for C1D8 Abraxane/Gemzar.    Patient seen by provider today: No   present during visit today: Not Applicable.    Note: Patient reports experiencing a rash after last chemotherapy infusion. Reports taking PO benadryl PRN at home to manage symptoms and states the rash has significantly improved since last Saturday. Dr. Rey was rounding and recommended benadryl cream, patient stated this would be preferred so she wouldn't get as tired like she does from the oral dose. Dr. Rey sent the prescription to patient's pharmacy. Patient states she otherwise feels well, see flowsheets for full assessment.     Intravenous Access:  Implanted Port.    Treatment Conditions:  Lab Results   Component Value Date    HGB 12.1 06/16/2023    WBC 5.5 06/16/2023    ANEU 2.7 06/17/2019    ANEUTAUTO 3.5 06/16/2023     06/16/2023      Results reviewed, labs MET treatment parameters, ok to proceed with treatment.    Post Infusion Assessment:  Patient tolerated infusion without incident.  Blood return noted pre and post infusion.  Site patent and intact, free from redness, edema or discomfort.  No evidence of extravasations.  Access discontinued per protocol.     Discharge Plan:   Future appts have been reviewed and crosschecked with appt note and plan.  AVS to patient via [employer].  Patient will return 6/23/2023 for next appointment.   Patient discharged in stable condition accompanied by: self.  Departure Mode: Ambulatory.      Gisell Jacques RN BSN OCN

## 2023-06-22 NOTE — PATIENT INSTRUCTIONS
Surgery is scheduled for 6/27/23.    Cancel chemotherapy infusion for 6/23/23, in preparation for the surgery.    You may restart your chemotherapy treatment 1-2 weeks after surgery, as long as no infection concerns.    Post-op appointment with Dr. Camejo on 7/13/23.

## 2023-06-22 NOTE — PROGRESS NOTES
[facility], Gynecologic Oncology:  Pre-op Education Note    Relevant Diagnosis:  Ovarian mass; Malignant neoplasm of pancreas metastatic to liver    Procedure:  Laparoscopic surgery, removal of both fallopian tubes and ovaries, possible open surgery.    Procedure Date: 6/27/23, patient is aware    Person(s) involved in teaching:  Patient    Education was completed: in person.    Motivation Level:    Asks Questions:   Yes  Eager to Learn:  Yes  Cooperative:  Yes  Receptive (willing/able to accept information):  Yes  Comments:  None    Patient demonstrates understanding of the following:  Reason for the appointment, diagnosis and treatment plan:  Yes  Knowledge of proper use of medications and conditions for which they are ordered (with special attention to potential side effects or drug interactions):  Yes  Which situations necessitate calling provider and whom to contact:  Yes    Teaching Concerns:  No    Education/Instructional Materials Used/Given:     Before Your Surgery Booklet (Katherine)  Showering Before Surgery; A bottle of CHG soap was provided.  Educational Handout Pertaining to Procedure and/or Diagnosis: Pelvic Laparoscopy (Princess)  Home Care After Gynecologic Surgery  [facility] ([place]  Accommodations Brochure   Phone numbers for [facility] and After-Hours Line    Pre-op tests:     EKG: Completed today.    Labs: Completed today.    Chest X-Ray: Not ordered or requested by Dr. Camejo today.    Other: N/A    Pre-op appointment: Completed today by Dr. Camejo.    Post-op appointment: 7/13/23 at 10:45 am with Dr. Camejo ([facility]).  Patient is aware.    Time spent teaching with patient:  20 minutes    E-Consent for surgery signed today: Yes.    E-Consent for possible blood transfusion signed today: Yes.     Notes: Per Dr. Camejo, patient should cancel her chemotherapy appointment for tomorrow (6/23) in light of her  upcoming surgery next week.  Dr. Camejo states patient may restart her chemotherapy treatment ~1-2 weeks after surgery, as long as no infection concerns (and if she is feeling up to it).  Patient is aware of these recommendations, and requests to keep her chemotherapy appointment on 7/6/23 for now - she will see how she is feeling when it gets closer.    Thony Sweeney, RN, BSN, OCN  RN Care Coordinator - Oncology  [facility]

## 2023-06-22 NOTE — PROGRESS NOTES
Port accessed using 20 G 3/4 inch needle.  Labs collected from port.  Line flushed with NS and Heparin.  Pt tolerated procedure.  Port de-accessed per protocol.    Laurita Tabor RN-BSN, PHN, OCN  [facility]

## 2023-06-22 NOTE — LETTER
2023         RE: Karina Melvin  [address] [facility] [address]        Dear Colleague,    Thank you for referring your patient, Karina Melvin, to the [facility]. Please see a copy of my visit note below.                            Consult Notes on Referred Patient         Dr. Nathen Cruz MD  [address] [place] [address]       RE: Karina Melvin  : 1960  YOSELIN: 2023     Dear Dr. Nathen Cruz:    I had the pleasure of seeing your patient Karina Melvin here at the Gynecologic Cancer Clinic at the [facility] on 2023.  As you know she is a very pleasant 62 year old woman with a recent diagnosis of pelvic mass in the setting of pancreatic cancer.  Given these findings she was subsequently sent to the Gynecologic Cancer Clinic for new patient consultation.     HPI - She initially presented recently with abdominal pain, weight loss, and loss of appetite.  THis led ultimately to a CT scan which identified the followin/16/23 CT:  IMPRESSION:   1.  Mass in the pancreatic tail with numerous liver and left adrenal lesions. Findings are highly suspicious for metastatic pancreatic cancer.  2.  Incidental left ovarian cyst. This could be further evaluated with ultrasound, depending on the clinical picture.    23 US  Findings: The uterus measures 5.5 x 2.5 x 4.1cm. The endometrial  stripe measures 2 mm thick. There are a couple of small intramural  fibroids in the mid fundus measuring 8 mm and mid uterine body  measuring 5 mm.     The right ovary measures 3.0 x 1.6 x 1.7 cm. In the left ovary there  is a large cyst with thin septations measuring 8.1 x 5.5 x 3.4 cm.    23 IR biopsy (liver lesion):  Final Diagnosis   LIVER LESION, NEEDLE BIOPSY:   -Positive for malignancy  -Metastatic adenocarcinoma, see comment      Comment      The patient has an imaging impression of a pancreatic mass and suspected  metastatic lesions in the liver.  The morphologic findings in this case are compatible with metastatic adenocarcinoma of pancreatic origin in the absence of any other primary lesion.  Clinical and radiologic correlation is recommended.      ~12,000    5/31/23 MRI (Pelvis)  IMPRESSION:   1. Complex left adnexal cyst has imaging characteristics diagnostic  for ovarian cystadenofibroma measuring 6.2 cm.  2. Right sciatic hernia containing the majority of the right ovary.  3. Incidental periurethral 5 mm cystic focus favored to represent  urethral diverticula.    She has started chemo (Gemzar and Nab-paclitaxel) and reports that she tolerates the chemo in terms of nausea, but has been having worsening low anterior abdominal/perlvic pain.  She has also been having fatigue and has some petechial/urticarial lesions develop on her lower extremities.  Dr. Cruz was inclined to have this addressed.      Review of Systems:    Systemic           no weight changes; no fever; no chills; no night sweats; no appetite changes  Skin           no rashes, or lesions  Eye           no irritation; no changes in vision  Salina-Laryngeal           no dysphagia; no hoarseness   Pulmonary    no cough; no shortness of breath  Cardiovascular    no chest pain; no palpitations  Gastrointestinal    no diarrhea; no constipation; no abdominal pain; no changes in bowel  habits; no blood in stool  Genitourinary   no urinary frequency; no urinary urgency; no dysuria; no pain; no abnormal vaginal discharge; no abnormal vaginal bleeding  Breast   no breast discharge; no breast changes; no breast pain  Musculoskeletal    no myalgias; no arthralgias; no back pain  Psychiatric           no depressed mood; no anxiety    Hematologic           no tender lymph nodes; no noticeable swellings or lumps   Endocrine    no hot flashes; no heat/cold intolerance         Neurological   no tremor; no numbness and tingling; no headaches; no difficulty   sleeping      Past Medical History:    Past Medical History:   Diagnosis Date     Arthritis      Depressive disorder      Malignant neoplasm of pancreas metastatic to liver (H) 6/1/2023     Positive TB test          Past Surgical History:    Past Surgical History:   Procedure Laterality Date     COLONOSCOPY N/A 8/21/2019    Procedure: Colonoscopy, With Polypectomy And Biopsy;  Surgeon: Duane, William Charles, MD;  Location: [facility] OR     COLONOSCOPY WITH CO2 INSUFFLATION N/A 8/21/2019    Procedure: COLONOSCOPY, WITH CO2 INSUFFLATION;  Surgeon: Duane, William Charles, MD;  Location: [facility] OR     INSERT PORT VASCULAR ACCESS Right 6/8/2023    Procedure: Insert port vascular access;  Surgeon: hSen Grande MD;  Location: [facility] OR     IR CHEST PORT PLACEMENT > 5 YRS OF AGE  6/8/2023     IR LIVER BIOPSY PERCUTANEOUS  5/25/2023         Health Maintenance:  Health Maintenance Due   Topic Date Due     Pneumococcal Vaccine: Pediatrics (0 to 5 Years) and At-Risk Patients (6 to 64 Years) (1 - PCV) Never done     YEARLY PREVENTIVE VISIT  05/16/2023       Last Pap Smear: 5/16/22 NILM HPV negative    Last Mammogram: 5/2022 BIRADS 1    Last Colonoscopy: 8/2019 (polyps)                      Current Medications:     has a current medication list which includes the following prescription(s): acetaminophen, azelastine, clotrimazole-betamethasone, diclofenac, diphenhydramine, diphenhydramine, lidocaine-prilocaine, meclizine, prochlorperazine, tramadol, and cholecalciferol.       Allergies:     Patient has no known allergies.        Social History:     Social History     Tobacco Use     Smoking status: Never     Smokeless tobacco: Never   Substance Use Topics     Alcohol use: No       History   Drug Use No           Family History:     The patient's family history is notable for .    Family History   Problem Relation Age of Onset     Colon Cancer Mother      Lung Cancer Father      Lung Cancer Brother      Thyroid Disease Sister   "        Physical Exam:     PS1  VS: /69 (BP Location: Right arm, Patient Position: Sitting, Cuff Size: Adult Regular)   Pulse 95   Temp 98.1  F (36.7  C) (Oral)   Resp 18   Ht 1.564 m (5' 1.58\")   Wt 50.9 kg (112 lb 4.8 oz)   SpO2 98%   BMI 20.82 kg/m       General Appearance: Thin and somewhat frail appearing but alert, no distress     HEENT:  no thyromegaly, no palpable nodules or masses        Cardiovascular: regular rate and rhythm, no gallops, rubs or murmurs     Respiratory: lungs clear, no rales, rhonchi or wheezes, normal diaphragmatic excursion    Musculoskeletal: extremities non tender and without edema    Skin: no lesions or rashes     Neurological: normal gait, no gross defects     Psychiatric: appropriate mood and affect                               Hematological: normal cervical, supraclavicular and inguinal lymph nodes     Gastrointestinal:       abdomen soft, non-tender, non-distended, no organomegaly or masses    Genitourinary: External genitalia and urethral meatus appears normal.  Vagina is smooth without nodularity or masses.  Cervix appears normal and without lesions.  Bimanual exam reveal no masses, nodularity or fullness.  Recto-vaginal exam confirms these findings.      Assessment:    Karina Melvin is a 62 year old woman with a new diagnosis of metastatic adeno CA (favor pancreatic) with associated pelvic mass of undetermined signidicance.     A total of 60 minutes was spent with the patient, 40 minutes of which were spent in counseling the patient and/or treatment planning.      Plan:     1.)   We have discussed the clinical and radiologic findings.  I have instructed the patient that malignancy of the pelvic mass is not excluded, though felt to be less likely.  She is symptomatic from this mass and not from her liver disease.  This may reflect a component of torsion.  We have discussed options and she is prepared for a laparoscopic resection of pelvic mass with possible " open surgery or debulking in the setting of malignancy.  She understands that this could require a midline laparotomy and could involve debulking.  Would defer and extensive debulking in favor of having her pancreatic cancer treated sooner and more aggressively - with possible delayed debulking if indicated.  Will plan for removal of tubes and both ovaries either way.  Will begin the pre-operative processes today.       2.) Genetic risk factors were assessed and the patient does not meet the qualifications for a referral.      3.) Labs and/or tests ordered include:  Pre-op labs.     4.) Health maintenance issues addressed today include none.    5.) Pre-op teaching was completed today.  Risks of surgery were discussed to include: bleeding, transfusion, infection, unintentional injury to surrounding organs/structures.      Thank you for allowing us to participate in the care of your patient.         Sincerely,    Benjamin Camejo MD      Patient Care Team:  Ernestina De Leon APRN CNP as PCP - General (Internal Medicine - Pediatrics)  Ernestina De Leon APRN CNP as Assigned PCP  Esme Bender MD as MD (Dermatology)  Ramiro Camilo DPM as Assigned Surgical Provider  Becky Ramos GC as Genetic Counselor (Genetic Counselor, MS)  Radha Quintana, RN as Specialty Care Coordinator (Hematology & Oncology)  FABIANA MALHOTRA      Port accessed using 20 G 3/4 inch needle.  Labs collected from port.  Line flushed with NS and Heparin.  Pt tolerated procedure.  Port de-accessed per protocol.    Laurita Tabor RN-BSN, PHN, OCN  [facility]      Again, thank you for allowing me to participate in the care of your patient.        Sincerely,        Benjamin Camejo MD

## 2023-06-22 NOTE — PROGRESS NOTES
Consult Notes on Referred Patient         Dr. Nathen Cruz MD  [address] [place] [address]       RE: Karina Melvin  : 1960  YOSELIN: 2023     Dear Dr. Nathen Cruz:    I had the pleasure of seeing your patient Karina Melvin here at the Gynecologic Cancer Clinic at the [facility] on 2023.  As you know she is a very pleasant 62 year old woman with a recent diagnosis of pelvic mass in the setting of pancreatic cancer.  Given these findings she was subsequently sent to the Gynecologic Cancer Clinic for new patient consultation.     HPI - She initially presented recently with abdominal pain, weight loss, and loss of appetite.  THis led ultimately to a CT scan which identified the followin/16/23 CT:  IMPRESSION:   1.  Mass in the pancreatic tail with numerous liver and left adrenal lesions. Findings are highly suspicious for metastatic pancreatic cancer.  2.  Incidental left ovarian cyst. This could be further evaluated with ultrasound, depending on the clinical picture.    23 US  Findings: The uterus measures 5.5 x 2.5 x 4.1cm. The endometrial  stripe measures 2 mm thick. There are a couple of small intramural  fibroids in the mid fundus measuring 8 mm and mid uterine body  measuring 5 mm.     The right ovary measures 3.0 x 1.6 x 1.7 cm. In the left ovary there  is a large cyst with thin septations measuring 8.1 x 5.5 x 3.4 cm.    23 IR biopsy (liver lesion):  Final Diagnosis   LIVER LESION, NEEDLE BIOPSY:   -Positive for malignancy  -Metastatic adenocarcinoma, see comment      Comment      The patient has an imaging impression of a pancreatic mass and suspected metastatic lesions in the liver.  The morphologic findings in this case are compatible with metastatic adenocarcinoma of pancreatic origin in the absence of any other primary lesion.  Clinical and radiologic correlation is recommended.      ~12,000    23 MRI  (Pelvis)  IMPRESSION:   1. Complex left adnexal cyst has imaging characteristics diagnostic  for ovarian cystadenofibroma measuring 6.2 cm.  2. Right sciatic hernia containing the majority of the right ovary.  3. Incidental periurethral 5 mm cystic focus favored to represent  urethral diverticula.    She has started chemo (Gemzar and Nab-paclitaxel) and reports that she tolerates the chemo in terms of nausea, but has been having worsening low anterior abdominal/perlvic pain.  She has also been having fatigue and has some petechial/urticarial lesions develop on her lower extremities.  Dr. Cruz was inclined to have this addressed.      Review of Systems:    Systemic           no weight changes; no fever; no chills; no night sweats; no appetite changes  Skin           no rashes, or lesions  Eye           no irritation; no changes in vision  Salina-Laryngeal           no dysphagia; no hoarseness   Pulmonary    no cough; no shortness of breath  Cardiovascular    no chest pain; no palpitations  Gastrointestinal    no diarrhea; no constipation; no abdominal pain; no changes in bowel  habits; no blood in stool  Genitourinary   no urinary frequency; no urinary urgency; no dysuria; no pain; no abnormal vaginal discharge; no abnormal vaginal bleeding  Breast   no breast discharge; no breast changes; no breast pain  Musculoskeletal    no myalgias; no arthralgias; no back pain  Psychiatric           no depressed mood; no anxiety    Hematologic           no tender lymph nodes; no noticeable swellings or lumps   Endocrine    no hot flashes; no heat/cold intolerance         Neurological   no tremor; no numbness and tingling; no headaches; no difficulty  sleeping      Past Medical History:    Past Medical History:   Diagnosis Date     Arthritis      Depressive disorder      Malignant neoplasm of pancreas metastatic to liver (H) 6/1/2023     Positive TB test          Past Surgical History:    Past Surgical History:   Procedure  "Laterality Date     COLONOSCOPY N/A 8/21/2019    Procedure: Colonoscopy, With Polypectomy And Biopsy;  Surgeon: Duane, William Charles, MD;  Location: [facility] OR     COLONOSCOPY WITH CO2 INSUFFLATION N/A 8/21/2019    Procedure: COLONOSCOPY, WITH CO2 INSUFFLATION;  Surgeon: Duane, William Charles, MD;  Location: [facility] OR     INSERT PORT VASCULAR ACCESS Right 6/8/2023    Procedure: Insert port vascular access;  Surgeon: Shen Grande MD;  Location: [facility] OR     IR CHEST PORT PLACEMENT > 5 YRS OF AGE  6/8/2023     IR LIVER BIOPSY PERCUTANEOUS  5/25/2023         Health Maintenance:  Health Maintenance Due   Topic Date Due     Pneumococcal Vaccine: Pediatrics (0 to 5 Years) and At-Risk Patients (6 to 64 Years) (1 - PCV) Never done     YEARLY PREVENTIVE VISIT  05/16/2023       Last Pap Smear: 5/16/22 NILM HPV negative    Last Mammogram: 5/2022 BIRADS 1    Last Colonoscopy: 8/2019 (polyps)                      Current Medications:     has a current medication list which includes the following prescription(s): acetaminophen, azelastine, clotrimazole-betamethasone, diclofenac, diphenhydramine, diphenhydramine, lidocaine-prilocaine, meclizine, prochlorperazine, tramadol, and cholecalciferol.       Allergies:     Patient has no known allergies.        Social History:     Social History     Tobacco Use     Smoking status: Never     Smokeless tobacco: Never   Substance Use Topics     Alcohol use: No       History   Drug Use No           Family History:     The patient's family history is notable for .    Family History   Problem Relation Age of Onset     Colon Cancer Mother      Lung Cancer Father      Lung Cancer Brother      Thyroid Disease Sister          Physical Exam:     PS1  VS: /69 (BP Location: Right arm, Patient Position: Sitting, Cuff Size: Adult Regular)   Pulse 95   Temp 98.1  F (36.7  C) (Oral)   Resp 18   Ht 1.564 m (5' 1.58\")   Wt 50.9 kg (112 lb 4.8 oz)   SpO2 98%   BMI 20.82 kg/m       General " Appearance: Thin and somewhat frail appearing but alert, no distress     HEENT:  no thyromegaly, no palpable nodules or masses        Cardiovascular: regular rate and rhythm, no gallops, rubs or murmurs     Respiratory: lungs clear, no rales, rhonchi or wheezes, normal diaphragmatic excursion    Musculoskeletal: extremities non tender and without edema    Skin: no lesions or rashes     Neurological: normal gait, no gross defects     Psychiatric: appropriate mood and affect                               Hematological: normal cervical, supraclavicular and inguinal lymph nodes     Gastrointestinal:       abdomen soft, non-tender, non-distended, no organomegaly or masses    Genitourinary: External genitalia and urethral meatus appears normal.  Vagina is smooth without nodularity or masses.  Cervix appears normal and without lesions.  Bimanual exam reveal no masses, nodularity or fullness.  Recto-vaginal exam confirms these findings.      Assessment:    Karina Melvin is a 62 year old woman with a new diagnosis of metastatic adeno CA (favor pancreatic) with associated pelvic mass of undetermined signidicance.     A total of 60 minutes was spent with the patient, 40 minutes of which were spent in counseling the patient and/or treatment planning.      Plan:     1.)   We have discussed the clinical and radiologic findings.  I have instructed the patient that malignancy of the pelvic mass is not excluded, though felt to be less likely.  She is symptomatic from this mass and not from her liver disease.  This may reflect a component of torsion.  We have discussed options and she is prepared for a laparoscopic resection of pelvic mass with possible open surgery or debulking in the setting of malignancy.  She understands that this could require a midline laparotomy and could involve debulking.  Would defer and extensive debulking in favor of having her pancreatic cancer treated sooner and more aggressively - with possible  delayed debulking if indicated.  Will plan for removal of tubes and both ovaries either way.  Will begin the pre-operative processes today.       2.) Genetic risk factors were assessed and the patient does not meet the qualifications for a referral.      3.) Labs and/or tests ordered include:  Pre-op labs.     4.) Health maintenance issues addressed today include none.    5.) Pre-op teaching was completed today.  Risks of surgery were discussed to include: bleeding, transfusion, infection, unintentional injury to surrounding organs/structures.      Thank you for allowing us to participate in the care of your patient.         Sincerely,    Benjamin Camejo MD    CC  Patient Care Team:  Ernestina De Leon APRN CNP as PCP - General (Internal Medicine - Pediatrics)  Ernestina De Leon APRN CNP as Assigned PCP  Esme Bender MD as MD (Dermatology)  Ramiro Camilo DPM as Assigned Surgical Provider  Becky Ramos GC as Genetic Counselor (Genetic Counselor, MS)  Radha Quintana, RN as Specialty Care Coordinator (Hematology & Oncology)  FABIANA MALHOTRA

## 2023-06-22 NOTE — NURSING NOTE
"Oncology Rooming Note    June 22, 2023 2:43 PM   Karina Melvin is a 62 year old female who presents for:    Chief Complaint   Patient presents with     Oncology Clinic Visit     New patient: Ovarian cyst adenofibroma      Initial Vitals: /69 (BP Location: Right arm, Patient Position: Sitting, Cuff Size: Adult Regular)   Pulse 95   Temp 98.1  F (36.7  C) (Oral)   Resp 18   Ht 1.564 m (5' 1.58\")   Wt 50.9 kg (112 lb 4.8 oz)   SpO2 98%   BMI 20.82 kg/m   Estimated body mass index is 20.82 kg/m  as calculated from the following:    Height as of this encounter: 1.564 m (5' 1.58\").    Weight as of this encounter: 50.9 kg (112 lb 4.8 oz). Body surface area is 1.49 meters squared.  Severe Pain (6) Comment: Data Unavailable   No LMP recorded. Patient is postmenopausal.  Allergies reviewed: Yes  Medications reviewed: Yes    Medications: Medication refills not needed today.  Pharmacy name entered into [facility]:    [facility] - [place], [place] - [address], [address]  [id number] [address] AT [facility] [address]    Clinical concerns: Please discuss abdominal pain, constant nausea, weight loss, and decreased appetite.        Caren Richardson LPN June 22, 2023 2:43 PM              "

## 2023-06-23 NOTE — TELEPHONE ENCOUNTER
Patient is schedule surgery with: Dr. Camejo     Surgery Date: 6/27/23     Location: [facility]    H&P: already completed by surgeon on 6/22/23     Post-op: will be scheduled by the clinic    Patient will receive a phone call from pre-admission nurses 1-2 days prior to surgery with arrival and start time.    Patient aware times are subject to change up until day before surgery.     Patient questions/concerns: N/A     Surgery packet was provided by the RNCC during appointment      Lisa King on 6/23/2023 at 8:42 AM

## 2023-06-27 PROBLEM — N83.8 OVARIAN MASS: Status: ACTIVE | Noted: 2023-01-01

## 2023-06-27 NOTE — PROGRESS NOTES
Gynecology Oncology Progress Note  06/28/23    POD# 1 s/p laparoscopic BSO    Disease: Large ovarian cyst, likely cystadenofibroma, in setting of known metastatic pancreatic cancer    24 hour events: Surgery    Subjective: Patient is doing well this morning.  Pain is well controlled on oral pain meds. Was feeling nauseous yesterday so has only had a few bites of food but tolerated this without emesis. Passing flatus, voiding spontaneously. Ambulating minimally to commode. Denies chest pain, SOB, nausea/vomiting, fevers/chills, dizziness.    Objective:   Patient Vitals for the past 24 hrs:   BP Temp Temp src Pulse Resp SpO2   06/28/23 0300 120/75 98  F (36.7  C) Oral 60 18 96 %   06/27/23 2304 112/70 97.2  F (36.2  C) Temporal 57 16 95 %   06/27/23 1808 112/68 -- -- 65 13 94 %   06/27/23 1727 126/75 -- -- 64 16 96 %   06/27/23 1616 136/83 -- -- 59 16 93 %   06/27/23 1542 (!) 140/60 -- -- 57 18 95 %   06/27/23 1300 109/78 -- -- 58 -- 94 %   06/27/23 1237 -- -- -- -- -- 95 %   06/27/23 1230 119/83 -- -- 57 12 92 %   06/27/23 1200 107/77 -- -- 59 12 95 %   06/27/23 1130 122/81 98.1  F (36.7  C) Oral 59 12 98 %   06/27/23 1115 (!) 122/95 -- -- 65 13 97 %   06/27/23 1112 -- -- -- 58 13 97 %   06/27/23 1100 123/74 -- -- 60 10 95 %   06/27/23 1048 -- -- -- 60 10 95 %   06/27/23 1045 123/84 -- -- 61 12 96 %   06/27/23 1035 -- -- -- 58 12 99 %   06/27/23 1030 124/84 -- -- 61 10 97 %   06/27/23 1023 -- -- -- 60 11 97 %   06/27/23 1015 132/84 -- -- 61 12 96 %   06/27/23 1014 -- -- -- 60 10 96 %   06/27/23 1000 (!) 131/91 -- -- 68 11 95 %   06/27/23 0947 136/87 -- -- 71 11 98 %   06/27/23 0945 136/87 -- -- 68 12 95 %   06/27/23 0937 -- -- -- 66 12 100 %   06/27/23 0930 (!) 142/87 -- -- 67 10 100 %   06/27/23 0926 (!) 132/90 98.8  F (37.1  C) -- 65 10 100 %   06/27/23 0620 (!) 130/95 98.1  F (36.7  C) Oral 66 16 99 %       General: NAD, appears comfortable in bed  CV: Regular rate, well perfused  Resp: Nonlabored  breathing  Abdomen: soft, appropriately tender, nondistended  Incision: c/d/i, covered in sterile dressing  Extremities: warm, well-perfused, nontender, no edema, SCDs in place    I/O last 3 completed shifts:  In: 907 [P.O.:100; I.V.:807]  Out: 835 [Urine:825; Blood:10]    Lines/Drains:     New labs/imaging-  None planned for this AM    Assessment: 62 year old POD#1 s/p laparoscopic BSO. Doing well in postoperative period.    Plan:   Disease: Large ovarian cyst c/f ovarian cystadenofibroma on MRI, in setting of pancreatic cancer with liver metastasis on Gemzar + nab-paclitaxel.  198.  FEN: Advance diet as tolerated.  Pain: Tylenol, ibuprofen, home tramadol  Heme: Hgb 11.7> EBL 10  CV: NI  Resp: NI  GI: Bowel regimen  : s/p whitfield, voiding spontaneously  Neuro/Psych/MSK: Arthritis, PTA diclofenac  Endocrine: Pancreatic cancer  ID: s/p pre-operative gent/clinda  PPx: IS, SCDs, ambulation    Dispo: to home today    Stephen Alvarez MD  OB/GYN PGY-2  06/28/2023 6:11 AM     Provider Disclosure:   I agree with above History, Review of Systems, Physical exam and Plan. I have reviewed the content of the documentation and have edited it as needed. I have seen and personally performed the services documented here and the documentation accurately represents those services and the decisions I have made.     Electronically signed by:   Hermann Dunn MD   Gynecologic Oncology   [facility]

## 2023-06-27 NOTE — ANESTHESIA POSTPROCEDURE EVALUATION
Patient: Karina Melvin    Procedure: Procedure(s):  Laparoscopy, Removal of both tubes and ovaries Possible open suregry       Anesthesia Type:  General    Note:  Disposition: Admission   Postop Pain Control: Uneventful            Sign Out: Well controlled pain   PONV: Yes            Symptoms: Nausea only            Sign Out: PONV/POV resolved with treatment   Neuro/Psych: Uneventful            Sign Out: Acceptable/Baseline neuro status   Airway/Respiratory: Uneventful            Sign Out: Acceptable/Baseline resp. status   CV/Hemodynamics: Uneventful            Sign Out: Acceptable CV status; No obvious hypovolemia; No obvious fluid overload   Other NRE: NONE   DID A NON-ROUTINE EVENT OCCUR? No           Last vitals:  Vitals Value Taken Time   /77 06/27/23 1200   Temp 36.7  C (98.1  F) 06/27/23 1130   Pulse 59 06/27/23 1200   Resp 12 06/27/23 1200   SpO2 95 % 06/27/23 1210   Vitals shown include unvalidated device data.    Electronically Signed By: Prabhakar Lara MD  June 27, 2023  12:11 PM

## 2023-06-27 NOTE — ANESTHESIA PREPROCEDURE EVALUATION
Anesthesia Pre-Procedure Evaluation    Patient: Karina Melvin   MRN: [id number] : 1960        Procedure : Procedure(s):  Laparoscopy, Removal of both tubes and ovaries Possible open suregry  possible open          Past Medical History:   Diagnosis Date     Arthritis      Depressive disorder      Malignant neoplasm of pancreas metastatic to liver (H) 2023     Positive TB test       Past Surgical History:   Procedure Laterality Date     COLONOSCOPY N/A 2019    Procedure: Colonoscopy, With Polypectomy And Biopsy;  Surgeon: Duane, William Charles, MD;  Location: [facility] OR     COLONOSCOPY WITH CO2 INSUFFLATION N/A 2019    Procedure: COLONOSCOPY, WITH CO2 INSUFFLATION;  Surgeon: Duane, William Charles, MD;  Location: [facility] OR     INSERT PORT VASCULAR ACCESS Right 2023    Procedure: Insert port vascular access;  Surgeon: Shen Grande MD;  Location: [facility] OR     IR CHEST PORT PLACEMENT > 5 YRS OF AGE  2023     IR LIVER BIOPSY PERCUTANEOUS  2023      No Known Allergies   Social History     Tobacco Use     Smoking status: Never     Smokeless tobacco: Never   Substance Use Topics     Alcohol use: No      Wt Readings from Last 1 Encounters:   23 51.4 kg (113 lb 5.1 oz)        Anesthesia Evaluation   Pt has had prior anesthetic. Type: MAC.    No history of anesthetic complications       ROS/MED HX  ENT/Pulmonary:  - neg pulmonary ROS     Neurologic:  - neg neurologic ROS     Cardiovascular:     (+) -----Previous cardiac testing   Echo: Date: Results:    Stress Test: Date: Results:  Normal EF, no inducible ischemia  ECG Reviewed: Date: Results:    Cath: Date: Results:      METS/Exercise Tolerance:     Hematologic:  - neg hematologic  ROS     Musculoskeletal:   (+) arthritis,     GI/Hepatic: Comment: Metastatic pancreatic cancer      Renal/Genitourinary:  - neg Renal ROS     Endo: Comment: Ovarian cyst      Psychiatric/Substance Use:  - neg psychiatric ROS   (+) psychiatric history  anxiety and depression     Infectious Disease:       Malignancy:   (+) Malignancy, History of Other.Other CA pancreatic Active status post Chemo.    Other:  - neg other ROS          Physical Exam    Airway        Mallampati: II   TM distance: > 3 FB   Neck ROM: full   Mouth opening: > 3 cm    Respiratory Devices and Support         Dental       (+) Completely normal teeth      Cardiovascular          Rhythm and rate: regular and normal     Pulmonary           breath sounds clear to auscultation           OUTSIDE LABS:  CBC:   Lab Results   Component Value Date    WBC 2.5 (L) 06/22/2023    WBC 5.5 06/16/2023    HGB 11.7 06/22/2023    HGB 12.1 06/16/2023    HCT 35.0 06/22/2023    HCT 36.5 06/16/2023     06/22/2023     06/16/2023     BMP:   Lab Results   Component Value Date     (L) 06/22/2023     (L) 06/09/2023    POTASSIUM 3.8 06/22/2023    POTASSIUM 4.2 06/09/2023    CHLORIDE 100 06/22/2023    CHLORIDE 94 (L) 06/09/2023    CO2 26 06/22/2023    CO2 27 06/09/2023    BUN 11.0 06/22/2023    BUN 13.8 06/09/2023    CR 0.51 06/22/2023    CR 0.54 06/09/2023    GLC 94 06/27/2023     (H) 06/22/2023     COAGS:   Lab Results   Component Value Date    PTT 28 05/25/2023    INR 0.95 05/25/2023     POC: No results found for: BGM, HCG, HCGS  HEPATIC:   Lab Results   Component Value Date    ALBUMIN 3.9 06/22/2023    PROTTOTAL 6.8 06/22/2023    ALT 26 06/22/2023    AST 35 06/22/2023    ALKPHOS 295 (H) 06/22/2023    BILITOTAL 0.3 06/22/2023     OTHER:   Lab Results   Component Value Date    IVAN 9.0 06/22/2023    LIPASE 314 05/09/2023    TSH 0.51 04/26/2023    T4 1.03 04/26/2023    CRP 3.0 05/04/2023    SED 18 05/04/2023       Anesthesia Plan    ASA Status:  4      Anesthesia Type: General.     - Airway: ETT   Induction: Intravenous.   Maintenance: Balanced.   Techniques and Equipment:     - Lines/Monitors: 2nd IV     Consents    Anesthesia Plan(s) and associated risks, benefits, and realistic  alternatives discussed. Questions answered and patient/representative(s) expressed understanding.    - Discussed:     - Discussed with:  Patient      - Extended Intubation/Ventilatory Support Discussed: No.      - Patient is DNR/DNI Status: No    Use of blood products discussed: Yes.     - Discussed with: Patient.     - Consented: consented to blood products            Reason for refusal: other.     Postoperative Care    Pain management: IV analgesics, Oral pain medications.   PONV prophylaxis: Ondansetron (or other 5HT-3), Dexamethasone or Solumedrol, Background Propofol Infusion     Comments:    Other Comments: Type and screen ordered            Prabhakar Lara MD

## 2023-06-27 NOTE — PROGRESS NOTES
Gynecologic Oncology Postoperative Check Note  6/27/2023    S: Patient reports she is doing well postoperatively. Feeling a bit nauseous right now but just took compazine. Pain is okay right now but concerned she might not be able to keep oral pain medications down. Has not yet tried to ambulated. Has not yet voided, but does feel urge and would like to go now. Tolerating water without nausea or vomiting. Denies chest pain, shortness of breath, dizziness, or other concerns at this time.     O:  Vitals:    06/27/23 1230 06/27/23 1237 06/27/23 1300 06/27/23 1542   BP: 119/83  109/78 (!) 140/60   BP Location:    Right arm   Pulse: 57  58 57   Resp: 12   18   Temp:       TempSrc:       SpO2: 92% 95% 94% 95%   Weight:       Height:           Gen: NAD  Cardio: RRR, S1/S2, no murmurs  Resp: CTAB, no wheezing or crackles  Abdomen: soft, appropriately tender, non-distedned, 3 laparoscopic incisions covered in sterile bandages  Extremities: Non-tender, no edema    Assessment: 62 year old POD#0 s/p laparoscopic BSO. Doing well in early postoperative period. Needs to void, will try now. Will give one-time dose of toradol while patient nauseous.    Plan:    Disease: Large ovarian cyst c/f ovarian cystadenofibroma on MRI, in setting of pancreatic cancer with liver metastasis on Gemzar + nab-paclitaxel.  198.  FEN: Advance diet as tolerated.  Pain: Tylenol, ibuprofen, prn tramadol  Heme: Hgb 11.7  CV: NI  Resp: NI  GI: Bowel regimen  : s/p whitfield, awaiting void  Neuro/Psych/MSK: Arthritis, PTA diclofenac  Endocrine: Pancreatic cancer  ID: s/p pre-operative gent/clinda  PPx: IS, SCDs, ambulation      Dispo: Outpatient in a bed overnight, as patient reports she does not have anyone to stay with her for first 24h after surgery. Then anticipate dispo home. Post-op visit with Dr. Camejo 7/13    Stephen Alvarez MD  OB/GYN PGY-2  06/27/2023 3:59 PM    Addendum 1748: Per chart review, patient successfully voided.

## 2023-06-27 NOTE — ANESTHESIA CARE TRANSFER NOTE
Patient: Karina Melvin    Procedure: Procedure(s):  Laparoscopy, Removal of both tubes and ovaries Possible open suregry       Diagnosis: Malignant neoplasm of pancreas metastatic to liver (H) [C25.9, C78.7]  Ovarian mass [N83.8]  Diagnosis Additional Information: No value filed.    Anesthesia Type:   General     Note:    Oropharynx: oropharynx clear of all foreign objects and spontaneously breathing  Level of Consciousness: awake  Oxygen Supplementation: nasal cannula  Level of Supplemental Oxygen (L/min / FiO2): 2  Independent Airway: airway patency satisfactory and stable  Dentition: dentition unchanged  Vital Signs Stable: post-procedure vital signs reviewed and stable  Report to RN Given: handoff report given  Patient transferred to: PACU    Handoff Report: Identifed the Patient, Identified the Reponsible Provider, Reviewed the pertinent medical history, Discussed the surgical course, Reviewed Intra-OP anesthesia mangement and issues during anesthesia, Set expectations for post-procedure period and Allowed opportunity for questions and acknowledgement of understanding      Vitals:  Vitals Value Taken Time   /87 06/27/23 0930   Temp 37.1  C (98.8  F) 06/27/23 0926   Pulse 64 06/27/23 0932   Resp 11 06/27/23 0932   SpO2 100 % 06/27/23 0932   Vitals shown include unvalidated device data.    Electronically Signed By: VINCENZO Powers CRNA  June 27, 2023  9:33 AM

## 2023-06-27 NOTE — BRIEF OP NOTE
[facility]    Brief Operative Note    Pre-operative diagnosis: Malignant neoplasm of pancreas metastatic to liver (H) [C25.9, C78.7]  Ovarian mass [N83.8]  Post-operative diagnosis Same as pre-operative diagnosis    Procedure: Procedure(s):  Laparoscopy, Removal of both tubes and ovaries Possible open suregry  Surgeon: Surgeon(s) and Role:     * Benjamin Camejo MD - Primary     * Stephen Alvarez MD - Resident - Assisting  Anesthesia: General   Estimated Blood Loss: 10 mL  IV Fluids: 700 mL crystalloid  Urine: 150 mL clear fluid    Drains: None  Specimens:   ID Type Source Tests Collected by Time Destination   1 : Pelvic washings Washings Pelvis NON-GYNECOLOGIC CYTOLOGY Benjamin Camejo MD 6/27/2023  8:35 AM    2 : right fallopian tube and ovary Tissue Ovary and Fallopian Tube, Right SURGICAL PATHOLOGY EXAM Benjamin Camejo MD 6/27/2023  9:00 AM    3 : left tube and ovary Tissue Ovary and Fallopian Tube, Left SURGICAL PATHOLOGY EXAM Benjamin Camejo MD 6/27/2023  9:01 AM      Findings:   On laparoscopy, no evidence of trauma on entry. Liver with several small areas of tissue implants consistent with tumor. Normal gallbladder. Small anteverted uterus. Left ovarian mass, ~7 cm with cystic and fibrous components. Normal appearing left fallopian tube. Normal appearing right tube and ovary..  Complications: None.  Implants: * No implants in log *      Stephen Alvarez MD  OB/GYN PGY-2  06/27/2023 9:26 AM

## 2023-06-27 NOTE — ANESTHESIA PROCEDURE NOTES
Airway       Patient location during procedure: OR       Procedure Start/Stop Times: 6/27/2023 8:05 AM  Staff -        CRNA: Mayelin Whitley APRN CRNA       Performed By: CRNA  Consent for Airway        Urgency: elective  Indications and Patient Condition       Indications for airway management: sergey-procedural       Induction type:intravenous       Mask difficulty assessment: 1 - vent by mask    Final Airway Details       Final airway type: endotracheal airway       Successful airway: ETT - single and Oral  Endotracheal Airway Details        ETT size (mm): 7.0       Cuffed: yes       Successful intubation technique: direct laryngoscopy       DL Blade Type: [place] 2       Grade View of Cords: 2       Adjucts: stylet       Position: Right       Measured from: gums/teeth       Secured at (cm): 21    Post intubation assessment        Placement verified by: capnometry, equal breath sounds and chest rise        Number of attempts at approach: 1       Secured with: silk tape       Ease of procedure: easy       Dentition: Intact and Unchanged    Medication(s) Administered   Medication Administration Time: 6/27/2023 8:05 AM

## 2023-06-27 NOTE — CARE PLAN
POD 0 s/p Laparoscopic removal of tubes and ovaries. Pt was admitted to  at 1455. VSS. Two RN skin assessment completed. No skin issues found. Lap sites x3. Chest port. L PIV. Rated pain 5/10 and reports nausea.  Will cont. POC.

## 2023-06-27 NOTE — PHARMACY-ADMISSION MEDICATION HISTORY
Pharmacist Admission Medication History    Admission medication history is complete. The information provided in this note is only as accurate as the sources available at the time of the update.    Medication reconciliation/reorder completed by provider prior to medication history? Yes    Information Source(s): Pre-op RN assessment, Dispense history      Medication History Completed By: Concepción Salazar [facility] 6/27/2023 3:39 PM    Prior to Admission medications    Medication Sig Last Dose Taking? Auth Provider Long Term End Date   acetaminophen (TYLENOL) 325 MG tablet Take 2 tablets (650 mg) by mouth every 4 hours as needed for other (mild pain)  Yes Stephen Alvarez MD No    azelastine (ASTELIN) 0.1 % nasal spray Spray 1 spray into both nostrils 2 times daily More than a month Yes Ernestina De Leon APRN CNP     diclofenac (VOLTAREN) 1 % topical gel Apply 4 g topically 4 times daily More than a month Yes Ernestina De eLon APRN CNP     diphenhydrAMINE (BENADRYL) 2 % external cream Apply topically 3 times daily as needed for itching More than a month Yes Natacha Rey,      diphenhydrAMINE (BENADRYL) 25 MG capsule Take 25 mg by mouth every 6 hours as needed for itching or allergies More than a month Yes Reported, Patient     ibuprofen (ADVIL/MOTRIN) 600 MG tablet Take 1 tablet (600 mg) by mouth every 6 hours as needed for mild pain  Yes Stephen Alvarez MD     lidocaine-prilocaine (EMLA) 2.5-2.5 % external cream Apply topically as needed for moderate pain More than a month Yes Nathen Cruz MD Yes    meclizine (ANTIVERT) 25 MG tablet Take 1 tablet (25 mg) by mouth 3 times daily as needed for dizziness More than a month Yes Ernestina De Leon APRN CNP     prochlorperazine (COMPAZINE) 10 MG tablet Take 1 tablet (10 mg) by mouth every 6 hours as needed for nausea or vomiting More than a month Yes Nathen Cruz MD     senna-docusate (SENOKOT-S/PERICOLACE) 8.6-50 MG tablet Take 1-2 tablets by mouth 2 times  daily Take while on oral narcotics to prevent or treat constipation.  Yes Stephen Alvarez MD     traMADol (ULTRAM) 50 MG tablet Take 1 tablet (50 mg) by mouth every 6 hours as needed for severe pain 6/26/2023 Yes Nathen Cruz MD     VITAMIN D, CHOLECALCIFEROL, PO Take 5,000 Units by mouth daily More than a month Yes Reported, Patient

## 2023-06-27 NOTE — DISCHARGE SUMMARY
Gynecologic Oncology Discharge Summary    Karina Mevlin  [id number]    Admit Date: 6/27/2023  Discharge Date: 6/28/2023  Admitting Provider: Hermann Dunn MD  Discharge Provider: Hermann Dunn MD    Admission Dx:   - Ovarian mass  - Metastatic adenocarcinoma of pancreatic origin  - s/p 2C Gemzar + nab-paclitaxel  - Arthritis  - MDD    Discharge Dx:  - Ovarian mass, likely cystadenofibroma  - Metastatic adenocarcinoma of pancreatic origin  - s/p 2C Gemzar + nab-paclitaxel  - Arthritis  - MDD    Patient Active Problem List   Diagnosis     DDD (degenerative disc disease), cervical     Neck pain on left side     CARDIOVASCULAR SCREENING; LDL GOAL LESS THAN 160     Adjustment disorder with mixed anxiety and depressed mood     Family history of thyroid disease     Intertrigo     Tendinopathy of rotator cuff, right     Chronic pain of both shoulders     Internal hemorrhoid, bleeding     Chronic right shoulder pain     Sleeping difficulty     Tendonitis of ankle or foot     Left foot pain     Positive TB test     Malignant neoplasm of pancreas metastatic to liver (H)     Ovarian mass       Procedures:   - Laparoscopic bilateral salpingo-oophorectomy    Prior to Admission Medications:  Medications Prior to Admission   Medication Sig Dispense Refill Last Dose     azelastine (ASTELIN) 0.1 % nasal spray Spray 1 spray into both nostrils 2 times daily 30 mL 3 More than a month     diclofenac (VOLTAREN) 1 % topical gel Apply 4 g topically 4 times daily 100 g 1 More than a month     diphenhydrAMINE (BENADRYL) 2 % external cream Apply topically 3 times daily as needed for itching 56.8 g 1 More than a month     diphenhydrAMINE (BENADRYL) 25 MG capsule Take 25 mg by mouth every 6 hours as needed for itching or allergies   More than a month     lidocaine-prilocaine (EMLA) 2.5-2.5 % external cream Apply topically as needed for moderate pain 30 g 3 More than a month     meclizine (ANTIVERT) 25 MG tablet Take 1 tablet (25 mg)  by mouth 3 times daily as needed for dizziness 30 tablet 1 More than a month     prochlorperazine (COMPAZINE) 10 MG tablet Take 1 tablet (10 mg) by mouth every 6 hours as needed for nausea or vomiting 30 tablet 2 More than a month     traMADol (ULTRAM) 50 MG tablet Take 1 tablet (50 mg) by mouth every 6 hours as needed for severe pain 60 tablet 0 6/26/2023     VITAMIN D, CHOLECALCIFEROL, PO Take 5,000 Units by mouth daily   More than a month     [DISCONTINUED] acetaminophen (TYLENOL) 500 MG tablet Take 500-1,000 mg by mouth every 6 hours as needed for mild pain   More than a month       Discharge Medications:     Review of your medicines      START taking      Dose / Directions   ibuprofen 600 MG tablet  Commonly known as: ADVIL/MOTRIN  Used for: S/P bilateral salpingo-oophorectomy      Dose: 600 mg  Take 1 tablet (600 mg) by mouth every 6 hours as needed for mild pain  Quantity: 30 tablet  Refills: 0     senna-docusate 8.6-50 MG tablet  Commonly known as: SENOKOT-S/PERICOLACE  Used for: S/P bilateral salpingo-oophorectomy      Dose: 1-2 tablet  Take 1-2 tablets by mouth 2 times daily Take while on oral narcotics to prevent or treat constipation.  Quantity: 30 tablet  Refills: 0        CONTINUE these medicines which may have CHANGED, or have new prescriptions. If we are uncertain of the size of tablets/capsules you have at home, strength may be listed as something that might have changed.      Dose / Directions   acetaminophen 325 MG tablet  Commonly known as: TYLENOL  This may have changed:     medication strength    how much to take    when to take this    reasons to take this  Used for: S/P bilateral salpingo-oophorectomy      Dose: 650 mg  Take 2 tablets (650 mg) by mouth every 4 hours as needed for other (mild pain)  Quantity: 100 tablet  Refills: 0        CONTINUE these medicines which have NOT CHANGED      Dose / Directions   azelastine 0.1 % nasal spray  Commonly known as: ASTELIN  Used for: Seasonal  allergic rhinitis, unspecified trigger      Dose: 1 spray  Spray 1 spray into both nostrils 2 times daily  Quantity: 30 mL  Refills: 3     diclofenac 1 % topical gel  Commonly known as: VOLTAREN  Used for: Pain in both knees, unspecified chronicity      Dose: 4 g  Apply 4 g topically 4 times daily  Quantity: 100 g  Refills: 1     diphenhydrAMINE 2 % external cream  Commonly known as: BENADRYL  Used for: Malignant neoplasm of pancreas metastatic to liver (H)      Apply topically 3 times daily as needed for itching  Quantity: 56.8 g  Refills: 1     diphenhydrAMINE 25 MG capsule  Commonly known as: BENADRYL  Indication: Acute Urticaria      Dose: 25 mg  Take 25 mg by mouth every 6 hours as needed for itching or allergies  Refills: 0     lidocaine-prilocaine 2.5-2.5 % external cream  Commonly known as: EMLA  Used for: Malignant neoplasm of pancreas metastatic to liver (H)      Apply topically as needed for moderate pain  Quantity: 30 g  Refills: 3     meclizine 25 MG tablet  Commonly known as: ANTIVERT  Used for: Dizziness, Benign paroxysmal positional vertigo, unspecified laterality      Dose: 25 mg  Take 1 tablet (25 mg) by mouth 3 times daily as needed for dizziness  Quantity: 30 tablet  Refills: 1     prochlorperazine 10 MG tablet  Commonly known as: COMPAZINE  Used for: Malignant neoplasm of pancreas metastatic to liver (H)      Dose: 10 mg  Take 1 tablet (10 mg) by mouth every 6 hours as needed for nausea or vomiting  Quantity: 30 tablet  Refills: 2     traMADol 50 MG tablet  Commonly known as: ULTRAM  Used for: Malignant neoplasm of pancreas metastatic to liver (H)      Dose: 50 mg  Take 1 tablet (50 mg) by mouth every 6 hours as needed for severe pain  Quantity: 60 tablet  Refills: 0     VITAMIN D (CHOLECALCIFEROL) PO      Dose: 5,000 Units  Take 5,000 Units by mouth daily  Refills: 0           Where to get your medicines      These medications were sent to [facility] - [place], [place] -  [address]  [address] [address]    Phone: [phone]     acetaminophen 325 MG tablet    ibuprofen 600 MG tablet    senna-docusate 8.6-50 MG tablet         Consultations: None    Brief History of Illness:  62 year old female with metastatic pancreatic cancer who had pelvic pain. Pelvic ultrasound demonstrated 8.1cm L ovarian cyst, pelvic MRI was recommended. MRI demonstrated complex left adnexal cyst with findings most consistent with ovarian cystadenofibroma. She takes tramadol at home for the pelvic pain. Laparoscopic BSO was recommended.    Hospital Course:  Dz:   - Preoperative diagnosis was likely ovarian cystadenofibroma based on MRI findings. Final pathology is pending at the time of discharge. She will follow-up postoperatively for a care plan.  FEN:   - She was advanced to a regular diet following her surgery. By discharge, she was tolerating a regular diet without nausea and vomiting and able to maintain her hydration without IVF supplementation.  Pain:   - Her pain was initially controlled on IV pain medications in PACU. Once tolerating PO intake, she was transitioned to a PO pain regimen.  Her pain was well controlled on this and she was discharged home with these medications.  CV:   - She has no history of CV issues. Her vital signs were stable while in house with some mild hypertension and she had no acute CV issues.  PULM:   - She has no history of pulmonary issues.  She was initially given O2 supplementation in to maintain her O2 sats in the immediate postop period and was transitioned off of this without difficulty.  By discharge, her O2 sats were greater than 94% on RA.  She was encouraged to use her bedside IS while in house.  She had no acute pulmonary issues while in house.  HEME:   - She had no acute heme issues while in house.  GI:  - She was made NPO prior to the procedure.  On POD#0, her diet was advanced slowly as tolerated.  At the time of discharge, she was  tolerating a regular diet without nausea and vomiting.  She was passing flatus prior to discharge. She had no acute GI issues while in house.  :    - A whitfield catheter was placed at the time of the surgery and removed in the OR following her procedure. Prior to discharge, the patient was voiding spontaneously without difficulty.  She had no acute  issues while in house.  ID:   - The patient was AF during her hospitalization.  She received standard preoperative antibiotics without incident.  ENDO:   - No issues  PSYCH/NEURO/MSK:   - Her home diclofenac was continued for her arthritis. She does not take any medications for her MDD.  PPX:    - She was given SCDs and IS during her hospital course. She tolerated these prophylactic interventions without incident.  They were discontinued at the time of her discharge.      Discharge Instructions and Follow up:  Ms. Karina Melvin was discharged from the hospital with follow up for further care.    Discharge Diet: Regular  Discharge Activity: Lifting restricted to 15 pounds, no driving while on narcotics, nothing per vagina for 6 weeks.  Discharge Follow up: With Dr. Camejo on 7/13. Chemotherapy and oncology visits as planned with oncology team.    Discharge Disposition:  Discharged to home    Discharge Staff: MD Joycelyn Cuenca, APRN, DNP, CNP  6/28/2023 8:22 AM    Provider Disclosure:   I agree with above History, Review of Systems, Physical exam and Plan. I have reviewed the content of the documentation and have edited it as needed. I have seen and personally performed the services documented here and the documentation accurately represents those services and the decisions I have made.     Electronically signed by:   Hermann Dunn MD   Gynecologic Oncology   [facility]

## 2023-06-27 NOTE — OP NOTE
[facility]  Operative Note    Date: 06/27/2023    Preoperative diagnosis:  Malignant neoplasm of pancreas metastatic to liver; Ovarian mass    Postoperative diagnosis:  Same    Procedure:  Laparoscopic bilateral salpingo-oophorectomy    Surgeon:   Benjamin Camejo MD    Assistant:  Stephen Alvarez, PGY-2    Anesthesia:  GETA    Specimens:  right and left fallopian tubes and ovaries    Complications: None apparent    EBL:  10 cc  IVF:  700 cc crystalloid   UOP:  150 ml    Drains: none    Findings: On laparoscopy, no evidence of trauma on entry. Liver with several small areas of tissue implants consistent with tumor. Normal gallbladder. Small anteverted uterus. Left ovarian mass, ~7 cm with cystic and fibrous components. Normal appearing left fallopian tube. Normal appearing right tube and ovary. Bilateral ureters visualized well below surgical field.     Indications: Karina Melvin is a 62 year old female with pancreatic adenocarcinoma with metastasis to the liver currently undergoing Gemzar and nab-paclitaxel who presented with abdominopelvic pain and was found to have a 6-8 cm ovarian cyst. On MRI, the cyst had features most consistent ovarian cystadenofibroma. Laparoscopic bilateral salpingo-oophorectomy recommended at that time. All risks, benefits and alternatives were discussed and written informed consent was obtained.       Procedure:   The patient was taken to the OR and anesthesia was induced. The patient was placed in dorsal lithotomy with stirrups with arms tucked. The patient's abdomen and vagina were prepped and draped in the usual sterile fashion. A whitfield catheter was placed in the bladder. Surgical time out was completed.    The base of the umbilicus was grasped with an Allis clamp, then perforating towel clamps were placed vertically at the base of the umbilical stump and the Allis clamp was removed. A 5 mm umbilical incision was made using an 11 blade and a Veress needle  was used to enter the abdomen with intra-abdominal placement confirmed with easy instillation of saline and low insertion pressure.    The abdomen was then insufflated to an operating pressure of 15mmHg with carbon dioxide. The Veress needle was removed and a 5mm port was placed. The camera was then introduced. No injuries were visualized from entry into the abdomen. The entire abdomen was surveyed with the above findings noted. Areas superior and medial to the anterior superior iliac spines were identified bilaterally and were transilluminated to ensure that prominent vessels were not present.  An additional 5 mm trocar was placed under direct visualization on the left side. A 12mm port was placed in the right lower quadrant.  The patient was placed in Trendelenburg position to move the bowels out of the surgical field and improve visualization of the pelvic organs.  The abdomen and pelvis were then inspected and the above described findings were noted.    The right fallopian tube was elevated using an atraumatic grasper. The ureter was identified through the peritoneum. Monopolar arpan were used to score the broad ligament and create a defect in the ligament. The Ligasure was then used to fulgurate and divide the infundibulopelvic ligament. The mesosalpinx was then fulgurated and divided using the Ligasure to the level of the uterine cornua. The fallopian tube and utero-ovarian ligament were transected and the right adnexa was was placed in the pelvis. The same procedure was carried out on the contralateral side and both adnexa were placed in a 10 mm endocatch bag. The right port was removed and the bag was brought to the level of the incision. A needle and syringe were used to drain the cystic component of the left ovary within the bag. The bag and both adnexa were then removed.    The abdomen was then inspected and the surgical sites were noted to be hemostatic. The right sided port was closed with two  interrupted stitches using the Amrit Serafin closure device and 0 Vicryl. The patient was taken out of Trendelenberg. The umbilical and left ports were removed. The port sites were injected with a total of 10 ml of 0.25% bupivacaine. All three port sites were closed with 4-0 Monocryl and covered in sterile pressure dressing. All sponge, lap, needle, and instrument counts were correct prior to completion of the case.    Dr. Camejo was present and scrubbed for the entire procedure.    Stephen Alvarez MD  OB/GYN PGY-2  06/27/2023 10:09 AM    I was present and scrubbed throughout this case.    Benjamin Camejo

## 2023-06-27 NOTE — PROGRESS NOTES
Admitted/transferred from: PACU  2 RN full   skin assessment completed by Chencho Rosales RN and Maira ARNETT  Skin assessment finding: skin intact, no problems. Expected findings: 3 lap sites, chest port, L PIV.  Interventions/actions: No interventions needed at this time     Will continue to monitor.

## 2023-06-28 NOTE — PLAN OF CARE
A&Ox4. VSS on RA. Lap sites covered with dressings, CDI. Abd pain controlled with tylenol. Tolerating regular diet. Voiding. Passing gas. Walked in pradhan. Pt discharging home. Discharge instructions reviewed with pt including abd precautions, when to notify dr/urgent care, medications, follow ups. Pt verbalized understanding. Pt's brother in law providing ride home.

## 2023-06-28 NOTE — PROGRESS NOTES
Pt arrived from PACU around 1500  R chest port heparin locked. L PIV pulled per pt request. Regular diet tolerated well for dinner. Nausea earlier relieved well with compazine PO. Pain controlled with tramadol and tylenol. 3 ABD lap sites with gauze and Tegaderm CDI. Urinating spontaneously. BS present. Up transferring to commode with 1 assist.

## 2023-06-28 NOTE — PLAN OF CARE
[id number]    Neuro: A&Ox4.   Cardiac: VSS.   Respiratory: Sating >92% on RA.  GI/: Adequate urine output. +BS, no BM overnight  Diet/appetite: Tolerating regular diet. Denied nausea overnight.  Activity:  Assist of 1 up to commode  Pain: At acceptable level on current regimen.   Skin: Abd Lap sites x3 with gauze and tegaderm - CDI  LDA's: R Chest Port - heparin locked    Plan: Continue with POC. Notify primary team with changes.

## 2023-06-29 NOTE — PROGRESS NOTES
Clinic Care Coordination Contact  [facility]/Voicemail       Clinical Data: Care Coordinator Outreach  Outreach attempted x 1.  Left message on patient's voicemail with call back information and requested return call.  Plan: Care Coordinator will send care coordination introduction letter with care coordinator contact information and explanation of care coordination services via [employer]. Care Coordinator will try to reach patient again in 1-2 business days.    Brandan Stafford MSN, RN, PHN, [facility]   Primary Care Clinical RN Care Coordinator  [facility]  6/29/2023   9:07 AM  [email or web address]  Office: [phone]

## 2023-06-29 NOTE — LETTER
[facility] CARE COORDINATION  [address]  [place] [address]    June 29, 2023    Karina Melvin  [address] [place] [address]      Dear Karina,        I am a  clinic care coordinator who works with VINCENZO Whipple CNP with the [facility]. I wanted to introduce myself and provide you with my contact information for you to be able to call me with any questions or concerns. Below is a description of clinic care coordination and how I can further assist you.       The clinic care coordination team is made up of a registered nurse, , financial resource worker and community health worker who understand the health care system. The goal of clinic care coordination is to help you manage your health and improve access to the health care system. Our team works alongside your provider to assist you in determining your health and social needs. We can help you obtain health care and community resources, providing you with necessary information and education. We can work with you through any barriers and develop a care plan that helps coordinate and strengthen the communication between you and your care team.  Our services are voluntary and are offered without charge to you personally.    Please feel free to contact me with any questions or concerns regarding care coordination and what we can offer.      We are focused on providing you with the highest-quality healthcare experience possible.    Sincerely,     Brandan Stafford MSN, RN, PHN, CCM   Primary Care Clinical RN Care Coordinator  [facility]  6/29/2023   9:06 AM  [email or web address]  Office: [phone]

## 2023-06-30 NOTE — PROGRESS NOTES
Clinic Care Coordination Contact  [facility]/Voicemail       Clinical Data: Care Coordinator Outreach  Outreach attempted x 4.  Left message on patient's voicemail with call back information and requested return call.  Plan: Care Coordinator sent care coordination introduction letter on 6/29/23 via [employer]. Care Coordinator will do no further outreaches at this time.    Brandan Stafford MSN, RN, PHN, CCM   Primary Care Clinical RN Care Coordinator  [facility]  6/30/2023   12:46 PM  [email or web address]  Office: [phone]

## 2023-07-05 NOTE — PROGRESS NOTES
[facility]: Cancer Care Note    Received callback from Dr. Camejo regarding this patient's request to cancel her chemotherapy infusion for tomorrow.  Per Dr. Camejo, since patient appears to be doing well post-op with no infection concerns, he does not see any reason from a surgical standpoint to cancel patient's chemotherapy infusion for tomorrow.      Dr. Camejo states he would prefer to leave this decision up to patient's Medical Oncologist, Dr. Cruz, who is managing her chemotherapy treatment for pancreatic cancer.    Note routed to Dr. Cruz with request to review and advise.    Thony Sweeney, RN, BSN, OCN  RN Care Coordinator - Oncology  [facility]

## 2023-07-05 NOTE — PROGRESS NOTES
[facility]: Cancer Care Note      [facility], Gynecologic Oncology: Post-op call    Surgery Date:  6/27/23    Description of Surgery:  Laparoscopic bilateral salpingo-oophorectomy    Pain:  1) Location: Abdomen, near incisions  2) Rate pain on scale 1-10: 5/10  3) Is your pain well controlled on your pain medication?: Yes  4) How often are you taking your pain medication?: Tylenol (doesn't work, per patient), Tramadol (states this is helpful).  She is also using ice packs which she finds helpful.    GI:  5) Last bowel movement: This morning.  6) Are you having regular bowel movements?: Yes, and is taking stool softeners to help keep herself regular.    7) Eating/drinking well?: She states appetite is better than last week, but still has to push herself to eat.  Drinking plenty of water.  8) Nausea/vomiting?: She had two episodes of nausea since surgery - one episode yesterday, and one this morning.  No vomiting so far.  Has not been taking her antiemetics.    Urinary:  9) Are you having problems or difficulty with urination?: No    Lower Extremities:  10) Were lymph nodes removed during surgery?: No  11) If yes, have you been offered a lymphedema consult appointment?: N/A  12) Any pain, redness, or swelling in legs?: No  13) Any area on your legs that are warm to touch?: No  14) Chest pain or severe shortness of breath?: No chest pain, but endorses mild shortness of breath with exertion at times.  She states this was present before surgery as well.      Wound:  15) Type of incision: Laparoscopic incisions  Any of the following:     - Drainage (color, amount): None  - Odor: None  - Redness: No redness, but she has noticed some slight bruising near incisions.  - Chills: No  - Fever: No  16) Vaginal bleeding or discharge?: No  17) Staples - Have you had your staples out yet? (staples should be removed 7-10 days post-op): N/A - no staples present.  18) Patient was reminded to wash incision (allow  warm, soapy water to run over incision): Yes    Post-op:  19) Verify date and time of appointment: 7/13/23 at 10:45 am  20) Patient was informed that pathology will be discussed at this appointment: Yes    Any other questions or concerns at this time?: Yes - patient is scheduled to restart her chemotherapy treatment tomorrow (which she is on for her pancreatic cancer).  However, she reports feeling a little weak after surgery, and doesn't feel that she is ready to restart chemo treatment yet.  She would like Dr. Camejo's opinion regarding if she could cancel for this week.  Advised patient that Dr. Camejo had originally suggested that she restart her chemotherapy ~1-2 weeks after surgery, but writer will review with MD and will get back to patient with his recommendation.  Dr. Camejo was paged this afternoon.    Thony Sweeney, RN, BSN, OCN  RN Care Coordinator - Oncology  [facility]

## 2023-07-06 NOTE — PROGRESS NOTES
"[facility]: Cancer Care Note    Chart review completed to follow up on patient's phone call from yesterday.  Noted the encounter was forwarded to Dr. Felipe for review, in Dr. Cruz's absence - however, no response has been received yet from Medical Oncology.  Also noted patient ended up cancelling her own lab and chemotherapy appointments for today through [employer].    Writer placed telephone call to patient this afternoon to follow up.  Patient shares update that she actually tried connecting to her video visit with Jimena Yusuf CNP, earlier today - however, after sitting in the visit for 20 minutes, she states nobody connected with her, so she ended up leaving.    Patient also shares that she continues to feel tired and \"a little weak\" today, following her recent surgery.  She states her symptoms are the same as yesterday - she denies any worsening symptoms.  She made the decision to cancel her chemotherapy for today as she just wasn't feeling ready to restart quite yet.  However, she said that she will likely be ready to start next week.    Writer scheduled patient for video visit with Madeline Gagnon CNP, on Tuesday next week (7/11/23) to follow up prior to her next tentative chemotherapy infusion on 7/12/23.  Patient accepted and is aware of appointment details.  Patient also has a post-op visit with Dr. Camejo scheduled for 7/13/23.    Encouraged patient to call us with any new/concerning or worsening symptoms in the meantime.    Note and encounter routed to Dr. Cruz and Madeline Gagnon CNP, to provide updates.    Thony Sweeney, RN, BSN, OCN  RN Care Coordinator - Oncology  [facility]    " no

## 2023-07-11 NOTE — LETTER
7/11/2023         RE: Karina Melvin  [address] [place] [facility] [address]        Dear Colleague,    Thank you for referring your patient, Karina Melvin, to the [facility]. Please see a copy of my visit note below.    Virtual Visit Details    Type of service:  Video Visit   Video Start Time: 12:51 PM  Video End Time:1:11 PM    Originating Location (pt. Location): Home   Distant Location (provider location):  On-site  Platform used for Video Visit: [facility]      Oncology Follow Up Visit: July 11, 2023    Oncologist: Dr Nathen Cruz  PCP: Ernestina De Leon    Diagnosis: Pancreatic cancer  Karina Melvin is a 63 yo female who presented with abdominal pain x couple weeks in 5/2023.   5/16/2023.  CT abdomen/pelvis showed ill-defined hypoenhancing mass in the tail of the pancreas measuring 4.5 cm x 2 cm with associated pancreatic ductal dilation.  In addition to that there are numerous low-density peripherally enhancing lesions throughout the liver the largest liver lesion is in the right hepatic lobe inferiorly measuring 2.5 cm.  There is also a low-density 2 cm mass in the left adrenal gland inferiorly.  6.8 x 3.9 x 4.8 cm cyst within the left posterior pelvis likely left ovarian cyst.  Overall these findings were suspicious for metastatic pancreatic cancer.  5/25/2023 liver biopsy showed metastatic adenocarcinoma compatible with metastatic adenocarcinoma of pancreatic origin.  5/19/2023.  Pelvic ultrasound showed 8.1 cm left ovarian cyst for which pelvic MRI was recommended.  5/31/2023.  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  Treatment:   6/21/2023 began Cisplatin/ Abraxane    Interval History: Ms. Melvin is seen by video for review prior to cycle 1 Day 15 of Cisplatin/ Abraxane. Weakness throughout time of cycle 1 and noted rash with day 8 infusion  -used benadryl and  resolved after 1 week. Nausea through cycle and did use antiemetics as needed but did interfere with intake and has lost weight.   Surgery- 2 weeks ago-  area is tender- no sign of fevers or infection.  BM s better -using tramadol 2 x daily for abdominal pain but using stool softeners. Walking daily- lives on 3rd floor.    Sister  2022 of female cancer- pt has no children. Reports she wants to get back to work but cannot stand for any length of time due to weakness.   Rest of comprehensive and complete ROS is reviewed and is negative.   Past Medical History:   Diagnosis Date     Arthritis      Depressive disorder      Malignant neoplasm of pancreas metastatic to liver (H) 2023     Positive TB test      Current Outpatient Medications   Medication     acetaminophen (TYLENOL) 325 MG tablet     azelastine (ASTELIN) 0.1 % nasal spray     diclofenac (VOLTAREN) 1 % topical gel     diphenhydrAMINE (BENADRYL) 2 % external cream     diphenhydrAMINE (BENADRYL) 25 MG capsule     ibuprofen (ADVIL/MOTRIN) 600 MG tablet     lidocaine-prilocaine (EMLA) 2.5-2.5 % external cream     meclizine (ANTIVERT) 25 MG tablet     prochlorperazine (COMPAZINE) 10 MG tablet     senna-docusate (SENOKOT-S/PERICOLACE) 8.6-50 MG tablet     traMADol (ULTRAM) 50 MG tablet     VITAMIN D, CHOLECALCIFEROL, PO     No current facility-administered medications for this visit.     No Known Allergies    Physical Exam:There were no vitals taken for this visit.   GENERAL: Alert and no distress with visit.  EYES: Eyes grossly normal to inspection.  No discharge or erythema, or obvious scleral/conjunctival abnormalities.  RESP: No audible wheeze, cough, or visible cyanosis.  No visible retractions or increased work of breathing.    SKIN: Visible skin clear. No significant rash, abnormal pigmentation or lesions.  Has head covered for known hair loss.   NEURO: Cranial nerves grossly intact.  Mentation and speech appropriate for age.  PSYCH: Mentation  appears normal, affect normal/bright, judgement and insight intact, normal speech and appearance well-groomed.  The rest of a comprehensive physical examination is deferred due to video visit restrictions     Laboratory/Imaging Results: To be completed prior to infusion.     Assessment and Plan:   Pancreatic cancer-Began Cisplatin/ Abraxane 3 weeks previous and has completed days 1 and 8 of first cycle.   Pt has noted:   Chronic weakness- cannot go back to work due to continued weakness but has been able to keep walking in building and up to 3rd floor apartment. Weight loss with nausea early in each week- reviewed that she may need to start each daily with antiemetic and use smaller meals more often. Will refer to dietician for help with getting in proteins and her weight loss.   Abdominal pain- using tramadol 2 x daily - taking stool softener for help with prevention.   Rash after day 8 - resolved with oral benadryl after 1 week- suspect Abraxane is cause- will add benadryl to premed and ask she repeat benadryl at bedtime night after treatment.    Genetics- sister with female cancer that passed in last year. Pt has no children and is currently not interested in any testing.     - Pt to continue on with cisplatin and Abraxane as planned.   - return for review prior to cycle 2   The total time of this encounter amounted to 45 minutes. This time included video time spent with the patient, prep work, ordering tests, and performing post visit documentation.  Madeline Gagnon Cnp        Again, thank you for allowing me to participate in the care of your patient.        Sincerely,        Madeline Gagnon NP, APRN CNP

## 2023-07-11 NOTE — PROGRESS NOTES
Virtual Visit Details    Type of service:  Video Visit   Video Start Time: 12:51 PM  Video End Time:1:11 PM    Originating Location (pt. Location): Home   Distant Location (provider location):  On-site  Platform used for Video Visit: [facility]      Oncology Follow Up Visit: July 11, 2023    Oncologist: Dr Nathen Cruz  PCP: Ernestina De Leon    Diagnosis: Pancreatic cancer  Karina Melvin is a 63 yo female who presented with abdominal pain x couple weeks in 5/2023.   5/16/2023.  CT abdomen/pelvis showed ill-defined hypoenhancing mass in the tail of the pancreas measuring 4.5 cm x 2 cm with associated pancreatic ductal dilation.  In addition to that there are numerous low-density peripherally enhancing lesions throughout the liver the largest liver lesion is in the right hepatic lobe inferiorly measuring 2.5 cm.  There is also a low-density 2 cm mass in the left adrenal gland inferiorly.  6.8 x 3.9 x 4.8 cm cyst within the left posterior pelvis likely left ovarian cyst.  Overall these findings were suspicious for metastatic pancreatic cancer.  5/25/2023 liver biopsy showed metastatic adenocarcinoma compatible with metastatic adenocarcinoma of pancreatic origin.  5/19/2023.  Pelvic ultrasound showed 8.1 cm left ovarian cyst for which pelvic MRI was recommended.  5/31/2023.  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  Treatment:   6/21/2023 began Cisplatin/ Abraxane    Interval History: Ms. Melvin is seen by video for review prior to cycle 1 Day 15 of Cisplatin/ Abraxane. Weakness throughout time of cycle 1 and noted rash with day 8 infusion  -used benadryl and resolved after 1 week. Nausea through cycle and did use antiemetics as needed but did interfere with intake and has lost weight.   Surgery- 2 weeks ago-  area is tender- no sign of fevers or infection.  BM s better -using tramadol 2 x daily for abdominal pain but  using stool softeners. Walking daily- lives on 3rd floor.    Sister  2022 of female cancer- pt has no children. Reports she wants to get back to work but cannot stand for any length of time due to weakness.   Rest of comprehensive and complete ROS is reviewed and is negative.   Past Medical History:   Diagnosis Date     Arthritis      Depressive disorder      Malignant neoplasm of pancreas metastatic to liver (H) 2023     Positive TB test      Current Outpatient Medications   Medication     acetaminophen (TYLENOL) 325 MG tablet     azelastine (ASTELIN) 0.1 % nasal spray     diclofenac (VOLTAREN) 1 % topical gel     diphenhydrAMINE (BENADRYL) 2 % external cream     diphenhydrAMINE (BENADRYL) 25 MG capsule     ibuprofen (ADVIL/MOTRIN) 600 MG tablet     lidocaine-prilocaine (EMLA) 2.5-2.5 % external cream     meclizine (ANTIVERT) 25 MG tablet     prochlorperazine (COMPAZINE) 10 MG tablet     senna-docusate (SENOKOT-S/PERICOLACE) 8.6-50 MG tablet     traMADol (ULTRAM) 50 MG tablet     VITAMIN D, CHOLECALCIFEROL, PO     No current facility-administered medications for this visit.     No Known Allergies    Physical Exam:There were no vitals taken for this visit.   GENERAL: Alert and no distress with visit.  EYES: Eyes grossly normal to inspection.  No discharge or erythema, or obvious scleral/conjunctival abnormalities.  RESP: No audible wheeze, cough, or visible cyanosis.  No visible retractions or increased work of breathing.    SKIN: Visible skin clear. No significant rash, abnormal pigmentation or lesions.  Has head covered for known hair loss.   NEURO: Cranial nerves grossly intact.  Mentation and speech appropriate for age.  PSYCH: Mentation appears normal, affect normal/bright, judgement and insight intact, normal speech and appearance well-groomed.  The rest of a comprehensive physical examination is deferred due to video visit restrictions     Laboratory/Imaging Results: To be completed prior to  infusion.     Assessment and Plan:   Pancreatic cancer-Began Cisplatin/ Abraxane 3 weeks previous and has completed days 1 and 8 of first cycle.   Pt has noted:   Chronic weakness- cannot go back to work due to continued weakness but has been able to keep walking in building and up to 3rd floor apartment. Weight loss with nausea early in each week- reviewed that she may need to start each daily with antiemetic and use smaller meals more often. Will refer to dietician for help with getting in proteins and her weight loss.   Abdominal pain- using tramadol 2 x daily - taking stool softener for help with prevention.   Rash after day 8 - resolved with oral benadryl after 1 week- suspect Abraxane is cause- will add benadryl to premed and ask she repeat benadryl at bedtime night after treatment.    Genetics- sister with female cancer that passed in last year. Pt has no children and is currently not interested in any testing.     - Pt to continue on with cisplatin and Abraxane as planned.   - return for review prior to cycle 2   The total time of this encounter amounted to 45 minutes. This time included video time spent with the patient, prep work, ordering tests, and performing post visit documentation.  Madeline Gagnon,Cnp

## 2023-07-11 NOTE — NURSING NOTE
Is the patient currently in the state of [place]? YES    Visit mode:VIDEO    If the visit is dropped, the patient can be reconnected by: VIDEO VISIT: Text to cell phone: [phone]    Will anyone else be joining the visit? NO      How would you like to obtain your AVS? MyChart    Are changes needed to the allergy or medication list? NO    Reason for visit: DILLON MUHAMMAD, VF

## 2023-07-12 NOTE — PROGRESS NOTES
Infusion Nursing Note:  Karina Melvin presents today for deferred C1D15 Gemzar/Abraxane.    Patient seen by provider today: No   present during visit today: Not Applicable.    Note: Patient expressed no new medical concerns. Stated that she is still having some pain and tenderness to her incision sites. Otherwise doing well and things are getting better each day.      Intravenous Access:  Implanted Port.    Treatment Conditions:  Lab Results   Component Value Date    HGB 13.2 07/12/2023    WBC 9.1 07/12/2023    ANEU 2.7 06/17/2019    ANEUTAUTO 6.5 07/12/2023     07/12/2023      Lab Results   Component Value Date     (L) 06/22/2023    POTASSIUM 3.8 06/22/2023    CR 0.51 06/22/2023    IVAN 9.0 06/22/2023    BILITOTAL 0.3 06/22/2023    ALBUMIN 3.9 06/22/2023    ALT 26 06/22/2023    AST 35 06/22/2023     Results reviewed, labs MET treatment parameters, ok to proceed with treatment.      Post Infusion Assessment:  Patient tolerated infusion without incident.  Blood return noted pre and post infusion.  Site patent and intact, free from redness, edema or discomfort.  No evidence of extravasations.  Access discontinued per protocol.       Discharge Plan:   [facility]  Patient will return 7/25/23 for next appointment.   Patient discharged in stable condition accompanied by: self.  Departure Mode: Ambulatory.      Alayna Peña RN

## 2023-07-12 NOTE — PROGRESS NOTES
"Patient's name and  were verified.  See Doc Flowsheet - IV assess for details.  IVAD accessed with 20G 3/4\" garcia gripper plus needle  blood return positive: YES  Site without redness, tenderness or swelling: YES  flushed with 20cc NS and 5cc 100u/ml heparin  Needle: left in place for infusion.  Comments: Labs drawn.  Patient tolerated procedure without incident.  "

## 2023-07-13 NOTE — PROGRESS NOTES
[facility]: Cancer Care Note    Writer met with patient in clinic this morning, following her post-op visit with Dr. Camejo.  Reviewed recommendations from Dr. Camejo to proceed with a referral to Estrella Linder, Psychology, as well as a referral to Dr. Rodriguez, Oncology, [facility], for second opinion and possible transfer of care.  Referral orders were placed, and patient understands that she will be receiving a phone call to schedule her appointments.      Patient will otherwise not require any further follow-up with Dr. Camejo.  Encouraged her to call with any further questions or concerns following today's appointment.    Note routed to patient's current Oncologist, Dr. Cruz, and Dorie Quintana, RNCC, to provide these updates.    Thony Sweeney, RN, BSN, OCN  RN Care Coordinator - Oncology  [facility]

## 2023-07-13 NOTE — NURSING NOTE
"Oncology Rooming Note    July 13, 2023 10:47 AM   Karina Melvin is a 62 year old female who presents for:    Chief Complaint   Patient presents with     Oncology Clinic Visit     Post-op surgery 6/27     Initial Vitals: /79   Pulse 77   Temp 98  F (36.7  C) (Oral)   Resp 16   Ht 1.549 m (5' 0.98\")   Wt 49.4 kg (108 lb 14.4 oz)   SpO2 96%   BMI 20.59 kg/m   Estimated body mass index is 20.59 kg/m  as calculated from the following:    Height as of this encounter: 1.549 m (5' 0.98\").    Weight as of this encounter: 49.4 kg (108 lb 14.4 oz). Body surface area is 1.46 meters squared.  Data Unavailable Comment: Data Unavailable   No LMP recorded. Patient is postmenopausal.  Allergies reviewed: Yes  Medications reviewed: Yes    Medications: MEDICATION REFILLS NEEDED TODAY. Provider was notified.  Pharmacy name entered into [facility]:    [facility] - [place], [place] - [address], [address]  [id number] [place], [place] - [address] [facility] [address] [place] [address]    Clinical concerns: great fatigue       Vivienne Avery LPN            "

## 2023-07-13 NOTE — PATIENT INSTRUCTIONS
Referral to Estrella Linder, Psychologist.  Referral to Dr. Abdullahi Rodriguez, Medical Oncologist, [facility] (second opinion and/or possible transfer of care).  Follow-up with Dr. Camejo as-needed for any surgical or post-operative concerns.

## 2023-07-13 NOTE — LETTER
2023         RE: Karina Melvin  [address] [facility] [address]        Dear Colleague,    Thank you for referring your patient, Karina Melvin, to the [facility]. Please see a copy of my visit note below.                            Consult Notes on Referred Patient         Dr. Nathen Cruz MD  [address] [place] [address]       RE: Karina Melvin  : 1960  Y[place]SELIN: 2023     Dear Dr. Nathen Cruz:    I had the pleasure of seeing your patient Karina Melvin here at the Gynecologic Cancer Clinic at the [facility] on 2023.  As you know she is a very pleasant 62 year old woman with a recent diagnosis of pelvic mass in the setting of pancreatic cancer.  Given these findings she was subsequently sent to the Gynecologic Cancer Clinic for new patient consultation.     HPI - She initially presented recently with abdominal pain, weight loss, and loss of appetite.  THis led ultimately to a CT scan which identified the followin/16/23 CT:  IMPRESSION:   1.  Mass in the pancreatic tail with numerous liver and left adrenal lesions. Findings are highly suspicious for metastatic pancreatic cancer.  2.  Incidental left ovarian cyst. This could be further evaluated with ultrasound, depending on the clinical picture.    23 US  Findings: The uterus measures 5.5 x 2.5 x 4.1cm. The endometrial  stripe measures 2 mm thick. There are a couple of small intramural  fibroids in the mid fundus measuring 8 mm and mid uterine body  measuring 5 mm.     The right ovary measures 3.0 x 1.6 x 1.7 cm. In the left ovary there  is a large cyst with thin septations measuring 8.1 x 5.5 x 3.4 cm.    23 IR biopsy (liver lesion):  Final Diagnosis   LIVER LESION, NEEDLE BIOPSY:   -Positive for malignancy  -Metastatic adenocarcinoma, see comment      Comment      The patient has an imaging impression of a pancreatic mass and suspected  metastatic lesions in the liver.  The morphologic findings in this case are compatible with metastatic adenocarcinoma of pancreatic origin in the absence of any other primary lesion.  Clinical and radiologic correlation is recommended.      ~12,000    5/31/23 MRI (Pelvis)  IMPRESSION:   1. Complex left adnexal cyst has imaging characteristics diagnostic  for ovarian cystadenofibroma measuring 6.2 cm.  2. Right sciatic hernia containing the majority of the right ovary.  3. Incidental periurethral 5 mm cystic focus favored to represent  urethral diverticula.    She has started chemo (Gemzar and Nab-paclitaxel) and reports that she tolerates the chemo in terms of nausea, but has been having worsening low anterior abdominal/perlvic pain.  She has also been having fatigue and has some petechial/urticarial lesions develop on her lower extremities.  Dr. Cruz was inclined to have this addressed.    6/27/23 X-lap and BSO - cytology +adeno CA    A. Right fallopian tube and ovary, laparoscopic salpingo-oophorectomy:  - Ovary with inclusion cysts  - Fallopian tube with no significant histologic abnormality  - Negative for atypia or malignancy     B. Left fallopian tube and ovary, laparoscopic salpingo-oophorectomy:  - Ovarian serous cystadenofibroma  - Fallopian tube with no significant histologic abnormality  - Negative for atypia or malignancy    Review of Systems:    Systemic           no weight changes; no fever; no chills; no night sweats; no appetite changes  Skin           no rashes, or lesions  Eye           no irritation; no changes in vision  [place]-Laryngeal           no dysphagia; no hoarseness   Pulmonary    no cough; no shortness of breath  Cardiovascular    no chest pain; no palpitations  Gastrointestinal    no diarrhea; no constipation; no abdominal pain; no changes in bowel  habits; no blood in stool  Genitourinary   no urinary frequency; no urinary urgency; no dysuria; no pain; no abnormal vaginal  discharge; no abnormal vaginal bleeding  Breast   no breast discharge; no breast changes; no breast pain  Musculoskeletal    no myalgias; no arthralgias; no back pain  Psychiatric           no depressed mood; no anxiety    Hematologic           no tender lymph nodes; no noticeable swellings or lumps   Endocrine    no hot flashes; no heat/cold intolerance         Neurological   no tremor; no numbness and tingling; no headaches; no difficulty  sleeping      Past Medical History:    Past Medical History:   Diagnosis Date     Arthritis      Depressive disorder      Malignant neoplasm of pancreas metastatic to liver (H) 6/1/2023     Positive TB test          Past Surgical History:    Past Surgical History:   Procedure Laterality Date     COLONOSCOPY N/A 8/21/2019    Procedure: Colonoscopy, With Polypectomy And Biopsy;  Surgeon: Duane, William Charles, MD;  Location: [facility] OR     COLONOSCOPY WITH CO2 INSUFFLATION N/A 8/21/2019    Procedure: COLONOSCOPY, WITH CO2 INSUFFLATION;  Surgeon: Duane, William Charles, MD;  Location: [facility] OR     INSERT PORT VASCULAR ACCESS Right 6/8/2023    Procedure: Insert port vascular access;  Surgeon: Shen Grande MD;  Location: [facility] OR     IR CHEST PORT PLACEMENT > 5 YRS OF AGE  6/8/2023     IR LIVER BIOPSY PERCUTANEOUS  5/25/2023     LAPAROSCOPIC SALPINGO-OOPHORECTOMY Bilateral 6/27/2023    Procedure: Laparoscopy, Removal of both tubes and ovaries Possible open suregry;  Surgeon: Benjamin Camejo MD;  Location:  OR         Health Maintenance:  Health Maintenance Due   Topic Date Due     Pneumococcal Vaccine: Pediatrics (0 to 5 Years) and At-Risk Patients (6 to 64 Years) (1 - PCV) Never done     YEARLY PREVENTIVE VISIT  05/16/2023       Last Pap Smear: 5/16/22 NILM HPV negative    Last Mammogram: 5/2022 BIRADS 1    Last Colonoscopy: 8/2019 (polyps)                      Current Medications:     has a current medication list which includes the following prescription(s):  acetaminophen, azelastine, diclofenac, diphenhydramine, diphenhydramine, ibuprofen, lidocaine-prilocaine, meclizine, prochlorperazine, senna-docusate, tramadol, and cholecalciferol.       Allergies:     Patient has no known allergies.        Social History:     Social History     Tobacco Use     Smoking status: Never     Smokeless tobacco: Never   Substance Use Topics     Alcohol use: No       History   Drug Use No           Family History:     The patient's family history is notable for .    Family History   Problem Relation Age of Onset     Colon Cancer Mother      Lung Cancer Father      Thyroid Disease Sister      Ovarian Cancer Sister      Lung Cancer Brother          Physical Exam:     PS1  VS: There were no vitals taken for this visit.     General Appearance: Thin and somewhat frail appearing but alert, no distress     HEENT:  no thyromegaly, no palpable nodules or masses        Cardiovascular: regular rate and rhythm, no gallops, rubs or murmurs     Respiratory: lungs clear, no rales, rhonchi or wheezes, normal diaphragmatic excursion    Musculoskeletal: extremities non tender and without edema    Skin: no lesions or rashes     Neurological: normal gait, no gross defects     Psychiatric: appropriate mood and affect                               Hematological: normal cervical, supraclavicular and inguinal lymph nodes     Gastrointestinal:       abdomen soft, non-tender, non-distended, no organomegaly or masses    Genitourinary: External genitalia and urethral meatus appears normal.  Vagina is smooth without nodularity or masses.  Cervix appears normal and without lesions.  Bimanual exam reveal no masses, nodularity or fullness.  Recto-vaginal exam confirms these findings.      Assessment:    Karina Melvin is a 62 year old woman with a new diagnosis of metastatic adeno CA (favor pancreatic) with associated pelvic mass of undetermined signidicance.     A total of 60 minutes was spent with the patient, 40  minutes of which were spent in counseling the patient and/or treatment planning.      Plan:     1.)   P-op: we have discussed the clinical and pathologic findings.  I have recommended no additional follow-up at this time.  We have discussed wound care and reason to return for additional follow-up.  I have answered her questions to the best of my ability and she appears to have a good understanding of her condition.  She restarted chemo yesterday, but expressed that she feels uncertain about whether she will be able to complete this.  She would like to see Dr. Rodriguez for a second opinion, and Estrella Linder fro coping skills.     2.) Genetic risk factors were assessed and the patient does not meet the qualifications for a referral.      3.) Labs and/or tests ordered include:  none     4.) Health maintenance issues addressed today include none.    5.) Pre-op teaching was completed today.  Risks of surgery were discussed to include: bleeding, transfusion, infection, unintentional injury to surrounding organs/structures.      Thank you for allowing us to participate in the care of your patient.         Sincerely,    Benjamin Camejo MD    CC  Patient Care Team:  Ernestina De Leon APRN CNP as PCP - General (Internal Medicine - Pediatrics)  Ernestina De Leon APRN CNP as Assigned PCP  Esme Bender MD as MD (Dermatology)  Becky Ramos GC as Genetic Counselor (Genetic Counselor, MS)  Ernestina De Leon APRN CNP as Assigned Pain Medication Provider  Benjamin Taylor MD as Assigned Surgical Provider  Radha Quintana RN as Specialty Care Coordinator (Hematology & Oncology)  Fabiana Cruz MD as Assigned Cancer Care Provider  Thony Sweeney RN as Specialty Care Coordinator (Hematology & Oncology)  Benjamin Camejo MD as MD (Gynecologic Oncology)  FABIANA CRUZ        Again, thank you for allowing me to participate in the care of your patient.        Sincerely,        Benjamin Camejo  MD

## 2023-07-21 NOTE — PROGRESS NOTES
Placed TC to patient at the request of Dr. Abdullahi Rodriguez who saw the patient today for a second opinion.  Per Dr. Rodriguez, patient does not wish to continue with chemotherapy treatments.      Per Dr. Rodriguez's visit note, he encouraged patient to meet with Dr. Cruz next week as originally scheduled to discuss next steps- which may include hospice.      Writer spoke with patient and confirmed her decision to not pursue chemotherapy treatment. Writer will have  cancel all future chemotherapy appointments.  Patient has an appointment to meet with the dietician on 7/25 (switched from in-person to video), writer added appointment with Dr. Cruz back on.  She is also scheduled to see palliative care next week.      Patient declined having any questions at this time.     Laurita Tabor RN-BSN, PHN, OCN  [facility]

## 2023-07-21 NOTE — PROGRESS NOTES
Virtual Visit Details    Type of service:  Video Visit       Originating Location (pt. Location): Home    Distant Location (provider location):  On-site  Platform used for Video Visit: [employer]

## 2023-07-21 NOTE — Clinical Note
"    7/21/2023         RE: Karina Melvin  [address] [facility] [address]        Dear Colleague,    Thank you for referring your patient, Karina Melvin, to the [facility]. Please see a copy of my visit note below.    Virtual Visit Details    Type of service:  Video Visit   Video Start Time: {video visit start/end time for provider to select:933058}  Video End Time:{video visit start/end time for provider to select:828142}    Originating Location (pt. Location): {video visit patient location:575836::\"Home\"}  {PROVIDER LOCATION On-site should be selected for visits conducted from your clinic location or adjoining [facility] hospital, academic office, or other nearby [facility] building. Off-site should be selected for all other provider locations, including home:113584}  Distant Location (provider location):  {virtual location provider:501868}  Platform used for Video Visit: {Virtual Visit Platforms:535024::\"[facility]\"}      Again, thank you for allowing me to participate in the care of your patient.        Sincerely,        Abdullahi Rodriguez MD  "

## 2023-07-21 NOTE — NURSING NOTE
Is the patient currently in the state of [place]? YES    Visit mode:VIDEO    If the visit is dropped, the patient can be reconnected by: VIDEO VISIT: Text to cell phone: [phone]    Will anyone else be joining the visit? NO      How would you like to obtain your AVS? MyChart    Are changes needed to the allergy or medication list? NO    Reason for visit: DILLON MUHAMMAD, VF

## 2023-07-21 NOTE — PROGRESS NOTES
Service Date: 2023    MEDICAL ONCOLOGY NEW PATIENT NOTE    REFERRING PHYSICIAN:  Dr. Benjamin Camejo, Gynecologic Oncology Service, [facility].      PRIMARY MEDICAL ONCOLOGIST:  Dr. Nathen Cruz, [facility], [facility].    CANCER DIAGNOSIS:  Metastatic/stage IV form of pancreatic tail adenocarcinoma with innumerable liver metastases and left adrenal metastasis.  The patient requested a second opinion for medical oncology evaluation and discussion of treatment.    HISTORY OF PRESENT ILLNESS:  Ms. Karina Melvin is a 62-year-old woman from [place], [place].  She joins me for [employer]-based virtual video visit.  I am also joined by Valeria Ricardo, one of our physician assistants, who was observing the visit today.  She is under the care of Dr. Cruz at [facility] for metastatic pancreas cancer and she requested a second opinion. She has in her family history her sister who unfortunately  of ovarian cancer in 2022.  She lives alone in [place] and her bother-in-law does live nearby. In the spring of 2023, she herself was diagnosed with metastatic pancreas adenocarcinoma.      This began with symptoms related to musculoskeletal and chest pain.  She had some shoulder pain and other aches for which she saw her healthcare provider team on 2023.  She had been having some vertigo.  She was having significant fatigue.  This led to workup that included a CT scan of the abdomen and pelvis after 2023 when she had about a week's worth of abdominal flank pain.  She had a CT abdomen and pelvis done on 2023, and unfortunately, this revealed that there was a mass measuring 4.5 cm in greatest diameter in the pancreatic tail.  There were numerous liver and left adrenal lesions.     All this in tandem was consistent with metastatic pancreas cancer.  There was an incidental finding of a left ovarian cyst as well.  She underwent a CT-guided liver biopsy of  one of the liver lesions 05/25/2023, and unfortunately, this was diagnostic of metastatic adenocarcinoma consistent with pancreas cancer.  A CA 19-9 was checked on 06/09/2023 and was highly elevated at 11,1988.  She had a CT chest on 06/01/2023, which was a PE protocol.  There was no evidence of pulmonary embolism.  There were scattered solid and ground-glass nodules throughout the lungs, but otherwise, no evidence of vickey metastatic disease to the lungs.      She met with Dr. Nathen Cruz from Medical Oncology who initiated gemcitabine and nab-paclitaxel in the first-line palliative intent setting 06/08/2023.  Her second treatment was day 8 on 06/16/2023.  She states she was having a tremendous amount of difficulty tolerating chemotherapy due to weakness.  She has not had any vickey falls, but she had BPPV dating back to at least early May, and overall, she has felt the chemotherapy has not gone well,  She has been extremely tired and would like to suspend chemotherapy.  She was meeting with Dr. Camejo as well due to the finding of left ovarian cyst and other issues.  Again, Dr. Camejo had been her sister's primary oncologist for ovarian cancer as well, dating back to last year.  Dr. Camejo met with her on 06/22/2023 for evaluation of the pelvic mass and took her to the OR on 06/27/2023 for a bilateral salpingo-oophorectomy.  The ovaries and fallopian tubes on right and left sides were benign.  She resumed chemotherapy with gemcitabine and nab-paclitaxel day 15 of the first cycle, which had been deferred, was administered 07/12/2023.  Today she tells me that the second and third infusions were the worst.  She had extreme difficulty tolerating it.  She had extreme nausea despite premedication with ondansetron and use of Compazine.  She has not yet met with the Palliative Care team.  She is schedule to meet next Tuesday, 5 days from now, with Nutrition team after the next infusion, which will be cycle 2, day 1 of  gemcitabine and nab-paclitaxel.  She states to me very clearly within the first few minutes of the visit that she wishes to suspend chemotherapy.  She does not have any social support at home.  Her friends are in [place], [place] and out of state and her brother-in-law who is a  from her own sister who  of ovarian cancer last year lives nearby and can help on occasion only.    PAST MEDICAL AND SURGICAL HISTORY:  Most notable for the pancreas cancer.  She has BPPV.  She has other chronic history of arthritic and other types of pains as well. Surgeries include the CT-guided biopsy that was necessary to confirm pancreas cancer and that was done 2023.    SOCIAL HISTORY:  As noted above.  She lives in [place].  She lives alone.  Her friends live out of state.  Her brother-in-law lives nearby.  Her sister  of ovarian cancer 2022.    FAMILY HISTORY:  Family history of malignancy is most notable again for the sister who  of ovarian cancer in  and has been under the care of Dr. Camejo.    REVIEW OF SYSTEMS:  Full 14-point review of systems was performed. Pertinent symptoms are reviewed above per HPI.    MEDICATIONS/ALLERGIES:  Fully reviewed in Epic.  Current Outpatient Medications   Medication    acetaminophen (TYLENOL) 325 MG tablet    traMADol (ULTRAM) 50 MG tablet    azelastine (ASTELIN) 0.1 % nasal spray    diclofenac (VOLTAREN) 1 % topical gel    diphenhydrAMINE (BENADRYL) 2 % external cream    diphenhydrAMINE (BENADRYL) 25 MG capsule    ibuprofen (ADVIL/MOTRIN) 600 MG tablet    lidocaine-prilocaine (EMLA) 2.5-2.5 % external cream    meclizine (ANTIVERT) 25 MG tablet    prochlorperazine (COMPAZINE) 10 MG tablet    senna-docusate (SENOKOT-S/PERICOLACE) 8.6-50 MG tablet    VITAMIN D, CHOLECALCIFEROL, PO     No current facility-administered medications for this visit.         No Known Allergies     PHYSICAL EXAMINATION:  Completed via virtual video visit.  I estimated Karnofsky  performance status is approximately 70.   In-person physical exam could not be performed today in context of a Virtual Visit. Observed physical assessments made today by visualizing the patient by video link:  Vitals - Patient Reported  Pain Score: Moderate Pain (5)  Pain Loc: Abdomen             General/Constitutional: Generally appears well, not acutely ill.  HEENT: no scleral icterus, not jaundiced.  Respiratory: no labored breathing.  Musculoskeletal: appears to have full range of motion and adequate physical strength.  Skin: no jaundice, discoloration or other notable lesions.  Neurological: no evidence of tremors.  Psychiatric: no evident anxiety; fully alert and oriented with fluent speech.      The rest of a comprehensive physical examination is deferred due to nature of video visits.     Labs, pathology and imaging were reviewed by me on the day of the visit and with the patient in lay language.    IMPRESSION/PLAN:  Ms. Karina Melvin is a 62-year-old woman with a diagnosis of pancreatic adenocarcinoma that was stage IV at time of diagnosis in 05/2023.  She has undergone 3 infusions of gemcitabine and nab-paclitaxel and not tolerated it well.  Between the second and third infusion was interrupted by salpingo-oophorectomy.  She states very clear to me that she wishes to suspend chemotherapy and just have supportive care alone.  She has been on tramadol prescription from Dr. Camejo in the postoperative setting but does not state any significant amount of pain, but she does express a great deal of interest in meeting with our Palliative Care team for further discussion about goals of care.  I carefully reviewed the natural course, biology, and treatment of pancreas adenocarcinoma.      I explained that stage IV metastatic pancreatic carcinoma is not curable but potentially treated by chemotherapy. Doing chemotherapy versus not doing chemotherapy has the potential for palliating symptoms or delaying onset of  symptoms and also improving overall survival compared to no chemotherapy or systemic treatment whatsoever.  I want to respect the fact that she has made a decision and so I sent a message following this visit via NoDaysOff to Dr. Cruz and his nurse care coordinator, Jimena Trejo, so that they may follow with her. At her request, I have placed a separate message to our infusion team requesting scheduling of any remaining infusions.  I did encourage her to meet with Dr. Nathen Cruz one last time in person as is scheduled for next Tuesday, 07/25/2023, and to follow up with Nutrition.  I think that would be helpful.      We did briefly discuss Sruthi in case she has pancreatic enzyme insufficiency and pain upon eating.  She may benefit from a palliative standpoint from that standpoint.  Otherwise, she requested a Palliative Care referral which I placed today.  I think she would definitely benefit from hospice referral as well for evaluation, but at this point, she states she is not ready for that but would like to meet with Palliative Care and is very motivated to do so.  It was a great pleasure meeting this very kind woman.  I am happy to help in any way possible.    I met with Ms. Karina Melvin 07/21/2023 between 8 a.m. and 8:35 a.m. on the above date.    VIRTUAL VISIT - DETAILS:    I have reviewed the note as documented above. This accurately captures the substance of my conversation with the patient.    Date of call: July 21, 2023   Start of call: 8:00 am  End of call: 8:35 am    Provider location: [facility] (academic office)  Patient location: Home      Mode of Video Visit: Kye           I spent 35 minutes in consultation, including history and discussion with the patient including review of recent lab values and radiologic imaging results.  An additional 30 minutes was spent on the day of the visit, including reviewing pertinent medical notes and documentation from other physicians and APPs who have evaluated and  treated this patient, pertinent lab values, pathology and imaging results, personal review of radiologic images, discussing the case with referring providers and/or nurse coordinator team, post-visit orders, and all post-visit documentation.    Abdullahi Rodriguez MD PhD       cc:  Benjamin Camejo MD  [facility] [address]  [address]    Nathen Cruz MD  [address]  [address]      Abdullahi Rodriguez MD        D: 2023   T: 2023   MT: wesly    Name:     NICOLE LEA  MRN:      [id number]        Account:      [id number]   :      1960           Service Date: 2023       Document: [id number]

## 2023-07-25 NOTE — PROGRESS NOTES
Virtual Visit Details    Type of service:  Video Visit   Video Start Time: 11:07 AM  Video End Time:11:24 AM    Originating Location (pt. Location): Home    Distant Location (provider location):  Off-site  Platform used for Video Visit: Richar

## 2023-07-25 NOTE — NURSING NOTE
Is the patient currently in the state of [place]? YES    Visit mode:VIDEO    If the visit is dropped, the patient can be reconnected by: VIDEO VISIT: Text to cell phone: [phone]    Will anyone else be joining the visit? NO      How would you like to obtain your AVS? MyChart    Are changes needed to the allergy or medication list? NO    Reason for visit: DILLON Finnegan

## 2023-07-25 NOTE — PROGRESS NOTES
PORT Removal orders were faxed to [facility] @ [phone].  FAX was received and message sent to scheduling to help arrange.

## 2023-07-25 NOTE — LETTER
7/25/2023         RE: Karina Melvin  [address] [facility] [address]        Dear Colleague,    Thank you for referring your patient, Karina Melvin, to the [facility]. Please see a copy of my visit note below.    Virtual Visit Details    Type of service:  Video Visit   Video Start Time: 11:07 AM  Video End Time:11:24 AM    Originating Location (pt. Location): Home    Distant Location (provider location):  Off-site  Platform used for Video Visit: [facility]    Oncology follow-up visit:  Date on this visit: 7/25/23     Karina Melvin  is referred by self-referred for an oncology consultation. She requires evaluation for metastatic pancreatic adenocarcinoma    Primary Physician: Ernestina De Leon     History Of Present Illness:  Ms. Melvin is a 62 year old  female who presents with metastatic pancreatic adenocarcinoma with multiple liver mets and left adrenal metastasis.    I initially saw her on 6/1/2023.  Please see my previous note for details.  I have copied and updated from prior notes.    5/16/2023.  CT abdomen/pelvis was done for abdominal pain which showed ill-defined hypoenhancing mass in the tail of the pancreas measuring 4.5 cm x 2 cm with associated pancreatic ductal dilation.  In addition to that there are numerous low-density peripherally enhancing lesions throughout the liver the largest liver lesion is in the right hepatic lobe inferiorly measuring 2.5 cm.  There is also a low-density 2 cm mass in the left adrenal gland inferiorly.  6.8 x 3.9 x 4.8 cm cyst within the left posterior pelvis likely left ovarian cyst.  Overall these findings were suspicious for metastatic pancreatic cancer.    She had a liver biopsy on 5/25/2023 which showed metastatic adenocarcinoma compatible with metastatic adenocarcinoma of pancreatic origin.    Caris testing did not show any actionable mutations    5/19/2023.  Pelvic ultrasound showed 8.1 cm left ovarian cyst for  which pelvic MRI was recommended.    5/31/2023.  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    CTA chest 6/1/2023 did not show any pulmonary embolism but it showed scattered solid and groundglass nodules throughout the lungs.    6/9/2023-CA 19-9 was [id number]  6/9/2023.  Started palliative gemcitabine/Abraxane.  6/16/2023.  Cycle #1, day 8    6/27/2023.  She had bilateral salpingo-oophorectomy by Dr. Camejo for the pelvic mass.  She was noted to have cyst adenofibroma on the left side.     Pelvic washings were consistent with adenocarcinoma.      She received deferred cycle #1, day #15 on 7/12/2023.    She did not tolerate the chemotherapy well and had extreme weakness and nausea and was not feeling well and lost 18 pounds.  She recently Dr. Rodriguez and mentioned to him that she does not want any more chemotherapy.  Now that she has been off chemotherapy for a couple of weeks, she has started to feel better.  She takes tramadol once a day.  She has started to eat better and has gained some weight.  Energy is also improving.  She is very sure that she does not want chemotherapy.  The port is bothering her as it is rubbing with her bra strap and she wants this out.        ECOG 1-2    ROS:  A comprehensive ROS was otherwise neg    I reviewed other history in epic as below.      Past Medical/Surgical History:  Past Medical History:   Diagnosis Date     Arthritis      Depressive disorder      Malignant neoplasm of pancreas metastatic to liver (H) 6/1/2023     Positive TB test      Past Surgical History:   Procedure Laterality Date     COLONOSCOPY N/A 8/21/2019    Procedure: Colonoscopy, With Polypectomy And Biopsy;  Surgeon: Duane, William Charles, MD;  Location: [facility] OR     COLONOSCOPY WITH CO2 INSUFFLATION N/A 8/21/2019    Procedure: COLONOSCOPY, WITH CO2 INSUFFLATION;  Surgeon: Duane, William Charles, MD;  Location: [facility] OR     INSERT  PORT VASCULAR ACCESS Right 6/8/2023    Procedure: Insert port vascular access;  Surgeon: Shen Grande MD;  Location: [facility] OR     IR CHEST PORT PLACEMENT > 5 YRS OF AGE  6/8/2023     IR LIVER BIOPSY PERCUTANEOUS  5/25/2023     LAPAROSCOPIC SALPINGO-OOPHORECTOMY Bilateral 6/27/2023    Procedure: Laparoscopy, Removal of both tubes and ovaries Possible open suregry;  Surgeon: Benjamin Camejo MD;  Location: [facility] OR     Cancer History:   As above    Allergies:  Allergies as of 07/25/2023     (No Known Allergies)     Current Medications:  Current Outpatient Medications   Medication Sig Dispense Refill     acetaminophen (TYLENOL) 325 MG tablet Take 2 tablets (650 mg) by mouth every 4 hours as needed for other (mild pain) 100 tablet 0     azelastine (ASTELIN) 0.1 % nasal spray Spray 1 spray into both nostrils 2 times daily (Patient not taking: Reported on 7/11/2023) 30 mL 3     diclofenac (VOLTAREN) 1 % topical gel Apply 4 g topically 4 times daily (Patient not taking: Reported on 7/11/2023) 100 g 1     diphenhydrAMINE (BENADRYL) 2 % external cream Apply topically 3 times daily as needed for itching (Patient not taking: Reported on 7/11/2023) 56.8 g 1     diphenhydrAMINE (BENADRYL) 25 MG capsule Take 25 mg by mouth every 6 hours as needed for itching or allergies (Patient not taking: Reported on 7/11/2023)       ibuprofen (ADVIL/MOTRIN) 600 MG tablet Take 1 tablet (600 mg) by mouth every 6 hours as needed for mild pain (Patient not taking: Reported on 7/11/2023) 30 tablet 0     lidocaine-prilocaine (EMLA) 2.5-2.5 % external cream Apply topically as needed for moderate pain (Patient not taking: Reported on 7/11/2023) 30 g 3     meclizine (ANTIVERT) 25 MG tablet Take 1 tablet (25 mg) by mouth 3 times daily as needed for dizziness (Patient not taking: Reported on 7/11/2023) 30 tablet 1     prochlorperazine (COMPAZINE) 10 MG tablet Take 1 tablet (10 mg) by mouth every 6 hours as needed for nausea or vomiting  (Patient not taking: Reported on 7/11/2023) 30 tablet 2     senna-docusate (SENOKOT-S/PERICOLACE) 8.6-50 MG tablet Take 1-2 tablets by mouth 2 times daily Take while on oral narcotics to prevent or treat constipation. (Patient not taking: Reported on 7/11/2023) 30 tablet 0     traMADol (ULTRAM) 50 MG tablet Take 1 tablet (50 mg) by mouth every 6 hours as needed for severe pain 30 tablet 0     VITAMIN D, CHOLECALCIFEROL, PO Take 5,000 Units by mouth daily (Patient not taking: Reported on 7/11/2023)        Family History:  Family History   Problem Relation Age of Onset     Colon Cancer Mother      Lung Cancer Father      Thyroid Disease Sister      Ovarian Cancer Sister      Lung Cancer Brother    sister with ovarian cancer    No kids.   Social History:  Social History     Socioeconomic History     Marital status: Single     Spouse name: Not on file     Number of children: Not on file     Years of education: Not on file     Highest education level: Not on file   Occupational History     Not on file   Tobacco Use     Smoking status: Never     Smokeless tobacco: Never   Vaping Use     Vaping Use: Never used   Substance and Sexual Activity     Alcohol use: No     Drug use: No     Sexual activity: Not Currently     Partners: Male   Other Topics Concern     Parent/sibling w/ CABG, MI or angioplasty before 65F 55M? Not Asked   Social History Narrative     Not on file     Social Determinants of Health     Financial Resource Strain: Not on file   Food Insecurity: Not on file   Transportation Needs: Not on file   Physical Activity: Not on file   Stress: Not on file   Social Connections: Not on file   Intimate Partner Violence: Not At Risk (6/1/2023)    Humiliation, Afraid, Rape, and Kick questionnaire      Fear of Current or Ex-Partner: No      Emotionally Abused: No      Physically Abused: No      Sexually Abused: No   Housing Stability: Not on file   lives alone.  No smoke. Drinks etoh very occasionally  She is originally  "from [place]    Physical Exam:  Ht 1.562 m (5' 1.5\")   Wt 49 kg (108 lb)   BMI 20.08 kg/m     Wt Readings from Last 4 Encounters:   07/25/23 49 kg (108 lb)   07/21/23 49 kg (108 lb)   07/13/23 49.4 kg (108 lb 14.4 oz)   07/12/23 50 kg (110 lb 4.8 oz)       Constitutional.  Looks well and in no apparent distress.   Eyes.  Without eye redness or apparent jaundice.   Respiratory.  Non labored breathing. Speaking in full sentences.    Skin.  No concerning skin rashes on the skin visualized.   Neurological.  Is alert and oriented.  Psychiatric.  Mood and affect seem appropriate.      The rest of a comprehensive physical examination is deferred due to Public Health Emergency video visit restrictions.        Laboratory/Imaging Studies      Reviewed  7/12/2023.    CBC was unremarkable    6/22/2023.  CMP shows sodium 135.  Alkaline phosphatase 295     was 198     Imaging and pathology reviewed and mentioned above.      ASSESSMENT/PLAN:    Metastatic pancreatic adenocarcinoma with multiple liver mets, left adrenal mass and positive peritoneal washings.  She has indeterminate tiny lung nodules  Caris testing did not show any actionable mutations.    She was started on palliative gemcitabine/Abraxane but tolerated it very poorly.  We discussed the situation in detail.  Other option includes to give her a modified gemcitabine/Abraxane by decreasing the dose and giving it to her every other week.    In addition to that, for chemotherapy-induced nausea, I will give her Decadron, Emend in addition to ondansetron.    We discussed that if she is very sure that she does not want chemotherapy, then I would strongly encourage her to enroll in hospice to make sure that she remains comfortable and does not suffer.  She tells me that she has made up her mind and she does not want chemotherapy.    At this time she is not willing to make a transition to hospice but she has an appointment with Dr. Fournier from palliative care on " 7/28/2023 and she wants to discuss it with him as well before making that decision.  I strongly encouraged her to enroll in hospice if we are not giving her chemotherapy.    Discussion regarding Port-A-Cath.  She wants the Port-A-Cath out because it is bothering her as it is rubbing along the strap of the bra.  We discussed that if she is 100% sure that she does not want chemotherapy, then it is reasonable to take out the Port-A-Cath otherwise I would like her to wait before she has made up her decision.  She tells me repeatedly that she will not get chemotherapy and she wants her Port-A-Cath out and we will try to schedule that.    We did not address the following today.  Discussion regarding genetic testing.  Due to personal and family history of cancers, I recommend genetic counselor evaluation and I gave her a referral for that.    She will let us know about her decision regarding hospice after meeting with Dr. Fournier.  At this time I am not making another appointment with me but she can do so if she wants to.      All questions were answered to her satisfaction.  She is agreeable and comfortable with the plan.    Nathen Cruz MD       Again, thank you for allowing me to participate in the care of your patient.        Sincerely,        Nathen Cruz MD

## 2023-07-25 NOTE — PROGRESS NOTES
Oncology follow-up visit:  Date on this visit: 7/25/23     Karina Melvin  is referred by self-referred for an oncology consultation. She requires evaluation for metastatic pancreatic adenocarcinoma    Primary Physician: Ernestina De Leon     History Of Present Illness:  Ms. Melvin is a 62 year old  female who presents with metastatic pancreatic adenocarcinoma with multiple liver mets and left adrenal metastasis.    I initially saw her on 6/1/2023.  Please see my previous note for details.  I have copied and updated from prior notes.    5/16/2023.  CT abdomen/pelvis was done for abdominal pain which showed ill-defined hypoenhancing mass in the tail of the pancreas measuring 4.5 cm x 2 cm with associated pancreatic ductal dilation.  In addition to that there are numerous low-density peripherally enhancing lesions throughout the liver the largest liver lesion is in the right hepatic lobe inferiorly measuring 2.5 cm.  There is also a low-density 2 cm mass in the left adrenal gland inferiorly.  6.8 x 3.9 x 4.8 cm cyst within the left posterior pelvis likely left ovarian cyst.  Overall these findings were suspicious for metastatic pancreatic cancer.    She had a liver biopsy on 5/25/2023 which showed metastatic adenocarcinoma compatible with metastatic adenocarcinoma of pancreatic origin.    Caris testing did not show any actionable mutations    5/19/2023.  Pelvic ultrasound showed 8.1 cm left ovarian cyst for which pelvic MRI was recommended.    5/31/2023.  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    CTA chest 6/1/2023 did not show any pulmonary embolism but it showed scattered solid and groundglass nodules throughout the lungs.    6/9/2023-CA 19-9 was [id number]  6/9/2023.  Started palliative gemcitabine/Abraxane.  6/16/2023.  Cycle #1, day 8    6/27/2023.  She had bilateral salpingo-oophorectomy by Dr. Camejo for the  pelvic mass.  She was noted to have cyst adenofibroma on the left side.     Pelvic washings were consistent with adenocarcinoma.      She received deferred cycle #1, day #15 on 7/12/2023.    She did not tolerate the chemotherapy well and had extreme weakness and nausea and was not feeling well and lost 18 pounds.  She recently Dr. Rodriguez and mentioned to him that she does not want any more chemotherapy.  Now that she has been off chemotherapy for a couple of weeks, she has started to feel better.  She takes tramadol once a day.  She has started to eat better and has gained some weight.  Energy is also improving.  She is very sure that she does not want chemotherapy.  The port is bothering her as it is rubbing with her bra strap and she wants this out.        ECOG 1-2    ROS:  A comprehensive ROS was otherwise neg    I reviewed other history in epic as below.      Past Medical/Surgical History:  Past Medical History:   Diagnosis Date    Arthritis     Depressive disorder     Malignant neoplasm of pancreas metastatic to liver (H) 6/1/2023    Positive TB test      Past Surgical History:   Procedure Laterality Date    COLONOSCOPY N/A 8/21/2019    Procedure: Colonoscopy, With Polypectomy And Biopsy;  Surgeon: Duane, William Charles, MD;  Location: [facility] OR    COLONOSCOPY WITH CO2 INSUFFLATION N/A 8/21/2019    Procedure: COLONOSCOPY, WITH CO2 INSUFFLATION;  Surgeon: Duane, William Charles, MD;  Location: [facility] OR    INSERT PORT VASCULAR ACCESS Right 6/8/2023    Procedure: Insert port vascular access;  Surgeon: Shen Grande MD;  Location: [facility] OR    IR CHEST PORT PLACEMENT > 5 YRS OF AGE  6/8/2023    IR LIVER BIOPSY PERCUTANEOUS  5/25/2023    LAPAROSCOPIC SALPINGO-OOPHORECTOMY Bilateral 6/27/2023    Procedure: Laparoscopy, Removal of both tubes and ovaries Possible open suregry;  Surgeon: Benjamin Camejo MD;  Location: [facility] OR     Cancer History:   As above    Allergies:  Allergies as of 07/25/2023    (No Known  Allergies)     Current Medications:  Current Outpatient Medications   Medication Sig Dispense Refill    acetaminophen (TYLENOL) 325 MG tablet Take 2 tablets (650 mg) by mouth every 4 hours as needed for other (mild pain) 100 tablet 0    azelastine (ASTELIN) 0.1 % nasal spray Spray 1 spray into both nostrils 2 times daily (Patient not taking: Reported on 7/11/2023) 30 mL 3    diclofenac (VOLTAREN) 1 % topical gel Apply 4 g topically 4 times daily (Patient not taking: Reported on 7/11/2023) 100 g 1    diphenhydrAMINE (BENADRYL) 2 % external cream Apply topically 3 times daily as needed for itching (Patient not taking: Reported on 7/11/2023) 56.8 g 1    diphenhydrAMINE (BENADRYL) 25 MG capsule Take 25 mg by mouth every 6 hours as needed for itching or allergies (Patient not taking: Reported on 7/11/2023)      ibuprofen (ADVIL/MOTRIN) 600 MG tablet Take 1 tablet (600 mg) by mouth every 6 hours as needed for mild pain (Patient not taking: Reported on 7/11/2023) 30 tablet 0    lidocaine-prilocaine (EMLA) 2.5-2.5 % external cream Apply topically as needed for moderate pain (Patient not taking: Reported on 7/11/2023) 30 g 3    meclizine (ANTIVERT) 25 MG tablet Take 1 tablet (25 mg) by mouth 3 times daily as needed for dizziness (Patient not taking: Reported on 7/11/2023) 30 tablet 1    prochlorperazine (COMPAZINE) 10 MG tablet Take 1 tablet (10 mg) by mouth every 6 hours as needed for nausea or vomiting (Patient not taking: Reported on 7/11/2023) 30 tablet 2    senna-docusate (SENOKOT-S/PERICOLACE) 8.6-50 MG tablet Take 1-2 tablets by mouth 2 times daily Take while on oral narcotics to prevent or treat constipation. (Patient not taking: Reported on 7/11/2023) 30 tablet 0    traMADol (ULTRAM) 50 MG tablet Take 1 tablet (50 mg) by mouth every 6 hours as needed for severe pain 30 tablet 0    VITAMIN D, CHOLECALCIFEROL, PO Take 5,000 Units by mouth daily (Patient not taking: Reported on 7/11/2023)        Family History:  Family  "History   Problem Relation Age of Onset    Colon Cancer Mother     Lung Cancer Father     Thyroid Disease Sister     Ovarian Cancer Sister     Lung Cancer Brother    sister with ovarian cancer    No kids.   Social History:  Social History     Socioeconomic History    Marital status: Single     Spouse name: Not on file    Number of children: Not on file    Years of education: Not on file    Highest education level: Not on file   Occupational History    Not on file   Tobacco Use    Smoking status: Never    Smokeless tobacco: Never   Vaping Use    Vaping Use: Never used   Substance and Sexual Activity    Alcohol use: No    Drug use: No    Sexual activity: Not Currently     Partners: Male   Other Topics Concern    Parent/sibling w/ CABG, MI or angioplasty before 65F 55M? Not Asked   Social History Narrative    Not on file     Social Determinants of Health     Financial Resource Strain: Not on file   Food Insecurity: Not on file   Transportation Needs: Not on file   Physical Activity: Not on file   Stress: Not on file   Social Connections: Not on file   Intimate Partner Violence: Not At Risk (6/1/2023)    Humiliation, Afraid, Rape, and Kick questionnaire     Fear of Current or Ex-Partner: No     Emotionally Abused: No     Physically Abused: No     Sexually Abused: No   Housing Stability: Not on file   lives alone.  No smoke. Drinks etoh very occasionally  She is originally from [place]    Physical Exam:  Ht 1.562 m (5' 1.5\")   Wt 49 kg (108 lb)   BMI 20.08 kg/m     Wt Readings from Last 4 Encounters:   07/25/23 49 kg (108 lb)   07/21/23 49 kg (108 lb)   07/13/23 49.4 kg (108 lb 14.4 oz)   07/12/23 50 kg (110 lb 4.8 oz)       Constitutional.  Looks well and in no apparent distress.   Eyes.  Without eye redness or apparent jaundice.   Respiratory.  Non labored breathing. Speaking in full sentences.    Skin.  No concerning skin rashes on the skin visualized.   Neurological.  Is alert and oriented.  Psychiatric.  Mood " and affect seem appropriate.      The rest of a comprehensive physical examination is deferred due to [facility] video visit restrictions.        Laboratory/Imaging Studies      Reviewed  7/12/2023.    CBC was unremarkable    6/22/2023.  CMP shows sodium 135.  Alkaline phosphatase 295     was 198     Imaging and pathology reviewed and mentioned above.      ASSESSMENT/PLAN:    Metastatic pancreatic adenocarcinoma with multiple liver mets, left adrenal mass and positive peritoneal washings.  She has indeterminate tiny lung nodules  Caris testing did not show any actionable mutations.    She was started on palliative gemcitabine/Abraxane but tolerated it very poorly.  We discussed the situation in detail.  Other option includes to give her a modified gemcitabine/Abraxane by decreasing the dose and giving it to her every other week.    In addition to that, for chemotherapy-induced nausea, I will give her Decadron, Emend in addition to ondansetron.    We discussed that if she is very sure that she does not want chemotherapy, then I would strongly encourage her to enroll in hospice to make sure that she remains comfortable and does not suffer.  She tells me that she has made up her mind and she does not want chemotherapy.    At this time she is not willing to make a transition to hospice but she has an appointment with Dr. Fournier from palliative care on 7/28/2023 and she wants to discuss it with him as well before making that decision.  I strongly encouraged her to enroll in hospice if we are not giving her chemotherapy.    Discussion regarding Port-A-Cath.  She wants the Port-A-Cath out because it is bothering her as it is rubbing along the strap of the bra.  We discussed that if she is 100% sure that she does not want chemotherapy, then it is reasonable to take out the Port-A-Cath otherwise I would like her to wait before she has made up her decision.  She tells me repeatedly that she will not get  chemotherapy and she wants her Port-A-Cath out and we will try to schedule that.    We did not address the following today.  Discussion regarding genetic testing.  Due to personal and family history of cancers, I recommend genetic counselor evaluation and I gave her a referral for that.    She will let us know about her decision regarding hospice after meeting with Dr. Fournier.  At this time I am not making another appointment with me but she can do so if she wants to.      All questions were answered to her satisfaction.  She is agreeable and comfortable with the plan.    Nathen Cruz MD

## 2023-07-25 NOTE — PATIENT INSTRUCTIONS
Kasia De La Rosa,     I have attached the resources that I referred to during our conversation (see your email):   --Sources of protein  --High calorie/high protein calories  --High calorie/high protein smoothies  --high calorie/high protein beverages     Here are your nutrition goals:  --Calories: 1500/day  --Protein: 60 grams/day  --Fluids: 6 cups water/herbal tea daily    Please reach out to me with any questions along the way!    Be well,    Janett Sigala RD, , LD  Board Certified Specialist in Oncology Nutrition  [facility]  Oncology Services  [email or web address]   Office: [phone]  Fax: [phone]

## 2023-07-25 NOTE — PROGRESS NOTES
Video-Visit Details     Type of service:  Video Visit     Video Start Time (time video started): 12:42pm     Video End Time (time video stopped): 1:07pm    Originating Location (pt. Location): Home     Distant Location (provider location):  [facility] NUTRITION SERVICES      Mode of Communication:  Video Conference via [facility]      CLINICAL NUTRITION SERVICES - ASSESSMENT NOTE    Karina Melvin 62 year old referred for MNT related to pancreatic cancer     Time Spent: 25 minutes  Visit Type: video  Pt accompanied by: self  Referring Physician: Madeline Baker 7/11/23  C25.9, C78.7 (ICD-10-CM) - Malignant neoplasm of pancreas metastatic to liver (H)   R63.4 (ICD-10-CM) - Weight loss     Additional Information:  had nausea and has lost weight with new diagnosis of panc cancer.    NUTRITION HISTORY  Factors affecting nutrition intake include: min/moderate pain with eating  Chemotherapy: discontinued after several cycles due to nausea and intolerance   Current diet/appetite: general diet, small frequent meals/improved appetite  Her appetite has been back since she stopped chemotherapy.  She has been taking Tramadol, 1/day.  She takes this with a stools softener to prevent constipation.   She does have some pain/pressure in her upper abdominal region with eating in the past couple of days.   She tends to burp which helps the pain/pressure resolve.   She has been focused on small meals as this feels better as well.    She recently started drinking protein shakes again. She had stopped drinking them as she was feeling so sick with chemo.   She is working towards drinking 1-2 Ensure Complete shakes daily     Diet Recall  Breakfast Apple, yogurt, banana OR 1/2 apple, one egg   Lunch Bowl of cereal w/ almond milk, Horizon OR protein shake (Ensure Complete)   Dinner Chicken or salmon, salad    Snacks Nuts, peanuts, cashews, almond   Beverages Water, Mary tea       Treatment Plan:  Oncology History  "  Malignant neoplasm of pancreas metastatic to liver (H)   6/1/2023 Initial Diagnosis    Malignant neoplasm of pancreas metastatic to liver (H)     6/9/2023 -  Chemotherapy    OP ONC Pancreatic Cancer - PACLitaxel protein-bound (Abraxane) / Gemcitabine  Plan Provider: Nathen Cruz MD  Treatment goal: Palliative  Line of treatment: [No plan line of treatment]       Treatment plan has been reviewed.    ANTHROPOMETRICS  Height: 62\"  Weight: 108 lbs/49kg  BMI: 19  Weight Status:  Normal BMI  IBW: 110 lbs (99%)  Weight History: down 15   lb x past one month  Wt Readings from Last 14 Encounters:   07/25/23 49 kg (108 lb)   07/21/23 49 kg (108 lb)   07/13/23 49.4 kg (108 lb 14.4 oz)   07/12/23 50 kg (110 lb 4.8 oz)   07/11/23 50.8 kg (112 lb)   06/27/23 51.4 kg (113 lb 5.1 oz)   06/22/23 50.9 kg (112 lb 4.8 oz)   06/16/23 53.9 kg (118 lb 12.8 oz)   06/09/23 53.8 kg (118 lb 8 oz)   06/08/23 53.1 kg (117 lb)   06/01/23 53.1 kg (117 lb 1.6 oz)   05/26/23 53.5 kg (118 lb)   05/25/23 53.1 kg (117 lb)   05/01/23 55.7 kg (122 lb 11.2 oz)     Dosing Weight: 49kg    Medications/vitamins/minerals/herbals:   Reviewed    Labs:   Labs reviewed    NUTRITION FOCUSED PHYSICAL ASSESSMENT FOR DIAGNOSING MALNUTRITION:  Consult for education only      ASSESSED NUTRITION NEEDS:  Estimated Energy Needs: 1500 kcals (30 Kcal/Kg)  Justification: maintenance  Estimated Protein Needs: 60 grams protein (1.2 g pro/Kg)  Justification: maintenance  Estimated Fluid Needs: 1500  mL   Justification: maintenance    MALNUTRITION:  % Weight Loss:  > 5% in 1 month (severe malnutrition)  % Intake:  <75% for >/= 1 month (moderate malnutrition)  Subcutaneous Fat Loss:  None observed  Muscle Loss:  None observed  Fluid Retention:  None noted    Malnutrition Diagnosis: Moderate malnutrition  In Context of:  Acute illness or injury    NUTRITION DIAGNOSIS:  Inadequate oral intake related to decreased ability to consume sufficient energy due to side effects of cancer " therapy as evidenced by 15 lb wt loss x past 1 month, dietary intake <75% estimated needs.      INTERVENTIONS  Provided written & verbal education:     - Reviewed nutrition and hydration needs.   Advised pt to aim for at least 1500kcal and 60g protein daily.    Advised pt to aim for 6 cups non-caffeine containing beverages (water/electrolytes) daily.    - Discussed strategies to help fortify meals and snacks. Encouraged to focus on small, frequent meals.    - Reviewed sources of protein. Encouraged to have a protein source with each meal and snack.    - Reviewed common barriers to eating with cancer treatment.  Discussed ways to cope with nausea and constipation.   - Reviewed high calorie/high protein oral nutrition supplement options (Ensure Complete, Ensure Plus/Boost Plusetc).   - Encouraged utilizing these ONS in home made shakes/smoothies to prevent flavor fatigue.    - Reviewed vitamins/minerals. Suggested she can start taking her supplements now that she's not on active treatment.   - Reviewed potential need for PERT, ie Creon or Zenpep if she continues to struggle with pain or has evidence of steatorrhea. Encouraged to monitor pain with eating as well as s/sx of steatorrhea (oily, greasy or floating stools).     Provided pt with corresponding education materials/handouts on:  Academy of Nutrition and Dietetics High zully/High protein recipes, Academy of Nutrition and Dietetics High protein list, Sources of Protein    Pt verbalize understanding of materials provided during consult.   Patient Understanding: good  Expected patient engagement: good    Goals  1.  Aim for 5-6 small frequent meals  2.  Aim for 1500kcal and 60g protein/day  3. Weight maintenance     Follow-Up Plans: Pt has RD contact information for questions.      Janett Sigala RD, , LD

## 2023-07-25 NOTE — LETTER
7/25/2023         RE: Karina Melvin  [address] [facility] [address]        Dear Colleague,    Thank you for referring your patient, Karina Melvin, to the [facility] [place]. Please see a copy of my visit note below.    Video-Visit Details     Type of service:  Video Visit     Video Start Time (time video started): 12:42pm     Video End Time (time video stopped): 1:07pm    Originating Location (pt. Location): Home     Distant Location (provider location):  [facility] NUTRITION SERVICES      Mode of Communication:  Video Conference via [facility]      CLINICAL NUTRITION SERVICES - ASSESSMENT NOTE    Karina Melvin 62 year old referred for MNT related to pancreatic cancer     Time Spent: 25 minutes  Visit Type: video  Pt accompanied by: self  Referring Physician: Madeline Baker 7/11/23  C25.9, C78.7 (ICD-10-CM) - Malignant neoplasm of pancreas metastatic to liver (H)   R63.4 (ICD-10-CM) - Weight loss     Additional Information:  had nausea and has lost weight with new diagnosis of panc cancer.    NUTRITION HISTORY  Factors affecting nutrition intake include: min/moderate pain with eating  Chemotherapy: discontinued after several cycles due to nausea and intolerance   Current diet/appetite: general diet, small frequent meals/improved appetite  Her appetite has been back since she stopped chemotherapy.  She has been taking Tramadol, 1/day.  She takes this with a stools softener to prevent constipation.   She does have some pain/pressure in her upper abdominal region with eating in the past couple of days.   She tends to burp which helps the pain/pressure resolve.   She has been focused on small meals as this feels better as well.    She recently started drinking protein shakes again. She had stopped drinking them as she was feeling so sick with chemo.   She is working towards drinking 1-2 Ensure Complete shakes daily     Diet Recall  Breakfast Apple,  "yogurt, banana OR 1/2 apple, one egg   Lunch Bowl of cereal w/ almond milk, Horizon OR protein shake (Ensure Complete)   Dinner Chicken or salmon, salad    Snacks Nuts, peanuts, cashews, almond   Beverages Water, Mary tea       Treatment Plan:  Oncology History   Malignant neoplasm of pancreas metastatic to liver (H)   6/1/2023 Initial Diagnosis    Malignant neoplasm of pancreas metastatic to liver (H)     6/9/2023 -  Chemotherapy    OP ONC Pancreatic Cancer - PACLitaxel protein-bound (Abraxane) / Gemcitabine  Plan Provider: Nathen Cruz MD  Treatment goal: Palliative  Line of treatment: [No plan line of treatment]       Treatment plan has been reviewed.    ANTHROPOMETRICS  Height: 62\"  Weight: 108 lbs/49kg  BMI: 19  Weight Status:  Normal BMI  IBW: 110 lbs (99%)  Weight History: down 15   lb x past one month  Wt Readings from Last 14 Encounters:   07/25/23 49 kg (108 lb)   07/21/23 49 kg (108 lb)   07/13/23 49.4 kg (108 lb 14.4 oz)   07/12/23 50 kg (110 lb 4.8 oz)   07/11/23 50.8 kg (112 lb)   06/27/23 51.4 kg (113 lb 5.1 oz)   06/22/23 50.9 kg (112 lb 4.8 oz)   06/16/23 53.9 kg (118 lb 12.8 oz)   06/09/23 53.8 kg (118 lb 8 oz)   06/08/23 53.1 kg (117 lb)   06/01/23 53.1 kg (117 lb 1.6 oz)   05/26/23 53.5 kg (118 lb)   05/25/23 53.1 kg (117 lb)   05/01/23 55.7 kg (122 lb 11.2 oz)     Dosing Weight: 49kg    Medications/vitamins/minerals/herbals:   Reviewed    Labs:   Labs reviewed    NUTRITION FOCUSED PHYSICAL ASSESSMENT FOR DIAGNOSING MALNUTRITION:  Consult for education only      ASSESSED NUTRITION NEEDS:  Estimated Energy Needs: 1500 kcals (30 Kcal/Kg)  Justification: maintenance  Estimated Protein Needs: 60 grams protein (1.2 g pro/Kg)  Justification: maintenance  Estimated Fluid Needs: 1500  mL   Justification: maintenance    MALNUTRITION:  % Weight Loss:  > 5% in 1 month (severe malnutrition)  % Intake:  <75% for >/= 1 month (moderate malnutrition)  Subcutaneous Fat Loss:  None observed  Muscle Loss:  None " observed  Fluid Retention:  None noted    Malnutrition Diagnosis: Moderate malnutrition  In Context of:  Acute illness or injury    NUTRITION DIAGNOSIS:  Inadequate oral intake related to decreased ability to consume sufficient energy due to side effects of cancer therapy as evidenced by 15 lb wt loss x past 1 month, dietary intake <75% estimated needs.      INTERVENTIONS  Provided written & verbal education:     - Reviewed nutrition and hydration needs.   Advised pt to aim for at least 1500kcal and 60g protein daily.    Advised pt to aim for 6 cups non-caffeine containing beverages (water/electrolytes) daily.    - Discussed strategies to help fortify meals and snacks. Encouraged to focus on small, frequent meals.    - Reviewed sources of protein. Encouraged to have a protein source with each meal and snack.    - Reviewed common barriers to eating with cancer treatment.  Discussed ways to cope with nausea and constipation.   - Reviewed high calorie/high protein oral nutrition supplement options (Ensure Complete, Ensure Plus/Boost Plusetc).   - Encouraged utilizing these ONS in home made shakes/smoothies to prevent flavor fatigue.    - Reviewed vitamins/minerals. Suggested she can start taking her supplements now that she's not on active treatment.   - Reviewed potential need for PERT, ie Creon or Zenpep if she continues to struggle with pain or has evidence of steatorrhea. Encouraged to monitor pain with eating as well as s/sx of steatorrhea (oily, greasy or floating stools).     Provided pt with corresponding education materials/handouts on:  Academy of Nutrition and Dietetics High zully/High protein recipes, Academy of Nutrition and Dietetics High protein list, Sources of Protein    Pt verbalize understanding of materials provided during consult.   Patient Understanding: good  Expected patient engagement: good    Goals  1.  Aim for 5-6 small frequent meals  2.  Aim for 1500kcal and 60g protein/day  3. Weight  maintenance     Follow-Up Plans: Pt has RD contact information for questions.      Janett Sigala RD, , LD        Again, thank you for allowing me to participate in the care of your patient.        Sincerely,        Janett Sigala RD

## 2023-07-27 NOTE — BRIEF OP NOTE
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  Complications: None.  Implants:   Implant Name Type Inv. Item Serial No.  Lot No. LRB No. Used Action   CATH PORT POWERPORT CLEARVUE SLIM 6FR [id number] - [id number] Port CATH PORT POWERPORT CLEARVUE SLIM 6FR [id number] CR about.me [facility] [id number] Right 1 Explanted       6 [employer] ClearVUE Slim removed completely. 23 cm catheter length noted. Pocket liberally irrigated with saline and dried. Closed primarily.

## 2023-07-27 NOTE — ANESTHESIA POSTPROCEDURE EVALUATION
Patient: Karina Melvin    Procedure: Procedure(s):  Remove port vascular access right       Anesthesia Type:  MAC    Note:  Disposition: Outpatient   Postop Pain Control: Uneventful            Sign Out: Well controlled pain   PONV: No   Neuro/Psych: Uneventful            Sign Out: Acceptable/Baseline neuro status   Airway/Respiratory: Uneventful            Sign Out: Acceptable/Baseline resp. status   CV/Hemodynamics: Uneventful            Sign Out: Acceptable CV status; No obvious hypovolemia; No obvious fluid overload   Other NRE: NONE   DID A NON-ROUTINE EVENT OCCUR?            Last vitals:  Vitals Value Taken Time   /78 07/27/23 0937   Temp 36.2  C (97.1  F) 07/27/23 0937   Pulse 56 07/27/23 0937   Resp 16 07/27/23 0937   SpO2 99 % 07/27/23 0937       Electronically Signed By: Ramiro Caldwell MD  July 27, 2023  10:11 AM

## 2023-07-27 NOTE — ANESTHESIA PREPROCEDURE EVALUATION
Anesthesia Pre-Procedure Evaluation    Patient: Karina Melvin   MRN: [id number] : 1960        Procedure : Procedure(s):  Remove port vascular access right          Past Medical History:   Diagnosis Date    Arthritis     Depressive disorder     Malignant neoplasm of pancreas metastatic to liver (H) 2023    Positive TB test       Past Surgical History:   Procedure Laterality Date    COLONOSCOPY N/A 2019    Procedure: Colonoscopy, With Polypectomy And Biopsy;  Surgeon: Duane, William Charles, MD;  Location: [facility] OR    COLONOSCOPY WITH CO2 INSUFFLATION N/A 2019    Procedure: COLONOSCOPY, WITH CO2 INSUFFLATION;  Surgeon: Duane, William Charles, MD;  Location: [facility] OR    INSERT PORT VASCULAR ACCESS Right 2023    Procedure: Insert port vascular access;  Surgeon: Shen Grande MD;  Location: [facility] OR    IR CHEST PORT PLACEMENT > 5 YRS OF AGE  2023    IR LIVER BIOPSY PERCUTANEOUS  2023    LAPAROSCOPIC SALPINGO-OOPHORECTOMY Bilateral 2023    Procedure: Laparoscopy, Removal of both tubes and ovaries Possible open suregry;  Surgeon: Benjamin Camejo MD;  Location: [facility] OR      No Known Allergies   Social History     Tobacco Use    Smoking status: Never    Smokeless tobacco: Never   Substance Use Topics    Alcohol use: No      Wt Readings from Last 1 Encounters:   23 49 kg (108 lb)        Anesthesia Evaluation   Pt has had prior anesthetic. Type: MAC.    No history of anesthetic complications       ROS/MED HX  ENT/Pulmonary:  - neg pulmonary ROS     Neurologic:  - neg neurologic ROS     Cardiovascular:     (+)  - -   -  - -                                 Previous cardiac testing   Echo: Date: Results:    Stress Test:  Date: Results:  Normal EF, no inducible ischemia  ECG Reviewed:  Date: Results:    Cath:  Date: Results:      METS/Exercise Tolerance:     Hematologic:  - neg hematologic  ROS     Musculoskeletal:   (+)  arthritis,             GI/Hepatic: Comment:  Metastatic pancreatic cancer      Renal/Genitourinary:  - neg Renal ROS     Endo: Comment: Ovarian cyst      Psychiatric/Substance Use:  - neg psychiatric ROS   (+) psychiatric history anxiety and depression       Infectious Disease:       Malignancy:   (+) Malignancy, History of Other.Other CA pancreatic Active status post Chemo.    Other:  - neg other ROS          Physical Exam    Airway        Mallampati: II       Respiratory Devices and Support         Dental           Cardiovascular          Rhythm and rate: regular     Pulmonary           breath sounds clear to auscultation           OUTSIDE LABS:  CBC:   Lab Results   Component Value Date    WBC 9.1 07/12/2023    WBC 2.5 (L) 06/22/2023    HGB 13.2 07/12/2023    HGB 11.7 06/22/2023    HCT 39.7 07/12/2023    HCT 35.0 06/22/2023     07/12/2023     06/22/2023     BMP:   Lab Results   Component Value Date     (L) 06/22/2023     (L) 06/09/2023    POTASSIUM 3.8 06/22/2023    POTASSIUM 4.2 06/09/2023    CHLORIDE 100 06/22/2023    CHLORIDE 94 (L) 06/09/2023    CO2 26 06/22/2023    CO2 27 06/09/2023    BUN 11.0 06/22/2023    BUN 13.8 06/09/2023    CR 0.51 06/22/2023    CR 0.54 06/09/2023    GLC 85 06/28/2023    GLC 94 06/27/2023     COAGS:   Lab Results   Component Value Date    PTT 28 05/25/2023    INR 0.95 05/25/2023     POC: No results found for: BGM, HCG, HCGS  HEPATIC:   Lab Results   Component Value Date    ALBUMIN 3.9 06/22/2023    PROTTOTAL 6.8 06/22/2023    ALT 26 06/22/2023    AST 35 06/22/2023    ALKPHOS 295 (H) 06/22/2023    BILITOTAL 0.3 06/22/2023     OTHER:   Lab Results   Component Value Date    IVAN 9.0 06/22/2023    LIPASE 314 05/09/2023    TSH 0.51 04/26/2023    T4 1.03 04/26/2023    CRP 3.0 05/04/2023    SED 18 05/04/2023       Anesthesia Plan    ASA Status:  2    NPO Status:  NPO Appropriate    Anesthesia Type: MAC.     - Reason for MAC: straight local not clinically adequate              Consents    Anesthesia Plan(s) and  associated risks, benefits, and realistic alternatives discussed. Questions answered and patient/representative(s) expressed understanding.     - Discussed:     - Discussed with:  Patient            Postoperative Care            Comments:                Ramiro Caldwell MD

## 2023-07-27 NOTE — ANESTHESIA CARE TRANSFER NOTE
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    Anesthesia Type:   MAC     Note:    Oropharynx: spontaneously breathing  Level of Consciousness: awake  Oxygen Supplementation: room air    Independent Airway: airway patency satisfactory and stable  Dentition: dentition unchanged  Vital Signs Stable: post-procedure vital signs reviewed and stable  Report to RN Given: handoff report given  Patient transferred to: Phase II    Handoff Report: Identifed the Patient, Identified the Reponsible Provider, Reviewed the pertinent medical history, Discussed the surgical course, Reviewed Intra-OP anesthesia mangement and issues during anesthesia, Set expectations for post-procedure period and Allowed opportunity for questions and acknowledgement of understanding      Vitals:  Vitals Value Taken Time   /74 07/27/23 0856   Temp 36.3  C (97.4  F) 07/27/23 0856   Pulse 63 07/27/23 0856   Resp 14 07/27/23 0856   SpO2 97 % 07/27/23 0856       Electronically Signed By: VINCENZO Donovan CRNA  July 27, 2023  9:02 AM

## 2023-07-27 NOTE — DISCHARGE INSTRUCTIONS
A collaboration between [facility] and [facility]  Experts in minimally invasive, targeted treatments performed using imaging guidance    Venous Access Device, Port Catheter or Tunneled Central Line Removal    Today you had your existing venous access device removed, either because it was no longer needed or because there was malfunction or infection issues.    After you go home:  Drink plenty of fluids.  Generally 6-8 (8 ounce) glasses a day is recommended.  Resume your regular diet unless otherwise ordered by a medical provider.  Keep any applied tape/gauze dressings clean and dry.  Change tape/gauze dressings if they get wet or soiled.  You may shower the following day after procedure, however cover and protect from moisture any tape/gauze dressings.  You may let water hit and run over dried skin glue, but do not scrub.  Pat the area dry after showering.  Port removal incisions are closed with absorbable suture, meaning they do not need to be removed at a later date, and a topical skin adhesive (skin glue).  This glue will wear off in 7-14 days.  Do not remove before this time.  If 14 days have passed and residual glue is present, you may gently remove it.  You may remove tape/gauze dressings after 5 days if the site looks closed and in the process of healing.  Do not apply gels, lotions, or ointments to the glue site for the first 10 days as this may cause the glue to prematurely soften and fail.  Do not perform strenuous activities or lift greater than 10 pounds for the next three days.  If there is bleeding or oozing from the procedure site, apply firm pressure to the area for 5-10 minutes.  If the bleeding continues seek medical advice at the numbers below.  Mild procedure site discomfort can be treated with an ice pack and over-the-counter pain relievers.              For 24 hours after any sedation used:  Relax and take it easy.  No strenuous  activities.  Do not drive or operate machines at home or at work.  No alcohol consumption.  Do not make any important or legal decisions.    Call our Interventional Radiology (IR) service if:  If you start bleeding from the procedure site.  If you do start to bleed from the site, lie down and hold some pressure on the site.  Our radiology provider can help you decide if you need to return to the hospital.  If you have new or worsening pain related to the procedure.  If you have concerning swelling at the procedure site.  If you develop persistent nausea or vomiting.  If you develop hives or a rash or any unexplained itching.  If you have a fever of greater than 100.5  F and chills in the first 5 days after procedure.  Any other concerns related to your procedure.      [facility]  Interventional Radiology (IR)  [address]  [address] [facility] [place] [address]    Contact Number:  [phone]  (IR control desk)  Monday - Friday 8:00 am - 4:30 pm    After hours for urgent concerns:  [phone]  After 4:30 pm Monday - Friday, Weekends and Holidays.   Ask for Interventional Radiology on-call.  Someone is available 24 hours a day.  [facility] toll free number:  [phone]

## 2023-07-28 NOTE — LETTER
"2023       RE: Karina Melvin  [address] [facility] [address]       Dear Colleague,    Thank you for referring your patient, Karina Melvin, to the [facility] at [facility]. Please see a copy of my visit note below.    Palliative Care Outpatient Clinic      Patient ID:  Medical - She has metastatic pancreatic adenocarcinoma dx 2023--liver, adrenal mets at time of dx. Maybe lung mets. Also ovarian mass which was removed; benign but pelvic washings were + for adenoca.  2023 gem/nab-paclitaxel  2023 stopped all cancer tx 2/2 side effects and lack of social support    Social - Lives alone. Sister  last year. Sibs in [facility] [place] and [place]. Brother in law nearby; most support/friends are out of state. Grew up in [place]; lived in [place] long-term, moved here to be closer with her sister who  2022. , [place].    Care Planning - Hospice discussed 2023; she knows she can enroll at any time. Her plan is to move in with a friend in [place] for EOL care at some point. She has been told px is 6-12 months without cancer tx.      History:  History gathered today from: patient, medical chart    Chart reviewed & I confirmed key details with her. Note is given to her meeting with Dr Rodriguez  where she reported wanting to stop systemic therapy entirely due to severe toxicities from chemo and lack of support; Dr Rodriguez suggested hospice. She then saw Dr Cruz and started the process of getting her port out. \"I was literally dead for 5 days with chemo--I feel much better now.\"    Pain  Lower abd--just below umbilicus  Was excruciating initially.  Better now after oopherectomy    Tramadol 50 mg -- took it q5h at one point  Now just at night  Tylenol helps  No ibu  Uses a laxative and is stooling well she says    Mood  \"This is depressing from time to time\". Sister just .    Cancer  Knows incurable  Was told 6 mo - 1 year " survival without treatment.  Planning for EOL care--  Best friend in [place]--she plans on moving in with her at some point. Near [place].  We discussed hospice in the home then.   Her sister  in the hospital and did not receive home hospice      PE: There were no vitals taken for this visit.   Wt Readings from Last 3 Encounters:   23 49 kg (108 lb)   23 49 kg (108 lb)   23 49 kg (108 lb)     Alert NAD  Clear sensorium      Data reviewed:  I reviewed recent labs and imaging, my comments:  CA 19.9 12k  LFTs nl except AlkP 295  Cr 0.5    CTs--panc mass, many liver mets, possible lung mets     database reviewed: y      Impression & Recommendations:  61 yo with metastatic pancreatic adenocarcinoma complicated by pain; not planning on receiving further active anticancer tx.    Pain  Interestingly seems mostly ovarian cyst related. She still has some pain after oopherectomy though. Likes tramadol; ok to continue for now. D/w her long term she'll need 'stronger' opioid pain medicines and she knows that. Continue APAP. OIC ok with a laxative she takes.    Care planning  She understands the terminal nature of her cancer and has discussed limited prognosis with Dr Cruz. We discussed planning for future debility and caregiving needs and she has a plan to move to [place] so her good friend can care for her. D/w her hospice at home and what that entails; let her know 1) she is eligible for hospice now if she wants -- she is not interested in extra help at the moment, and 2) I would for sure recommend enrolling in hospice at the point she moves to [place]. We can help her find an agency etc.     D/w her none of us know the time frame of when she'll start having more trouble. Right now she's on an upswing after stopping chemo.  We'll follow-up 1 mo        Over 60 minutes spent on the date of the encounter doing chart review, history and exam, patient education & counseling, documentation and other activities as noted  above.    Thank you for involving us in the patient's care.   Hunter Fournier MD / Palliative Medicine / Text me via [employer]        Again, thank you for allowing me to participate in the care of your patient.      Sincerely,    Hunter Fournier MD

## 2023-07-28 NOTE — PATIENT INSTRUCTIONS
Thank you for meeting with us in the [facility].    How to get a hold of us:  For non-urgent matters, MyChart works best.    For more urgent matters, or if you prefer not to use MyChart, call our clinic nurse Jimena Haywood at [phone].     We have an on-call number for evenings and weekends. Please call this only if you are having uncontrolled symptoms or serious side effects from your medicines: [phone].     For refills, please give us a week (5 working days) notice. We don't always have providers available everyday to do refills. If you call the day you run out of your medicine, we may not be able to refill it in time, so call 5 days in advance!

## 2023-07-28 NOTE — PROGRESS NOTES
"Palliative Care Outpatient Clinic      Patient ID:  Medical - She has metastatic pancreatic adenocarcinoma dx 2023--liver, adrenal mets at time of dx. Maybe lung mets. Also ovarian mass which was removed; benign but pelvic washings were + for adenoca.  2023 gem/nab-paclitaxel  2023 stopped all cancer tx 2/2 side effects and lack of social support    Social - Lives alone. Sister  last year. Sibs in [facility] [facility] [facility] [place] and [place]. Brother in law nearby; most support/friends are out of state. Grew up in [place]; lived in [place] long-term, moved here to be closer with her sister who  2022. , [place].    Care Planning - Hospice discussed 2023; she knows she can enroll at any time. Her plan is to move in with a friend in [place] for EOL care at some point. She has been told px is 6-12 months without cancer tx.      History:  History gathered today from: patient, medical chart    Chart reviewed & I confirmed key details with her. Note is given to her meeting with Dr Rodriguez  where she reported wanting to stop systemic therapy entirely due to severe toxicities from chemo and lack of support; Dr Rodriguez suggested hospice. She then saw Dr Cruz and started the process of getting her port out. \"I was literally dead for 5 days with chemo--I feel much better now.\"    Pain  Lower abd--just below umbilicus  Was excruciating initially.  Better now after oopherectomy    Tramadol 50 mg -- took it q5h at one point  Now just at night  Tylenol helps  No ibu  Uses a laxative and is stooling well she says    Mood  \"This is depressing from time to time\". Sister just .    Cancer  Knows incurable  Was told 6 mo - 1 year survival without treatment.  Planning for EOL care--  Best friend in [place]--she plans on moving in with her at some point. [place].  We discussed hospice in the home then.   Her sister  in the hospital and did not receive home hospice      PE: There were no vitals taken for this visit.   Wt Readings from Last 3 " Encounters:   07/27/23 49 kg (108 lb)   07/25/23 49 kg (108 lb)   07/21/23 49 kg (108 lb)     Alert NAD  Clear sensorium      Data reviewed:  I reviewed recent labs and imaging, my comments:  CA 19.9 12k  LFTs nl except AlkP 295  Cr 0.5    CTs--panc mass, many liver mets, possible lung mets     database reviewed: y      Impression & Recommendations:  61 yo with metastatic pancreatic adenocarcinoma complicated by pain; not planning on receiving further active anticancer tx.    Pain  Interestingly seems mostly ovarian cyst related. She still has some pain after oopherectomy though. Likes tramadol; ok to continue for now. D/w her long term she'll need 'stronger' opioid pain medicines and she knows that. Continue APAP. OIC ok with a laxative she takes.    Care planning  She understands the terminal nature of her cancer and has discussed limited prognosis with Dr Cruz. We discussed planning for future debility and caregiving needs and she has a plan to move to [place] so her good friend can care for her. D/w her hospice at home and what that entails; let her know 1) she is eligible for hospice now if she wants -- she is not interested in extra help at the moment, and 2) I would for sure recommend enrolling in hospice at the point she moves to [place]. We can help her find an agency etc.     D/w her none of us know the time frame of when she'll start having more trouble. Right now she's on an upswing after stopping chemo.  We'll follow-up 1 mo        Over 60 minutes spent on the date of the encounter doing chart review, history and exam, patient education & counseling, documentation and other activities as noted above.    Thank you for involving us in the patient's care.   Hunter Fournier MD / Palliative Medicine / Text me via [employer]    Virtual Visit Details    Type of service:  Video Visit   Video Start Time: 9:21 AM  Video End Time:9:46 AM  Originating Location (pt. Location): Home    Distant Location (provider location):   On-site  Platform used for Video Visit: Rich[employer]

## 2023-07-28 NOTE — NURSING NOTE
Is the patient currently in the state of [place]? YES    Visit mode:VIDEO    If the visit is dropped, the patient can be reconnected by: VIDEO VISIT: Send to e-mail at: [email or web address]    Will anyone else be joining the visit? NO      How would you like to obtain your AVS? MyChart    Are changes needed to the allergy or medication list? NO    Reason for visit: Consult

## 2023-08-13 NOTE — ED PROVIDER NOTES
[facility] ([facility] between [facility])    8/13/23       ED PROVIDER NOTE    History     Chief Complaint   Patient presents with    Abdominal Pain     HPI  Karina Melvin is a 62 year old female with past medical history significant for metastatic pancreatic adenocarcinoma (dx 05/2023), ovarian mass s/p bilateral salpingo-oophorectomy 06/27/2023 who presents to the ED for abdominal pain x 3 days. Patient reports that pain is dull and located in the epigastric region without radiation. Patient reports that pain is worse with deep inhalation and worse with eating. She reports associated bloating and feeling like she needs burp after eating. She also endorses left lower leg cramping. She denies any associated fevers, chills, nausea, vomiting, chest pain, shortness of breath or changes in bowel movements. Patient's last bowel movement was this morning, she is passing gas. Patient only eats a liquid diet due to cancer-related pain and bloating. She takes tramadol for pain at night. She recently was seen by palliative care to consider next steps as she is no longer on chemotherapy as she could not tolerate side effects.     Past Medical History  Past Medical History:   Diagnosis Date    Arthritis     Depressive disorder     Malignant neoplasm of pancreas metastatic to liver (H) 6/1/2023    Positive TB test      Past Surgical History:   Procedure Laterality Date    COLONOSCOPY N/A 8/21/2019    Procedure: Colonoscopy, With Polypectomy And Biopsy;  Surgeon: Duane, William Charles, MD;  Location: [facility] OR    COLONOSCOPY WITH CO2 INSUFFLATION N/A 8/21/2019    Procedure: COLONOSCOPY, WITH CO2 INSUFFLATION;  Surgeon: Duane, William Charles, MD;  Location: [facility] OR    INSERT PORT VASCULAR ACCESS Right 6/8/2023    Procedure: Insert port vascular access;  Surgeon: Shen Grande MD;  [place] [facility] [place]    IR CHEST PORT PLACEMENT > 5 YRS OF AGE  6/8/2023    IR LIVER BIOPSY PERCUTANEOUS  5/25/2023    IR PORT REMOVAL  "RIGHT  7/27/2023    LAPAROSCOPIC SALPINGO-OOPHORECTOMY Bilateral 6/27/2023    Procedure: Laparoscopy, Removal of both tubes and ovaries Possible open suregry;  Surgeon: Benjamin Camejo MD;  Location: [facility] OR    REMOVE PORT VASCULAR ACCESS Right 7/27/2023    Procedure: Remove port vascular access right;  Surgeon: Vijay Garcia MD;  Location: [facility] OR     metoclopramide (REGLAN) 5 MG tablet  acetaminophen (TYLENOL) 325 MG tablet  azelastine (ASTELIN) 0.1 % nasal spray  diclofenac (VOLTAREN) 1 % topical gel  diphenhydrAMINE (BENADRYL) 2 % external cream  diphenhydrAMINE (BENADRYL) 25 MG capsule  lidocaine-prilocaine (EMLA) 2.5-2.5 % external cream  prochlorperazine (COMPAZINE) 10 MG tablet  senna-docusate (SENOKOT-S/PERICOLACE) 8.6-50 MG tablet  traMADol (ULTRAM) 50 MG tablet  VITAMIN D, CHOLECALCIFEROL, PO      No Known Allergies  Family History  Family History   Problem Relation Age of Onset    Colon Cancer Mother     Lung Cancer Father     Thyroid Disease Sister     Ovarian Cancer Sister     Lung Cancer Brother      Social History   Social History     Tobacco Use    Smoking status: Never    Smokeless tobacco: Never   Vaping Use    Vaping Use: Never used   Substance Use Topics    Alcohol use: No    Drug use: No      Past medical history, past surgical history, medications, allergies, family history, and social history were reviewed with the patient. No additional pertinent items.      A medically appropriate review of systems was performed with pertinent positives and negatives noted in the HPI, and all other systems negative.    Physical Exam   BP: 125/78  Pulse: 74  Temp: 98.2  F (36.8  C)  Resp: 16  Height: 154.9 cm (5' 1\")  Weight: 47.6 kg (105 lb)  SpO2: 99 %  Physical Exam  Constitutional:       General: She is not in acute distress.  Eyes:      General: No scleral icterus.  Cardiovascular:      Rate and Rhythm: Normal rate and regular rhythm.      Heart sounds: No murmur heard.  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 On admission no blood return from port despite Cathflo heparin reaccessed and reposition. Chemotherapy was held yesterday and pt made NPO to go to IR for dye study/possible revision. Tip of catheter reported to be against vessel wall and to have a fibrin sheathe. As per Dr. Cardona it is safe to infuse chemotherapy through port. Plan to start Azacitidine today and pt to be NPO Sunday for new port placement on Monday. Due to high risk of infection pt will remain admitted through out breanna and count recovery. Effort: Pulmonary effort is normal. No respiratory distress.      Breath sounds: Normal breath sounds.   Abdominal:      General: Abdomen is flat. Bowel sounds are normal. There is no distension.      Palpations: Abdomen is soft.      Tenderness: There is abdominal tenderness in the right upper quadrant and epigastric area. There is no guarding or rebound.   Skin:     General: Skin is warm and dry.   Neurological:      General: No focal deficit present.      Mental Status: She is alert and oriented to person, place, and time.         ED Course, Procedures, & Data     ED Course as of 08/16/23 1814   Sun Aug 13, 2023   1919 Pending CT results, consideration of palliative consult and reevaluation.   2306 CT was negative for obstruction. Pt felt better after metoclopramide and feels comfortable with outpatient follow-up with palliative care. Educated about     Procedures       ED Course Selections:        EKG Interpretation:      Interpreted by Noelle Alvarado MD  Time reviewed: 5:08 pm  Symptoms at time of EKG: abd pain  Rhythm: normal sinus   Rate: Normal  Axis: Normal  Ectopy: none  Conduction: normal  ST Segments/ T Waves: No acute ischemic changes  Q Waves: septal  Comparison to prior: Unchanged    Clinical Impression: non-specific EKG              Results for orders placed or performed during the hospital encounter of 08/13/23   CT Abdomen Pelvis w Contrast     Status: None    Narrative    EXAMINATION: CT ABDOMEN PELVIS W CONTRAST, 8/13/2023 6:17 PM    INDICATION: cancer, gastric outlet obstruction?    COMPARISON STUDY: CT abdomen and pelvis 5/16/2023    TECHNIQUE: CT scan of the abdomen and pelvis was performed on  multidetector CT scanner using volumetric acquisition technique and  images were reconstructed in multiple planes with variable thickness  and reviewed on dedicated workstations.     CONTRAST: 65ml Isovue 370 injected IV without oral contrast    CT scan radiation dose is optimized to minimum requisite  dose using  automated dose modulation techniques.    FINDINGS:    Lower thorax: Subpleural nodules in the bases.    Liver: Increase in size and number of multiple liver metastases with  the largest lesion measuring 4.1 x 3.0 cm in hepatic segment VII  (series 3, image 60) No intrahepatic biliary dilatation.    Biliary System: Distended gallbladder with small pericholecystic  fluid. No significant wall thickening. No cholelithiasis.    Pancreas: Ill-defined hypoenhancing mass in the tail of the pancreas  measuring 6.3 x 3.8 cm with associated main pancreatic duct  dilatation.    Adrenal glands: Unchanged size of low-density 2 cm mass in the left  adrenal gland inferiorly. Right adrenal gland unremarkable.    Spleen: Normal.    Kidneys: No suspicious mass, obstructing calculus or hydronephrosis.    Gastrointestinal tract: Thickened stomach wall likely accentuated by  under distention. No abnormal enhancement or obstructing soft tissue  mass. Normal appendix. Normal caliber small bowel.     Pelvis: Postsurgical changes of bilateral salpingo-oophorectomy.  Normal uterus.     Mesentery/peritoneum/retroperitoneum: Soft tissue lesion to the  lateral aspect of the left psoas muscle.. No free fluid or air.    Lymph nodes: No significant lymphadenopathy.    Vasculature: Patent major abdominal vasculature.     Soft tissues: Within normal limits.    Osseous structures: No aggressive or acute osseous lesion.      Impression    IMPRESSION:   1.  No evidence of mass obstructing the gastric outlet.    2.  Increase in size and number of multiple liver metastases, largest  measuring 4.1 x 3.0 cm.    I have personally reviewed the examination and initial interpretation  and I agree with the findings.    CELINA GUERIN MD         SYSTEM ID:  [id number]   XR Chest 2 Views     Status: None    Narrative    Exam: XR CHEST 2 VIEWS, 8/13/2023 5:01 PM    Comparison: Chest CT 6/1/2023, chest x-ray 4/11/2022    History: pain with  inhalation    Findings:  PA and lateral views of the chest. Heart size is normal. No acute  airspace opacities, pneumothorax or pleural effusions. Pulmonary  vasculature, and osseous structures are normal. No acute osseous  abnormality.      Impression    Impression: No acute pulmonary pathology.     I have personally reviewed the examination and initial interpretation  and I agree with the findings.    CELINA GUERIN MD         SYSTEM ID:  [id number]   US Lower Extremity Venous Duplex Left     Status: None    Narrative    EXAMINATION: DOPPLER VENOUS ULTRASOUND OF THE LEFT LOWER EXTREMITY,  8/13/2023 4:57 PM     COMPARISON: None.    HISTORY: Left leg pain    TECHNIQUE:  Gray-scale evaluation with compression, spectral flow, and  color Doppler assessment of the deep venous system of the left leg  from groin to knee, and then at the ankle.    FINDINGS:  In the left lower extremity, the common femoral, femoral, and  popliteal veins demonstrate normal compressibility and blood flow. The  posterior tibial and peroneal veins demonstrate normal  compressibility.      Impression    IMPRESSION:  No evidence left lower extremity deep venous thrombosis.    I have personally reviewed the examination and initial interpretation  and I agree with the findings.    CELINA GUERIN MD         SYSTEM ID:  [id number]   CT Chest Pulmonary Embolism w Contrast     Status: None    Narrative    EXAMINATION: CT CHEST PULMONARY EMBOLISM W CONTRAST on 8/13/2023 6:00  PM.    INDICATION: pain with deep inhalation; hx of malignancy    COMPARISON: CT PET 6/1/2023, same day chest x-ray.    TECHNIQUE: CT imaging obtained through the chest with contrast.  Coronal and axial MIP reformatted images obtained. Three-dimensional  (3D) post-processed angiographic images were reconstructed, archived  to PACS and used in interpretation of this study.     CONTRAST: 65 ml isovue 370 IV    FINDINGS:    Lines and tubes: None.    Vessels: No central filling defect  consistent with a pulmonary  embolism.  No aortic aneurysm.     Mediastinum: Multinodular thyroid, similar to prior. Central  tracheobronchial tree is patent. Heart size is normal. No pericardial  effusion.  Normal thoracic vasculature.     Lungs: No consolidation. No pleural effusion or pneumothorax. Trace  subsegmental dependent atelectasis. New 4 mm left upper lobe pulmonary  nodule. A few areas of groundglass nodularity which are slightly  increased compared to 6/1/2023, for example, an ill-defined 8 mm  ground glass nodule left upper lobe. Increased nodularity in the  lingula, favored to represent atelectasis. Additional solid and  groundglass pulmonary nodules are not significantly changed compared  to 6/1/2023.    Bones and soft tissues: No suspicious bone findings.     Upper abdomen: Limited. Multiple hepatic hypodensities, pancreatic  tail mass, and left adrenal mass, better demonstrated on same day CT  abdomen and pelvis.      Impression    IMPRESSION:   1. No central filling defect consistent with a pulmonary embolism.   2. Possible small solid and groundglass nodules, some of which are  increased compared to 6/1/2023. Given patient's history of  biopsy-proven adenocarcinoma, cannot exclude metastatic disease.  Recommend ongoing attention on follow-up.  3. Partially visualized abdomen demonstrates known hypoattenuating  metastatic lesions in the liver, adrenal mass, and pancreatic tail  mass, better demonstrated on same day CT abdomen and pelvis.    I have personally reviewed the examination and initial interpretation  and I agree with the findings.    CELINA GUERIN MD         SYSTEM ID:  [id number]   Comprehensive metabolic panel     Status: Abnormal   Result Value Ref Range    Sodium 137 136 - 145 mmol/L    Potassium 3.8 3.4 - 5.3 mmol/L    Chloride 100 98 - 107 mmol/L    Carbon Dioxide (CO2) 26 22 - 29 mmol/L    Anion Gap 11 7 - 15 mmol/L    Urea Nitrogen 11.5 8.0 - 23.0 mg/dL    Creatinine 0.50 (L)  0.51 - 0.95 mg/dL    Calcium 9.2 8.8 - 10.2 mg/dL    Glucose 95 70 - 99 mg/dL    Alkaline Phosphatase 355 (H) 35 - 104 U/L    AST 46 (H) 0 - 45 U/L    ALT 26 0 - 50 U/L    Protein Total 7.1 6.4 - 8.3 g/dL    Albumin 4.1 3.5 - 5.2 g/dL    Bilirubin Total 0.4 <=1.2 mg/dL    GFR Estimate >90 >60 mL/min/1.73m2   Lipase     Status: Normal   Result Value Ref Range    Lipase 55 13 - 60 U/L   Troponin T, High Sensitivity     Status: Normal   Result Value Ref Range    Troponin T, High Sensitivity <6 <=14 ng/L   CBC with platelets and differential     Status: None   Result Value Ref Range    WBC Count 7.4 4.0 - 11.0 10e3/uL    RBC Count 3.96 3.80 - 5.20 10e6/uL    Hemoglobin 11.9 11.7 - 15.7 g/dL    Hematocrit 37.4 35.0 - 47.0 %    MCV 94 78 - 100 fL    MCH 30.1 26.5 - 33.0 pg    MCHC 31.8 31.5 - 36.5 g/dL    RDW 13.5 10.0 - 15.0 %    Platelet Count 212 150 - 450 10e3/uL    % Neutrophils 67 %    % Lymphocytes 19 %    % Monocytes 11 %    % Eosinophils 2 %    % Basophils 1 %    % Immature Granulocytes 0 %    NRBCs per 100 WBC 0 <1 /100    Absolute Neutrophils 5.0 1.6 - 8.3 10e3/uL    Absolute Lymphocytes 1.4 0.8 - 5.3 10e3/uL    Absolute Monocytes 0.8 0.0 - 1.3 10e3/uL    Absolute Eosinophils 0.2 0.0 - 0.7 10e3/uL    Absolute Basophils 0.1 0.0 - 0.2 10e3/uL    Absolute Immature Granulocytes 0.0 <=0.4 10e3/uL    Absolute NRBCs 0.0 10e3/uL   EKG 12-lead, tracing only     Status: None   Result Value Ref Range    Systolic Blood Pressure  mmHg    Diastolic Blood Pressure  mmHg    Ventricular Rate 70 BPM    Atrial Rate 70 BPM    OK Interval 168 ms    QRS Duration 68 ms     ms    QTc 451 ms    P Axis 87 degrees    R AXIS -22 degrees    T Axis 18 degrees    Interpretation ECG       Sinus rhythm  Possible Left atrial enlargement  Septal infarct , age undetermined  Abnormal ECG  Unconfirmed report - interpretation of this ECG is computer generated - see medical record for final interpretation  Confirmed by - EMERGENCY ROOM,  PHYSICIAN ([id number]),  CM MEYER ([id number]) on 8/13/2023 6:00:20 PM     CBC with platelets differential     Status: None    Narrative    The following orders were created for panel order CBC with platelets differential.  Procedure                               Abnormality         Status                     ---------                               -----------         ------                     CBC with platelets and d...[[id number]]                      Final result                 Please view results for these tests on the individual orders.              Results for orders placed or performed during the hospital encounter of 08/13/23   CT Abdomen Pelvis w Contrast     Status: None    Narrative    EXAMINATION: CT ABDOMEN PELVIS W CONTRAST, 8/13/2023 6:17 PM    INDICATION: cancer, gastric outlet obstruction?    COMPARISON STUDY: CT abdomen and pelvis 5/16/2023    TECHNIQUE: CT scan of the abdomen and pelvis was performed on  multidetector CT scanner using volumetric acquisition technique and  images were reconstructed in multiple planes with variable thickness  and reviewed on dedicated workstations.     CONTRAST: 65ml Isovue 370 injected IV without oral contrast    CT scan radiation dose is optimized to minimum requisite dose using  automated dose modulation techniques.    FINDINGS:    Lower thorax: Subpleural nodules in the bases.    Liver: Increase in size and number of multiple liver metastases with  the largest lesion measuring 4.1 x 3.0 cm in hepatic segment VII  (series 3, image 60) No intrahepatic biliary dilatation.    Biliary System: Distended gallbladder with small pericholecystic  fluid. No significant wall thickening. No cholelithiasis.    Pancreas: Ill-defined hypoenhancing mass in the tail of the pancreas  measuring 6.3 x 3.8 cm with associated main pancreatic duct  dilatation.    Adrenal glands: Unchanged size of low-density 2 cm mass in the left  adrenal gland inferiorly. Right adrenal gland  unremarkable.    Spleen: Normal.    Kidneys: No suspicious mass, obstructing calculus or hydronephrosis.    Gastrointestinal tract: Thickened stomach wall likely accentuated by  under distention. No abnormal enhancement or obstructing soft tissue  mass. Normal appendix. Normal caliber small bowel.     Pelvis: Postsurgical changes of bilateral salpingo-oophorectomy.  Normal uterus.     Mesentery/peritoneum/retroperitoneum: Soft tissue lesion to the  lateral aspect of the left psoas muscle.. No free fluid or air.    Lymph nodes: No significant lymphadenopathy.    Vasculature: Patent major abdominal vasculature.     Soft tissues: Within normal limits.    Osseous structures: No aggressive or acute osseous lesion.      Impression    IMPRESSION:   1.  No evidence of mass obstructing the gastric outlet.    2.  Increase in size and number of multiple liver metastases, largest  measuring 4.1 x 3.0 cm.    I have personally reviewed the examination and initial interpretation  and I agree with the findings.    CELINA GUERIN MD         SYSTEM ID:  [id number]   XR Chest 2 Views     Status: None    Narrative    Exam: XR CHEST 2 VIEWS, 8/13/2023 5:01 PM    Comparison: Chest CT 6/1/2023, chest x-ray 4/11/2022    History: pain with inhalation    Findings:  PA and lateral views of the chest. Heart size is normal. No acute  airspace opacities, pneumothorax or pleural effusions. Pulmonary  vasculature, and osseous structures are normal. No acute osseous  abnormality.      Impression    Impression: No acute pulmonary pathology.     I have personally reviewed the examination and initial interpretation  and I agree with the findings.    CELINA GUERIN MD         SYSTEM ID:  [id number]   US Lower Extremity Venous Duplex Left     Status: None    Narrative    EXAMINATION: DOPPLER VENOUS ULTRASOUND OF THE LEFT LOWER EXTREMITY,  8/13/2023 4:57 PM     COMPARISON: None.    HISTORY: Left leg pain    TECHNIQUE:  Gray-scale evaluation with compression,  spectral flow, and  color Doppler assessment of the deep venous system of the left leg  from groin to knee, and then at the ankle.    FINDINGS:  In the left lower extremity, the common femoral, femoral, and  popliteal veins demonstrate normal compressibility and blood flow. The  posterior tibial and peroneal veins demonstrate normal  compressibility.      Impression    IMPRESSION:  No evidence left lower extremity deep venous thrombosis.    I have personally reviewed the examination and initial interpretation  and I agree with the findings.    CELINA GUERIN MD         SYSTEM ID:  [id number]   CT Chest Pulmonary Embolism w Contrast     Status: None    Narrative    EXAMINATION: CT CHEST PULMONARY EMBOLISM W CONTRAST on 8/13/2023 6:00  PM.    INDICATION: pain with deep inhalation; hx of malignancy    COMPARISON: CT PET 6/1/2023, same day chest x-ray.    TECHNIQUE: CT imaging obtained through the chest with contrast.  Coronal and axial MIP reformatted images obtained. Three-dimensional  (3D) post-processed angiographic images were reconstructed, archived  to PACS and used in interpretation of this study.     CONTRAST: 65 ml isovue 370 IV    FINDINGS:    Lines and tubes: None.    Vessels: No central filling defect consistent with a pulmonary  embolism.  No aortic aneurysm.     Mediastinum: Multinodular thyroid, similar to prior. Central  tracheobronchial tree is patent. Heart size is normal. No pericardial  effusion.  Normal thoracic vasculature.     Lungs: No consolidation. No pleural effusion or pneumothorax. Trace  subsegmental dependent atelectasis. New 4 mm left upper lobe pulmonary  nodule. A few areas of groundglass nodularity which are slightly  increased compared to 6/1/2023, for example, an ill-defined 8 mm  ground glass nodule left upper lobe. Increased nodularity in the  lingula, favored to represent atelectasis. Additional solid and  groundglass pulmonary nodules are not significantly changed compared  to  6/1/2023.    Bones and soft tissues: No suspicious bone findings.     Upper abdomen: Limited. Multiple hepatic hypodensities, pancreatic  tail mass, and left adrenal mass, better demonstrated on same day CT  abdomen and pelvis.      Impression    IMPRESSION:   1. No central filling defect consistent with a pulmonary embolism.   2. Possible small solid and groundglass nodules, some of which are  increased compared to 6/1/2023. Given patient's history of  biopsy-proven adenocarcinoma, cannot exclude metastatic disease.  Recommend ongoing attention on follow-up.  3. Partially visualized abdomen demonstrates known hypoattenuating  metastatic lesions in the liver, adrenal mass, and pancreatic tail  mass, better demonstrated on same day CT abdomen and pelvis.    I have personally reviewed the examination and initial interpretation  and I agree with the findings.    CELINA GUERIN MD         SYSTEM ID:  [id number]   Comprehensive metabolic panel     Status: Abnormal   Result Value Ref Range    Sodium 137 136 - 145 mmol/L    Potassium 3.8 3.4 - 5.3 mmol/L    Chloride 100 98 - 107 mmol/L    Carbon Dioxide (CO2) 26 22 - 29 mmol/L    Anion Gap 11 7 - 15 mmol/L    Urea Nitrogen 11.5 8.0 - 23.0 mg/dL    Creatinine 0.50 (L) 0.51 - 0.95 mg/dL    Calcium 9.2 8.8 - 10.2 mg/dL    Glucose 95 70 - 99 mg/dL    Alkaline Phosphatase 355 (H) 35 - 104 U/L    AST 46 (H) 0 - 45 U/L    ALT 26 0 - 50 U/L    Protein Total 7.1 6.4 - 8.3 g/dL    Albumin 4.1 3.5 - 5.2 g/dL    Bilirubin Total 0.4 <=1.2 mg/dL    GFR Estimate >90 >60 mL/min/1.73m2   Lipase     Status: Normal   Result Value Ref Range    Lipase 55 13 - 60 U/L   Troponin T, High Sensitivity     Status: Normal   Result Value Ref Range    Troponin T, High Sensitivity <6 <=14 ng/L   CBC with platelets and differential     Status: None   Result Value Ref Range    WBC Count 7.4 4.0 - 11.0 10e3/uL    RBC Count 3.96 3.80 - 5.20 10e6/uL    Hemoglobin 11.9 11.7 - 15.7 g/dL    Hematocrit 37.4 35.0 -  47.0 %    MCV 94 78 - 100 fL    MCH 30.1 26.5 - 33.0 pg    MCHC 31.8 31.5 - 36.5 g/dL    RDW 13.5 10.0 - 15.0 %    Platelet Count 212 150 - 450 10e3/uL    % Neutrophils 67 %    % Lymphocytes 19 %    % Monocytes 11 %    % Eosinophils 2 %    % Basophils 1 %    % Immature Granulocytes 0 %    NRBCs per 100 WBC 0 <1 /100    Absolute Neutrophils 5.0 1.6 - 8.3 10e3/uL    Absolute Lymphocytes 1.4 0.8 - 5.3 10e3/uL    Absolute Monocytes 0.8 0.0 - 1.3 10e3/uL    Absolute Eosinophils 0.2 0.0 - 0.7 10e3/uL    Absolute Basophils 0.1 0.0 - 0.2 10e3/uL    Absolute Immature Granulocytes 0.0 <=0.4 10e3/uL    Absolute NRBCs 0.0 10e3/uL   EKG 12-lead, tracing only     Status: None   Result Value Ref Range    Systolic Blood Pressure  mmHg    Diastolic Blood Pressure  mmHg    Ventricular Rate 70 BPM    Atrial Rate 70 BPM    IA Interval 168 ms    QRS Duration 68 ms     ms    QTc 451 ms    P Axis 87 degrees    R AXIS -22 degrees    T Axis 18 degrees    Interpretation ECG       Sinus rhythm  Possible Left atrial enlargement  Septal infarct , age undetermined  Abnormal ECG  Unconfirmed report - interpretation of this ECG is computer generated - see medical record for final interpretation  Confirmed by - EMERGENCY ROOM, PHYSICIAN ([id number]),  CM MEYRE ([id number]) on 8/13/2023 6:00:20 PM     CBC with platelets differential     Status: None    Narrative    The following orders were created for panel order CBC with platelets differential.  Procedure                               Abnormality         Status                     ---------                               -----------         ------                     CBC with platelets and d...[[id number]]                      Final result                 Please view results for these tests on the individual orders.     Medications   HYDROmorphone (PF) (DILAUDID) injection 0.5 mg (0.5 mg Intravenous $Given 8/13/23 4943)   lactated ringers BOLUS 1,000 mL (0 mLs Intravenous Stopped 8/13/23  1730)   iopamidol (ISOVUE-370) solution 65 mL (65 mLs Intravenous $Given 8/13/23 1752)   sodium chloride (PF) 0.9% PF flush 90 mL (90 mLs Intravenous $Given 8/13/23 1752)   metoclopramide (REGLAN) injection 5 mg (5 mg Intravenous $Given 8/13/23 2054)     Labs Ordered and Resulted from Time of ED Arrival to Time of ED Departure   COMPREHENSIVE METABOLIC PANEL - Abnormal       Result Value    Sodium 137      Potassium 3.8      Chloride 100      Carbon Dioxide (CO2) 26      Anion Gap 11      Urea Nitrogen 11.5      Creatinine 0.50 (*)     Calcium 9.2      Glucose 95      Alkaline Phosphatase 355 (*)     AST 46 (*)     ALT 26      Protein Total 7.1      Albumin 4.1      Bilirubin Total 0.4      GFR Estimate >90     LIPASE - Normal    Lipase 55     TROPONIN T, HIGH SENSITIVITY - Normal    Troponin T, High Sensitivity <6     CBC WITH PLATELETS AND DIFFERENTIAL    WBC Count 7.4      RBC Count 3.96      Hemoglobin 11.9      Hematocrit 37.4      MCV 94      MCH 30.1      MCHC 31.8      RDW 13.5      Platelet Count 212      % Neutrophils 67      % Lymphocytes 19      % Monocytes 11      % Eosinophils 2      % Basophils 1      % Immature Granulocytes 0      NRBCs per 100 WBC 0      Absolute Neutrophils 5.0      Absolute Lymphocytes 1.4      Absolute Monocytes 0.8      Absolute Eosinophils 0.2      Absolute Basophils 0.1      Absolute Immature Granulocytes 0.0      Absolute NRBCs 0.0       CT Abdomen Pelvis w Contrast   Final Result   IMPRESSION:    1.  No evidence of mass obstructing the gastric outlet.      2.  Increase in size and number of multiple liver metastases, largest   measuring 4.1 x 3.0 cm.      I have personally reviewed the examination and initial interpretation   and I agree with the findings.      CELINA GUERIN MD            SYSTEM ID:  [id number]      CT Chest Pulmonary Embolism w Contrast   Final Result   IMPRESSION:    1. No central filling defect consistent with a pulmonary embolism.    2. Possible small  solid and groundglass nodules, some of which are   increased compared to 6/1/2023. Given patient's history of   biopsy-proven adenocarcinoma, cannot exclude metastatic disease.   Recommend ongoing attention on follow-up.   3. Partially visualized abdomen demonstrates known hypoattenuating   metastatic lesions in the liver, adrenal mass, and pancreatic tail   mass, better demonstrated on same day CT abdomen and pelvis.      I have personally reviewed the examination and initial interpretation   and I agree with the findings.      CELINA GUERIN MD            SYSTEM ID:  [id number]      XR Chest 2 Views   Final Result   Impression: No acute pulmonary pathology.       I have personally reviewed the examination and initial interpretation   and I agree with the findings.      CELINA GUERIN MD            SYSTEM ID:  [id number]      US Lower Extremity Venous Duplex Left   Final Result   IMPRESSION:   No evidence left lower extremity deep venous thrombosis.      I have personally reviewed the examination and initial interpretation   and I agree with the findings.      CELINA GUERIN MD            SYSTEM ID:  [id number]                 Assessment & Plan    Karina Melvin is a 62 year old female with past medical history significant for metastatic pancreatic adenocarcinoma (dx 05/2023), ovarian mass s/p bilateral salpingo-oophorectomy 06/27/2023 who presents to the ED for abdominal pain x 72 hours. Patient endorses dull epigastric pain with associated bloating and burping that's precipitated by eating. Patient in no acute distress, afebrile and hemodynamically stable on presentation with epigastric tenderness to palpation, no rebound or guarding at this time. Initial differential concerning for gastric outlet obstruction in setting of disease progression vs small bowel obstruction in setting of recent gyne surgery on 6/27. Will pursue CT abdomen pelvis for further evaluation. Given that patient is also experiencing left lower  extremity cramping with pleuritic chest pain in setting of malignancy low threshold to pursue PE work-up. Will pursue troponin, EKG, chest x-ray, DVT ultrasound and CT PE at this time. Patient's pain is well controlled now as she received fentanyl during transfer. Will provide dilaudid 0.5 mg prn, zofran prn, and LR bolus given poor oral intake in setting of abdominal pain. CT PE negative for pulmonary embolism, however new pulmonary nodules seen and likely contributing to pleuritic pain. CT abdomen/pelvis without findings of gastric outlet obstruction, however notable thickening of gastric wall and progression of metastatic liver disease seen. Will trial metoclopramide for symptoms of abdominal distension and bloating at this time. Results shared with patient regarding imaging demonstrating progression of metastatic disease with new nodules on liver and lungs. Will discharge patient with short course of metoclopramide x 7 days for symptomatic support. Patient feels that her pain is well controlled with tramadol at home, and therefore will not prescribe any additional pain medications at this time. Recommended follow-up with palliative in outpatient setting for further management of abdominal discomfort and pleuritic pain likely related to progression of disease. Return precautions provided.     Ramonita Dalal, PGY-1  Internal Medicine     I have reviewed the nursing notes. I have reviewed the findings, diagnosis, plan and need for follow up with the patient.    Discharge Medication List as of 8/13/2023 11:15 PM        START taking these medications    Details   metoclopramide (REGLAN) 5 MG tablet Take 1 tablet (5 mg) by mouth 4 times daily (before meals and nightly) for 7 days, Disp-28 tablet, R-0, Local Print             Final diagnoses:   Metastatic neoplastic disease (H)   Malignant neoplasm of pancreas, unspecified location of malignancy (H)       Noelle Alvarado  [facility] EMERGENCY  DEPARTMENT  8/13/2023    --    ED Attending Physician Attestation    I Noelle Alvarado MD, cared for this patient with the Resident. I have performed a history and physical examination of the patient and discussed management with the resident. I reviewed the resident's documentation above and agree with the documented findings and plan of care.      Critical care was not performed.     Medical Decision Making  The patient's presentation was of high complexity (an acute health issue posing potential threat to life or bodily function).    The patient's evaluation involved:  review of external note(s) from 2 sources (see separate area of note for details)  ordering and/or review of 3+ test(s) in this encounter (see separate area of note for details)  review of 3+ test result(s) ordered prior to this encounter (see separate area of note for details)  discussion of management or test interpretation with another health professional (see separate area of note for details)    The patient's management necessitated moderate risk (prescription drug management including medications given in the ED), high risk (a parenteral controlled substance), high risk (a decision regarding hospitalization), and further care after sign-out to oncoming ED provider  (see their note for further management).          Noelle Alvarado MD  Emergency Medicine        Noelle Alvarado MD  08/16/23 2812

## 2023-08-13 NOTE — ED NOTES
Bed: ED25  Expected date: 8/13/23  Expected time: 3:20 PM  Means of arrival: Ambulance  Comments:  [id number]  Oncology, sob, and chest pressure

## 2023-08-13 NOTE — ED TRIAGE NOTES
Pt BIBA from apartment. Hx pancreatic cancer not currently perusing treatment. Epigastric pain SOB. On liquid and soft diet. 25 mcg fentanyl. .

## 2023-08-14 NOTE — PROGRESS NOTES
Clinic Care Coordination Contact  Community Health Worker Initial Outreach    CHW Initial Information Gathering:  Referral Source: ED Follow-Up  CHW Additional Questions  [facility] active?: Yes    Patient accepts CC: No, patient declined at this time. Patient is requesting a letter to provider to her  regarding her health. CHW connected pt with her primary clinic for this request. She has no other needs at this time. Patient will be sent Care Coordination introduction letter for future reference.     Felicita Choi  Community Health Worker  Connected Care Resource [facility]

## 2023-08-14 NOTE — TELEPHONE ENCOUNTER
Forms/Letter Request    Type of form/letter:  Patient needs letter to get out of her lease. Due to her diagnosis.  She has stopped her cancer treatments and can't live by herself. She would like to move to [place] in the next couple of months to be with family/friends.     Have you been seen for this request: No    Do we have the form/letter: No    When is form/letter needed by: asap    How would you like the form/letter returned:

## 2023-08-14 NOTE — LETTER
August 14, 2023      Karina Melvin  [address] [address]        To Whom It May Concern,     Karina Melvin has been diagnosed with a terminal illness. She is not able to live by herself any longer and needs to move out of state to be with her family. She is not able to continue her lease where she is. Please release her from her lease.         Sincerely,        VINCENZO Whipple CNP

## 2023-08-14 NOTE — DISCHARGE INSTRUCTIONS
You were seen in the ED today for abdominal pain and pain with breathing. We scanned your lungs and abdomen which showed that you have more nodules on your liver and new nodules on your lungs possibly related to your cancer. Since you are having symptoms of bloating and distension after eating, we prescribed you a medication called reglan to aid with symptoms. We would like you to call your palliative care doctors to discuss next steps given that your symptoms are likely related to your cancer. Please return to the ED if your symptoms become worse.

## 2023-08-14 NOTE — LETTER
Karina Melvin  [address] [address]    Dear Karina Melvin,      I am a team member within the [facility] Care Resource Center with [facility]. I recently contacted you to ensure you were doing well following a recent visit within our health system. I also wanted to take this chance to introduce Clinic Care Coordination.     Below is a description of Clinic Care Coordination and how this team can further assist you:       The Clinic Care Coordination team is made up of a Registered Nurse, , Financial Resource Worker, and a Community Health Worker who understand and can help navigate the health care system. The goal of clinic care coordination is to help you manage your health, improve access to care, and achieve optimal health outcomes. They work alongside your provider to assist you in determining your health and social needs, obtain health care and community resources, and provide you with necessary information and education. Clinic Care Coordination can work with you through any barriers and develop a care plan that helps coordinate and strengthen the relationship between you and your care team.    If you wish to connect with the Clinic Care Coordination Team, please let your [facility] Primary Care Provider or Clinic Care Team know and they can place a referral. The Clinic Care Coordination team will then reach out by phone to further support you.    We are focused on providing you with the highest-quality healthcare experience possible.    Sincerely,   Your care team with [facility]

## 2023-08-21 NOTE — LETTER
[facility] [address] OBSERVATION [address] [address]  [phone]    FACSIMILE TRANSMITTAL SHEET       From:  ROSALINDA Colin, [facility] ED/Observation  [facility]  Phone: [phone]  Pager: [phone]    Confidentiality Notice: The document(s) accompanying this fax may contain protected health information under state and federal law and is legally privileged. The information is only for the use of the intended recipient named above. The recipient or person responsible for delivering this information is prohibited by law from disclosing this information without proper authorization to any other party, unless required to do so by law or regulation. If you are not the intended recipient, you are hereby notified that any review, dissemination, distribution, or copying of this message is strictly prohibited. If you have received this communication in error, please notify us immediately by telephone to arrange for the return or destruction of the faxed document. Thank you

## 2023-08-21 NOTE — TELEPHONE ENCOUNTER
Received telephone call from patient requesting refill of Tramadol. Patient is requesting a 90 day supply because she is moving to [place] and will take her some time to find a new provider.    Last refill: 7/30/2023  Last office visit: 7/28/2023  Scheduled for follow up Moving to [place]     Will route request to MD for review.     Reviewed [place]  Report.

## 2023-08-21 NOTE — LETTER
[facility] [address] OBSERVATION [address] [address]  [phone]    FACSIMILE TRANSMITTAL SHEET       From:  ROSALINDA Colin, [facility] ED/Observation  [facility]  Phone: [phone]  Pager: [phone]    Confidentiality Notice: The document(s) accompanying this fax may contain protected health information under state and federal law and is legally privileged. The information is only for the use of the intended recipient named above. The recipient or person responsible for delivering this information is prohibited by law from disclosing this information without proper authorization to any other party, unless required to do so by law or regulation. If you are not the intended recipient, you are hereby notified that any review, dissemination, distribution, or copying of this message is strictly prohibited. If you have received this communication in error, please notify us immediately by telephone to arrange for the return or destruction of the faxed document. Thank you

## 2023-08-21 NOTE — TELEPHONE ENCOUNTER
Patient called to request if scheduled appointment 8/22/23 can be changed to 08/21/23 or if can be seen at an earlier time or virtual visit on 8/22/23 due to patient has another appointment at 1 pm tomorrow.     Writer reviewed schedule and informed patient writer unable to make reschedule as as there are no available appointments slots for requested changes. Advise can changed to a different date than requested, patient declined and states will keep and continue with appointment tomorrow.     MEENA Osman  [facility]

## 2023-08-21 NOTE — LETTER
[facility] [address] OBSERVATION [address] [address]  [phone]    FACSIMILE TRANSMITTAL SHEET    TO: Intake   COMPANY: [facility]  FAX NUMBER: [phone]  PHONE NUMBER: [phone]     FROM: ROSALINDA Odom, [facility]CRYSTAL  ED/OBS   [facility]  PHONE: [phone]  FAX: [phone]  DATE: 2023       _____URGENT _____REVIEW ONLY _____PLEASE COMMENT____PLEASE REPLY    NOTES/COMMENTS: Initial Hospice Referral for ST ( 1960)                                      IF YOU DID NOT RECEIVE THE CORRECT NUMBER OF PAGES OR THE FAX DID NOT COME THROUGH CLEARLY, PLEASE CALL THE SENDER     CONFIDENTIALITY STATEMENT: Confidential information that may accompany this transmission contains protected health information under state and federal law and is legally privileged. This information is intended only for the use of the individual or entity named above and may be used only for carrying out treatment, payment or other healthcare operations. The recipient or person responsible for delivering this information is prohibited by law from disclosing this information without proper authorization to any other party, unless required to do so by law or regulation. If you are not the intended recipient, you are hereby notified that any review, dissemination, distribution, or copying of this message is strictly prohibited. If you have received this communication in error, please destroy the materials and contact us immediately by calling the number listed above. No response indicates that the information was received by the appropriate authorized party

## 2023-08-22 PROBLEM — E86.0 DEHYDRATION: Status: ACTIVE | Noted: 2023-01-01

## 2023-08-22 PROBLEM — R11.2 NAUSEA AND VOMITING, UNSPECIFIED VOMITING TYPE: Status: ACTIVE | Noted: 2023-01-01

## 2023-08-22 PROBLEM — K85.90 ACUTE PANCREATITIS, UNSPECIFIED COMPLICATION STATUS, UNSPECIFIED PANCREATITIS TYPE: Status: ACTIVE | Noted: 2023-01-01

## 2023-08-22 PROBLEM — R10.13 ABDOMINAL PAIN, EPIGASTRIC: Status: ACTIVE | Noted: 2023-01-01

## 2023-08-22 PROBLEM — R53.1 WEAKNESS GENERALIZED: Status: ACTIVE | Noted: 2023-01-01

## 2023-08-22 NOTE — H&P
[facility]    History and Physical - Hospitalist Service, GOLD TEAM        Date of Admission:  8/21/2023    Assessment & Plan      Karina Melvin is a 62 year old female admitted on 8/21/2023. She has a past medical history of metastatic pancreatic adenocarcinoma and ovarian mass s/p bilateral salpingo-oophorectomy. She presented to the ED with a three day history of abdominal pain and nausea. No vomiting or diarrhea. Denies fevers, chest pain or shortness of breath.       # Abdominal pain  # Nausea  # Metastatic pancreatic cancer  Patient was diagnosed with metastatic pancreatic adenocarcinoma on 05/2023. Also diagnosed with an ovarian mass and is s/p bilateral salpingo-oophorectomy 06/27/2023. Her primary oncologist is Dr. Cruz and she recently stopped palliative chemotherapy. Recent CT reveals a progression of metastatic disease. She has had decreased PO intake over the last few days pickard to sharp abdominal pain when she eats. Alk phos 485 and AST 76, ALT 48. Lipase 84, lactic acid 0.8.  - Palliative care consult  - Dilaudid and oxycodone PRN  - Tramadol PRN (patient's request- she prefers non narcotic if able)  - Bowel regimen- senna and miralax   - NS 75 ml/hr until adequate PO intake         Diet: Combination Diet Regular Diet Adult  DVT Prophylaxis: Pneumatic Compression Devices  Donnelly Catheter: Not present  Lines: None     Cardiac Monitoring: None  Code Status: No CPR- Do NOT Intubate      Disposition Plan      Expected Discharge Date: 08/24/2023                The patient's care was discussed with the Attending Physician, Dr. Patterson .    VINCENZO Field CNP  Hospitalist Service, GOLD TEAM   [facility]  Securely message with [employer] (more info)  Text page via [facility] Paging/Directory   See signed in provider for up to date coverage  information    ______________________________________________________________________    Chief Complaint   Abdominal paon    History is obtained from the patient    History of Present Illness   Karina Melvin is a 62 year old female who has a recent diagnosis of metastatic pancreatic cancer. She states that she was relatively healthy and in May had a CT due to abdominal pain and fatigue. Malignancy was found at that time and she was referred to oncology. She attempted chemo but did not like the way it made her feel and recent CT showed progression of disease. She has elected to stop chemo and was planning to move to [place] to be closer to friends this Thursday.      Past Medical History    Past Medical History:   Diagnosis Date    Arthritis     Depressive disorder     Malignant neoplasm of pancreas metastatic to liver (H) 6/1/2023    Positive TB test        Past Surgical History   Past Surgical History:   Procedure Laterality Date    COLONOSCOPY N/A 8/21/2019    Procedure: Colonoscopy, With Polypectomy And Biopsy;  Surgeon: Duane, William Charles, MD;  Location: [facility] OR    COLONOSCOPY WITH CO2 INSUFFLATION N/A 8/21/2019    Procedure: COLONOSCOPY, WITH CO2 INSUFFLATION;  Surgeon: Duane, William Charles, MD;  Location: [facility] OR    INSERT PORT VASCULAR ACCESS Right 6/8/2023    Procedure: Insert port vascular access;  Surgeon: Shen Grande MD;  Location: [facility] OR    IR CHEST PORT PLACEMENT > 5 YRS OF AGE  6/8/2023    IR LIVER BIOPSY PERCUTANEOUS  5/25/2023    IR PORT REMOVAL RIGHT  7/27/2023    LAPAROSCOPIC SALPINGO-OOPHORECTOMY Bilateral 6/27/2023    Procedure: Laparoscopy, Removal of both tubes and ovaries Possible open suregry;  Surgeon: Benjamin Camejo MD;  Location: [facility] OR    REMOVE PORT VASCULAR ACCESS Right 7/27/2023    Procedure: Remove port vascular access right;  Surgeon: Vijay Garcia MD;  Location: [facility]       Prior to Admission Medications   Prior to Admission Medications    Prescriptions Last Dose Informant Patient Reported? Taking?   VITAMIN D, CHOLECALCIFEROL, PO  Self Yes No   Sig: Take 5,000 Units by mouth daily   Patient not taking: Reported on 7/11/2023   acetaminophen (TYLENOL) 325 MG tablet   No No   Sig: Take 2 tablets (650 mg) by mouth every 4 hours as needed for other (mild pain)   azelastine (ASTELIN) 0.1 % nasal spray  Self No No   Sig: Spray 1 spray into both nostrils 2 times daily   Patient not taking: Reported on 7/11/2023   diclofenac (VOLTAREN) 1 % topical gel  Self No No   Sig: Apply 4 g topically 4 times daily   Patient not taking: Reported on 7/11/2023   diphenhydrAMINE (BENADRYL) 2 % external cream   No No   Sig: Apply topically 3 times daily as needed for itching   Patient not taking: Reported on 7/11/2023   diphenhydrAMINE (BENADRYL) 25 MG capsule   Yes No   Sig: Take 25 mg by mouth every 6 hours as needed for itching or allergies   Patient not taking: Reported on 7/11/2023   lidocaine-prilocaine (EMLA) 2.5-2.5 % external cream   No No   Sig: Apply topically as needed for moderate pain   Patient not taking: Reported on 7/11/2023   metoclopramide (REGLAN) 5 MG tablet   No No   Sig: Take 1 tablet (5 mg) by mouth 4 times daily (before meals and nightly) for 7 days   prochlorperazine (COMPAZINE) 10 MG tablet   No No   Sig: Take 1 tablet (10 mg) by mouth every 6 hours as needed for nausea or vomiting   Patient not taking: Reported on 7/11/2023   senna-docusate (SENOKOT-S/PERICOLACE) 8.6-50 MG tablet   No No   Sig: Take 1-2 tablets by mouth 2 times daily Take while on oral narcotics to prevent or treat constipation.   Patient not taking: Reported on 7/11/2023   traMADol (ULTRAM) 50 MG tablet   No No   Sig: Take 1 tablet (50 mg) by mouth 4 times daily as needed for severe pain      Facility-Administered Medications: None        Review of Systems    The 10 point Review of Systems is negative other than noted in the HPI or here.      Physical Exam   Vital Signs: Temp:  98.5  F (36.9  C) Temp src: Oral BP: 117/75 Pulse: 74   Resp: 16 SpO2: 98 % O2 Device: None (Room air)    Weight: 109 lbs 0 oz    Physical Exam   Constitutional:   Thin, resting comfortably   Cardiovascular: Regular rate and rhythm without murmurs or gallops  Pulmonary/Chest: Clear to auscultation bilaterally, with no wheezes or retractions. No respiratory distress.  GI: Good bowel sounds.  Tender, non-distended, with no guarding, no rebound, no peritoneal signs.   Musculoskeletal:  No edema or clubbing   Skin: Skin is warm and dry. No rash noted.   Neurological: Alert and oriented to person, place, and time. Nonfocal exam  Psychiatric:  Normal mood and affect.      Medical Decision Making     55 MINUTES SPENT BY ME on the date of service doing chart review, history, exam, documentation & further activities per the note.      Data     I have personally reviewed the following data over the past 24 hrs:    8.0  \   11.8   / 257     136 99 11.9 /  160 (H)   3.9 26 0.49 (L) \     ALT: 48 AST: 76 (H) AP: 485 (H) TBILI: 0.4   ALB: 3.8 TOT PROTEIN: 7.1 LIPASE: 84 (H)     Trop: <6 BNP: 140     Procal: N/A CRP: N/A Lactic Acid: 0.8       INR:  1.02 PTT:  30   D-dimer:  N/A Fibrinogen:  N/A       Imaging results reviewed over the past 24 hrs:   Recent Results (from the past 24 hour(s))   XR Chest Port 1 View    Narrative    EXAM: XR CHEST PORT 1 VIEW  LOCATION: [facility]  DATE: 8/21/2023    INDICATION: epigastric and chest pain  COMPARISON: 08/13/2023      Impression    IMPRESSION: Stable cardiomediastinal silhouette. No focal consolidation or pleural effusion. Mild left basilar fibroatelectasis.   CT Abdomen Pelvis w Contrast    Narrative    EXAM: CT ABDOMEN PELVIS W CONTRAST  LOCATION: [facility]  DATE: 8/22/2023    INDICATION: met pancreatic cancer with epigastric pain and fullness with n v  eval for obstructive cause  COMPARISON:  08/13/2023  TECHNIQUE: CT scan of the abdomen and pelvis was performed following injection of IV contrast. Multiplanar reformats were obtained. Dose reduction techniques were used.  CONTRAST: 66 ml isovue 370    FINDINGS:   LOWER CHEST: Mild basilar atelectasis.    HEPATOBILIARY: Diffuse hepatic metastases again seen. Gallbladder wall thickening may be exaggerated by contraction of the gallbladder.    PANCREAS: Ill-defined mass of the pancreatic tail more. Mild dilatation of the main pancreatic duct. Slight stranding of fat adjacent to the pancreatic tail.    SPLEEN: Normal.    ADRENAL GLANDS: 1.9 cm left adrenal mass unchanged.    KIDNEYS/BLADDER: Normal.    BOWEL: Normal caliber. Normal appendix.    LYMPH NODES: Ill-defined peripancreatic lymph nodes similar.    VASCULATURE: Stable. Left abdominal varices.    PELVIC ORGANS: Normal.    MUSCULOSKELETAL: Degenerative change osseous structures.      Impression    IMPRESSION:   1.  Ill-defined pancreatic mass and hepatic metastases similar to prior.  2.  Stranding of fat adjacent to the pancreatic tail. Correlate for superimposed pancreatitis.  3.  Left adrenal lesion similar.

## 2023-08-22 NOTE — PROGRESS NOTES
"Shift: 9332-7403  VS: /76   Pulse 75   Temp 99  F (37.2  C) (Oral)   Resp 16   Ht 1.549 m (5' 1\")   Wt 49.4 kg (109 lb)   SpO2 97%   BMI 20.60 kg/m     Pain: endoreses 5/10 epigastric pain, managed with PRN tramadol ( Pt prefers this over other PRNs)  Neuro: A&Ox4.  Cardiac: WDL  Respiratory: WDL  Diet/Appetite: Tolerating regular diet. Poor appetite, ate a few bites of oatmeal and applesauce  /GI: Adequate urine output. No BM this shift  LDA's: R ( SL) and L PIV (infusing NS at 75 ml/hr)  Skin: No new deficits noted.  Activity: Independent     Plan: symptom management, SW and Palliative care consult  "

## 2023-08-22 NOTE — CONSULTS
"Palliative Care Consultation Note  [facility]      Patient: Karina Melvin  Date of Admission:  8/21/2023    Requesting Clinician / Team: Anu Virgen APRN CNP  / [facility]  Reason for consult: Pain management  Patient and family support       Recommendations & Counseling     GOALS OF CARE:   Comfort focused  - Estrella has already decided not to pursue cancer treatment but had not yet enrolled in hospice care, had planned to move in with friends .  Estrella and friend's current focus is on pain management and planning for care outside the hospital with hospice locally before the friends return to [place] on Thursday.    We did not discuss goals of care re: this hospitalization.  Will need more conversation if acute reversible cause is found for pain other than r/t to cancer progression.  Once pain manages and planning has begun for disposition, consider further discussion on prognosis and what to expect if patient is open to this.     ADVANCE CARE PLANNING:  No health care directive on file. Per  informed consent policy, next of kin should be involved if patient becomes unable.  There is no POLST form on file, recommend to complete prior to DC.  Code status: No CPR- Do NOT Intubate    MEDICAL MANAGEMENT:   #Pain,acute on chronic cancer related pain with known pancreatic mass vs other acute process.   Opioids: recommend transition to oxycodone 5mg q 3 hrs PRN  from Tramadol.  Acetaminophen (Tylenol)1000mg BID Scheduled- discussed with patient benefits of multimodal therapy. She is hesitant to use as it has not been helpful alone.    Other medicines for pain: consider hydroxazine 25mg Q 3 hours PRN \"pain flare\"      #Constipation,Medication adverse effect opioids   Continue Sennoside (Senokot) 1-2 tabs BID as PTA    PSYCHOSOCIAL/SPIRITUAL SUPPORT:  Lives alone, was planning to move to [place] with her friends on Thursday.  New pain and inability to eat has been a " shock and they have decided staying in [place] and seeking 24/7 support would be a better option.  Appreciate support of ED CRYSTAL Mathews in support of above.     Palliative Care will continue to follow. Thank you for the consult and allowing us to aid in the care of Karina Melvin.      VINCENZO Dan CNP  Securely message with [employer] (more info)  Text page via [facility] Paging/Directory       Assessment      Karina Melvin is a 62 year old female with a past medical history of metastatic pancreatic adenocarcinoma (dx 05/2023), ovarian mass s/p bilateral salpingo-oophorectomy 06/27/2023  who presented on 8/21 with epigastric and right sided abdominal pain that has been ongoing for the last 3 days.   Pain is in epigastrium with mild area of pain in R Lower abdomen relieved with pressure. It is similar to known cancer related pain, sharp and constant. There is mild allodynia with palpation. It is worse with eating, Estrella has not eaten much in the past 3 days.  Usual Tramadol Q HS has been unhelpful even with increasing to q 5 hours PRN.   Tylenol has been unhelpful. PEr primary team, there is no obvious other cause for pain other than cancer.     Today, the patient was seen for:  Pain management  Assist with transition to hospice.    Palliative Care Summary:   Met with Estrella, her brother in law Layton Dinh and Friends Tong and Keyona Lemos visiting from [place].     Estrella follows with Dr. Shantanu collins.    I introduced our role inpatient as an extra layer of support and how we help patients and families dealing with serious, potentially life-limiting illnesses. I explained the composition of the palliative care team.  Palliative care helps patients and families navigate their care while focusing on the whole person; providing emotional, social and spiritual support  Palliative care often assists with symptom management, information sharing about what to expect from the illness, available treatment options and  what effect those options may have on the disease course, and provide effective communication and caring support.  They share the story of plans to travel together to [place] to seek out hospice care their with friends support however increasing pain, inability to eat and increased weakness now makes them consider this is not a good plan and biggest concern is finding 24 hour support for Estrella here in [place] with hospice care prior to their scheduled flight back to [place] on Thursday.    Estrella tells me of her pain and other symptoms but as main focus is on care planning did not discuss much detail about hospice or symptom management over time.    Prognosis, Goals, & Planning:    Functional Status just prior to this current hospitalization:  ECOG1 (Restricted in physically strenuous activity but ambulatory and able to carry out work of a light or sedentary nature)    Prognosis, Goals, and/or Advance Care Planning:  Education provided regarding hospice philosophy, prognostic,and eligibility criteria. Discussed what services are provided and those that are not,  Discussed common misconceptions. We explored the various disposition options where they can receive hospice care (home, residential hospice homes, LTC with hospice) including subsequent financial and familial implications. Discussed typical anticipated timing of discharge.    Code Status was addressed today:   No DNR/DNI per chart and this is consistent with goals.     Patient's decision making preferences: shared with support from loved ones        Patient has decision-making capacity today for complex decisions:Intact            Coping, Meaning, & Spirituality:   Mood, coping, and/or meaning in the context of serious illness were addressed today: Yes  Denies anxiety, feels well supported by friends.  Requests  support.     Social:   Lives alone. Sister  last year. Sibs in [facility] and [place]. Brother in law in [facility], most support/friends are out of state. Grew  up in [place]; lived in [place] long-term, moved here to be closer with her sister who  2022. , hairdresser.    Medications:  I have reviewed this patient's medication profile and medications from this hospitalization. Notable medications:Tramadol 50mg QID PRN pain, using chronically q HS, increased over last 3 days.     ROS:  Comprehensive ROS is reviewed and is negative except as here & per HPI.    Physical Exam   Vital Signs with Ranges  Temp:  [98.5  F (36.9  C)-99  F (37.2  C)] 99  F (37.2  C)  Pulse:  [68-75] 75  Resp:  [16] 16  BP: (117-133)/(75-89) 133/76  SpO2:  [97 %-98 %] 97 %  109 lbs 0 oz    GEN:  Alert, oriented x 3, appears comfortable, NAD.  HEENT:  Normocephalic/atraumatic, no scleral icterus, no nasal discharge, mouth moist.  LUNGS:  non labored breathing.  Symmetric chest rise on inhalation noted.  ABD:  Active bowel sounds, soft, tender to light palpation over epigastrum /non-distended.  No rebound/guarding/rigidity.  EXT:  No edema or cyanosis.    SKIN:  Dry to touch, no exanthems noted in the visualized areas.      Data reviewed:  EXAM: CT ABDOMEN PELVIS W CONTRAST  LOCATION: [facility]  DATE: 2023     INDICATION: met pancreatic cancer with epigastric pain and fullness with n v  eval for obstructive cause  COMPARISON: 2023  TECHNIQUE: CT scan of the abdomen and pelvis was performed following injection of IV contrast. Multiplanar reformats were obtained. Dose reduction techniques were used.  CONTRAST: 66 ml isovue 370     FINDINGS:   LOWER CHEST: Mild basilar atelectasis.     HEPATOBILIARY: Diffuse hepatic metastases again seen. Gallbladder wall thickening may be exaggerated by contraction of the gallbladder.     PANCREAS: Ill-defined mass of the pancreatic tail more. Mild dilatation of the main pancreatic duct. Slight stranding of fat adjacent to the pancreatic tail.     SPLEEN: Normal.     ADRENAL GLANDS: 1.9 cm left  adrenal mass unchanged.     KIDNEYS/BLADDER: Normal.     BOWEL: Normal caliber. Normal appendix.     LYMPH NODES: Ill-defined peripancreatic lymph nodes similar.     VASCULATURE: Stable. Left abdominal varices.     PELVIC ORGANS: Normal.     MUSCULOSKELETAL: Degenerative change osseous structures.                                                                      IMPRESSION:   1.  Ill-defined pancreatic mass and hepatic metastases similar to prior.  2.  Stranding of fat adjacent to the pancreatic tail. Correlate for superimposed pancreatitis.  3.  Left adrenal lesion similar.    CBC RESULTS:   Recent Labs   Lab Test 08/22/23  0728   WBC 7.0   RBC 3.82   HGB 11.9   HCT 36.4   MCV 95   MCH 31.2   MCHC 32.7   RDW 13.2        Last Comprehensive Metabolic Panel:  Lab Results   Component Value Date     (L) 08/22/2023    POTASSIUM 4.0 08/22/2023    CHLORIDE 100 08/22/2023    CO2 24 08/22/2023    ANIONGAP 11 08/22/2023     (H) 08/22/2023    BUN 7.3 (L) 08/22/2023    CR 0.55 08/22/2023    GFRESTIMATED >90 08/22/2023    IVAN 9.0 08/22/2023       Lipase   Date Value Ref Range Status   08/21/2023 84 (H) 13 - 60 U/L Final   05/09/2023 314 73 - 393 U/L Final        Medical Decision Making       Please see A&P for additional details of medical decision making.  MANAGEMENT DISCUSSED with the following over the past 24 hours: Dr. Armando and Reid Frazier   NOTE(S)/MEDICAL RECORDS REVIEWED over the past 24 hours: Outpateint Palliative, onc  and H+P  Tests REVIEWED in the past 24 hours:  - See lab/imaging results included in the data section of the note  SUPPLEMENTAL HISTORY, in addition to the patient's history, over the past 24 hours obtained from:   - Friend  - Brother in law  Medical complexity over the past 24 hours:  - Decision to DE-ESCALATE CARE based on prognosis

## 2023-08-22 NOTE — TELEPHONE ENCOUNTER
Pt called to see if ok to have her visit today be converted to virtual as pt is currently in the ED. Pt appt was changed to  telephone per pt req.    Sending to PCP as FYI and to be sure this is ok.  Birgit Aguayo CMA

## 2023-08-22 NOTE — UTILIZATION REVIEW
"        Admission Status; Secondary Review Determination     Under the authority of the Utilization Management Committee, the utilization review process indicated a secondary review on the above patient. The review outcome is based on review of the medical records, discussions with staff, and applying clinical experience noted on the date of the review.     (x) Observation Status Appropriate - This patient does not meet hospital inpatient criteria and is placed in observation status. If this patient's primary payer is Medicare and was admitted as an inpatient, Condition Code 44 should be used and patient status changed to \"observation\".     RATIONALE FOR DETERMINATION:      Per provider note:  \"62 year old female admitted on 8/21/2023. She has a past medical history of metastatic pancreatic adenocarcinoma and ovarian mass s/p bilateral salpingo-oophorectomy. She presented to the ED with a three day history of abdominal pain and nausea. No vomiting or diarrhea. Denies fevers, chest pain or shortness of breath.\"    VS unremarkable    Labs:  .  Lipase 84    CT abdomen:  1.  Ill-defined pancreatic mass and hepatic metastases similar to prior.   2.  Stranding of fat adjacent to the pancreatic tail. Correlate for superimposed pancreatitis.     Pt has required one dose of IV Dilaudid today for pain.  She is receiving a regular diet.      Palliative care has been consulted    The severity of illness, intensity of service provided, expected LOS and risk for adverse outcome make the care appropriate for further observation; however, doesn't meet criteria for hospital inpatient admission. Dr Armando notified of this determination via text message through [facility] today.   This document was produced using voice recognition software.   The information on this document is developed by the utilization review team in order for the business office to ensure compliance. This only denotes the appropriateness of proper admission " status and does not reflect the quality of care rendered.   The definitions of Inpatient Status and Observation Status used in making the determination above are those provided in the CMS Coverage Manual, Chapter 1 and Chapter 6, section 70.4.     Sincerely,     Bud Salinas MD  Utilization Review   Physician Advisor   [facility]

## 2023-08-22 NOTE — CONSULTS
Oncology Consult Note   Date of Service: 08/22/2023    Patient: Karina Melvin  MRN: [id number]  Admission Date: 8/21/2023  Hospital Day # 1   Primary Outpatient Oncologist: Dr. Nathen Cruz    Reason for Consult: hospice referral      History of Present Illness:    Karina Melvin is a 62 year old female with history of recently diagnosed metastatic pancreatic adenocarcinoma with metastatic metastasis to the liver left adrenal gland and positive peritoneal washings, status post 2 cycles of gemcitabine/Abraxane, who decided to discontinue cancer directed therapy on 7/25/2023, presenting to the emergency department with worsening abdominal pain and concerns to transition to hospice.  On admission she was noted to have an elevated alkaline phosphatase, lipase of 84.  She was given Dilaudid and oxycodone as needed.  She was started on bowel regimen and IV fluids.  Patient is here with her brother-in-law and 2 close family members.  She had decided to enroll in hospice many weeks ago however this has not been arranged yet.  Her plans were initially to go to [place] to live with a family friend were currently in the room with her however her pain has worsened in the last 2 weeks and she is now looking at options for inpatient hospice here in [place].  She lives around the [facility].  She tells that she is aware of the meaning of hospice and wishes to proceed with this.  She was meeting with palliative care outpatient and will meet with them inpatient.  On review of systems she tells me she has worsening abdominal pain, very minimal nausea and vomiting.  She denies any fevers, chills, shortness of breath, no other acute concerns.  Oncology consulted for assistance with transition to hospice care.    Oncology History:  Diagnosis: pancreatic adenocarcinoma    5/16/2023.  CT abdomen/pelvis was done for abdominal pain which showed ill-defined hypoenhancing mass in the tail of the pancreas measuring 4.5 cm  x 2 cm with associated pancreatic ductal dilation.  In addition to that there are numerous low-density peripherally enhancing lesions throughout the liver the largest liver lesion is in the right hepatic lobe inferiorly measuring 2.5 cm.  There is also a low-density 2 cm mass in the left adrenal gland inferiorly.  6.8 x 3.9 x 4.8 cm cyst within the left posterior pelvis likely left ovarian cyst.  Overall these findings were suspicious for metastatic pancreatic cancer.     She had a liver biopsy on 5/25/2023 which showed metastatic adenocarcinoma compatible with metastatic adenocarcinoma of pancreatic origin.     Caris testing did not show any actionable mutations     5/19/2023.  Pelvic ultrasound showed 8.1 cm left ovarian cyst for which pelvic MRI was recommended.     5/31/2023.  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     CTA chest 6/1/2023 did not show any pulmonary embolism but it showed scattered solid and groundglass nodules throughout the lungs.     6/9/2023-CA 19-9 was [id number]  6/9/2023.  Started palliative gemcitabine/Abraxane.  6/16/2023.  Cycle #1, day 8     6/27/2023.  She had bilateral salpingo-oophorectomy by Dr. Camejo for the pelvic mass.  She was noted to have cyst adenofibroma on the left side.     Pelvic washings were consistent with adenocarcinoma.       She received  cycle #1, day #15 on 7/12/2023.     She did not tolerate the chemotherapy well and had extreme weakness and nausea and was not feeling well and lost 18 pounds.  She recently Dr. Rodriguez and mentioned to him that she does not want any more chemotherapy.  Now that she has been off chemotherapy for a couple of weeks, she has started to feel better.  She takes tramadol once a day.  She has started to eat better and has gained some weight.  Energy is also improving.  She is very sure that she does not want chemotherapy.  The port is bothering her  as it is rubbing with her bra strap and she wants this out.      Review of Systems: Pertinent positive and negative systems described in HPI; the remainder of the 14 systems are negative    Past Medical History:  Past Medical History:   Diagnosis Date    Arthritis     Depressive disorder     Malignant neoplasm of pancreas metastatic to liver (H) 6/1/2023    Positive TB test        Past Surgical History:  Past Surgical History:   Procedure Laterality Date    COLONOSCOPY N/A 8/21/2019    Procedure: Colonoscopy, With Polypectomy And Biopsy;  Surgeon: Duane, William Charles, MD;  Location: [facility] OR    COLONOSCOPY WITH CO2 INSUFFLATION N/A 8/21/2019    Procedure: COLONOSCOPY, WITH CO2 INSUFFLATION;  Surgeon: Duane, William Charles, MD;  Location: [facility] OR    INSERT PORT VASCULAR ACCESS Right 6/8/2023    Procedure: Insert port vascular access;  Surgeon: Shen Grande MD;  Location: [facility] OR    IR CHEST PORT PLACEMENT > 5 YRS OF AGE  6/8/2023    IR LIVER BIOPSY PERCUTANEOUS  5/25/2023    IR PORT REMOVAL RIGHT  7/27/2023    LAPAROSCOPIC SALPINGO-OOPHORECTOMY Bilateral 6/27/2023    Procedure: Laparoscopy, Removal of both tubes and ovaries Possible open suregry;  Surgeon: Benjamin Camejo MD;  Location: [facility] OR    REMOVE PORT VASCULAR ACCESS Right 7/27/2023    Procedure: Remove port vascular access right;  Surgeon: Vijay Garcia MD;  Location: [facility] [place] [place]       Social History:  Social History     Socioeconomic History    Marital status: Single     Spouse name: None    Number of children: None    Years of education: None    Highest education level: None   Tobacco Use    Smoking status: Never    Smokeless tobacco: Never   Vaping Use    Vaping Use: Never used   Substance and Sexual Activity    Alcohol use: No    Drug use: No    Sexual activity: Not Currently     Partners: Male     Social Determinants of Health     Intimate Partner Violence: Not At Risk (6/1/2023)    Humiliation, Afraid, Rape, and Kick questionnaire      "Fear of Current or Ex-Partner: No     Emotionally Abused: No     Physically Abused: No     Sexually Abused: No        Family History  Family History   Problem Relation Age of Onset    Colon Cancer Mother     Lung Cancer Father     Thyroid Disease Sister     Ovarian Cancer Sister     Lung Cancer Brother        Outpatient Medications:  No current facility-administered medications on file prior to encounter.  acetaminophen (TYLENOL) 325 MG tablet, Take 2 tablets (650 mg) by mouth every 4 hours as needed for other (mild pain)  azelastine (ASTELIN) 0.1 % nasal spray, Spray 1 spray into both nostrils 2 times daily  diclofenac (VOLTAREN) 1 % topical gel, Apply 4 g topically 4 times daily  diphenhydrAMINE (BENADRYL) 2 % external cream, Apply topically 3 times daily as needed for itching  diphenhydrAMINE (BENADRYL) 25 MG capsule, Take 25 mg by mouth every 6 hours as needed for itching or allergies (Patient not taking: Reported on 7/11/2023)  lidocaine-prilocaine (EMLA) 2.5-2.5 % external cream, Apply topically as needed for moderate pain  prochlorperazine (COMPAZINE) 10 MG tablet, Take 1 tablet (10 mg) by mouth every 6 hours as needed for nausea or vomiting  senna-docusate (SENOKOT-S/PERICOLACE) 8.6-50 MG tablet, Take 1-2 tablets by mouth 2 times daily Take while on oral narcotics to prevent or treat constipation.  traMADol (ULTRAM) 50 MG tablet, Take 1 tablet (50 mg) by mouth 4 times daily as needed for severe pain  VITAMIN D, CHOLECALCIFEROL, PO, Take 5,000 Units by mouth daily         Physical Exam:    /76   Pulse 75   Temp 99  F (37.2  C) (Oral)   Resp 16   Ht 1.549 m (5' 1\")   Wt 49.4 kg (109 lb)   SpO2 97%   BMI 20.60 kg/m    Gen: in NAD  HEENT: alopecia noted  Pulm: normal work of breathing  Abd: Soft, nt/nd, no rebound/guarding  Ext: Warm and well perfused. No lower extremity edema  Skin: No rash, cyanosis or petechial lesion  Neuro: Alert and answering questions appropriately.     Labs & Studies: I " personally reviewed the following studies:  ROUTINE LABS (Last four results):  CMP  Recent Labs   Lab 08/22/23 0728 08/21/23 2044   * 136   POTASSIUM 4.0 3.9   CHLORIDE 100 99   CO2 24 26   ANIONGAP 11 11   * 160*   BUN 7.3* 11.9   CR 0.55 0.49*   GFRESTIMATED >90 >90   IVAN 9.0 9.0   PROTTOTAL 6.7 7.1   ALBUMIN 3.5 3.8   BILITOTAL 0.8 0.4   ALKPHOS 445* 485*   AST 70* 76*   ALT 38 48     CBC  Recent Labs   Lab 08/22/23 0728 08/21/23 2044   WBC 7.0 8.0   RBC 3.82 3.92   HGB 11.9 11.8   HCT 36.4 37.0   MCV 95 94   MCH 31.2 30.1   MCHC 32.7 31.9   RDW 13.2 13.1    257     INR  Recent Labs   Lab 08/21/23 2044   INR 1.02     Imaging    Reviewed    Assessment & Plan:   Karina Melvin is a 62 year old female with history of recently diagnosed metastatic pancreatic adenocarcinoma with metastatic metastasis to the liver left adrenal gland and positive peritoneal washings, status post 2 cycles of gemcitabine/Abraxane, who decided to discontinue cancer directed therapy on 7/25/2023, and wishes to enroll in hospice.  We discussed with Estrella what hospice care would entail.  We discussed that this is not cancer directed therapy however the focus of this modality is for comfort and increased quality of life.  She is in understanding of hospice care.  In talking to the patient she was previously interested in traveling to [place] to have home hospice with some close friends.  However given her ongoing abdominal pain and not feeling able to travel to [place] she has opted for inpatient hospice with close family support around.  We agree with this.  Palliative care is already involved and aware of patient's wishes.  Recommend to touch base with social work regarding inpatient hospice options.  Patient's family friends are here until Thursday and they are hoping to have an inpatient hospice facility finalized before they leave.  Agree with keeping patient comfortable from a pain perspective while she waits  placement.    Recommendations:   - Follow up with palliative care and social work regarding inpatient hospice options    Patient was seen and plan of care was discussed with attending physician Dr. Paulino.    We will sign off at this time. Please let us know if any questions.      Summer Wilkinson MD  Hematology/Oncology Fellow

## 2023-08-22 NOTE — CONSULTS
"SPIRITUAL HEALTH SERVICES Progress Note  [facility] ([place] ED    Saw pt Karina Melvin per STAT consult (end of life concerns).    Patient/Family Understanding of Illness and Goals of Care - Estrella was present with her brother-in-law. She had a  visit earlier today. She has been experiencing pain and lives with pancreatic cancer which is spreading. Her desire is to have peace and not be in pain.     Distress and Loss - Estrella shared about her sister who  almost a year ago from the same thing. This has been a lot not just for Estrella but for her brother in law. Estrella shared grief over saying good-bye to those she loves and also expressed that sometimes she has doubts about God's plan but that is just momentary. When asked what worries her she shared \"just the little things, like if I will get to go back to my apartment or who will take care of my things\".     Strengths, Coping, and Resources - Estrella has good support from her brother in law. She shared that her michael is very helpful to her coping.      Meaning, Beliefs, and Spirituality - Estrella has recently been attending a [employer] and said she had submitted a prayer request, though she is not a member. She \"has been praying a lot\" and said that her favorite Bible passage right now is Alex 8:28. She appreciated this  reading that passage, Psalm 23, and saying a prayer.     Plan of Care - I provided a prayer and Bible reading.     Bobby Goldberg MDiv  Chaplain Resident  Pager [phone]    * [facility] remains available  for emergent requests/referrals, either by having the switchboard page the on-call  or by entering an ASAP/STAT consult in Epic (this will also page the on-call ). Routine Epic consults receive an initial response within 24 hours.*    "

## 2023-08-22 NOTE — CONSULTS
Care Management Initial Consult    General Information  Assessment completed with: Patient,    Type of CM/SW Visit: Initial Assessment    Primary Care Provider verified and updated as needed: Yes   Readmission within the last 30 days: no previous admission in last 30 days      Reason for Consult: discharge planning  Advance Care Planning: Advance Care Planning Reviewed: concerns discussed (HCD given)        Communication Assessment  Patient's communication style: spoken language (English or Bilingual)        Cognitive  Cognitive/Neuro/Behavioral: WDL                      Living Environment:   People in home: alone     Current living Arrangements: house      Able to return to prior arrangements: no       Family/Social Support:  Care provided by: self  Provides care for: no one, unable/limited ability to care for self  Marital Status: Single  Sibling(s) (Friends)          Description of Support System: Supportive, Involved    Support Assessment: Adequate social supports    Current Resources:   Patient receiving home care services: No     Community Resources: None  Equipment currently used at home:    Supplies currently used at home:      Employment/Financial:  Employment Status:          Financial Concerns: No concerns identified   Referral to Financial Worker: No       Does the patient's insurance plan have a 3 day qualifying hospital stay waiver?  No    Lifestyle & Psychosocial Needs:  Social Determinants of Health     Tobacco Use: Low Risk  (8/22/2023)    Patient History     Smoking Tobacco Use: Never     Smokeless Tobacco Use: Never     Passive Exposure: Not on file   Alcohol Use: Not on file   Financial Resource Strain: Not on file   Food Insecurity: Not on file   Transportation Needs: Not on file   Physical Activity: Not on file   Stress: Not on file   Social Connections: Not on file   Intimate Partner Violence: Not At Risk (6/1/2023)    Humiliation, Afraid, Rape, and Kick questionnaire     Fear of Current or  Ex-Partner: No     Emotionally Abused: No     Physically Abused: No     Sexually Abused: No   Depression: Not at risk (1/30/2023)    PHQ-2     PHQ-2 Score: 0   Housing Stability: Not on file       Functional Status:  Prior to admission patient needed assistance:   Dependent ADLs:: Independent  Dependent IADLs:: Independent  Assesssment of Functional Status: Needs placement in a SNF/TCF for rehabilitation    Mental Health Status:  Mental Health Status: No Current Concerns       Chemical Dependency Status:  Chemical Dependency Status: No Current Concerns             Values/Beliefs:  Spiritual, Cultural Beliefs, Roman Catholic Practices, Values that affect care: no               Additional Information:  Karina Melvin is a 62 year old female admitted on 8/21/2023. She has a past medical history of metastatic pancreatic adenocarcinoma and ovarian mass s/p bilateral salpingo-oophorectomy. She presented to the ED with a three day history of abdominal pain and nausea. No vomiting or diarrhea. Denies fevers, chest pain or shortness of breath.     Karina Melvin is a 62 year old female admitted on 8/21/2023. She has a past medical history of metastatic pancreatic adenocarcinoma and ovarian mass s/p bilateral salpingo-oophorectomy. She presented to the ED with a three day history of abdominal pain and nausea. No vomiting or diarrhea. Denies fevers, chest pain or shortness of breath.     Chart reviewed. Case discussed with bedside RN and palliative care.    Writer met with pt, her friends who are visiting from [place], [place] and [place] (# entered in contacts), and pt's brother-in-law, Allegra. Prior to this hospitalization, pt was planning on moving down and staying with her friends, Keyona and [place], as she enrolled in hospice, however, due to pt's recent and sudden decline in health, pt no longer thinks she can tolerate a trip down to [place] and wishes to enroll in hospice up in [place], near where she is currently living. Pt  wishes to stay in/near the [facility]. No preference was given on hospice agency .    Discussed potential discharge needs to include Hospice/SNF. List of Medicare certified options reviewed with patient/family/caregiver. Offered list and patient's right to choose among available options. Explained any financial ties to [place]. Offered [facility]  as an option, but pt declined at this time due to distance from her preferred location.     The importance of an advanced directive was discussed and a HCD packed was provided. Writer explained the form and what was necessary to make it legal.     Still TBD if pt's PMAP will provide coverage for SNF, or if pt will need to complete application for LTC. Will defer to SNFs for insurance verification of SNF benefits.     Referrals have been made to the following facilities and their status is as follows:    [facility]  [address] [place]  [address]  P: [phone]  P: [phone] - Admissions  F: [phone]  - 8/22: Writer preemptively faxed referral packet for SNF to review.  - Writer received inbasket denial from SNF indicating bed is unavailable. Writer has ceased following this referral.    [facility]  P: [phone]  P: [phone] - Admissions  F: [phone]  - 8/22: Writer preemptively faxed referral packet for SNF to review.  - Writer received inbasket denial from SNF indicating bed is unavailable. Writer has ceased following this referral.    [facility]  P: [phone]  P: [phone] - Admissions  F: [phone]  - 8/22: Writer preemptively faxed referral packet for SNF to review.    [facility]  [address]  [place]  [address]  P: [phone]  P: [phone] - Admissions  F: [phone]  - 8/22: Writer preemptively faxed referral packet for SNF to review.    [facility]  [address] [place]  [address]  P:  [phone]  P: [phone] - Admissions  F: [phone]  - 8/22: Writer preemptively faxed referral packet for SNF to review.    ________________    ROSALINDA Colin, [facility]  ED/Observation   [facility]  Phone: [phone]  Pager: [phone]  Fax: [phone]    On-call pager, [phone], 4:00pm to midnight

## 2023-08-22 NOTE — PROGRESS NOTES
SPIRITUAL HEALTH SERVICES Progress Note (Palliative Focus)  [facility] ([place] ED  REFERRAL SOURCE: Staff Referral    I met briefly with Estrella per her request through Palliative team. She was accompanied by friends and her brother. She requested prayer for peace which I facilitated. No other SHS needs were identified at this time.    Plan: Will continue to follow while Palliative Care is consulted.     Sathya Thompson M.Div.  Chaplain Resident  Pager [phone]    [facility] Palliative Care Inpatient Team  Securely message with the MyScreen Web Console (learn more here) or  Text page via [facility] Paging/Directory

## 2023-08-22 NOTE — ED PROVIDER NOTES
"ED Provider Note  [facility]      History     Chief Complaint   Patient presents with    Abdominal Pain     HPI  Karina Melvin is a 62 year old female with a past medical history of metastatic pancreatic adenocarcinoma (dx 05/2023), ovarian mass s/p bilateral salpingo-oophorectomy 06/27/2023 who presents to the Emergency Department seeking evaluation of epigastric and right sided abdominal pain that has been ongoing for the last 3 days. She notes some nausea as well. She states that eating exacerbates the pain, and so she has been eating less over the last few days as well. She describes the pain as being \"sharp\", and states that it is not a burning sensation. She has found some relief of her symptoms when taking tramadol. She denies any history of heart issues, leg swelling, diarrhea, UTI symptoms, or any pain radiating to her back.   Patient does describe a fullness in the epigastric area sometimes more pronounced when she takes a deep breath.    Per chart review, patient was seen here on 8/13 for evaluation of abdominal pain that had been ongoing for 3 days and worsened by eating. Patient's workup found a thickening of gastric wall and progression of metastatic liver disease.       EXAMINATION: CT ABDOMEN PELVIS W CONTRAST, 8/13/2023 6:17 PM   IMPRESSION:   1.  No evidence of mass obstructing the gastric outlet.     2.  Increase in size and number of multiple liver metastases, largest  measuring 4.1 x 3.0 cm.    Past Medical History  Past Medical History:   Diagnosis Date    Arthritis     Depressive disorder     Malignant neoplasm of pancreas metastatic to liver (H) 6/1/2023    Positive TB test      Past Surgical History:   Procedure Laterality Date    COLONOSCOPY N/A 8/21/2019    Procedure: Colonoscopy, With Polypectomy And Biopsy;  Surgeon: Duane, William Charles, MD;  Location: [facility] OR    COLONOSCOPY WITH CO2 INSUFFLATION N/A 8/21/2019    Procedure: COLONOSCOPY, WITH CO2 " "INSUFFLATION;  Surgeon: Duane, William Charles, MD;  Location: [facility] OR    INSERT PORT VASCULAR ACCESS Right 6/8/2023    Procedure: Insert port vascular access;  Surgeon: Shen Grande MD;  Location: [facility] OR    IR CHEST PORT PLACEMENT > 5 YRS OF AGE  6/8/2023    IR LIVER BIOPSY PERCUTANEOUS  5/25/2023    IR PORT REMOVAL RIGHT  7/27/2023    LAPAROSCOPIC SALPINGO-OOPHORECTOMY Bilateral 6/27/2023    Procedure: Laparoscopy, Removal of both tubes and ovaries Possible open suregry;  Surgeon: Benjamin Camejo MD;  Location: [facility] OR    REMOVE PORT VASCULAR ACCESS Right 7/27/2023    Procedure: Remove port vascular access right;  Surgeon: Vijay Garcia MD;  Location: [facility] OR     acetaminophen (TYLENOL) 325 MG tablet  azelastine (ASTELIN) 0.1 % nasal spray  diclofenac (VOLTAREN) 1 % topical gel  diphenhydrAMINE (BENADRYL) 2 % external cream  diphenhydrAMINE (BENADRYL) 25 MG capsule  lidocaine-prilocaine (EMLA) 2.5-2.5 % external cream  prochlorperazine (COMPAZINE) 10 MG tablet  senna-docusate (SENOKOT-S/PERICOLACE) 8.6-50 MG tablet  traMADol (ULTRAM) 50 MG tablet  VITAMIN D, CHOLECALCIFEROL, PO      No Known Allergies  Family History  Family History   Problem Relation Age of Onset    Colon Cancer Mother     Lung Cancer Father     Thyroid Disease Sister     Ovarian Cancer Sister     Lung Cancer Brother      Social History   Social History     Tobacco Use    Smoking status: Never    Smokeless tobacco: Never   Vaping Use    Vaping Use: Never used   Substance Use Topics    Alcohol use: No    Drug use: No         A medically appropriate review of systems was performed with pertinent positives and negatives noted in the HPI, and all other systems negative.    Physical Exam   BP: 117/75  Pulse: 74  Temp: 98.5  F (36.9  C)  Resp: 16  Height: 154.9 cm (5' 1\")  Weight: 49.4 kg (109 lb)  SpO2: 98 %  Physical Exam  Vitals and nursing note reviewed.   Constitutional:       General: She is in acute distress.      " Appearance: Normal appearance. She is well-developed. She is ill-appearing. She is not toxic-appearing.      Comments: Patient is a comfortable at otherwise vitally stable   HENT:      Head: Normocephalic and atraumatic.      Nose: Nose normal.      Mouth/Throat:      Mouth: Mucous membranes are dry.      Pharynx: Oropharynx is clear.   Eyes:      General: No scleral icterus.     Extraocular Movements: Extraocular movements intact.      Conjunctiva/sclera: Conjunctivae normal.      Pupils: Pupils are equal, round, and reactive to light.   Cardiovascular:      Rate and Rhythm: Normal rate and regular rhythm.   Pulmonary:      Effort: Pulmonary effort is normal. No respiratory distress.      Breath sounds: No stridor.   Chest:      Chest wall: No tenderness.   Abdominal:      General: Abdomen is flat. There is distension.      Palpations: There is no mass.      Tenderness: There is abdominal tenderness. There is guarding.      Comments: Tenderness in the mid epigastric area primarily the abdomen although not markedly distended is mildly distended with some epigastric tenderness some involuntary guarding noted primarily    Musculoskeletal:         General: No swelling or tenderness.      Cervical back: Normal range of motion and neck supple. No rigidity.   Skin:     General: Skin is warm and dry.      Capillary Refill: Capillary refill takes less than 2 seconds.      Coloration: Skin is pale. Skin is not jaundiced.      Findings: No rash.   Neurological:      General: No focal deficit present.      Mental Status: She is alert and oriented to person, place, and time. Mental status is at baseline.   Psychiatric:      Comments: Patient flat affect soft-spoken because of ongoing pain issues           ED Course, Procedures, & Data        Records reviewed in epic.  Patient with metastatic adenocarcinoma of the pancreas is noted.  Recent ER visit on 13 August also with imaging reviewed labs etc. medications reviewed  etc.    In the ER patient has a peripheral IV noted that she has had her port removed in the past.  She had received some fentanyl IV per paramedics reviewed these records also.  In the ER patient still describing some pain as noted we did order Dilaudid 0.5 mg IV titrated for pain control.  Labs drawn reviewed.  EKG shows sinus rhythm without ischemic changes portable chest x-ray personally reviewed by myself reveals no free air no hiatal hernia no mass no pneumomediastinum no effusion infiltrate etc.    Patient's laboratory testing reveal a BNP of 140 troponin is negative.  Urinalysis negative lipase 84 INR 1.02.  Sodium 136 potassium 3.9.  Bicarb 26 gap 11.  Glucose 160 alk phos 485 AST is 76 ALT is 48 total bilirubin 0.4.  White count is 8 hemoglobin 11.8.  INR 1.02.    Patient received Dilaudid for pain control along with Zofran IV for pain control Protonix IV also reassessed patient slightly improved still having discomfort etc. discussed with patient as her ER visit 8 days ago with a CT done at that point patient is not treatment to doing another CT to make sure there is no significant changes from then.  CT scan of the abdomen with contrast was ordered.  Findings and CT reveal that of advanced metastatic adeno carcinoma the pancreas.  Also patient information the tail of pancreas that may be overlying acute pancreatitis also.  No other major findings noted.    Reassessed patient at this point patient lives by herself is uncomfortable with ongoing symptoms will plan to admit to medicine I talked to them they agree with plan patient be admitted to medicine for possible subtle pancreatitis with metastatic adenocarcinoma the pancreas with dehydration.    Procedures       ED Course Selections:        EKG Interpretation:      Interpreted by Romario Shabazz MD  Time reviewed: 2300  Symptoms at time of EKG: epigastric pain   Rhythm: normal sinus   Rate: normal  Axis: normal  Ectopy: none  Conduction: normal  ST  Segments/ T Waves: No ST-T wave changes  Q Waves: none  Comparison to prior: No old EKG available    Clinical Impression: normal EKG                 Results for orders placed or performed during the hospital encounter of 08/21/23   XR Chest Port 1 View     Status: None    Narrative    EXAM: XR CHEST PORT 1 VIEW  LOCATION: [facility]  DATE: 8/21/2023    INDICATION: epigastric and chest pain  COMPARISON: 08/13/2023      Impression    IMPRESSION: Stable cardiomediastinal silhouette. No focal consolidation or pleural effusion. Mild left basilar fibroatelectasis.   CT Abdomen Pelvis w Contrast     Status: None    Narrative    EXAM: CT ABDOMEN PELVIS W CONTRAST  LOCATION: [facility]  DATE: 8/22/2023    INDICATION: met pancreatic cancer with epigastric pain and fullness with n v  eval for obstructive cause  COMPARISON: 08/13/2023  TECHNIQUE: CT scan of the abdomen and pelvis was performed following injection of IV contrast. Multiplanar reformats were obtained. Dose reduction techniques were used.  CONTRAST: 66 ml isovue 370    FINDINGS:   LOWER CHEST: Mild basilar atelectasis.    HEPATOBILIARY: Diffuse hepatic metastases again seen. Gallbladder wall thickening may be exaggerated by contraction of the gallbladder.    PANCREAS: Ill-defined mass of the pancreatic tail more. Mild dilatation of the main pancreatic duct. Slight stranding of fat adjacent to the pancreatic tail.    SPLEEN: Normal.    ADRENAL GLANDS: 1.9 cm left adrenal mass unchanged.    KIDNEYS/BLADDER: Normal.    BOWEL: Normal caliber. Normal appendix.    LYMPH NODES: Ill-defined peripancreatic lymph nodes similar.    VASCULATURE: Stable. Left abdominal varices.    PELVIC ORGANS: Normal.    MUSCULOSKELETAL: Degenerative change osseous structures.      Impression    IMPRESSION:   1.  Ill-defined pancreatic mass and hepatic metastases similar to prior.  2.  Stranding of fat  adjacent to the pancreatic tail. Correlate for superimposed pancreatitis.  3.  Left adrenal lesion similar.   [place] Draw     Status: None    Narrative    The following orders were created for panel order [place] Draw.  Procedure                               Abnormality         Status                     ---------                               -----------         ------                     Extra Blue Top Tube[[id number]]                              Final result               Extra Red Top Tube[[id number]]                               Final result               Extra Green Top (Lithium...[[id number]]                      Final result               Extra Purple Top Tube[[id number]]                            Final result                 Please view results for these tests on the individual orders.   Extra Blue Top Tube     Status: None   Result Value Ref Range    Hold Specimen JIC    Extra Red Top Tube     Status: None   Result Value Ref Range    Hold Specimen JIC    Extra Green Top (Lithium Heparin) Tube     Status: None   Result Value Ref Range    Hold Specimen JIC    Extra Purple Top Tube     Status: None   Result Value Ref Range    Hold Specimen JIC    INR     Status: Normal   Result Value Ref Range    INR 1.02 0.85 - 1.15   Partial thromboplastin time     Status: Normal   Result Value Ref Range    aPTT 30 22 - 38 Seconds   Comprehensive metabolic panel     Status: Abnormal   Result Value Ref Range    Sodium 136 136 - 145 mmol/L    Potassium 3.9 3.4 - 5.3 mmol/L    Chloride 99 98 - 107 mmol/L    Carbon Dioxide (CO2) 26 22 - 29 mmol/L    Anion Gap 11 7 - 15 mmol/L    Urea Nitrogen 11.9 8.0 - 23.0 mg/dL    Creatinine 0.49 (L) 0.51 - 0.95 mg/dL    Calcium 9.0 8.8 - 10.2 mg/dL    Glucose 160 (H) 70 - 99 mg/dL    Alkaline Phosphatase 485 (H) 35 - 104 U/L    AST 76 (H) 0 - 45 U/L    ALT 48 0 - 50 U/L    Protein Total 7.1 6.4 - 8.3 g/dL    Albumin 3.8 3.5 - 5.2 g/dL    Bilirubin Total 0.4 <=1.2 mg/dL    GFR Estimate >90  >60 mL/min/1.73m2   Lipase     Status: Abnormal   Result Value Ref Range    Lipase 84 (H) 13 - 60 U/L   Lactic acid whole blood     Status: Normal   Result Value Ref Range    Lactic Acid 0.8 0.7 - 2.0 mmol/L   Troponin T, High Sensitivity     Status: Normal   Result Value Ref Range    Troponin T, High Sensitivity <6 <=14 ng/L   Nt probnp inpatient (BNP)     Status: Normal   Result Value Ref Range    N terminal Pro BNP Inpatient 140 0 - 900 pg/mL   UA with Microscopic reflex to Culture     Status: Abnormal    Specimen: Urine, Midstream   Result Value Ref Range    Color Urine Yellow Colorless, Straw, Light Yellow, Yellow    Appearance Urine Clear Clear    Glucose Urine Negative Negative mg/dL    Bilirubin Urine Negative Negative    Ketones Urine Negative Negative mg/dL    Specific Gravity Urine 1.014 1.003 - 1.035    Blood Urine Negative Negative    pH Urine 5.5 5.0 - 7.0    Protein Albumin Urine Negative Negative mg/dL    Urobilinogen Urine Normal Normal, 2.0 mg/dL    Nitrite Urine Negative Negative    Leukocyte Esterase Urine Negative Negative    Mucus Urine Present (A) None Seen /LPF    RBC Urine 0 <=2 /HPF    WBC Urine 1 <=5 /HPF    Narrative    Urine Culture not indicated   CBC with platelets and differential     Status: None   Result Value Ref Range    WBC Count 8.0 4.0 - 11.0 10e3/uL    RBC Count 3.92 3.80 - 5.20 10e6/uL    Hemoglobin 11.8 11.7 - 15.7 g/dL    Hematocrit 37.0 35.0 - 47.0 %    MCV 94 78 - 100 fL    MCH 30.1 26.5 - 33.0 pg    MCHC 31.9 31.5 - 36.5 g/dL    RDW 13.1 10.0 - 15.0 %    Platelet Count 257 150 - 450 10e3/uL    % Neutrophils 72 %    % Lymphocytes 13 %    % Monocytes 11 %    % Eosinophils 3 %    % Basophils 1 %    % Immature Granulocytes 0 %    NRBCs per 100 WBC 0 <1 /100    Absolute Neutrophils 5.8 1.6 - 8.3 10e3/uL    Absolute Lymphocytes 1.0 0.8 - 5.3 10e3/uL    Absolute Monocytes 0.9 0.0 - 1.3 10e3/uL    Absolute Eosinophils 0.3 0.0 - 0.7 10e3/uL    Absolute Basophils 0.0 0.0 - 0.2  10e3/uL    Absolute Immature Granulocytes 0.0 <=0.4 10e3/uL    Absolute NRBCs 0.0 10e3/uL   EKG 12 lead     Status: None (Preliminary result)   Result Value Ref Range    Systolic Blood Pressure  mmHg    Diastolic Blood Pressure  mmHg    Ventricular Rate 70 BPM    Atrial Rate 70 BPM    DC Interval 172 ms    QRS Duration 72 ms     ms    QTc 457 ms    P Axis 78 degrees    R AXIS -20 degrees    T Axis 47 degrees    Interpretation ECG       Sinus rhythm  Cannot rule out Anterior infarct , age undetermined  Abnormal ECG     CBC with platelets differential     Status: None    Narrative    The following orders were created for panel order CBC with platelets differential.  Procedure                               Abnormality         Status                     ---------                               -----------         ------                     CBC with platelets and d...[[id number]]                      Final result                 Please view results for these tests on the individual orders.     Medications   lidocaine 1 % 0.1-1 mL (has no administration in time range)   lidocaine (LMX4) cream (has no administration in time range)   sodium chloride (PF) 0.9% PF flush 3 mL (3 mLs Intracatheter Not Given 8/21/23 2221)   sodium chloride (PF) 0.9% PF flush 3 mL (has no administration in time range)   ondansetron (ZOFRAN) injection 4 mg (has no administration in time range)   HYDROmorphone (PF) (DILAUDID) injection 0.5 mg (0.5 mg Intravenous $Given 8/21/23 2204)   0.9% sodium chloride BOLUS (0 mLs Intravenous Stopped 8/21/23 2337)   pantoprazole (PROTONIX) IV push injection 40 mg (40 mg Intravenous $Given 8/21/23 2204)   iopamidol (ISOVUE-370) solution 66 mL (66 mLs Intravenous $Given 8/22/23 0008)   sodium chloride (PF) 0.9% PF flush 67 mL (67 mLs Intravenous $Given 8/22/23 0008)     Labs Ordered and Resulted from Time of ED Arrival to Time of ED Departure   COMPREHENSIVE METABOLIC PANEL - Abnormal       Result Value     Sodium 136      Potassium 3.9      Chloride 99      Carbon Dioxide (CO2) 26      Anion Gap 11      Urea Nitrogen 11.9      Creatinine 0.49 (*)     Calcium 9.0      Glucose 160 (*)     Alkaline Phosphatase 485 (*)     AST 76 (*)     ALT 48      Protein Total 7.1      Albumin 3.8      Bilirubin Total 0.4      GFR Estimate >90     LIPASE - Abnormal    Lipase 84 (*)    ROUTINE UA WITH MICROSCOPIC REFLEX TO CULTURE - Abnormal    Color Urine Yellow      Appearance Urine Clear      Glucose Urine Negative      Bilirubin Urine Negative      Ketones Urine Negative      Specific Gravity Urine 1.014      Blood Urine Negative      pH Urine 5.5      Protein Albumin Urine Negative      Urobilinogen Urine Normal      Nitrite Urine Negative      Leukocyte Esterase Urine Negative      Mucus Urine Present (*)     RBC Urine 0      WBC Urine 1     INR - Normal    INR 1.02     PARTIAL THROMBOPLASTIN TIME - Normal    aPTT 30     LACTIC ACID WHOLE BLOOD - Normal    Lactic Acid 0.8     TROPONIN T, HIGH SENSITIVITY - Normal    Troponin T, High Sensitivity <6     NT PROBNP INPATIENT - Normal    N terminal Pro BNP Inpatient 140     CBC WITH PLATELETS AND DIFFERENTIAL    WBC Count 8.0      RBC Count 3.92      Hemoglobin 11.8      Hematocrit 37.0      MCV 94      MCH 30.1      MCHC 31.9      RDW 13.1      Platelet Count 257      % Neutrophils 72      % Lymphocytes 13      % Monocytes 11      % Eosinophils 3      % Basophils 1      % Immature Granulocytes 0      NRBCs per 100 WBC 0      Absolute Neutrophils 5.8      Absolute Lymphocytes 1.0      Absolute Monocytes 0.9      Absolute Eosinophils 0.3      Absolute Basophils 0.0      Absolute Immature Granulocytes 0.0      Absolute NRBCs 0.0       CT Abdomen Pelvis w Contrast   Final Result   IMPRESSION:    1.  Ill-defined pancreatic mass and hepatic metastases similar to prior.   2.  Stranding of fat adjacent to the pancreatic tail. Correlate for superimposed pancreatitis.   3.  Left adrenal lesion  similar.      XR Chest Port 1 View   Final Result   IMPRESSION: Stable cardiomediastinal silhouette. No focal consolidation or pleural effusion. Mild left basilar fibroatelectasis.             Critical care was not performed.     Medical Decision Making  The patient's presentation was of high complexity (a chronic illness severe exacerbation, progression, or side effect of treatment).    The patient's evaluation involved:  review of external note(s) from 3+ sources (see separate area of note for details)  ordering and/or review of 3+ test(s) in this encounter (see separate area of note for details)  review of 3+ test result(s) ordered prior to this encounter (see separate area of note for details)  discussion of management or test interpretation with another health professional (see separate area of note for details)    The patient's management necessitated high risk (a parenteral controlled substance) and high risk (a decision regarding hospitalization).    Assessment & Plan   62-year-old female with history of metastatic adenocarcinoma currently not undergoing any chemo as she could not tolerate this presents with abdominal pain epigastric nausea vomiting generalized weakness.  Here in the ER cardiac work-up otherwise negative.  Patient's lipase slightly elevated at 86 other labs relatively stable cardiac work-up as noted negative chest x-ray negative patient has CT scan done of the abdomen pelvis revealing questionable mild tail of pancreas inflammation concerning for pancreatitis along with progression of her disease no obstruction seen at this point.  Patient given IV fluids along with IV pain medications antiemetics PPIs patient be admitted to medicine for ongoing management of her progressive disease with possible overlying pancreatitis.       I have reviewed the nursing notes. I have reviewed the findings, diagnosis, plan and need for follow up with the patient.    New Prescriptions    No medications on file        Final diagnoses:   Malignant neoplasm of pancreas metastatic to liver (H)   Abdominal pain, epigastric   Nausea and vomiting, unspecified vomiting type   Dehydration   Weakness generalized   Acute pancreatitis, unspecified complication status, unspecified pancreatitis type - by CT   IEndy, am serving as a trained medical scribe to document services personally performed by Romario Shabazz MD, based on the provider's statements to me.     IRomario MD, was physically present and have reviewed and verified the accuracy of this note documented by Endy Bishop.      Romario Shabazz MD  [facility] EMERGENCY DEPARTMENT  8/21/2023    This note was created at least in part by the use of dragon voice dictation system. Inadvertent typographical errors may still exist.  Romario Shabazz MD.  Patient evaluated in the emergency department during the COVID-19 pandemic period. Careful attention to patients safety was addressed throughout the evaluation. Evaluation and treatment management was initiated with disposition made efficiently and appropriate as possible to minimize any risk of potential exposure to patient during this evaluation.       Romario Shabazz MD  08/22/23 0211

## 2023-08-22 NOTE — ED TRIAGE NOTES
Pt BIBA from home where pt lives alone. C/o middle upper abdominal pain x3 days. Rates 4/10 at this time. Had nausea but was given 4mg IV Zofran by EMS & pt reports nausea has subsided. Pt took tramadol at 1800 at home. EMS gave pt a total of 50mcg IV Fentanyl. Denies chest pain or SOB. A&Ox4. Breathing easy & unlabored.      Triage Assessment       Row Name 08/21/23 2018       Triage Assessment (Adult)    Airway WDL WDL       Respiratory WDL    Respiratory WDL WDL       Skin Circulation/Temperature WDL    Skin Circulation/Temperature WDL WDL       Cardiac WDL    Cardiac WDL WDL       Peripheral/Neurovascular WDL    Peripheral Neurovascular WDL WDL       Cognitive/Neuro/Behavioral WDL    Cognitive/Neuro/Behavioral WDL WDL       [place] Coma Scale    Best Eye Response 4-->(E4) spontaneous    Best Motor Response 6-->(M6) obeys commands    Best Verbal Response 5-->(V5) oriented    [place] Coma Scale Score 15

## 2023-08-22 NOTE — PROGRESS NOTES
Estrella is a 62 year old who is being evaluated via a billable telephone visit.      What phone number would you like to be contacted at? [phone]   How would you like to obtain your AVS? Gisela    Distant Location (provider location):  On-site      Subjective   Estrella is a 62 year old, presenting for the following health issues:  Cancer      8/22/2023    10:43 AM   Additional Questions   Roomed by Kinga       History of Present Illness       Reason for visit:  Stop Cancer Treatments    She eats 0-1 servings of fruits and vegetables daily.She consumes 0 sweetened beverage(s) daily.She exercises with enough effort to increase her heart rate 9 or less minutes per day.  She exercises with enough effort to increase her heart rate 3 or less days per week.   She is taking medications regularly.     Patient presently in the ER now due to her abdominal pain   She wants to move back home but not sure if she is able   Let her know will talk when she is out of the hospital   Labs reviewed in EPIC  BP Readings from Last 3 Encounters:   08/22/23 133/76   08/13/23 109/76   07/27/23 119/78    Wt Readings from Last 3 Encounters:   08/21/23 49.4 kg (109 lb)   08/13/23 47.6 kg (105 lb)   07/27/23 49 kg (108 lb)                  Patient Active Problem List   Diagnosis    DDD (degenerative disc disease), cervical    Neck pain on left side    CARDIOVASCULAR SCREENING; LDL GOAL LESS THAN 160    Adjustment disorder with mixed anxiety and depressed mood    Family history of thyroid disease    Intertrigo    Tendinopathy of rotator cuff, right    Chronic pain of both shoulders    Internal hemorrhoid, bleeding    Chronic right shoulder pain    Sleeping difficulty    Tendonitis of ankle or foot    Left foot pain    Positive TB test    Malignant neoplasm of pancreas metastatic to liver (H)    Ovarian mass    Abdominal pain, epigastric    Dehydration    Weakness generalized    Acute pancreatitis, unspecified complication status, unspecified  pancreatitis type    Nausea and vomiting, unspecified vomiting type     Past Surgical History:   Procedure Laterality Date    COLONOSCOPY N/A 8/21/2019    Procedure: Colonoscopy, With Polypectomy And Biopsy;  Surgeon: Duane, William Charles, MD;  Location: [facility] OR    COLONOSCOPY WITH CO2 INSUFFLATION N/A 8/21/2019    Procedure: COLONOSCOPY, WITH CO2 INSUFFLATION;  Surgeon: Duane, William Charles, MD;  Location: [facility] OR    INSERT PORT VASCULAR ACCESS Right 6/8/2023    Procedure: Insert port vascular access;  Surgeon: Shen Grande MD;  Location: [facility] OR    IR CHEST PORT PLACEMENT > 5 YRS OF AGE  6/8/2023    IR LIVER BIOPSY PERCUTANEOUS  5/25/2023    IR PORT REMOVAL RIGHT  7/27/2023    LAPAROSCOPIC SALPINGO-OOPHORECTOMY Bilateral 6/27/2023    Procedure: Laparoscopy, Removal of both tubes and ovaries Possible open suregry;  Surgeon: Benjamin Camejo MD;  Location: [facility] OR    REMOVE PORT VASCULAR ACCESS Right 7/27/2023    Procedure: Remove port vascular access right;  Surgeon: Vijay Garcia MD;  Location: [facility] OR       Social History     Tobacco Use    Smoking status: Never    Smokeless tobacco: Never   Substance Use Topics    Alcohol use: No     Family History   Problem Relation Age of Onset    Colon Cancer Mother     Lung Cancer Father     Thyroid Disease Sister     Ovarian Cancer Sister     Lung Cancer Brother          Current Outpatient Medications   Medication Sig Dispense Refill    acetaminophen (TYLENOL) 325 MG tablet Take 2 tablets (650 mg) by mouth every 4 hours as needed for other (mild pain) 100 tablet 0    azelastine (ASTELIN) 0.1 % nasal spray Spray 1 spray into both nostrils 2 times daily 30 mL 3    diclofenac (VOLTAREN) 1 % topical gel Apply 4 g topically 4 times daily 100 g 1    diphenhydrAMINE (BENADRYL) 2 % external cream Apply topically 3 times daily as needed for itching 56.8 g 1    lidocaine-prilocaine (EMLA) 2.5-2.5 % external cream Apply topically as needed for moderate pain 30 g  3    prochlorperazine (COMPAZINE) 10 MG tablet Take 1 tablet (10 mg) by mouth every 6 hours as needed for nausea or vomiting 30 tablet 2    senna-docusate (SENOKOT-S/PERICOLACE) 8.6-50 MG tablet Take 1-2 tablets by mouth 2 times daily Take while on oral narcotics to prevent or treat constipation. 30 tablet 0    traMADol (ULTRAM) 50 MG tablet Take 1 tablet (50 mg) by mouth 4 times daily as needed for severe pain 180 tablet 0    VITAMIN D, CHOLECALCIFEROL, PO Take 5,000 Units by mouth daily      diphenhydrAMINE (BENADRYL) 25 MG capsule Take 25 mg by mouth every 6 hours as needed for itching or allergies (Patient not taking: Reported on 7/11/2023)       No Known Allergies

## 2023-08-23 NOTE — PLAN OF CARE
"Goal Outcome Evaluation:/89 (BP Location: Left arm)   Pulse 75   Temp 98.5  F (36.9  C) (Oral)   Resp 16   Ht 1.549 m (5' 1\")   Wt 49.4 kg (109 lb)   SpO2 95%   BMI 20.60 kg/m               -Hospice and facility set up :Not met   -Tolerating PO nausea and pain medications :Not met   -Tolerating PO diet, particularly fluids :Not met                      "

## 2023-08-23 NOTE — PLAN OF CARE
"Goal Outcome Evaluation:BP (!) 140/89 (BP Location: Left arm)   Pulse 82   Temp 99.5  F (37.5  C)   Resp 14   Ht 1.549 m (5' 1\")   Wt 49.4 kg (109 lb)   SpO2 95%   BMI 20.60 kg/m               -Hospice and facility set up :Not met   -Tolerating PO nausea and pain medications :Not met   -Tolerating PO diet, particularly fluids :Not met                      "

## 2023-08-23 NOTE — UTILIZATION REVIEW
Concurrent stay review; Secondary Review Determination - [facility]        Under the authority of the Utilization Management Committee, the utilization review process indicated a secondary review on the above patient.  The review outcome is based on review of the medical records, discussions with staff, and applying clinical experience noted on the date of the review.        (x) Observation/outpatient Status Appropriate - Concurrent stay review       RATIONALE FOR DETERMINATION:     Karina Melvin is a 62 year old female admitted on 8/21/2023. She has a past medical history of metastatic pancreatic adenocarcinoma and ovarian mass s/p bilateral salpingo-oophorectomy. She presented to the ED with a three day history of abdominal pain and nausea. She had no vomiting, diarrhea, fever, chest pain, or shortness of breath.  Patient was admitted for symptom control.  She was seen by oncology and hospice was recommended.  She has been seen by palliative care.  Plan is for her to discharge to hospice.  Pain is controlled with infrequent analgesic medication.  She is not requiring acute end-of-life care.   has been consulted for placement with hospice.    Patient delayed discharge is related to disposition, there is no medical necessity for inpatient admission at the time of this review. If there is a change in patient status, please resend for review.    The information on this document is developed by the utilization review team in order for the business office to ensure compliance.  This only denotes the appropriateness of proper admission status and does not reflect the quality of care rendered.       The definitions of Inpatient Status and Observation Status used in making the determination above are those provided in the CMS Coverage Manual, Chapter 1 and Chapter 6, section 70.4.       Sincerely,    Tong Ngo MD

## 2023-08-23 NOTE — PROGRESS NOTES
Care Management Follow Up     CHW/Anabel was asked to meet with patient to have them sign and notarize their Health Care Directive. CHW met with patient to notarize HCD.HCD form was verbally reviewed and verified with patient by CHW  Pt signed and dated form and CHW notarized form. HCD was emailed to [facility] by unit CRYSTAL Houston   [facility] Community Health Worker and Anabel   [facility] 7C & 7D

## 2023-08-23 NOTE — PROGRESS NOTES
Care Management Follow Up    Length of Stay (days): 1    Expected Discharge Date: 08/23/2023     Concerns to be Addressed: discharge planning     Patient plan of care discussed at interdisciplinary rounds: No    Anticipated Discharge Disposition: Hospice, Long Term Care    [facility]  [address] [place]  [address]  P: [phone]  P: [phone] - Admissions  F: [phone]     Anticipated Discharge Services: Hospice    [facility] (Ph: [phone]; ); Fax: [phone])    Anticipated Discharge DME:  (TBD)    Patient/family educated on Medicare website which has current facility and service quality ratings: yes  Education Provided on the Discharge Plan: No  Patient/Family in Agreement with the Plan:      Referrals Placed by CM/SW: Post Acute Facilities, Hospice  Private pay costs discussed: Not applicable    Additional Information:    Accepted:     [facility]  [address] [place]  [address]  P: [phone]  P: [phone] - Admissions  F: [phone]    1042 CRYSTAL confirmed with CHW Swapnil Houston that he is able to notarize pt's HCD today.    1045 CRYSTAL spoke with Edyta in Admissions ([phone]) who reported that they had an LTC bed open and she would have their business office review pt's financials and call SW back.      1225 CRYSTAL printed Medicare compare list for hospice agencies and provided it to pt.    1228 CRYSTAL emailed notarized HCD to Marylou Xie.    1237 CRYSTAL spoke with pt who asked that a hospice referral be made to [facility] ([phone]). SW agreed to do so. CRYSTAL then called agency who provided fax number for referral (Fax: [phone])    3583 Referral faxed via Epic    3302 CRYSTAL received VM from Belinda ([phone]) [facility] asking about pt's discharge plan/accepting facility, and that the referral be resent via Epic in basket    2767 Referral re-sent via Epic In basket    7805 CRYSTAL called Belinda and provided discharge  "plan information and confirmed that referral had been re-sent.    1359 CRYSTAL spoke with Edyta who reported that the hospice agency was OON with the facility and they would need to sign a \"one-time\" contract with them. Edyta provide the contact email (email: [email or web address]) and asked SW to have hospice agency contact them regarding the contract    1401 Belinda messaged SW and confirmed that pt's insurance is in-network and they can accept pt into hospice.    1429 SW sent message to Belinda with contract request and provided contact information and Belinda confirmed message receipt.    1755 SW updated pt on facility acceptance. Pt expressed gratitude. SW offered to arrange discharge transportation and pt accepted.    1750 SW contacted [facility] (Ph: [phone]), spoke with MARTIN and secured Wheelchair transportation for pt pickup on 8/24/2023 between [phone].    SW will continue to follow as needed.    ROSALINDA Odom, LGSW  ED/OBS   [facility]  Phone: [phone]  Pager: [phone]  Fax: [phone]     On-call pager, [phone], 4:00 pm to midnight          "

## 2023-08-23 NOTE — PROGRESS NOTES
[facility]    Medicine Progress Note - Hospitalist Service, GOLD TEAM 7    Date of Admission:  8/21/2023    Assessment & Plan   Karina Melvin is a 62 year old female admitted on 8/21/2023. She has a past medical history of metastatic pancreatic adenocarcinoma and ovarian mass s/p bilateral salpingo-oophorectomy. She presented to the ED with a three day history of abdominal pain and nausea, most likely related to underlying malignancy.     Today:  -Discussed with . Working on placement at [facility] with hospice agency.   -Continue supportive cares for abdominal pain, headache. No red flag signs.     # Abdominal pain  # Nausea  # Metastatic pancreatic cancer  Patient was diagnosed with metastatic pancreatic adenocarcinoma on 05/2023. Also diagnosed with an ovarian mass and is s/p bilateral salpingo-oophorectomy 06/27/2023. Her primary oncologist is Dr. Cruz and she recently stopped palliative chemotherapy. Recent CT reveals a progression of metastatic disease. She has had decreased PO intake over the last few days pickard to sharp abdominal pain when she eats. Alk phos 485 and AST 76, ALT 48. Lipase 84, lactic acid 0.8.  - Palliative care consult  -Oncology consult   - Dilaudid and oxycodone PRN  - Tramadol PRN (patient's request- she prefers non narcotic if able)  - Bowel regimen- senna and miralax   - NS 75 ml/hr until adequate PO intake       Diet: Combination Diet Regular Diet Adult    DVT Prophylaxis: comfort focused care   Donnelly Catheter: Not present  Lines: None     Cardiac Monitoring: None  Code Status: No CPR- Do NOT Intubate      Clinically Significant Risk Factors Present on Admission                                  Disposition Plan      Expected Discharge Date: 08/23/2023      Destination: nursing home            Dania Armando DO  Hospitalist Service, GOLD TEAM 7  [facility]  Securely message with [email or web address]  (more info)  Text page via [email or web address] Paging/Directory   See signed in provider for up to date coverage information  ______________________________________________________________________    Interval History   No overnight events reported. Still having abdominal pain which is unchanged from prior. Improved with pain meds. Able to eat some.     Physical Exam   Vital Signs: Temp: 97.9  F (36.6  C) Temp src: Oral BP: 131/87 Pulse: 70   Resp: 18 SpO2: 97 % O2 Device: None (Room air)    Weight: 109 lbs 0 oz    NAD   Abdomen tender to palpation in epigastric area with mild voluntary guarding in that area (soft, distractable)   Lungs clear, normal work of breathing on RA     Medical Decision Making           Data     I have personally reviewed the following data over the past 24 hrs:    N/A  \   N/A   / N/A     136 100 5.3 (L) /  91   3.8 25 0.57 \     ALT: 41 AST: 64 (H) AP: 487 (H) TBILI: 0.9   ALB: 3.7 TOT PROTEIN: 7.1 LIPASE: N/A       Imaging results reviewed over the past 24 hrs:   No results found for this or any previous visit (from the past 24 hour(s)).

## 2023-08-23 NOTE — PROGRESS NOTES
"[facility]  Palliative Care Daily Progress Note       Recommendations & Counseling     GOALS OF CARE:   Comfort focused  - Estrella has already decided not to pursue cancer treatment but had not yet enrolled in hospice care, had planned to move in with friends .     ADVANCE CARE PLANNING:  Health care directive on file, new today. Primary [facility] Friend Karyna Rand, alternate are Tong Troncoso and Layton Dinh.   POLST Completed today  Code status: No CPR- Do NOT Intubate     MEDICAL MANAGEMENT:   #Pain,acute on chronic cancer related pain with known pancreatic mass vs other acute process.   Opioids: Scheduled tramadol 50mg Q6 hours (Per pt's preference this is most effective and tolerates well.)  Oxycodone 5mg q 3 hrs PRN    Acetaminophen (Tylenol)1000mg BID Scheduled- discussed with patient benefits of multimodal therapy. She is hesitant to use as it has not been helpful alone.    Other medicines for pain: consider hydroxazine 25mg Q 3 hours PRN \"pain flare\"      #Constipation,Medication adverse effect opioids   Continue Sennoside (Senokot) 1-2 tabs BID as PTA     PSYCHOSOCIAL/SPIRITUAL SUPPORT:  Lives alone, was planning to move to [place] with her friends on Thursday.  New pain and inability to eat has been a shock and they have decided staying in [place] and seeking 24/7 support would be a better option.  Appreciate support of ED SW  in support of above.      Palliative Care will continue to follow. Thank you for the consult and allowing us to aid in the care of Karina Melvin.        Gosia Bo, VINCENZO CNP  Securely message with [employer] (more info)  Text page via [facility] Paging/Directory         Assessments          Consult Orders   Palliative Care Adult IP Consult: pancreatic cancer with mets, stopped chemo; Consultant may enter orders: Yes; Requesting provider? Hospitalist (if different from attending physician) [[id number]] ordered by Anu Virgen, " "APRN CNP at 08/22/23 0418          Expand All Collapse All    Palliative Care Consultation Note  [facility]        Patient: Karina Melvin  Date of Admission:  8/21/2023     Requesting Clinician / Team: Anu Virgen, VINCENZO ORTA  / [facility]  Reason for consult: Pain management  Patient and family support         Recommendations & Counseling      GOALS OF CARE:   Comfort focused  - Estrella has already decided not to pursue cancer treatment but had not yet enrolled in hospice care, had planned to move in with friends .  Estrella and friend's current focus is on pain management and planning for care outside the hospital with hospice locally before the friends return to [place] on Thursday.    We did not discuss goals of care re: this hospitalization.  Will need more conversation if acute reversible cause is found for pain other than r/t to cancer progression.  Once pain manages and planning has begun for disposition, consider further discussion on prognosis and what to expect if patient is open to this.      ADVANCE CARE PLANNING:  No health care directive on file. Per  informed consent policy, next of kin should be involved if patient becomes unable.  There is no POLST form on file, recommend to complete prior to DC.  Code status: No CPR- Do NOT Intubate     MEDICAL MANAGEMENT:   #Pain,acute on chronic cancer related pain with known pancreatic mass vs other acute process.   Opioids: recommend transition to oxycodone 5mg q 3 hrs PRN  from Tramadol.  Acetaminophen (Tylenol)1000mg BID Scheduled- discussed with patient benefits of multimodal therapy. She is hesitant to use as it has not been helpful alone.    Other medicines for pain: consider hydroxazine 25mg Q 3 hours PRN \"pain flare\"      #Constipation,Medication adverse effect opioids   Continue Sennoside (Senokot) 1-2 tabs BID as PTA     PSYCHOSOCIAL/SPIRITUAL SUPPORT:  Lives alone, was planning to move to [place] " with her friends on Thursday.  New pain and inability to eat has been a shock and they have decided staying in [place] and seeking 24/7 support would be a better option.  Appreciate support of ED CRYSTAL Mathews in support of above.      Palliative Care will continue to follow. Thank you for the consult and allowing us to aid in the care of Karina Melvin.        VINCENZO Dan CNP  Securely message with [employer] (more info)  Text page via [facility] Paging/Directory         Assessment       Karina Melvin is a 62 year old female with a past medical history of metastatic pancreatic adenocarcinoma (dx 05/2023), ovarian mass s/p bilateral salpingo-oophorectomy 06/27/2023  who presented on 8/21 with epigastric and right sided abdominal pain that has been ongoing for the last 3 days.   Pain is in epigastrium with mild area of pain in R Lower abdomen relieved with pressure. It is similar to known cancer related pain, sharp and constant. There is mild allodynia with palpation. It is worse with eating, Estrella has not eaten much in the past 3 days.  Usual Tramadol Q HS has been unhelpful even with increasing to q 5 hours PRN.   Tylenol has been unhelpful. PEr primary team, there is no obvious other cause for pain other than cancer.      Today, the patient was seen for:  Pain management  Assist with transition to hospice.                  Interval History:     Chart review/discussion with unit or clinical team members:   HCD completed today. Planning ongoing for facility with hospice care.  Trialed 2.5mg oxycodone last night, has received regular doses of Tramadol otherwise.    Per patient or family/caregivers today:  Patient anf friends feeling good about progress made in placement, remain hopeful Estrella can transfer to new facility prior to their leaving for [place] tomorrow.  Pain continues, oxycodone not effective.  Prefers Tramadol, has been most effective.                   Medications:     I have reviewed this patient's  medication profile and medications during this hospitalization.    Noted meds: 250mg Tramadol over the past 24 hours. 1mg IV dilaudid and 2.5mg oxycodone.           Physical Exam:   Vitals were reviewed  Temp: 97.9  F (36.6  C) Temp src: Oral BP: 131/87 Pulse: 70   Resp: 18 SpO2: 97 % O2 Device: None (Room air)    GEN:  Alert, oriented x 3, appears comfortable, NAD.  HEENT:  Normocephalic/atraumatic, no scleral icterus, no nasal discharge, mouth moist.  LUNGS:  non labored breathing.  Symmetric chest rise on inhalation noted.  ABD:  Active bowel sounds, soft, tender to light palpation over epigastrum /non-distended.  No rebound/guarding/rigidity.  EXT:  No edema or cyanosis.    SKIN:  Dry to touch, no exanthems noted in the visualized areas             Data Reviewed:     Reviewed recent pertinent imaging, comments:   none    Reviewed recent labs, comments:   Last Comprehensive Metabolic Panel:  Lab Results   Component Value Date     08/23/2023    POTASSIUM 3.8 08/23/2023    CHLORIDE 100 08/23/2023    CO2 25 08/23/2023    ANIONGAP 11 08/23/2023    GLC 91 08/23/2023    BUN 5.3 (L) 08/23/2023    CR 0.57 08/23/2023    GFRESTIMATED >90 08/23/2023    IVAN 9.1 08/23/2023      CBC RESULTS:   Recent Labs   Lab Test 08/22/23  0728   WBC 7.0   RBC 3.82   HGB 11.9   HCT 36.4   MCV 95   MCH 31.2   MCHC 32.7   RDW 13.2           Medical Decision Making     MANAGEMENT DISCUSSED with the following over the past 24 hours: Unit SW   NOTE(S)/MEDICAL RECORDS REVIEWED over the past 24 hours: nursing, SW, , medicine.  Tests REVIEWED in the past 24 hours:  - See lab/imaging results included in the data section of the note  SUPPLEMENTAL HISTORY, in addition to the patient's history, over the past 24 hours obtained from:   - GAYATRI Goodrich and keron, Brother in law Byung   Medical complexity over the past 24 hours:  - Prescription DRUG MANAGEMENT performed      Advance Care Planning Discussion 8/23/2023. Gosia SETH  VINCENZO Chen CNP met with Patient and HCA's today at the hospital to discuss Advance Care Planning. Karina Melvin does have decisional capacity and was present for this discussion.  Those present were informed of the voluntary nature of this discussion and wished to proceed.  The discussion included:  goals of care, and review and completion of POLST . This discussion began at 215PM and ended at 230 for a total of 15 minutes.

## 2023-08-23 NOTE — PLAN OF CARE
"Goal Outcome Evaluation:    BP (!) 138/93 (BP Location: Left arm)   Pulse 82   Temp 99  F (37.2  C) (Oral)   Resp 15   Ht 1.549 m (5' 1\")   Wt 49.4 kg (109 lb)   SpO2 93%   BMI 20.60 kg/m      2478-5300    Admitted for acute pancreatitis with hx of pancreatic cancer. A&Ox4, c/o abdominal pain, Dilaudid 0.5 mg once and Tramadol 50 mg once with some relief. Zofran 4 mg iv push once for nausea. SBA to the bathroom, steady gait. Ambulated to the bathroom twice. PIV infusing NS 75 ml/hr continuous. Nursing report given to OBS. Transferred to OBS room 3 around 2100. Will continue to monitor.   "

## 2023-08-24 NOTE — PROGRESS NOTES
DISCHARGE                         8/24/23 @ 1200.  ----------------------------------------------------------------------------  Discharged to: [facility]  Via: [facility]  Accompanied by: Family  Discharge Instructions: Regular diet, activity as tolerated, medications, follow up appointments, when to call the MD, aftercare instructions.  Prescriptions: To be filled by patient's preferred pharmacy; medication list reviewed & sent with pt  Follow Up Appointments: arranged; information given  Belongings: All sent with pt  IV: d/c'd  Telemetry: d/c'd  Pt exhibits understanding of above discharge instructions; all questions answered.    Discharge Paperwork: Signed, copied, and sent home with patient.

## 2023-08-24 NOTE — PROGRESS NOTES
"Care Management Discharge Note    Discharge Date: 08/24/2023     Discharge Disposition: Hospice, Long Term Care    [facility]  [address] [place]  [address]  P: [phone]  P: [phone] - Admissions  P: [phone] - RN Report  F: [phone]    Discharge Services:  (Hospice)    Discharge DME:  (Defer to SNF)    Discharge Transportation: agency - W/c  via [email or web address] [facility] [employer] (P: [phone])  between 4645-4309     Private pay costs discussed: Not applicable    Does the patient's insurance plan have a 3 day qualifying hospital stay waiver?  No    PAS Confirmation Code:  ([id number])  Patient/family educated on Medicare website which has current facility and service quality ratings: yes    Education Provided on the Discharge Plan: Yes  Persons Notified of Discharge Plans: Pt; SNF; MD; RN;  Patient/Family in Agreement with the Plan: yes    Handoff Referral Completed: Yes    Additional Information:  Chart reviewed. Case discussed with bedside RN and medical provider. Writer met with pt to discuss discharge plan. Pt to discharge to SNF today. Discussed orders.    [facility] (Ph: [phone]; Fax: [phone]) - Writer spoke with Belinda. They are expecting pt to admit to hospice today and will send out a team to admit pt to hospice later this afternoon. Writer faxed scripts and orders to SNF. Writer received call back from Belinda, who requested \"Eval and treat\" be included in the hospice orders. Writer worked with MD to update the orders. Writer re-faxed orders.     [facility]  [address] [place]  [address]  P: [phone]  P: [phone] - Admissions  F: [phone]  - Writer left VM RE: Discharge plan for pt today.  - Writer spoke with Edyta and confirmed discharge plan. SNF is in agreement.  - Writer manually faxed scripts.  - Writer e-faxed orders.  ________________    ROSALINDA Colin, [facility]  ED/Observation Social " Elvin SANCHEZ [facility]  Phone: [phone]  Pager: [phone]  Fax: [phone]    On-call pager, [phone], 4:00pm to midnight

## 2023-08-24 NOTE — DISCHARGE SUMMARY
[facility]  Hospitalist Discharge Summary      Date of Admission:  8/21/2023  Date of Discharge:  8/24/2023  1:08 PM  Discharging Provider: Dania Armando DO  Discharge Service: Hospitalist Service, GOLD TEAM 7    Discharge Diagnoses   Acute on Chronic Abdominal Pain  Metastatic Pancreatic Cancer   Discharge to LTC facility with hospice       Clinically Significant Risk Factors          Follow-ups Needed After Discharge   Follow-up Appointments     Follow Up and recommended labs and tests      Follow up with your hospice physician from now on.            Unresulted Labs Ordered in the Past 30 Days of this Admission       No orders found from 7/22/2023 to 8/22/2023.        These results will be followed up by NA     Discharge Disposition   Discharged to home  Condition at discharge: Stable    Hospital Course   Karina Melvin is a 62 year old female admitted on 8/21/2023. She has a past medical history of metastatic pancreatic adenocarcinoma and ovarian mass s/p bilateral salpingo-oophorectomy. She presented to the ED with a three day history of abdominal pain and nausea, most likely related to underlying malignancy. Palliative care consulted. Discharge to LTC with hospice support per her wishes.      Acute on Chronic Abdominal pain  Metastatic pancreatic cancer  Patient was diagnosed with metastatic pancreatic adenocarcinoma on 05/2023. Also diagnosed with an ovarian mass and is s/p bilateral salpingo-oophorectomy 06/27/2023. Her primary oncologist is Dr. Cruz and she recently stopped palliative chemotherapy. Recent CT reveals a progression of metastatic disease. She has had decreased PO intake over the last few days pickard to sharp abdominal pain when she eats. Alk phos 485 and AST 76, ALT 48. Lipase 84, lactic acid 0.8.  Suspect etiology related to malignancy as no other etiologies identified.   -Palliative care consult  -Oncology consult   -Dilaudid and oxycodone  PRN  -Tramadol PRN (patient's request- she prefers non narcotic if able)  -Bowel regimen- senna and miralax   -Started pantoprazole daily for symptom control from any GERD or gastritis component (had pain with eating)   -Zofran PRN   -Discharge to LTC with hospice     Consultations This Hospital Stay   PALLIATIVE CARE ADULT IP CONSULT  ONCOLOGY ADULT IP CONSULT  SOCIAL WORK IP CONSULT  SPIRITUAL HEALTH SERVICES IP CONSULT    Code Status   Prior    Time Spent on this Encounter   IDania DO, personally saw the patient today and spent greater than 30 minutes discharging this patient.       Dania Armando DO  [facility] [address] [facility]  [address] [place] [id number]  Phone: [phone]  Fax: [phone]  ______________________________________________________________________    Physical Exam   Vital Signs: Temp: 98.7  F (37.1  C) Temp src: Oral BP: (!) 140/90 Pulse: 67   Resp: 16 SpO2: 97 % O2 Device: None (Room air)    Weight: 109 lbs 0 oz  NAD   Abdomen tender to palpation in epigastric area with mild voluntary guarding in that area (soft, distractable)   Lungs clear, normal work of breathing on RA        Primary Care Physician   Ernestina De Leon    Discharge Orders      Primary Care - Care Coordination Referral      Hospice Referral      Reason for your hospital stay    You were hospitalized for abdominal pain due to your pancreatic cancer. You are being discharged on hospice according to your wishes.     Activity    Your activity upon discharge: activity as tolerated     Follow Up and recommended labs and tests    Follow up with your hospice physician from now on.     General info for SNF    Length of Stay Estimate: Long Term Care  Condition at Discharge: Terminal  Level of care:board and care  Rehabilitation Potential: Poor  Admission H&P remains valid and up-to-date: Yes  Recent Chemotherapy: N/A  Use Nursing Home Standing Orders: Yes     Mantoux instructions     Give two-step Mantoux (PPD) Per Facility Policy Yes     Diet    Follow this diet upon discharge: Orders Placed This Encounter      Combination Diet Regular Diet Adult       Significant Results and Procedures   Results for orders placed or performed during the hospital encounter of 08/21/23   XR Chest Port 1 View    Narrative    EXAM: XR CHEST PORT 1 VIEW  LOCATION: [facility]  DATE: 8/21/2023    INDICATION: epigastric and chest pain  COMPARISON: 08/13/2023      Impression    IMPRESSION: Stable cardiomediastinal silhouette. No focal consolidation or pleural effusion. Mild left basilar fibroatelectasis.   CT Abdomen Pelvis w Contrast    Narrative    EXAM: CT ABDOMEN PELVIS W CONTRAST  LOCATION: [facility]  DATE: 8/22/2023    INDICATION: met pancreatic cancer with epigastric pain and fullness with n v  eval for obstructive cause  COMPARISON: 08/13/2023  TECHNIQUE: CT scan of the abdomen and pelvis was performed following injection of IV contrast. Multiplanar reformats were obtained. Dose reduction techniques were used.  CONTRAST: 66 ml isovue 370    FINDINGS:   LOWER CHEST: Mild basilar atelectasis.    HEPATOBILIARY: Diffuse hepatic metastases again seen. Gallbladder wall thickening may be exaggerated by contraction of the gallbladder.    PANCREAS: Ill-defined mass of the pancreatic tail more. Mild dilatation of the main pancreatic duct. Slight stranding of fat adjacent to the pancreatic tail.    SPLEEN: Normal.    ADRENAL GLANDS: 1.9 cm left adrenal mass unchanged.    KIDNEYS/BLADDER: Normal.    BOWEL: Normal caliber. Normal appendix.    LYMPH NODES: Ill-defined peripancreatic lymph nodes similar.    VASCULATURE: Stable. Left abdominal varices.    PELVIC ORGANS: Normal.    MUSCULOSKELETAL: Degenerative change osseous structures.      Impression    IMPRESSION:   1.  Ill-defined pancreatic mass and hepatic metastases similar  to prior.  2.  Stranding of fat adjacent to the pancreatic tail. Correlate for superimposed pancreatitis.  3.  Left adrenal lesion similar.       Discharge Medications   Discharge Medication List as of 8/24/2023 11:49 AM        START taking these medications    Details   bisacodyl (DULCOLAX) 5 MG EC tablet Take 1 tablet (5 mg) by mouth daily as needed for constipation, Transitional      NONFORMULARY Hospice Referral - Please Evaluate and Treat, Disp-1 each, R-0, Transitional      ondansetron (ZOFRAN ODT) 4 MG ODT tab Take 1 tablet (4 mg) by mouth every 6 hours as needed for nausea or vomiting, R-0, Transitional      oxyCODONE (ROXICODONE) 5 MG tablet Take 1 tablet (5 mg) by mouth every 3 hours as needed for moderate pain (IF pain not managed with non-pharmacological and non-opioid interventions), Disp-24 tablet, R-0, Local Print      pantoprazole (PROTONIX) 40 MG EC tablet Take 1 tablet (40 mg) by mouth every morning (before breakfast), Transitional      polyethylene glycol (MIRALAX) 17 g packet Take 17 g by mouth daily, Transitional           CONTINUE these medications which have CHANGED    Details   traMADol (ULTRAM) 50 MG tablet Take 1 tablet (50 mg) by mouth every 3 hours as needed for severe pain, Disp-24 tablet, R-0, Local Print           CONTINUE these medications which have NOT CHANGED    Details   acetaminophen (TYLENOL) 325 MG tablet Take 2 tablets (650 mg) by mouth every 4 hours as needed for other (mild pain), Disp-100 tablet, R-0, E-Prescribe      azelastine (ASTELIN) 0.1 % nasal spray Spray 1 spray into both nostrils 2 times daily, Disp-30 mL, R-3, E-Prescribe      diclofenac (VOLTAREN) 1 % topical gel Apply 4 g topically 4 times daily, Disp-100 g, R-1, E-Prescribe      diphenhydrAMINE (BENADRYL) 2 % external cream Apply topically 3 times daily as needed for itchingDisp-56.8 g, E-[id number]-Enbxtbrca      lidocaine-prilocaine (EMLA) 2.5-2.5 % external cream Apply topically as needed for moderate painDisp-30 g,  G-1I-Gyhwvyusp      senna-docusate (SENOKOT-S/PERICOLACE) 8.6-50 MG tablet Take 1-2 tablets by mouth 2 times daily Take while on oral narcotics to prevent or treat constipation., Disp-30 tablet, R-0, E-PrescribeWhile taking narcotics           STOP taking these medications       diphenhydrAMINE (BENADRYL) 25 MG capsule Comments:   Reason for Stopping:         metoclopramide (REGLAN) 5 MG tablet Comments:   Reason for Stopping:         prochlorperazine (COMPAZINE) 10 MG tablet Comments:   Reason for Stopping:         VITAMIN D, CHOLECALCIFEROL, PO Comments:   Reason for Stopping:             Allergies   No Known Allergies

## 2023-08-24 NOTE — PLAN OF CARE
"Goal Outcome Evaluation             -Hospice and facility set up :Not met   -Tolerating PO nausea and pain medications :Met   -Tolerating PO diet, particularly fluids :Met     BP (!) 143/98 (BP Location: Left arm)   Pulse 114   Temp 98  F (36.7  C) (Oral)   Resp 18   Ht 1.549 m (5' 1\")   Wt 49.4 kg (109 lb)   SpO2 98%   BMI 20.60 kg/m                       "

## 2023-08-24 NOTE — PLAN OF CARE
Goal Outcome Evaluation             -Hospice and facility set up :Not met   -Tolerating PO nausea and pain medications :Met   -Tolerating PO diet, particularly fluids :Met

## 2023-08-24 NOTE — PLAN OF CARE
"Goal Outcome Evaluation:    Assume cares from 23:00 to 07:30.    Blood pressure (!) 144/88, pulse 72, temperature 98.6  F (37  C), temperature source Oral, resp. rate 16, height 1.549 m (5' 1\"), weight 49.4 kg (109 lb), SpO2 98 %.    Afebrile BP high as recorded above and recorded on flow sheet. On room air and sating > 92%. A/O x4. Pt c/o pain and had her scheduled Tramadol x 2 and x 1 prn Dilaudid 0.5 mg IV with fair relief. Left PIV with NS 0.9 /L over 75 ML/hour.She uses call light appropriately to ask for pain medications. She has been sleeping well between cares. Continue to assess pain and treat and follow plan of are.       Problem: Plan of Care - These are the overarching goals to be used throughout the patient stay.    Goal: Plan of Care Review  Description: The Plan of Care Review/Shift note should be completed every shift.  The Outcome Evaluation is a brief statement about your assessment that the patient is improving, declining, or no change.  This information will be displayed automatically on your shift note.  Outcome: Progressing     Problem: Plan of Care - These are the overarching goals to be used throughout the patient stay.    Goal: Patient-Specific Goal (Individualized)  Description: You can add care plan individualizations to a care plan. Examples of Individualization might be:  \"Parent requests to be called daily at 9am for status\", \"I have a hard time hearing out of my right ear\", or \"Do not touch me to wake me up as it startles me\".  Outcome: Progressing     Problem: Plan of Care - These are the overarching goals to be used throughout the patient stay.    Goal: Absence of Hospital-Acquired Illness or Injury  Intervention: Identify and Manage Fall Risk  Recent Flowsheet Documentation  Taken 8/24/2023 0400 by Anu Hendrickson RN  Safety Promotion/Fall Prevention:   activity supervised   lighting adjusted   clutter free environment maintained   nonskid shoes/slippers when out of bed   room " near nurse's station   room organization consistent  Taken 8/24/2023 0000 by Anu Hendrickson RN  Safety Promotion/Fall Prevention:   activity supervised   lighting adjusted   clutter free environment maintained   nonskid shoes/slippers when out of bed   room near nurse's station   room organization consistent     Problem: Plan of Care - These are the overarching goals to be used throughout the patient stay.    Goal: Absence of Hospital-Acquired Illness or Injury  Intervention: Prevent Skin Injury  Recent Flowsheet Documentation  Taken 8/24/2023 0400 by Anu Hendrickson RN  Body Position: position changed independently  Taken 8/24/2023 0000 by Anu Hendrickson RN  Body Position: position changed independently     Problem: Plan of Care - These are the overarching goals to be used throughout the patient stay.    Goal: Absence of Hospital-Acquired Illness or Injury  Intervention: Prevent and Manage VTE (Venous Thromboembolism) Risk  Recent Flowsheet Documentation  Taken 8/24/2023 0400 by Anu Hendrickson RN  VTE Prevention/Management:   SCDs (sequential compression devices) off   compression stockings off  Taken 8/24/2023 0000 by Anu Hendrickson RN  VTE Prevention/Management:   SCDs (sequential compression devices) off   compression stockings off     Problem: Plan of Care - These are the overarching goals to be used throughout the patient stay.    Goal: Optimal Comfort and Wellbeing  Outcome: Progressing

## 2023-08-25 PROBLEM — Z90.722 S/P BILATERAL SALPINGO-OOPHORECTOMY: Status: ACTIVE | Noted: 2023-01-01

## 2023-08-25 PROBLEM — F32.0 CURRENT MILD EPISODE OF MAJOR DEPRESSIVE DISORDER, UNSPECIFIED WHETHER RECURRENT (H): Status: ACTIVE | Noted: 2023-01-01

## 2023-08-25 PROBLEM — Z51.5 HOSPICE CARE PATIENT: Status: ACTIVE | Noted: 2023-01-01

## 2023-08-25 PROBLEM — K59.01 SLOW TRANSIT CONSTIPATION: Status: ACTIVE | Noted: 2023-01-01

## 2023-08-25 NOTE — LETTER
"    8/25/2023        RE: Karina Melvin  [address] [facility] [address]        [facility] GER[facility]    PRIMARY CARE PROVIDER AND CLINIC:  VINCENZO Donahue [facility], [address] [facility] [address]  Chief Complaint   Patient presents with     Hospital F/U      [facility] Medical Record Number:  [id number]  Place of Service where encounter took place:  [facility] () [[id number]]    Karina Melvin  is a 62 year old  (1960), admitted to the above facility from  [facility]. Hospital stay 08/21/2023 through 08/24/2023..   HPI:    61 yo female with history of metastatic pancreatic adenocarcinoma ovarian mass s/p bilateral salpingo-oophorectomy. She recently stopped palliative chemotherapy due to intolerance. She is now admitted for progressive abdominal pain and nausea and difficulty with PO. No clear reversible cause on imaging done at admission, suspect her symptoms and presentation most likely due to her metastatic disease. It is possible she has some mild pancreatitis, although lipase not significantly elevated, and care would be supportive at this time. CT does note \"Gallbladder wall thickening may be exaggerated by contraction of the gallbladder\" but no real clinical signs of cholecystitis, LFTs mostly stable. Could consider RUQ US pending further goals of care discussions. Patient is interested in hospice enrollment at this time, appreciate social work assistance in this. Patient has considered moving to [place] where she has more social support, but may not have insurance coverage for hospice in that case, so will likely stay here. For today, continue supportive cares with pain/nausea control, gentle IVF, diet as tolerated.     Patient seen for follow up, signed on with [facility] on 8/24, oriented, good historian, able to make own decisions, mild nausea, decreased intake and appetite, dislikes facility " meals, reports no BM x3 days, pain not well controlled with PRN tramadol and oxycodone and get behind, mild depression as sister recently passed with same Dx/Hx, wanted to return to [place] with friends and family but now thinks best to stay in [place] due to health issues, overall appears healthy, no distress.    CODE STATUS/ADVANCE DIRECTIVES DISCUSSION:   DNR / DNI  ALLERGIES: No Known Allergies   PAST MEDICAL HISTORY:   Past Medical History:   Diagnosis Date     Arthritis      Depressive disorder      Malignant neoplasm of pancreas metastatic to liver (H) 6/1/2023     Positive TB test       PAST SURGICAL HISTORY:   has a past surgical history that includes Colonoscopy with CO2 insufflation (N/A, 8/21/2019); Colonoscopy (N/A, 8/21/2019); IR Liver Biopsy Percutaneous (5/25/2023); IR Chest Port Placement > 5 Yrs of Age (6/8/2023); Insert port vascular access (Right, 6/8/2023); Laparoscopic salpingo-oophorectomy (Bilateral, 6/27/2023); IR Port Removal Right (7/27/2023); and Remove port vascular access (Right, 7/27/2023).  FAMILY HISTORY: family history includes Colon Cancer in her mother; Lung Cancer in her brother and father; Ovarian Cancer in her sister; Thyroid Disease in her sister.  SOCIAL HISTORY:   reports that she has never smoked. She has never used smokeless tobacco. She reports that she does not drink alcohol and does not use drugs.  Patient's living condition: lives in a skilled nursing facility    Post Discharge Medication Reconciliation Status:   MED REC REQUIRED  Post Medication Reconciliation Status: discharge medications reconciled and changed, per note/orders       Current Outpatient Medications   Medication Sig     bisacodyl (DULCOLAX) 5 MG EC tablet Take 1 tablet (5 mg) by mouth daily     senna-docusate (SENOKOT-S/PERICOLACE) 8.6-50 MG tablet Take 2 tablets by mouth 2 times daily Take while on oral narcotics to prevent or treat constipation.     traMADol (ULTRAM) 50 MG tablet Take 1 tablet (50 mg) by  mouth every 3 hours     acetaminophen (TYLENOL) 325 MG tablet Take 2 tablets (650 mg) by mouth every 4 hours as needed for other (mild pain)     azelastine (ASTELIN) 0.1 % nasal spray Spray 1 spray into both nostrils 2 times daily     diclofenac (VOLTAREN) 1 % topical gel Apply 4 g topically 4 times daily     diphenhydrAMINE (BENADRYL) 2 % external cream Apply topically 3 times daily as needed for itching     lidocaine-prilocaine (EMLA) 2.5-2.5 % external cream Apply topically as needed for moderate pain     NONFORMULARY Hospice Referral - Please Evaluate and Treat     ondansetron (ZOFRAN ODT) 4 MG ODT tab Take 1 tablet (4 mg) by mouth every 6 hours as needed for nausea or vomiting     oxyCODONE (ROXICODONE) 5 MG tablet Take 1 tablet (5 mg) by mouth every 3 hours as needed for moderate pain (IF pain not managed with non-pharmacological and non-opioid interventions)     pantoprazole (PROTONIX) 40 MG EC tablet Take 1 tablet (40 mg) by mouth every morning (before breakfast)     polyethylene glycol (MIRALAX) 17 g packet Take 17 g by mouth daily     No current facility-administered medications for this visit.       ROS:  4 point ROS including Respiratory, CV, GI and , other than that noted in the HPI,  is negative    Vitals:  /71   Pulse 81   Temp 97.5  F (36.4  C)   Resp 16   Wt 49.4 kg (108 lb 14.4 oz)   SpO2 93%   BMI 20.58 kg/m    Exam:  GENERAL APPEARANCE:  in no distress, appears healthy  ENT:  Mouth and posterior oropharynx normal, moist mucous membranes  RESP:  lungs clear to auscultation , no respiratory distress  CV:  regular rate and rhythm, no murmur, rub, or gallop, no edema  ABDOMEN:  bowel sounds normal  M/S:   Gait and station abnormal unsteady gait  SKIN:  Inspection of skin and subcutaneous tissue baseline  NEURO:   Examination of sensation by touch normal  PSYCH:  affect and mood normal    Lab/Diagnostic data:  Recent labs in [facility] reviewed by me today.     ASSESSMENT/PLAN:    (C25.9,   C78.7) Malignant neoplasm of pancreas metastatic to liver (H)  (primary encounter diagnosis)  (Z90.722) S/P bilateral salpingo-oophorectomy  (R10.13) Abdominal pain, epigastric  (F32.0) Current mild episode of major depressive disorder, unspecified whether recurrent (H)  (K59.01) Slow transit constipation  (Z51.5) Hospice care patient  Comment: post chemo, did not tolerate, pain abdominal, constipated, depressed, [facility] hosp[facility]  Plan:   -change senna to 2 tabs BID  -change bisacodyl to daily  -continue oxycodone Q3h PRN  -change tramadol to 50mg Q3h scheduled  -dietary eval for menu concerns  -ACP to eval for depression  -continue other meds as scheduled  -hospice nurse to visit 1-2x/wk and PRN  -if no BM in 1-2 days will need enema  -staff to support as indicated  -plan to follow RE: status and plan in 1-2 weeks      Electronically signed by:  VINCENZO Donahue CNP                    Sincerely,        VINCENZO Donahue CNP

## 2023-08-25 NOTE — PROGRESS NOTES
"[facility] GERIATRICS    PRIMARY CARE PROVIDER AND CLINIC:  Edmar Amanda, APRN CNP, [address] [facility] [address]  Chief Complaint   Patient presents with    Hospital F/U      [place] Medical Record Number:  [id number]  Place of Service where encounter took place:  [facility] () [[id number]]    Karina Melvin  is a 62 year old  (1960), admitted to the above facility from  [facility]. Hospital stay 08/21/2023 through 08/24/2023..   HPI:    63 yo female with history of metastatic pancreatic adenocarcinoma ovarian mass s/p bilateral salpingo-oophorectomy. She recently stopped palliative chemotherapy due to intolerance. She is now admitted for progressive abdominal pain and nausea and difficulty with PO. No clear reversible cause on imaging done at admission, suspect her symptoms and presentation most likely due to her metastatic disease. It is possible she has some mild pancreatitis, although lipase not significantly elevated, and care would be supportive at this time. CT does note \"Gallbladder wall thickening may be exaggerated by contraction of the gallbladder\" but no real clinical signs of cholecystitis, LFTs mostly stable. Could consider RUQ US pending further goals of care discussions. Patient is interested in hospice enrollment at this time, appreciate social work assistance in this. Patient has considered moving to [place] where she has more social support, but may not have insurance coverage for hospice in that case, so will likely stay here. For today, continue supportive cares with pain/nausea control, gentle IVF, diet as tolerated.     Patient seen for follow up, signed on with [facility] on 8/24, oriented, good historian, able to make own decisions, mild nausea, decreased intake and appetite, dislikes facility meals, reports no BM x3 days, pain not well controlled with PRN tramadol and oxycodone and get " behind, mild depression as sister recently passed with same Dx/Hx, wanted to return to [place] with friends and family but now thinks best to stay in [place] due to health issues, overall appears healthy, no distress.    CODE STATUS/ADVANCE DIRECTIVES DISCUSSION:   DNR / DNI  ALLERGIES: No Known Allergies   PAST MEDICAL HISTORY:   Past Medical History:   Diagnosis Date    Arthritis     Depressive disorder     Malignant neoplasm of pancreas metastatic to liver (H) 6/1/2023    Positive TB test       PAST SURGICAL HISTORY:   has a past surgical history that includes Colonoscopy with CO2 insufflation (N/A, 8/21/2019); Colonoscopy (N/A, 8/21/2019); IR Liver Biopsy Percutaneous (5/25/2023); IR Chest Port Placement > 5 Yrs of Age (6/8/2023); Insert port vascular access (Right, 6/8/2023); Laparoscopic salpingo-oophorectomy (Bilateral, 6/27/2023); IR Port Removal Right (7/27/2023); and Remove port vascular access (Right, 7/27/2023).  FAMILY HISTORY: family history includes Colon Cancer in her mother; Lung Cancer in her brother and father; Ovarian Cancer in her sister; Thyroid Disease in her sister.  SOCIAL HISTORY:   reports that she has never smoked. She has never used smokeless tobacco. She reports that she does not drink alcohol and does not use drugs.  Patient's living condition: lives in a skilled nursing facility    Post Discharge Medication Reconciliation Status:   MED REC REQUIRED  Post Medication Reconciliation Status: discharge medications reconciled and changed, per note/orders       Current Outpatient Medications   Medication Sig    bisacodyl (DULCOLAX) 5 MG EC tablet Take 1 tablet (5 mg) by mouth daily    senna-docusate (SENOKOT-S/PERICOLACE) 8.6-50 MG tablet Take 2 tablets by mouth 2 times daily Take while on oral narcotics to prevent or treat constipation.    traMADol (ULTRAM) 50 MG tablet Take 1 tablet (50 mg) by mouth every 3 hours    acetaminophen (TYLENOL) 325 MG tablet Take 2 tablets (650 mg) by mouth every 4  hours as needed for other (mild pain)    azelastine (ASTELIN) 0.1 % nasal spray Spray 1 spray into both nostrils 2 times daily    diclofenac (VOLTAREN) 1 % topical gel Apply 4 g topically 4 times daily    diphenhydrAMINE (BENADRYL) 2 % external cream Apply topically 3 times daily as needed for itching    lidocaine-prilocaine (EMLA) 2.5-2.5 % external cream Apply topically as needed for moderate pain    NONFORMULARY Hospice Referral - Please Evaluate and Treat    ondansetron (ZOFRAN ODT) 4 MG ODT tab Take 1 tablet (4 mg) by mouth every 6 hours as needed for nausea or vomiting    oxyCODONE (ROXICODONE) 5 MG tablet Take 1 tablet (5 mg) by mouth every 3 hours as needed for moderate pain (IF pain not managed with non-pharmacological and non-opioid interventions)    pantoprazole (PROTONIX) 40 MG EC tablet Take 1 tablet (40 mg) by mouth every morning (before breakfast)    polyethylene glycol (MIRALAX) 17 g packet Take 17 g by mouth daily     No current facility-administered medications for this visit.       ROS:  4 point ROS including Respiratory, CV, GI and , other than that noted in the HPI,  is negative    Vitals:  /71   Pulse 81   Temp 97.5  F (36.4  C)   Resp 16   Wt 49.4 kg (108 lb 14.4 oz)   SpO2 93%   BMI 20.58 kg/m    Exam:  GENERAL APPEARANCE:  in no distress, appears healthy  ENT:  Mouth and posterior oropharynx normal, moist mucous membranes  RESP:  lungs clear to auscultation , no respiratory distress  CV:  regular rate and rhythm, no murmur, rub, or gallop, no edema  ABDOMEN:  bowel sounds normal  M/S:   Gait and station abnormal unsteady gait  SKIN:  Inspection of skin and subcutaneous tissue baseline  NEURO:   Examination of sensation by touch normal  PSYCH:  affect and mood normal    Lab/Diagnostic data:  Recent labs in [facility] reviewed by me today.     ASSESSMENT/PLAN:    (C25.9,  C78.7) Malignant neoplasm of pancreas metastatic to liver (H)  (primary encounter diagnosis)  (Z90.722) S/P  bilateral salpingo-oophorectomy  (R10.13) Abdominal pain, epigastric  (F32.0) Current mild episode of major depressive disorder, unspecified whether recurrent (H)  (K59.01) Slow transit constipation  (Z51.5) Hospice care patient  Comment: post chemo, did not tolerate, pain abdominal, constipated, depressed, [facility]  Plan:   -change senna to 2 tabs BID  -change bisacodyl to daily  -continue oxycodone Q3h PRN  -change tramadol to 50mg Q3h scheduled  -dietary eval for menu concerns  -ACP to eval for depression  -continue other meds as scheduled  -hospice nurse to visit 1-2x/wk and PRN  -if no BM in 1-2 days will need enema  -staff to support as indicated  -plan to follow RE: status and plan in 1-2 weeks      Electronically signed by:  VINCENZO Donahue CNP

## 2023-08-25 NOTE — PROGRESS NOTES
Clinic Care Coordination Contact    Situation: Patient chart reviewed by care coordinator.    Background: IP staff placed a primary care coordination referral for transition of care.     Assessment: IP discharge to [facility]    Plan/Recommendations: patient no longer a care coordination candidate due to hospice admit.     Ying Poole RN, BSN, PHN Care Coordinator  [place] [place]   Phone: [phone]

## 2023-08-31 NOTE — TELEPHONE ENCOUNTER
Forms/Letter Request    Type of form/letter:  [facility]    Have you been seen for this request: N/A    Do we have the form/letter: Yes: Will place form on providers desk for review/signature    When is form/letter needed by: asap    How would you like the form/letter returned: Fax : [phone]

## 2023-09-20 NOTE — TELEPHONE ENCOUNTER
Received a call from Reid at [facility] to report that pt had passed away today.  FYI to provider  Birgit Aguayo CMA

## 2024-04-17 NOTE — BRIEF OP NOTE
[facility]    Brief Operative Note    Pre-operative diagnosis: Malignant neoplasm of pancreas, unspecified location of malignancy (H) [C25.9]  Post-operative diagnosis Same as pre-operative diagnosis    Procedure: Procedure(s):  Insert port vascular access  Surgeon: Surgeon(s) and Role:     * Shen Grande MD - Primary  Anesthesia: MAC   Estimated Blood Loss: Minimal    Drains: None  Specimens: * No specimens in log *  Findings:   None.  Complications: None.  Implants:   Implant Name Type Inv. Item Serial No.  Lot No. LRB No. Used Action   CATH PORT POWERPORT CLEARVUE SLIM 6FR [id number] - [id number] Port CATH PORT POWERPORT CLEARVUE SLIM 6FR [id number]  [email or web address] [facility] [id number] Right 1 Implanted     6 Danish 23 cm single lumen chest port via right IJ with tip at cavoatrial junction. Functions well and ready for immediate use.       [Home] : at home, [unfilled] , at the time of the visit. [Medical Office: ([facility])___] : at the medical office located in  [Family Member] : family member [Patient] : the patient

## (undated) DEVICE — LINEN GOWN XLG 5407

## (undated) DEVICE — PACK CENTRAL LINE INSERTION SAN32CLFCG

## (undated) DEVICE — KNIFE HANDLE W/15 BLADE 371615

## (undated) DEVICE — ADH SKIN CLOSURE PREMIERPRO EXOFIN 1.0ML 3470

## (undated) DEVICE — ENDO TROCAR FIRST ENTRY KII FIOS Z-THRD 12X100MM CTF73

## (undated) DEVICE — DEVICE SUTURE PASSER 14GA WECK EFX EFXSP2

## (undated) DEVICE — SYR 50ML LL W/O NDL 309653

## (undated) DEVICE — SU MONOCRYL 4-0 P-3 18" UND Y494G

## (undated) DEVICE — SUCTION IRR STRYKERFLOW II W/TIP 250-070-520

## (undated) DEVICE — ENDO TROCAR FIRST ENTRY KII FIOS Z-THRD 05X100MM CTF03

## (undated) DEVICE — SOL WATER IRRIG 1000ML BOTTLE 2F7114

## (undated) DEVICE — KIT INTRODUCER FLUENT MICRO 5FRX10CM ECHO TIP KIT-038-04

## (undated) DEVICE — WIPES FOLEY CARE SURESTEP PROVON DFC100

## (undated) DEVICE — PREP CHLORAPREP 26ML TINTED HI-LITE ORANGE 930815

## (undated) DEVICE — GLOVE BIOGEL PI ULTRATOUCH G SZ 8.0 42180

## (undated) DEVICE — SU VICRYL 3-0 SH 27" UND J416H

## (undated) DEVICE — ANTIFOG SOLUTION W/FOAM PAD 31142527

## (undated) DEVICE — GOWN XLG DISP 9545

## (undated) DEVICE — SU VICRYL 0 TIE 54" J608H

## (undated) DEVICE — DRSG DRAIN 2X2" 7087

## (undated) DEVICE — Device

## (undated) DEVICE — LINEN TOWEL PACK X5 5464

## (undated) DEVICE — DRSG TEGADERM 4X4 3/4" 1626W

## (undated) DEVICE — GLOVE GAMMEX NEOPRENE ULTRA SZ 7.5 LF 8515

## (undated) DEVICE — NDL INSUFFLATION 13GA 120MM C2201

## (undated) DEVICE — NDL SPINAL 22GA 3.5" QUINCKE 405181

## (undated) DEVICE — PREP POVIDONE-IODINE 10% SOLUTION 4OZ BOTTLE MDS093944

## (undated) DEVICE — SOL NACL 0.9% INJ 250ML BAG 2B1322Q

## (undated) DEVICE — SPECIMEN TRAP MUCOUS 40ML LUKI C30200A

## (undated) DEVICE — DECANTER BAG 2002S

## (undated) DEVICE — TUBING SMOKE EVAC PNEUMOCLEAR HIGH FLOW 0620050250

## (undated) DEVICE — SUCTION MANIFOLD NEPTUNE 2 SYS 4 PORT 0702-020-000

## (undated) DEVICE — ENDO TROCAR SLEEVE KII Z-THREADED 05X100MM CTS02

## (undated) DEVICE — SOL WATER IRRIG 1000ML BOTTLE 07139-09

## (undated) DEVICE — ESU LIGASURE LAPAROSCOPIC BLUNT TIP SEALER 5MMX37CM LF1837

## (undated) DEVICE — LINEN TOWEL PACK X30 5481

## (undated) DEVICE — KIT PATIENT POSITIONING PIGAZZI LATEX FREE 40580

## (undated) DEVICE — GLOVE BIOGEL PI ULTRATOUCH G SZ 7.5 42175

## (undated) DEVICE — LINEN TOWEL PACK X6 WHITE 5487

## (undated) DEVICE — COVER ULTRASOUND PROBE W/GEL FLEXI-FEEL 6"X58" LF  25-FF658

## (undated) DEVICE — ENDO POUCH UNIV RETRIEVAL SYSTEM INZII 10MM CD001

## (undated) DEVICE — ESU GROUND PAD ADULT W/CORD E7507

## (undated) DEVICE — DRSG GAUZE 2X2" 8042

## (undated) DEVICE — CONNECTOR MALE TO MALE LL

## (undated) DEVICE — SOL NACL 0.9% INJ 1000ML BAG 2B1324X

## (undated) DEVICE — SU DERMABOND ADVANCED .7ML DNX12

## (undated) DEVICE — DRSG TEGADERM 2 3/8X2 3/4" 1624W

## (undated) DEVICE — SU MONOCRYL 4-0 PS-2 27" UND Y426H

## (undated) DEVICE — PREP POVIDONE-IODINE 7.5% SCRUB 4OZ BOTTLE MDS093945

## (undated) DEVICE — SU VICRYL 4-0 P-3 18" UND  J494H

## (undated) DEVICE — BLADE CLIPPER SGL USE 9680

## (undated) RX ORDER — FENTANYL CITRATE 50 UG/ML
INJECTION, SOLUTION INTRAMUSCULAR; INTRAVENOUS
Status: DISPENSED
Start: 2023-01-01

## (undated) RX ORDER — HYDROMORPHONE HCL IN WATER/PF 6 MG/30 ML
PATIENT CONTROLLED ANALGESIA SYRINGE INTRAVENOUS
Status: DISPENSED
Start: 2023-01-01

## (undated) RX ORDER — ONDANSETRON 2 MG/ML
INJECTION INTRAMUSCULAR; INTRAVENOUS
Status: DISPENSED
Start: 2023-01-01

## (undated) RX ORDER — SODIUM CHLORIDE 9 MG/ML
INJECTION, SOLUTION INTRAVENOUS
Status: DISPENSED
Start: 2023-01-01

## (undated) RX ORDER — ACETAMINOPHEN 500 MG
TABLET ORAL
Status: DISPENSED
Start: 2023-01-01

## (undated) RX ORDER — FENTANYL CITRATE 50 UG/ML
INJECTION, SOLUTION INTRAMUSCULAR; INTRAVENOUS
Status: DISPENSED
Start: 2019-08-21

## (undated) RX ORDER — ACETAMINOPHEN 325 MG/1
TABLET ORAL
Status: DISPENSED
Start: 2023-01-01

## (undated) RX ORDER — HEPARIN SODIUM 5000 [USP'U]/.5ML
INJECTION, SOLUTION INTRAVENOUS; SUBCUTANEOUS
Status: DISPENSED
Start: 2023-01-01

## (undated) RX ORDER — PHENAZOPYRIDINE HYDROCHLORIDE 200 MG/1
TABLET, FILM COATED ORAL
Status: DISPENSED
Start: 2023-01-01

## (undated) RX ORDER — DEXAMETHASONE SODIUM PHOSPHATE 4 MG/ML
INJECTION, SOLUTION INTRA-ARTICULAR; INTRALESIONAL; INTRAMUSCULAR; INTRAVENOUS; SOFT TISSUE
Status: DISPENSED
Start: 2023-01-01

## (undated) RX ORDER — CEFAZOLIN SODIUM/WATER 2 G/20 ML
SYRINGE (ML) INTRAVENOUS
Status: DISPENSED
Start: 2023-01-01

## (undated) RX ORDER — TRAMADOL HYDROCHLORIDE 50 MG/1
TABLET ORAL
Status: DISPENSED
Start: 2023-01-01

## (undated) RX ORDER — SODIUM CHLORIDE, SODIUM LACTATE, POTASSIUM CHLORIDE, CALCIUM CHLORIDE 600; 310; 30; 20 MG/100ML; MG/100ML; MG/100ML; MG/100ML
INJECTION, SOLUTION INTRAVENOUS
Status: DISPENSED
Start: 2023-01-01

## (undated) RX ORDER — FENTANYL CITRATE-0.9 % NACL/PF 10 MCG/ML
PLASTIC BAG, INJECTION (ML) INTRAVENOUS
Status: DISPENSED
Start: 2023-01-01

## (undated) RX ORDER — METRONIDAZOLE 500 MG/100ML
INJECTION, SOLUTION INTRAVENOUS
Status: DISPENSED
Start: 2023-01-01

## (undated) RX ORDER — PROPOFOL 10 MG/ML
INJECTION, EMULSION INTRAVENOUS
Status: DISPENSED
Start: 2023-01-01

## (undated) RX ORDER — CLINDAMYCIN PHOSPHATE 900 MG/50ML
INJECTION, SOLUTION INTRAVENOUS
Status: DISPENSED
Start: 2023-01-01

## (undated) RX ORDER — CEFAZOLIN SODIUM 2 G/50ML
SOLUTION INTRAVENOUS
Status: DISPENSED
Start: 2023-01-01

## (undated) RX ORDER — LIDOCAINE HYDROCHLORIDE 10 MG/ML
INJECTION, SOLUTION EPIDURAL; INFILTRATION; INTRACAUDAL; PERINEURAL
Status: DISPENSED
Start: 2023-01-01